# Patient Record
Sex: MALE | Race: WHITE | NOT HISPANIC OR LATINO | Employment: UNEMPLOYED | URBAN - METROPOLITAN AREA
[De-identification: names, ages, dates, MRNs, and addresses within clinical notes are randomized per-mention and may not be internally consistent; named-entity substitution may affect disease eponyms.]

---

## 2017-12-12 ENCOUNTER — HOSPITAL ENCOUNTER (INPATIENT)
Facility: HOSPITAL | Age: 62
LOS: 15 days | DRG: 603 | End: 2017-12-27
Attending: EMERGENCY MEDICINE | Admitting: FAMILY MEDICINE
Payer: MEDICAID

## 2017-12-12 DIAGNOSIS — L03.116 CELLULITIS OF LEFT LOWER EXTREMITY WITHOUT FOOT: Primary | ICD-10-CM

## 2017-12-12 DIAGNOSIS — E11.65 TYPE 2 DIABETES MELLITUS WITH HYPERGLYCEMIA, WITHOUT LONG-TERM CURRENT USE OF INSULIN: ICD-10-CM

## 2017-12-12 DIAGNOSIS — Z79.4 DIABETES MELLITUS, TYPE II, INSULIN DEPENDENT: ICD-10-CM

## 2017-12-12 DIAGNOSIS — I25.10 CORONARY ARTERY DISEASE INVOLVING NATIVE CORONARY ARTERY OF NATIVE HEART WITHOUT ANGINA PECTORIS: ICD-10-CM

## 2017-12-12 DIAGNOSIS — R93.89 ABNORMAL CXR: ICD-10-CM

## 2017-12-12 DIAGNOSIS — E11.9 DIABETES MELLITUS, TYPE II, INSULIN DEPENDENT: ICD-10-CM

## 2017-12-12 DIAGNOSIS — M72.6 NECROTIZING FASCIITIS: ICD-10-CM

## 2017-12-12 DIAGNOSIS — M79.89 LEG SWELLING: ICD-10-CM

## 2017-12-12 DIAGNOSIS — F41.9 ANXIETY: ICD-10-CM

## 2017-12-12 DIAGNOSIS — K42.9 RECURRENT UMBILICAL HERNIA: ICD-10-CM

## 2017-12-12 DIAGNOSIS — L03.116 CELLULITIS OF LEFT LOWER EXTREMITY: ICD-10-CM

## 2017-12-12 DIAGNOSIS — I15.2 HYPERTENSION ASSOCIATED WITH DIABETES: ICD-10-CM

## 2017-12-12 DIAGNOSIS — E11.59 HYPERTENSION ASSOCIATED WITH DIABETES: ICD-10-CM

## 2017-12-12 DIAGNOSIS — R19.7 DIARRHEA, UNSPECIFIED TYPE: ICD-10-CM

## 2017-12-12 DIAGNOSIS — R79.89 ELEVATED TROPONIN: ICD-10-CM

## 2017-12-12 LAB
ALBUMIN SERPL BCP-MCNC: 2.5 G/DL
ALP SERPL-CCNC: 85 U/L
ALT SERPL W/O P-5'-P-CCNC: 30 U/L
ANION GAP SERPL CALC-SCNC: 10 MMOL/L
APTT BLDCRRT: 30 SEC
AST SERPL-CCNC: 33 U/L
BACTERIA #/AREA URNS HPF: NORMAL /HPF
BASOPHILS # BLD AUTO: 0.01 K/UL
BASOPHILS NFR BLD: 0.1 %
BILIRUB SERPL-MCNC: 0.5 MG/DL
BILIRUB UR QL STRIP: NEGATIVE
BUN SERPL-MCNC: 14 MG/DL
CALCIUM SERPL-MCNC: 9.2 MG/DL
CHLORIDE SERPL-SCNC: 98 MMOL/L
CLARITY UR: CLEAR
CO2 SERPL-SCNC: 28 MMOL/L
COLOR UR: YELLOW
CREAT SERPL-MCNC: 0.8 MG/DL
DIFFERENTIAL METHOD: ABNORMAL
EOSINOPHIL # BLD AUTO: 0 K/UL
EOSINOPHIL NFR BLD: 0 %
ERYTHROCYTE [DISTWIDTH] IN BLOOD BY AUTOMATED COUNT: 13.5 %
EST. GFR  (AFRICAN AMERICAN): >60 ML/MIN/1.73 M^2
EST. GFR  (NON AFRICAN AMERICAN): >60 ML/MIN/1.73 M^2
ESTIMATED AVG GLUCOSE: 140 MG/DL
GLUCOSE SERPL-MCNC: 164 MG/DL
GLUCOSE SERPL-MCNC: 170 MG/DL (ref 70–110)
GLUCOSE UR QL STRIP: NEGATIVE
HBA1C MFR BLD HPLC: 6.5 %
HCT VFR BLD AUTO: 42.4 %
HGB BLD-MCNC: 14.4 G/DL
HGB UR QL STRIP: ABNORMAL
HYALINE CASTS #/AREA URNS LPF: 0 /LPF
INR PPP: 1
KETONES UR QL STRIP: NEGATIVE
LACTATE SERPL-SCNC: 1.1 MMOL/L
LACTATE SERPL-SCNC: 1.5 MMOL/L
LACTATE SERPL-SCNC: 1.5 MMOL/L
LEUKOCYTE ESTERASE UR QL STRIP: NEGATIVE
LIPASE SERPL-CCNC: 10 U/L
LYMPHOCYTES # BLD AUTO: 0.5 K/UL
LYMPHOCYTES NFR BLD: 3.6 %
MAGNESIUM SERPL-MCNC: 1.3 MG/DL
MCH RBC QN AUTO: 35 PG
MCHC RBC AUTO-ENTMCNC: 34 G/DL
MCV RBC AUTO: 103 FL
MICROSCOPIC COMMENT: NORMAL
MONOCYTES # BLD AUTO: 1.1 K/UL
MONOCYTES NFR BLD: 8 %
NEUTROPHILS # BLD AUTO: 12.4 K/UL
NEUTROPHILS NFR BLD: 87.9 %
NITRITE UR QL STRIP: NEGATIVE
PH UR STRIP: 7 [PH] (ref 5–8)
PHOSPHATE SERPL-MCNC: 1.7 MG/DL
PLATELET # BLD AUTO: 176 K/UL
PMV BLD AUTO: 12.2 FL
POCT GLUCOSE: 170 MG/DL (ref 70–110)
POCT GLUCOSE: 210 MG/DL (ref 70–110)
POTASSIUM SERPL-SCNC: 3.9 MMOL/L
PROCALCITONIN SERPL IA-MCNC: 0.55 NG/ML
PROT SERPL-MCNC: 7 G/DL
PROT UR QL STRIP: ABNORMAL
PROTHROMBIN TIME: 10.9 SEC
RBC # BLD AUTO: 4.12 M/UL
RBC #/AREA URNS HPF: 1 /HPF (ref 0–4)
SODIUM SERPL-SCNC: 136 MMOL/L
SP GR UR STRIP: <=1.005 (ref 1–1.03)
SQUAMOUS #/AREA URNS HPF: 2 /HPF
TROPONIN I SERPL DL<=0.01 NG/ML-MCNC: 0.02 NG/ML
TROPONIN I SERPL DL<=0.01 NG/ML-MCNC: 0.05 NG/ML
URN SPEC COLLECT METH UR: ABNORMAL
UROBILINOGEN UR STRIP-ACNC: 1 EU/DL
WBC # BLD AUTO: 14.05 K/UL
WBC #/AREA URNS HPF: 0 /HPF (ref 0–5)

## 2017-12-12 PROCEDURE — 84484 ASSAY OF TROPONIN QUANT: CPT | Mod: 91

## 2017-12-12 PROCEDURE — 85610 PROTHROMBIN TIME: CPT

## 2017-12-12 PROCEDURE — 93005 ELECTROCARDIOGRAM TRACING: CPT

## 2017-12-12 PROCEDURE — 93010 ELECTROCARDIOGRAM REPORT: CPT | Mod: 76,,, | Performed by: INTERNAL MEDICINE

## 2017-12-12 PROCEDURE — 25000003 PHARM REV CODE 250: Performed by: STUDENT IN AN ORGANIZED HEALTH CARE EDUCATION/TRAINING PROGRAM

## 2017-12-12 PROCEDURE — 81000 URINALYSIS NONAUTO W/SCOPE: CPT

## 2017-12-12 PROCEDURE — 63600175 PHARM REV CODE 636 W HCPCS: Performed by: STUDENT IN AN ORGANIZED HEALTH CARE EDUCATION/TRAINING PROGRAM

## 2017-12-12 PROCEDURE — 83690 ASSAY OF LIPASE: CPT

## 2017-12-12 PROCEDURE — 96366 THER/PROPH/DIAG IV INF ADDON: CPT

## 2017-12-12 PROCEDURE — 83735 ASSAY OF MAGNESIUM: CPT

## 2017-12-12 PROCEDURE — 85730 THROMBOPLASTIN TIME PARTIAL: CPT

## 2017-12-12 PROCEDURE — 96372 THER/PROPH/DIAG INJ SC/IM: CPT

## 2017-12-12 PROCEDURE — 85025 COMPLETE CBC W/AUTO DIFF WBC: CPT

## 2017-12-12 PROCEDURE — 25000003 PHARM REV CODE 250: Performed by: EMERGENCY MEDICINE

## 2017-12-12 PROCEDURE — 82962 GLUCOSE BLOOD TEST: CPT

## 2017-12-12 PROCEDURE — 21400001 HC TELEMETRY ROOM

## 2017-12-12 PROCEDURE — 87040 BLOOD CULTURE FOR BACTERIA: CPT

## 2017-12-12 PROCEDURE — 84145 PROCALCITONIN (PCT): CPT

## 2017-12-12 PROCEDURE — 93010 ELECTROCARDIOGRAM REPORT: CPT | Mod: ,,, | Performed by: INTERNAL MEDICINE

## 2017-12-12 PROCEDURE — 99285 EMERGENCY DEPT VISIT HI MDM: CPT | Mod: 25

## 2017-12-12 PROCEDURE — 83605 ASSAY OF LACTIC ACID: CPT

## 2017-12-12 PROCEDURE — 83605 ASSAY OF LACTIC ACID: CPT | Mod: 91

## 2017-12-12 PROCEDURE — 80053 COMPREHEN METABOLIC PANEL: CPT

## 2017-12-12 PROCEDURE — 36415 COLL VENOUS BLD VENIPUNCTURE: CPT

## 2017-12-12 PROCEDURE — 87086 URINE CULTURE/COLONY COUNT: CPT

## 2017-12-12 PROCEDURE — 96365 THER/PROPH/DIAG IV INF INIT: CPT

## 2017-12-12 PROCEDURE — 84100 ASSAY OF PHOSPHORUS: CPT

## 2017-12-12 PROCEDURE — 83036 HEMOGLOBIN GLYCOSYLATED A1C: CPT

## 2017-12-12 PROCEDURE — 84484 ASSAY OF TROPONIN QUANT: CPT

## 2017-12-12 PROCEDURE — 96367 TX/PROPH/DG ADDL SEQ IV INF: CPT

## 2017-12-12 PROCEDURE — 63600175 PHARM REV CODE 636 W HCPCS: Performed by: EMERGENCY MEDICINE

## 2017-12-12 RX ORDER — IBUPROFEN 200 MG
24 TABLET ORAL
Status: DISCONTINUED | OUTPATIENT
Start: 2017-12-12 | End: 2017-12-27 | Stop reason: HOSPADM

## 2017-12-12 RX ORDER — SODIUM,POTASSIUM PHOSPHATES 280-250MG
1 POWDER IN PACKET (EA) ORAL ONCE
Status: COMPLETED | OUTPATIENT
Start: 2017-12-12 | End: 2017-12-12

## 2017-12-12 RX ORDER — IBUPROFEN 200 MG
16 TABLET ORAL
Status: DISCONTINUED | OUTPATIENT
Start: 2017-12-12 | End: 2017-12-27 | Stop reason: HOSPADM

## 2017-12-12 RX ORDER — CLOPIDOGREL BISULFATE 75 MG/1
75 TABLET ORAL DAILY
Status: DISCONTINUED | OUTPATIENT
Start: 2017-12-12 | End: 2017-12-27 | Stop reason: HOSPADM

## 2017-12-12 RX ORDER — GLUCAGON 1 MG
1 KIT INJECTION
Status: DISCONTINUED | OUTPATIENT
Start: 2017-12-12 | End: 2017-12-27 | Stop reason: HOSPADM

## 2017-12-12 RX ORDER — HYDROCODONE BITARTRATE AND ACETAMINOPHEN 5; 325 MG/1; MG/1
1 TABLET ORAL
Status: COMPLETED | OUTPATIENT
Start: 2017-12-12 | End: 2017-12-12

## 2017-12-12 RX ORDER — CARVEDILOL 6.25 MG/1
6.25 TABLET ORAL 2 TIMES DAILY WITH MEALS
Status: DISCONTINUED | OUTPATIENT
Start: 2017-12-12 | End: 2017-12-27 | Stop reason: HOSPADM

## 2017-12-12 RX ORDER — ENOXAPARIN SODIUM 100 MG/ML
40 INJECTION SUBCUTANEOUS EVERY 24 HOURS
Status: DISCONTINUED | OUTPATIENT
Start: 2017-12-12 | End: 2017-12-27 | Stop reason: HOSPADM

## 2017-12-12 RX ORDER — IBUPROFEN 200 MG
1 TABLET ORAL DAILY
Status: DISCONTINUED | OUTPATIENT
Start: 2017-12-13 | End: 2017-12-27 | Stop reason: HOSPADM

## 2017-12-12 RX ORDER — LANOLIN ALCOHOL/MO/W.PET/CERES
400 CREAM (GRAM) TOPICAL 2 TIMES DAILY
Status: COMPLETED | OUTPATIENT
Start: 2017-12-12 | End: 2017-12-13

## 2017-12-12 RX ORDER — HYDROCHLOROTHIAZIDE 12.5 MG/1
12.5 TABLET ORAL DAILY
Status: DISCONTINUED | OUTPATIENT
Start: 2017-12-12 | End: 2017-12-27 | Stop reason: HOSPADM

## 2017-12-12 RX ORDER — INSULIN ASPART 100 [IU]/ML
0-5 INJECTION, SOLUTION INTRAVENOUS; SUBCUTANEOUS
Status: DISCONTINUED | OUTPATIENT
Start: 2017-12-12 | End: 2017-12-27 | Stop reason: HOSPADM

## 2017-12-12 RX ORDER — ASPIRIN 81 MG/1
81 TABLET ORAL DAILY
Status: DISCONTINUED | OUTPATIENT
Start: 2017-12-12 | End: 2017-12-27 | Stop reason: HOSPADM

## 2017-12-12 RX ORDER — MORPHINE SULFATE 2 MG/ML
4 INJECTION, SOLUTION INTRAMUSCULAR; INTRAVENOUS EVERY 4 HOURS PRN
Status: DISCONTINUED | OUTPATIENT
Start: 2017-12-12 | End: 2017-12-20

## 2017-12-12 RX ORDER — HYDROCODONE BITARTRATE AND ACETAMINOPHEN 5; 325 MG/1; MG/1
1 TABLET ORAL EVERY 6 HOURS PRN
Status: DISCONTINUED | OUTPATIENT
Start: 2017-12-12 | End: 2017-12-27 | Stop reason: HOSPADM

## 2017-12-12 RX ORDER — CLORAZEPATE DIPOTASSIUM 3.75 MG/1
15 TABLET ORAL 2 TIMES DAILY
Status: DISCONTINUED | OUTPATIENT
Start: 2017-12-12 | End: 2017-12-27 | Stop reason: HOSPADM

## 2017-12-12 RX ORDER — SIMVASTATIN 20 MG/1
40 TABLET, FILM COATED ORAL NIGHTLY
Status: DISCONTINUED | OUTPATIENT
Start: 2017-12-12 | End: 2017-12-27 | Stop reason: HOSPADM

## 2017-12-12 RX ORDER — SODIUM CHLORIDE 0.9 % (FLUSH) 0.9 %
5 SYRINGE (ML) INJECTION
Status: DISCONTINUED | OUTPATIENT
Start: 2017-12-12 | End: 2017-12-27 | Stop reason: HOSPADM

## 2017-12-12 RX ADMIN — CARVEDILOL 6.25 MG: 6.25 TABLET, FILM COATED ORAL at 05:12

## 2017-12-12 RX ADMIN — HYDROCODONE BITARTRATE AND ACETAMINOPHEN 1 TABLET: 5; 325 TABLET ORAL at 08:12

## 2017-12-12 RX ADMIN — HYDROCODONE BITARTRATE AND ACETAMINOPHEN 1 TABLET: 5; 325 TABLET ORAL at 11:12

## 2017-12-12 RX ADMIN — CEFTRIAXONE SODIUM 1 G: 1 INJECTION, POWDER, FOR SOLUTION INTRAMUSCULAR; INTRAVENOUS at 05:12

## 2017-12-12 RX ADMIN — POTASSIUM & SODIUM PHOSPHATES POWDER PACK 280-160-250 MG 1 PACKET: 280-160-250 PACK at 05:12

## 2017-12-12 RX ADMIN — VANCOMYCIN HYDROCHLORIDE 1250 MG: 1 INJECTION, POWDER, LYOPHILIZED, FOR SOLUTION INTRAVENOUS at 01:12

## 2017-12-12 RX ADMIN — INSULIN ASPART 1 UNITS: 100 INJECTION, SOLUTION INTRAVENOUS; SUBCUTANEOUS at 10:12

## 2017-12-12 RX ADMIN — CLORAZEPATE DIPOTASSIUM 15 MG: 3.75 TABLET ORAL at 08:12

## 2017-12-12 RX ADMIN — CLOPIDOGREL 75 MG: 75 TABLET, FILM COATED ORAL at 05:12

## 2017-12-12 RX ADMIN — HYDROCODONE BITARTRATE AND ACETAMINOPHEN 1 TABLET: 5; 325 TABLET ORAL at 12:12

## 2017-12-12 RX ADMIN — PIPERACILLIN SODIUM AND TAZOBACTAM SODIUM 4.5 G: 4; .5 INJECTION, POWDER, FOR SOLUTION INTRAVENOUS at 10:12

## 2017-12-12 RX ADMIN — ENOXAPARIN SODIUM 40 MG: 100 INJECTION SUBCUTANEOUS at 05:12

## 2017-12-12 RX ADMIN — PIPERACILLIN AND TAZOBACTAM 4.5 G: 4; .5 INJECTION, POWDER, LYOPHILIZED, FOR SOLUTION INTRAVENOUS; PARENTERAL at 12:12

## 2017-12-12 RX ADMIN — Medication 400 MG: at 05:12

## 2017-12-12 RX ADMIN — ASPIRIN 81 MG: 81 TABLET, COATED ORAL at 05:12

## 2017-12-12 RX ADMIN — SIMVASTATIN 40 MG: 20 TABLET, FILM COATED ORAL at 05:12

## 2017-12-12 RX ADMIN — HYDROCHLOROTHIAZIDE 12.5 MG: 12.5 TABLET ORAL at 05:12

## 2017-12-12 NOTE — ED NOTES
APPEARANCE: Alert, oriented and in no acute distress.  CARDIAC: Normal rate and rhythm, no murmur heard.   PERIPHERAL VASCULAR: peripheral pulses present. Normal cap refill. Edema to left lower extremity. Warm to touch.    RESPIRATORY:Normal rate and effort, breath sounds clear bilaterally throughout chest. Respirations are equal and unlabored no obvious signs of distress.  GASTRO: soft, bowel sounds normal, no tenderness, no abdominal distention.  MUSC: Limited ROM to left lower extremity due to pain and swelling. Soft tissue tenderness to left lower extremity.   SKIN: Skin is warm and dry, normal skin turgor, mucous membranes moist. Left lower extremity is very red, swollen and the redness is traveling up left leg to groin area, very painful. Area at left knee is purplish red in color.   NEURO: 5/5 strength major flexors/extensors bilaterally. Sensory intact to light touch bilaterally. Guy coma scale: eyes open spontaneously-4, oriented & converses-5, obeys commands-6. No neurological abnormalities.   MENTAL STATUS: awake, alert and aware of environment.  EYE: PERRL, both eyes: pupils brisk and reactive to light. Normal size.  ENT: EARS: no obvious drainage. NOSE: no active bleeding.   Pt complains of left leg pain, redness and swelling x 2 days but has been having trouble with swelling off and on for 1 month.

## 2017-12-12 NOTE — PROGRESS NOTES
"Vancomycin Dosing and Monitoring Pharmacy Protocol    Reji Guerra is a 62 y.o. male    Height: 5' 5" (1.651 m)   Wt Readings from Last 1 Encounters:   12/12/17 106.6 kg (235 lb)     Ideal body weight: 61.5 kg (135 lb 9.3 oz)  Adjusted ideal body weight: 79.5 kg (175 lb 5.6 oz)    Temp Readings from Last 3 Encounters:   12/12/17 98.7 °F (37.1 °C) (Oral)   05/22/15 97.3 °F (36.3 °C) (Oral)   05/20/15 98 °F (36.7 °C)      Lab Results   Component Value Date/Time    WBC 14.05 (H) 12/12/2017 11:19 AM      Lab Results   Component Value Date/Time    CREATININE 0.8 12/12/2017 12:26 PM        Serum creatinine: 0.8 mg/dL 12/12/17 1226  Estimated creatinine clearance: 104.9 mL/min    Antibiotics       Start     Stop Route Frequency Ordered    12/13/17 0200  vancomycin (VANCOCIN) 1,250 mg in dextrose 5 % 250 mL IVPB  (Vancomycin IVPB with levels panel)      -- IV Every 12 hours (non-standard times) 12/12/17 1653    12/12/17 1700  cefTRIAXone (ROCEPHIN) 1 g in dextrose 5 % 50 mL IVPB (ready to mix system)      -- IV Every 12 hours (non-standard times) 12/12/17 1624          Antifungals       None            Microbiology Results (last 7 days)       Procedure Component Value Units Date/Time    Urine culture [[id number]] Collected:  12/12/17 1207    Order Status:  Sent Specimen:  Urine from Urine, Clean Catch Updated:  12/12/17 1605    Blood culture x two cultures. Draw prior to antibiotics. [[id number]] Collected:  12/12/17 1226    Order Status:  Sent Specimen:  Blood from Peripheral, Lower Arm, Right Updated:  12/12/17 1604    Blood culture x two cultures. Draw prior to antibiotics. [[id number]] Collected:  12/12/17 1119    Order Status:  Sent Specimen:  Blood from Peripheral, Hand, Left Updated:  12/12/17 1451            Indication:   Skin and skin structure    Target Level: 10-15 mcg/ml    Hemodialysis:   No on N/A    Dosing Weight:   AdjBW--adjusted body weight  If ABW is greater than or equal to 30% over Ideal Body Weight, " AdjBW will be used to calculate vancomycin dose.    Last Vancomycin dose: 1250 mg 17 at  1400 in ER    Date/Time given: N/A         Vancomycin level:  No results for input(s): VANCOMYCIN-TROUGH in the last 72 hours.  No results for input(s): VANCOMYCIN, RANDOM in the last 72 hours.    Per Protocol Initial/Adjustments Dosin. Initial/Adjustment Dose: DECREASE Vancomycin will be adjusted from 1500 mg q12 hr to 1250mg q12hr  2. Vancomycin Trough Level will be drawn on 17 at 0100 date/time    Pharmacy will continue to follow.    Please contact if you have any further questions. Thank you.    Helena Richards, PharmD  [phone]

## 2017-12-12 NOTE — ED PROVIDER NOTES
Encounter Date: 12/12/2017       History     Chief Complaint   Patient presents with    Leg Swelling     leg swelling, redness, rash, and tenderness off/on one month     This is a 63 y/o M with multiple co morbidities including NIDDM, PVD, CAD, HTN who presents with worsening erythema and swelling of left lower extremity.  Patient denies fever or chills.  Reports throbbing, burning, aching pain of LLE.  Redness has been worsening and at this time entire lower leg is erythematous and swollen and he has streaking area of redness up to the level of his groin on the L side.  No nausea or vomiting.  No chest pain or SOB.  No hx of DVT or PE previously.  Noncompliant with previously prescribed medication for HTN and DM and no visit to MD for over a year.          Leg Pain    The incident occurred at home. There was no injury mechanism. The incident occurred several weeks ago. The pain is present in the left leg. The quality of the pain is described as aching and burning. The pain is at a severity of 10/10. The pain has been constant since onset. Pertinent negatives include no numbness, no inability to bear weight, no loss of motion, no muscle weakness, no loss of sensation and no tingling. He reports no foreign bodies present. The symptoms are aggravated by bearing weight, activity and palpation. He has tried nothing for the symptoms.     Review of patient's allergies indicates:   Allergen Reactions    Iodine and iodide containing products     Shellfish containing products      Past Medical History:   Diagnosis Date    Diabetes mellitus, type 2     Heart disease     High cholesterol     Hypertension      Past Surgical History:   Procedure Laterality Date    ANGIOPLASTY      HERNIA REPAIR       No family history on file.  Social History   Substance Use Topics    Smoking status: Former Smoker    Smokeless tobacco: Not on file    Alcohol use No     Review of Systems   Constitutional: Positive for fatigue and  fever.   HENT: Negative for sore throat.    Respiratory: Negative for shortness of breath.    Cardiovascular: Positive for leg swelling. Negative for chest pain.   Gastrointestinal: Negative for nausea.   Genitourinary: Negative for dysuria.   Musculoskeletal: Negative for back pain.   Skin: Positive for color change, rash and wound.   Neurological: Negative for tingling, weakness and numbness.   Hematological: Does not bruise/bleed easily.   All other systems reviewed and are negative.      Physical Exam     Initial Vitals   BP Pulse Resp Temp SpO2   12/12/17 1025 12/12/17 1025 12/12/17 1025 12/12/17 1249 12/12/17 1025   126/70 88 15 98.9 °F (37.2 °C) 96 %      MAP       12/12/17 1025       88.67         Physical Exam    Nursing note and vitals reviewed.  Constitutional: He appears well-developed and well-nourished.   HENT:   Head: Normocephalic and atraumatic.   Eyes: Conjunctivae and EOM are normal. Pupils are equal, round, and reactive to light.   Neck: Normal range of motion. Neck supple.   Cardiovascular: Normal rate, regular rhythm, normal heart sounds and intact distal pulses. Exam reveals no gallop and no friction rub.    No murmur heard.  Pulmonary/Chest: Breath sounds normal. No stridor. No respiratory distress. He has no wheezes. He has no rhonchi. He has no rales. He exhibits no tenderness.   Abdominal: Soft. Bowel sounds are normal. He exhibits no distension. There is no tenderness. There is no rebound and no guarding.   Musculoskeletal: Normal range of motion. He exhibits edema and tenderness.   Bilateral lower extremity edema worse on the L side, Full ROM of L knee    Neurological: He is alert and oriented to person, place, and time. He has normal strength. No cranial nerve deficit.   Skin: Skin is warm and dry. Capillary refill takes less than 2 seconds. Rash noted. There is erythema.   Psychiatric: He has a normal mood and affect.         ED Course   Procedures  Labs Reviewed   CBC W/ AUTO  DIFFERENTIAL - Abnormal; Notable for the following:        Result Value    WBC 14.05 (*)     RBC 4.12 (*)      (*)     MCH 35.0 (*)     Gran # 12.4 (*)     Lymph # 0.5 (*)     Mono # 1.1 (*)     Gran% 87.9 (*)     Lymph% 3.6 (*)     All other components within normal limits   URINALYSIS - Abnormal; Notable for the following:     Specific Gravity, UA <=1.005 (*)     Protein, UA 2+ (*)     Occult Blood UA 1+ (*)     All other components within normal limits   COMPREHENSIVE METABOLIC PANEL - Abnormal; Notable for the following:     Glucose 164 (*)     Albumin 2.5 (*)     All other components within normal limits   TROPONIN I - Abnormal; Notable for the following:     Troponin I 0.051 (*)     All other components within normal limits   MAGNESIUM - Abnormal; Notable for the following:     Magnesium 1.3 (*)     All other components within normal limits   PHOSPHORUS - Abnormal; Notable for the following:     Phosphorus 1.7 (*)     All other components within normal limits   PROCALCITONIN - Abnormal; Notable for the following:     Procalcitonin 0.55 (*)     All other components within normal limits   POCT GLUCOSE - Abnormal; Notable for the following:     POCT Glucose 170 (*)     All other components within normal limits   PROTIME-INR   APTT   LACTIC ACID, PLASMA   LIPASE   LACTIC ACID, PLASMA   URINALYSIS MICROSCOPIC   LACTIC ACID, PLASMA   TROPONIN I   POCT GLUCOSE   POCT GLUCOSE        Imaging Results          X-Ray Abdomen AP 1 View (Final result)  Result time 12/19/17 08:04:21    Final result by Jorge Allred MD (12/19/17 08:04:21)                 Impression:     Localized proximal small bowel ileus question.      Electronically signed by: ANTONIO ALLRED MD  Date:     12/19/17  Time:    08:04              Narrative:    Single view of the abdomen, cervical mental opacities from anterior abdominal wall repair graft left periumbilical.  Minimal mild distention proximal small bowel.  Nondistended air  stomach and colon.  Lower pelvis not included on exam.                             X-Ray Chest 1 View (Final result)  Result time 12/18/17 08:13:11    Final result by Jorge Allred MD (12/18/17 08:13:11)                 Impression:     Stable chest.  No active process.      Electronically signed by: ANTONIO ALLRED MD  Date:     12/18/17  Time:    08:13              Narrative:    Single view chest, comparison 12/12/2017.  Cardiomegaly stable.  Incomplete inspiration, interstitial markings remain mildly prominent.  There are chronic rib deformity left lateral fifth sixth and seventh ribs.                             MRI Lower Extremity W WO Contrast Left (Final result)  Result time 12/15/17 16:53:12    Final result by Jorge Allred MD (12/15/17 16:53:12)                 Impression:     Nonspecific subcutaneous edema and Cellulitis without abscess, osteomyelitis, septic joint left lower leg.      Electronically signed by: ANTONIO ALLRED MD  Date:     12/15/17  Time:    16:53              Narrative:    Comparison ultrasound vein left leg, radiographs left knee dating back to 12/12/17.  MR on left tibia from knee to ankle without, and with gadovist 10 cc intravenous.    Asymmetrical subcutaneous edema left lower extremity compared to contralateral side.  Bone and joint structures normal.  No fracture dislocation.  Neurovascular structures appear intact.  Muscle tendon, ligament structures normal.  No abscess.  Heterogeneous enhancement of subcutaneous tissues, no unusual enhancement musculoskeletal structures otherwise.                             X-Ray Knee 3 View Left (Final result)  Result time 12/13/17 12:18:14    Final result by Rob Valle MD (12/13/17 12:18:14)                 Impression:     As above.      Electronically signed by: ROB VALLE MD  Date:     12/13/17  Time:    12:18              Narrative:    Knee 3 views left.    Findings: 3 views left. There is no fracture, dislocation,  or bony erosion identified.                             X-Ray Chest AP Portable (Final result)  Result time 12/12/17 12:14:25    Final result by Luan Gifford MD (12/12/17 12:14:25)                 Impression:        No acute radiographic findings on single view of the chest.      Electronically signed by: LUAN GIFFORD MD  Date:     12/12/17  Time:    12:14              Narrative:    Comparison: None    Technique: Single view of the chest.    Findings: Cardiac silhouette is enlarged.  There is aortic atherosclerosis.  Lungs show no evidence of significant focal consolidation, large effusion, or pneumothorax.  The bones demonstrate degenerative changes of the spine and shoulders.                             US Lower Extremity Veins Left (Final result)  Result time 12/12/17 11:54:55    Final result by Luan Gifford MD (12/12/17 11:54:55)                 Impression:         No evidence of deep vein thrombosisin the left lower extremity.    There is lower extremity edema.        Electronically signed by: LUAN GIFFORD MD  Date:     12/12/17  Time:    11:54              Narrative:    Comparison: None.    Findings: There is no evidence of venous thrombosis in the bilateral common femoral, left femoral, greater saphenous, popliteal, posterior tibial, anterior tibial, and peroneal veins.                                 Medical Decision Making:   Differential Diagnosis:   Cellulitis, DVT, peripheral vascular disease, soft tissue infection in diabetic patient   Clinical Tests:   Lab Tests: Ordered and Reviewed  Radiological Study: Ordered and Reviewed  Medical Tests: Ordered and Reviewed  ED Management:  IV abx, admission to [facility] family                    ED Course      Clinical Impression:   The primary encounter diagnosis was Cellulitis of left lower extremity without foot. Diagnoses of Leg swelling, Coronary artery disease involving native coronary artery of native heart without angina pectoris, Type 2 diabetes  mellitus with hyperglycemia, without long-term current use of insulin, Elevated troponin, Cellulitis of left lower extremity, Anxiety, Diabetes mellitus, type II, insulin dependent, and Diarrhea, unspecified type were also pertinent to this visit.    Disposition:   Disposition: Discharged  Condition: Stable                        Keerthi Main MD  12/20/17 6084

## 2017-12-12 NOTE — ED NOTES
Pt sitting up on the side of the bed, eating dinner. No distress noted.    nondistended/nontender.../soft

## 2017-12-12 NOTE — ED NOTES
Pt resting on stretcher. States pain is 2/10 at this time. No distress noted. SR up and CB in reach.

## 2017-12-12 NOTE — HPI
63 yo male w/ CAD (3 stents in 2003 w/MI), DMT2, HTN, HLD presents to ED with 1 week of LLE swelling and erythema, which just starting tracking up his inner thigh within the past two days. No trauma to his leg, no wounds on his foot. Pain with standing and walking. 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  He does have to rest 1/2 up a full flight of stairs but denies chest pain, shortness of breath, recent illness. He sleeps flat on just 1 pillow.    He hasn't had medical care in 2 years and hasn't taken any medications for 1 year.  He thinks he was supposed to keep taking the plavix.  Baseline dry cough, minimally productive with clear sputum. Denies CP, SOB, recent illness, N/V, diarrhea, constipation. Last BM day before admission.

## 2017-12-13 PROBLEM — R79.89 ELEVATED TROPONIN: Status: ACTIVE | Noted: 2017-12-13

## 2017-12-13 PROBLEM — L03.116 CELLULITIS OF LEFT LOWER EXTREMITY WITHOUT FOOT: Status: ACTIVE | Noted: 2017-12-13

## 2017-12-13 LAB
ALBUMIN SERPL BCP-MCNC: 2.2 G/DL
ALP SERPL-CCNC: 91 U/L
ALT SERPL W/O P-5'-P-CCNC: 34 U/L
ANION GAP SERPL CALC-SCNC: 9 MMOL/L
AST SERPL-CCNC: 35 U/L
BACTERIA UR CULT: NORMAL
BASOPHILS # BLD AUTO: 0.01 K/UL
BASOPHILS NFR BLD: 0.1 %
BILIRUB SERPL-MCNC: 0.4 MG/DL
BUN SERPL-MCNC: 15 MG/DL
CALCIUM SERPL-MCNC: 8.9 MG/DL
CHLORIDE SERPL-SCNC: 97 MMOL/L
CO2 SERPL-SCNC: 30 MMOL/L
CREAT SERPL-MCNC: 0.8 MG/DL
DIFFERENTIAL METHOD: ABNORMAL
EOSINOPHIL # BLD AUTO: 0 K/UL
EOSINOPHIL NFR BLD: 0.1 %
ERYTHROCYTE [DISTWIDTH] IN BLOOD BY AUTOMATED COUNT: 13.7 %
EST. GFR  (AFRICAN AMERICAN): >60 ML/MIN/1.73 M^2
EST. GFR  (NON AFRICAN AMERICAN): >60 ML/MIN/1.73 M^2
GLUCOSE SERPL-MCNC: 181 MG/DL
HCT VFR BLD AUTO: 41.9 %
HGB BLD-MCNC: 14 G/DL
LYMPHOCYTES # BLD AUTO: 0.7 K/UL
LYMPHOCYTES NFR BLD: 4.9 %
MAGNESIUM SERPL-MCNC: 1.5 MG/DL
MCH RBC QN AUTO: 34.8 PG
MCHC RBC AUTO-ENTMCNC: 33.4 G/DL
MCV RBC AUTO: 104 FL
MONOCYTES # BLD AUTO: 1.3 K/UL
MONOCYTES NFR BLD: 9.6 %
NEUTROPHILS # BLD AUTO: 11.8 K/UL
NEUTROPHILS NFR BLD: 84.9 %
PHOSPHATE SERPL-MCNC: 2.2 MG/DL
PLATELET # BLD AUTO: 170 K/UL
PMV BLD AUTO: 11.6 FL
POCT GLUCOSE: 132 MG/DL (ref 70–110)
POCT GLUCOSE: 171 MG/DL (ref 70–110)
POCT GLUCOSE: 226 MG/DL (ref 70–110)
POCT GLUCOSE: 229 MG/DL (ref 70–110)
POTASSIUM SERPL-SCNC: 3.5 MMOL/L
PROT SERPL-MCNC: 6.4 G/DL
RBC # BLD AUTO: 4.02 M/UL
SODIUM SERPL-SCNC: 136 MMOL/L
TROPONIN I SERPL DL<=0.01 NG/ML-MCNC: 0.01 NG/ML
WBC # BLD AUTO: 13.9 K/UL

## 2017-12-13 PROCEDURE — 25000003 PHARM REV CODE 250: Performed by: FAMILY MEDICINE

## 2017-12-13 PROCEDURE — 36415 COLL VENOUS BLD VENIPUNCTURE: CPT

## 2017-12-13 PROCEDURE — 63600175 PHARM REV CODE 636 W HCPCS: Performed by: STUDENT IN AN ORGANIZED HEALTH CARE EDUCATION/TRAINING PROGRAM

## 2017-12-13 PROCEDURE — 63600175 PHARM REV CODE 636 W HCPCS: Performed by: FAMILY MEDICINE

## 2017-12-13 PROCEDURE — 80053 COMPREHEN METABOLIC PANEL: CPT

## 2017-12-13 PROCEDURE — S4991 NICOTINE PATCH NONLEGEND: HCPCS | Performed by: STUDENT IN AN ORGANIZED HEALTH CARE EDUCATION/TRAINING PROGRAM

## 2017-12-13 PROCEDURE — A4216 STERILE WATER/SALINE, 10 ML: HCPCS | Performed by: STUDENT IN AN ORGANIZED HEALTH CARE EDUCATION/TRAINING PROGRAM

## 2017-12-13 PROCEDURE — 21400001 HC TELEMETRY ROOM

## 2017-12-13 PROCEDURE — 85025 COMPLETE CBC W/AUTO DIFF WBC: CPT

## 2017-12-13 PROCEDURE — 93010 ELECTROCARDIOGRAM REPORT: CPT | Mod: ,,, | Performed by: INTERNAL MEDICINE

## 2017-12-13 PROCEDURE — 83735 ASSAY OF MAGNESIUM: CPT

## 2017-12-13 PROCEDURE — 93005 ELECTROCARDIOGRAM TRACING: CPT

## 2017-12-13 PROCEDURE — 94761 N-INVAS EAR/PLS OXIMETRY MLT: CPT

## 2017-12-13 PROCEDURE — 84100 ASSAY OF PHOSPHORUS: CPT

## 2017-12-13 PROCEDURE — 25000003 PHARM REV CODE 250: Performed by: STUDENT IN AN ORGANIZED HEALTH CARE EDUCATION/TRAINING PROGRAM

## 2017-12-13 RX ORDER — MAGNESIUM SULFATE 1 G/100ML
1 INJECTION INTRAVENOUS ONCE
Status: DISCONTINUED | OUTPATIENT
Start: 2017-12-13 | End: 2017-12-13

## 2017-12-13 RX ADMIN — SODIUM CHLORIDE, PRESERVATIVE FREE 5 ML: 5 INJECTION INTRAVENOUS at 08:12

## 2017-12-13 RX ADMIN — CARVEDILOL 6.25 MG: 6.25 TABLET, FILM COATED ORAL at 08:12

## 2017-12-13 RX ADMIN — Medication 400 MG: at 08:12

## 2017-12-13 RX ADMIN — INSULIN ASPART 2 UNITS: 100 INJECTION, SOLUTION INTRAVENOUS; SUBCUTANEOUS at 01:12

## 2017-12-13 RX ADMIN — HYDROCODONE BITARTRATE AND ACETAMINOPHEN 1 TABLET: 5; 325 TABLET ORAL at 04:12

## 2017-12-13 RX ADMIN — CLORAZEPATE DIPOTASSIUM 15 MG: 3.75 TABLET ORAL at 08:12

## 2017-12-13 RX ADMIN — CARVEDILOL 6.25 MG: 6.25 TABLET, FILM COATED ORAL at 04:12

## 2017-12-13 RX ADMIN — SODIUM CHLORIDE 500 ML: 0.9 INJECTION, SOLUTION INTRAVENOUS at 08:12

## 2017-12-13 RX ADMIN — PIPERACILLIN SODIUM AND TAZOBACTAM SODIUM 4.5 G: 4; .5 INJECTION, POWDER, FOR SOLUTION INTRAVENOUS at 08:12

## 2017-12-13 RX ADMIN — HYDROCHLOROTHIAZIDE 12.5 MG: 12.5 TABLET ORAL at 08:12

## 2017-12-13 RX ADMIN — ENOXAPARIN SODIUM 40 MG: 100 INJECTION SUBCUTANEOUS at 04:12

## 2017-12-13 RX ADMIN — VANCOMYCIN HYDROCHLORIDE 1250 MG: 10 INJECTION, POWDER, LYOPHILIZED, FOR SOLUTION INTRAVENOUS at 01:12

## 2017-12-13 RX ADMIN — SIMVASTATIN 40 MG: 20 TABLET, FILM COATED ORAL at 08:12

## 2017-12-13 RX ADMIN — NICOTINE 1 PATCH: 21 PATCH, EXTENDED RELEASE TRANSDERMAL at 08:12

## 2017-12-13 RX ADMIN — CLOPIDOGREL 75 MG: 75 TABLET, FILM COATED ORAL at 08:12

## 2017-12-13 RX ADMIN — VANCOMYCIN HYDROCHLORIDE 1250 MG: 10 INJECTION, POWDER, LYOPHILIZED, FOR SOLUTION INTRAVENOUS at 02:12

## 2017-12-13 RX ADMIN — ASPIRIN 81 MG: 81 TABLET, COATED ORAL at 08:12

## 2017-12-13 RX ADMIN — HYDROCODONE BITARTRATE AND ACETAMINOPHEN 1 TABLET: 5; 325 TABLET ORAL at 10:12

## 2017-12-13 NOTE — PROGRESS NOTES
.Pharmacy New Medication Education    Patient accepted medication education.    Pharmacy educated patient on name and purpose of medications and possible side effects, using the teach-back method.     D/C Current Inpatient Medication Orders   D/C aspirin EC tablet 81 mg   D/C carvedilol tablet 6.25 mg   D/C clopidogrel tablet 75 mg   D/C clorazepate tablet 15 mg   D/C dextrose 50% injection 12.5 g   D/C dextrose 50% injection 25 g   D/C enoxaparin injection 40 mg   D/C glucagon (human recombinant) injection 1 mg   D/C glucose chewable tablet 16 g   D/C glucose chewable tablet 24 g   D/C hydroCHLOROthiazide tablet 12.5 mg   D/C hydrocodone-acetaminophen 5-325mg per tablet 1 tablet   D/C influenza (FLUZONE,FLUARIX QUADRIVALENT) vaccine 0.5 mL   D/C insulin aspart pen 0-5 Units   D/C lorazepam (ATIVAN) injection 2 mg   D/C magnesium oxide tablet 400 mg   D/C morphine injection 4 mg     D/C nicotine 21 mg/24 hr 1 patch   D/C piperacillin-tazobactam 4.5 g in dextrose 5 % 100 mL IVPB (ready to mix system)   D/C pneumoc 13-jose g conj-dip cr(PF) 0.5 mL   D/C simvastatin tablet 40 mg   D/C sodium chloride 0.9% flush 5 mL   D/C vancomycin (VANCOCIN) 1,250 mg in dextrose 5 % 250 mL IVPB       Learners of pharmacy medication education included:  Patient    Patient +/- learner response:  Verbalized Understanding, Teachback

## 2017-12-13 NOTE — ASSESSMENT & PLAN NOTE
WBC 14 stable to slightly Improved to 13.9  On Vanc, Zosyn  Vanc trough ordered before 4th dose  IV fluid bolus (500 ml) this AM to supplement PO intake  Cellulitis is overlying joint but seems to just involve the skin/soft tissue, will monitor closely- not currently thought to be septic arthritis, able to range joint

## 2017-12-13 NOTE — ED NOTES
Pt states pain is 2/10 and he will wait until he gets his room upstairs before he asks for more pain medicine. Pt is alert and oriented. SR up and CB in reach.

## 2017-12-13 NOTE — SUBJECTIVE & OBJECTIVE
Past Medical History:   Diagnosis Date    Diabetes mellitus, type 2     Heart disease     High cholesterol     Hypertension      Past Surgical History:   Procedure Laterality Date    ANGIOPLASTY      HERNIA REPAIR       Review of patient's allergies indicates:   Allergen Reactions    Iodine and iodide containing products     Shellfish containing products      No current facility-administered medications on file prior to encounter.      Current Outpatient Prescriptions on File Prior to Encounter   Medication Sig    carvedilol (COREG) 6.25 MG tablet Take 6.25 mg by mouth 2 (two) times daily with meals.    clopidogrel (PLAVIX) 75 mg tablet Take 75 mg by mouth once daily.    clorazepate (TRANXENE) 15 MG tablet Take 15 mg by mouth 2 (two) times daily.    glipiZIDE (GLUCOTROL) 5 MG tablet Take 5 mg by mouth 2 (two) times daily before meals.    hydrochlorothiazide (MICROZIDE) 12.5 mg capsule Take 12.5 mg by mouth once daily.    simvastatin (ZOCOR) 40 MG tablet Take 40 mg by mouth every evening.    [DISCONTINUED] naproxen (NAPROSYN) 500 MG tablet Take 500 mg by mouth 2 (two) times daily.     Family History     None        Social History Main Topics    Smoking status: Former Smoker    Smokeless tobacco: Not on file    Alcohol use No    Drug use: No    Sexual activity: Not on file     Review of Systems   Constitutional: Positive for activity change. Negative for chills and diaphoresis.   Cardiovascular: Positive for leg swelling (Unilat LLE swelling, skin changes).   Gastrointestinal: Positive for abdominal distention. Negative for abdominal pain, blood in stool, constipation, diarrhea, nausea and vomiting.   Genitourinary: Negative for difficulty urinating, dysuria and urgency.   Musculoskeletal: Positive for arthralgias, gait problem and myalgias. Negative for back pain.   Skin: Positive for color change.   Neurological: Negative for dizziness, weakness, numbness and headaches.     Objective:     Vital  Signs (Most Recent):  Temp: 98.7 °F (37.1 °C) (12/12/17 1426)  Pulse: 95 (12/12/17 1426)  Resp: 17 (12/12/17 1426)  BP: 108/61 (12/12/17 1426)  SpO2: 96 % (12/12/17 1426) Vital Signs (24h Range):  Temp:  [98.7 °F (37.1 °C)-98.9 °F (37.2 °C)] 98.7 °F (37.1 °C)  Pulse:  [78-95] 95  Resp:  [15-17] 17  SpO2:  [95 %-96 %] 96 %  BP: (108-126)/(61-76) 108/61     Weight: 106.6 kg (235 lb)  Body mass index is 39.11 kg/m².    Physical Exam   Constitutional: He is oriented to person, place, and time. He appears well-developed and well-nourished. No distress.   HENT:   Head: Normocephalic and atraumatic.   Mouth/Throat: No oropharyngeal exudate.   Eyes: Conjunctivae and EOM are normal. Pupils are equal, round, and reactive to light.   Neck: Normal range of motion. Neck supple. No JVD present.   Cardiovascular: Normal rate, regular rhythm and normal heart sounds.    No murmur heard.  Pulmonary/Chest: Effort normal. No respiratory distress. He has wheezes (bilat lower lung wheezes). He has no rales.   Abdominal: Soft. Bowel sounds are normal. He exhibits distension (rotund abdomen ). He exhibits no mass. There is no tenderness.   Musculoskeletal: He exhibits no edema.   Able to range knee (UE & RLE without erythema, pain); chronic spinal osteoarthritis   Neurological: He is alert and oriented to person, place, and time. No cranial nerve deficit.   Skin: Skin is warm. Capillary refill takes less than 2 seconds. He is not diaphoretic. There is erythema.   LLE erythema and swelling extending from mid calf to knee with erythema spreading up medial aspect of thigh with exquisite tenderness; dry flaking skin on bilat feet; bilat old healed venous stasis ulcer scaling wounds, no drainage   Psychiatric: He has a normal mood and affect. His behavior is normal.   Nursing note and vitals reviewed.        CRANIAL NERVES     CN III, IV, VI   Pupils are equal, round, and reactive to light.  Extraocular motions are normal.               Significant Labs:   CBC:   Recent Labs  Lab 12/12/17  1119   WBC 14.05*   HGB 14.4   HCT 42.4        CMP:   Recent Labs  Lab 12/12/17  1226      K 3.9   CL 98   CO2 28   *   BUN 14   CREATININE 0.8   CALCIUM 9.2   PROT 7.0   ALBUMIN 2.5*   BILITOT 0.5   ALKPHOS 85   AST 33   ALT 30   ANIONGAP 10   EGFRNONAA >60     Lactic Acid:   Recent Labs  Lab 12/12/17  1226 12/12/17  1656   LACTATE 1.5  1.5 1.1     Magnesium:   Recent Labs  Lab 12/12/17  1226   MG 1.3*     Prealbumin: No results for input(s): PREALBUMIN in the last 48 hours.  Troponin:   Recent Labs  Lab 12/12/17  1226   TROPONINI 0.051*       Significant Imaging: I have reviewed all pertinent imaging results/findings within the past 24 hours.     LLE US:   No evidence of deep vein thrombosisin the left lower extremity.    There is lower extremity edema.        Electronically signed by: JH SUTHERLAND MD  Date: 12/12/17  Time: 11:54     Encounter     View Encounter            CXR:  Findings: Cardiac silhouette is enlarged.  There is aortic atherosclerosis.  Lungs show no evidence of significant focal consolidation, large effusion, or pneumothorax.  The bones demonstrate degenerative changes of the spine and shoulders.   Impression         No acute radiographic findings on single view of the chest.      Electronically signed by: JH SUTHERLAND MD  Date: 12/12/17  Time: 12:14     Encounter     View Encounter

## 2017-12-13 NOTE — PROGRESS NOTES
[facility] Hospital Medicine  Progress Note    Patient Name: Reji Guerra  MRN: [id number]  Patient Class: IP- Inpatient   Admission Date: 12/12/2017  Length of Stay: 1 days  Attending Physician: Berkley Johnson MD  Primary Care Provider: [facility]    Subjective:     Principal Problem:Cellulitis    HPI:  61 yo male w/ CAD (3 stents in 2003 w/MI), DMT2, HTN, HLD presents to ED with 1 week of LLE swelling and erythema, which just starting tracking up his inner thigh within the past two days. No trauma to his leg, no wounds on his foot. Pain with standing and walking. His baseline includes being on his feet for long periods of time while working at a [employer].  He does have to rest 1/2 up a full flight of stairs but denies chest pain, shortness of breath, recent illness. He sleeps flat on just 1 pillow.    He hasn't had medical care in 2 years and hasn't taken any medications for 1 year.  He thinks he was supposed to keep taking the plavix.  Baseline dry cough, minimally productive with clear sputum. Denies CP, SOB, recent illness, N/V, diarrhea, constipation. Last BM day before admission.     Interval History: NAEON. AF. VSS. Erythema marked by night resident, no spread. Pt states he feels better and pain improves with meds.    Review of Systems   Constitutional: Positive for activity change. Negative for chills and diaphoresis.   Cardiovascular: Positive for leg swelling (Unilat LLE swelling, skin changes).   Gastrointestinal: Positive for abdominal distention (baseline). Negative for abdominal pain, blood in stool, constipation, diarrhea, nausea and vomiting.   Genitourinary: Negative for difficulty urinating, dysuria and urgency.   Musculoskeletal: Positive for arthralgias, gait problem and myalgias. Negative for back pain.   Skin: Positive for color change.   Neurological: Negative for dizziness, weakness, numbness and headaches.     Objective:     Vital  Signs (Most Recent):  Temp: 98.5 °F (36.9 °C) (12/13/17 0735)  Pulse: 75 (12/13/17 0735)  Resp: 18 (12/13/17 0735)  BP: 125/67 (12/13/17 0735)  SpO2: 96 % (12/13/17 0706) Vital Signs (24h Range):  Temp:  [97.9 °F (36.6 °C)-99.5 °F (37.5 °C)] 98.5 °F (36.9 °C)  Pulse:  [75-95] 75  Resp:  [15-20] 18  SpO2:  [92 %-97 %] 96 %  BP: (108-131)/(59-76) 125/67     Weight: 95 kg (209 lb 6.4 oz)  Body mass index is 34.85 kg/m².    Intake/Output Summary (Last 24 hours) at 12/13/17 0820  Last data filed at 12/13/17 0600   Gross per 24 hour   Intake                0 ml   Output                1 ml   Net               -1 ml      Physical Exam   Constitutional: He is oriented to person, place, and time. He appears well-developed and well-nourished. No distress.   HENT:   Head: Normocephalic and atraumatic.   Mouth/Throat: No oropharyngeal exudate.   Eyes: Conjunctivae and EOM are normal. Pupils are equal, round, and reactive to light.   Neck: Normal range of motion. Neck supple. No JVD present.   Cardiovascular: Normal rate, regular rhythm and normal heart sounds.    No murmur heard.  Pulmonary/Chest: Effort normal. No respiratory distress. He has wheezes (bilat lower lung wheezes). He has no rales.   Abdominal: Soft. Bowel sounds are normal. He exhibits distension (rotund abdomen ). He exhibits no mass. There is no tenderness.   Musculoskeletal: He exhibits no edema.   Able to range knee (UE & RLE without erythema, pain); chronic spinal osteoarthritis   Neurological: He is alert and oriented to person, place, and time. No cranial nerve deficit.   Skin: Skin is warm. Capillary refill takes less than 2 seconds. He is not diaphoretic. There is erythema.   LLE erythema and swelling extending from mid calf to knee with erythema, warmth spreading up medial aspect of thigh with exquisite tenderness; dry flaking skin on bilat feet; bilat old healed venous stasis ulcer scaling wounds, no drainage   Psychiatric: He has a normal mood and  affect. His behavior is normal.   Nursing note and vitals reviewed.    Significant Labs:   CBC:   Recent Labs  Lab 12/12/17  1119 12/13/17  0422   WBC 14.05* 13.90*   HGB 14.4 14.0   HCT 42.4 41.9    170     CMP:   Recent Labs  Lab 12/12/17  1226 12/13/17  0422    136   K 3.9 3.5   CL 98 97   CO2 28 30*   * 181*   BUN 14 15   CREATININE 0.8 0.8   CALCIUM 9.2 8.9   PROT 7.0 6.4   ALBUMIN 2.5* 2.2*   BILITOT 0.5 0.4   ALKPHOS 85 91   AST 33 35   ALT 30 34   ANIONGAP 10 9   EGFRNONAA >60 >60     Lactic Acid:   Recent Labs  Lab 12/12/17  1226 12/12/17  1656   LACTATE 1.5  1.5 1.1     Magnesium:   Recent Labs  Lab 12/12/17  1226 12/13/17  0422   MG 1.3* 1.5*     Troponin:   Recent Labs  Lab 12/12/17  1226 12/12/17  1859 12/12/17  2353   TROPONINI 0.051* 0.022 0.013     Significant Imaging: I have reviewed all pertinent imaging results/findings within the past 24 hours.     US:   Findings: There is no evidence of venous thrombosis in the bilateral common femoral, left femoral, greater saphenous, popliteal, posterior tibial, anterior tibial, and peroneal veins.   Impression          No evidence of deep vein thrombosisin the left lower extremity.    There is lower extremity edema.       CXR/: cardiomegaly, but otherwise neg    Assessment/Plan:      * Cellulitis    WBC 14 stable to slightly Improved to 13.9  On Vanc, Zosyn  Vanc trough ordered before 4th dose  IV fluid bolus (500 ml) this AM to supplement PO intake  Cellulitis is overlying joint but seems to just involve the skin/soft tissue, will monitor closely- not currently thought to be septic arthritis, able to range joint           Leg swelling    No DVT on ultrasound          Anxiety    Pt did not mention this, only per chart review was this known  No meds for now, will monitor          CAD (coronary artery disease)    Hx of 3 stents in angiogram in 2003  Restarted on ASA, plavix. Potentially able to stop plavix since stents were placed over 2  years ago  Sarina trended down  1 EKG with prolonged QT, resolved on its own  No chest pain            HTN (hypertension)    Restarted Carvedilol, HCTZ (prior home meds from 2 years ago)  108-131/59-76        DM (diabetes mellitus)    HgbA1C 6.5  On Low Dose Corrective scale while establishing patient's glucose levels  Gluc 164-181 since admit              VTE Risk Mitigation         Ordered     enoxaparin injection 40 mg  Daily     Route:  Subcutaneous        12/12/17 1624     Medium Risk of VTE  Once      12/12/17 1624              Andrew Lombardo MD  Department of Hospital Medicine   [facility]

## 2017-12-13 NOTE — ASSESSMENT & PLAN NOTE
Hx of 3 stents in angiogram in 2003  Restarted on ASA, plavix. Potentially able to stop plavix since stents were placed over 2 years ago  Trops trended down  1 EKG with prolonged QT, resolved on its own  No chest pain

## 2017-12-13 NOTE — CONSULTS
[facility] Consult    Reason for consult: Left leg cellulitis, eval for septic knee    SUBJECTIVE:     HISTORY OF PRESENT ILLNESS:  Reji Guerra is a 62 y.o. male w/ CAD, DMT2, HTN, HLD who presented to the ED with 3-4 days of LLE swelling and erythema. Denies trauma or wounds. States that he has not had medical care for 1-2 years. He was diagnosed with cellulitis, admitted to Family Medicine, and started on IV antibiotics. Reports pain is primarily in his lower leg over the cellulitis, reports minimal pain in the knee. + tobacco, denies IVDU. Denies history of right knee problems. Ambulates by self.      MEDICATIONS:  Home Medications:  No current facility-administered medications on file prior to encounter.      Current Outpatient Prescriptions on File Prior to Encounter   Medication Sig Dispense Refill    carvedilol (COREG) 6.25 MG tablet Take 6.25 mg by mouth 2 (two) times daily with meals.      clopidogrel (PLAVIX) 75 mg tablet Take 75 mg by mouth once daily.      clorazepate (TRANXENE) 15 MG tablet Take 15 mg by mouth 2 (two) times daily.      glipiZIDE (GLUCOTROL) 5 MG tablet Take 5 mg by mouth 2 (two) times daily before meals.      hydrochlorothiazide (MICROZIDE) 12.5 mg capsule Take 12.5 mg by mouth once daily.      simvastatin (ZOCOR) 40 MG tablet Take 40 mg by mouth every evening.       Inpatient Medications:   aspirin  81 mg Oral Daily    carvedilol  6.25 mg Oral BID WM    clopidogrel  75 mg Oral Daily    clorazepate  15 mg Oral BID    enoxaparin  40 mg Subcutaneous Daily    hydroCHLOROthiazide  12.5 mg Oral Daily    magnesium oxide  400 mg Oral BID    nicotine  1 patch Transdermal Daily    piperacillin-tazobactam 4.5 g in dextrose 5 % 100 mL IVPB (ready to mix system)  4.5 g Intravenous Q12H    simvastatin  40 mg Oral QHS    vancomycin (VANCOCIN) IVPB  1,250 mg Intravenous Q12H     Infusions:   PRN Medications:dextrose 50%, dextrose 50%, glucagon (human recombinant), glucose, glucose,  hydrocodone-acetaminophen 5-325mg, influenza, insulin aspart, lorazepam, morphine, pneumoc 13-jose g conj-dip cr(PF), sodium chloride 0.9%    ALLERGIES:    Review of patient's allergies indicates:   Allergen Reactions    Iodine and iodide containing products     Shellfish containing products        PAST MEDICAL HISTORY:    Past Medical History:   Diagnosis Date    Diabetes mellitus, type 2     Heart disease     High cholesterol     Hypertension        SURGICAL HISTORY:  Past Surgical History:   Procedure Laterality Date    ANGIOPLASTY      HERNIA REPAIR         FAMILY HISTORY:  History reviewed. No pertinent family history.    SOCIAL HISTORY:  Social History   Substance Use Topics    Smoking status: Former Smoker    Smokeless tobacco: Never Used    Alcohol use No        REVIEW OF SYSTEMS:  A 10-point review of systems is negative except for the above mentioned in the HPI.    OBJECTIVE:     Most Recent Vitals:  Temp: 98.4 °F (36.9 °C) (12/13/17 1123)  Pulse: 78 (12/13/17 1123)  Resp: 18 (12/13/17 1123)  BP: 121/65 (12/13/17 1123)  SpO2: (!) 92 % (12/13/17 1117)      PHYSICAL EXAM:  Gen: NAD  Resp: unlabored  Cv: RR by palp  LLE:  Erythema and plaques most significant over lower leg, also has less severe erythema extending down to ankle and up past the knee to the upper thigh  No fluctuance  No significant knee effusion  Knee AROM 5-85, reports pain at endpoints in cellulitic area distal to knee  Sensation grossly intact  BCR, non-palpable pulses      LABORATORY VALUES:    Recent Labs  Lab 12/12/17  1119 12/12/17  1656 12/13/17  0422   WBC 14.05*  --  13.90*   HGB 14.4  --  14.0   HCT 42.4  --  41.9     --  170   HGBA1C  --  6.5*  --      Recent Labs  Lab 12/12/17  1226 12/12/17  1227 12/13/17  0422     --  136   K 3.9  --  3.5   CL 98  --  97   CO2 28  --  30*   BUN 14  --  15   CREATININE 0.8  --  0.8   *  --  181*   CALCIUM 9.2  --  8.9   MG 1.3*  --  1.5*   PHOS 1.7*  --  2.2*   AST 33   --  35   ALT 30  --  34   ALKPHOS 85  --  91   BILITOT 0.5  --  0.4   PROT 7.0  --  6.4   ALBUMIN 2.5*  --  2.2*   LIPASE 10  --   --    PROCAL  --  0.55*  --      Recent Labs  Lab 12/12/17  1226 12/12/17  1656 12/12/17  1859 12/12/17  2353   INR 1.0  --   --   --    LACTATE 1.5  1.5 1.1  --   --    TROPONINI 0.051*  --  0.022 0.013   No results for input(s): PH, PCO2, PO2, HCO3 in the last 72 hours.    Recent Labs  Lab 12/12/17  1207   COLORU Yellow   APPEARANCEUA Clear   PHUR 7.0   SPECGRAV <=1.005*   RBCUA 1   WBCUA 0   BACTERIA Rare   OCCULTUA 1+*   LEUKOCYTESUR Negative   NITRITE Negative   PROTEINUA 2+*   GLUCUA Negative   KETONESU Negative   BILIRUBINUA Negative   UROBILINOGEN 1.0     No results for input(s): LABURIN in the last 168 hours.  Recent Labs  Lab 12/12/17  1119 12/12/17  1226   LABBLOO No Growth to date No Growth to date       Recent Labs  Lab 12/12/17  1119 12/12/17  1226   LABBLOO No Growth to date No Growth to date       ASSESSMENT/PLAN:     Reji Guerra is a 62 y.o. male with LLE cellulitis  - no acute surgical intervention indicated at this time  - patient examined with attending  - will defer aspiration of the knee due to overlying cellulitis and exam not consistent with septic arthritis  - no palpable fluid collections  - will follow. If condition worsens, may consider advanced imaging to eval for any collections not found on physical exam      Mike Kemp  PGY-3  [facility]

## 2017-12-13 NOTE — PLAN OF CARE
Patient lives with a roommate. Has rolling walker and cane. Was working at convenience store. Has sister that may be able to help him.    He said he spoke to medicaid rep but doesn't qualify because he makes too much money with social security. Does NOT have insurance.     He said he went to [facility] family practice but it has been a while. Will give him resources (clinics he can go to). Will speak with MD regarding inexpensive medications for patient.       12/13/17 6536   Discharge Assessment   Assessment Type Discharge Planning Assessment   Confirmed/corrected address and phone number on facesheet? Yes   Assessment information obtained from? Patient   Prior to hospitilization cognitive status: Alert/Oriented   Prior to hospitalization functional status: Independent;Assistive Equipment   Current cognitive status: Alert/Oriented   Current Functional Status: Assistive Equipment   Lives With other (see comments)  (roommate)   Able to Return to Prior Arrangements unable to determine at this time (comments)   Is patient able to care for self after discharge? Unable to determine at this time (comments)   Patient's perception of discharge disposition home or selfcare   Readmission Within The Last 30 Days no previous admission in last 30 days   Patient currently being followed by outpatient case management? No   Equipment Currently Used at Home walker, rolling;cane, quad   Do you have any problems affording any of your prescribed medications? No   Is the patient taking medications as prescribed? no  (patient states he can't afford)   Does the patient have transportation home? Yes   Transportation Available family or friend will provide   Discharge Plan A Home   Patient/Family In Agreement With Plan yes     Maci Mansfield RN, CCM, CMSRN  RN Transition Navigator  [phone]

## 2017-12-13 NOTE — H&P
[facility] Hospital Medicine  History & Physical    Patient Name: Reji Guerra  MRN: [id number]  Admission Date: 12/12/2017  Attending Physician: Berkley Johnson MD   Primary Care Provider: [facility]         Patient information was obtained from patient and ER records.     Subjective:     Principal Problem:Cellulitis    Chief Complaint:   Chief Complaint   Patient presents with    Leg Swelling     leg swelling, redness, rash, and tenderness off/on one month        HPI: 63 yo male w/ CAD (3 stents in 2003 w/MI), DMT2, HTN, HLD presents to ED with 1 week of LLE swelling and erythema, which just starting tracking up his inner thigh within the past two days. No trauma to his leg, no wounds on his foot. Pain with standing and walking. 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  He does have to rest 1/2 up a full flight of stairs but denies chest pain, shortness of breath, recent illness. He sleeps flat on just 1 pillow.    He hasn't had medical care in 2 years and hasn't taken any medications for 1 year.  He thinks he was supposed to keep taking the plavix.  Baseline dry cough, minimally productive. Denies CP, SOB, recent illness, N/V, diarrhea, constipation. Last BM yesterday.    Past Medical History:   Diagnosis Date    Diabetes mellitus, type 2     Heart disease     High cholesterol     Hypertension      Past Surgical History:   Procedure Laterality Date    ANGIOPLASTY      HERNIA REPAIR       Review of patient's allergies indicates:   Allergen Reactions    Iodine and iodide containing products     Shellfish containing products      No current facility-administered medications on file prior to encounter.      Current Outpatient Prescriptions on File Prior to Encounter   Medication Sig    carvedilol (COREG) 6.25 MG tablet Take 6.25 mg by mouth 2 (two) times daily with meals.    clopidogrel (PLAVIX) 75  mg tablet Take 75 mg by mouth once daily.    clorazepate (TRANXENE) 15 MG tablet Take 15 mg by mouth 2 (two) times daily.    glipiZIDE (GLUCOTROL) 5 MG tablet Take 5 mg by mouth 2 (two) times daily before meals.    hydrochlorothiazide (MICROZIDE) 12.5 mg capsule Take 12.5 mg by mouth once daily.    simvastatin (ZOCOR) 40 MG tablet Take 40 mg by mouth every evening.    [DISCONTINUED] naproxen (NAPROSYN) 500 MG tablet Take 500 mg by mouth 2 (two) times daily.     Family History     None        Social History Main Topics    Smoking status: Former Smoker    Smokeless tobacco: Not on file    Alcohol use No    Drug use: No    Sexual activity: Not on file     Review of Systems   Constitutional: Positive for activity change. Negative for chills and diaphoresis.   Cardiovascular: Positive for leg swelling (Unilat LLE swelling, skin changes).   Gastrointestinal: Positive for abdominal distention. Negative for abdominal pain, blood in stool, constipation, diarrhea, nausea and vomiting.   Genitourinary: Negative for difficulty urinating, dysuria and urgency.   Musculoskeletal: Positive for arthralgias, gait problem and myalgias. Negative for back pain.   Skin: Positive for color change.   Neurological: Negative for dizziness, weakness, numbness and headaches.     Objective:     Vital Signs (Most Recent):  Temp: 98.7 °F (37.1 °C) (12/12/17 1426)  Pulse: 95 (12/12/17 1426)  Resp: 17 (12/12/17 1426)  BP: 108/61 (12/12/17 1426)  SpO2: 96 % (12/12/17 1426) Vital Signs (24h Range):  Temp:  [98.7 °F (37.1 °C)-98.9 °F (37.2 °C)] 98.7 °F (37.1 °C)  Pulse:  [78-95] 95  Resp:  [15-17] 17  SpO2:  [95 %-96 %] 96 %  BP: (108-126)/(61-76) 108/61     Weight: 106.6 kg (235 lb)  Body mass index is 39.11 kg/m².    Physical Exam   Constitutional: He is oriented to person, place, and time. He appears well-developed. No distress. Appears older than stated age  HENT:   Head: Normocephalic and atraumatic.   Mouth/Throat: No oropharyngeal  exudate.   Eyes: Conjunctivae and EOM are normal. Pupils are equal, round, and reactive to light.   Neck: Normal range of motion. Neck supple. No JVD present.   Cardiovascular: Normal rate, regular rhythm and distant heart sounds.  Displaced PMI  Pulmonary/Chest: Effort normal. No respiratory distress. He has wheezes (bilat lower lung wheezes). He has no rales.   Abdominal: Soft. Bowel sounds are normal. He exhibits distension (rotund abdomen ). He exhibits no mass. There is no tenderness.   Musculoskeletal: He exhibits no edema.   Able to range knee (UE & RLE without erythema, pain); chronic spinal osteoarthritis   Neurological: He is alert and oriented to person, place, and time. No cranial nerve deficit.   Skin: Skin is warm. Capillary refill takes less than 2 seconds. He is not diaphoretic. There is erythema.   LLE erythema and swelling extending from mid calf to knee with erythema spreading up medial aspect of thigh with exquisite tenderness; dry flaking skin on bilat feet; bilat old healed venous stasis ulcer scaling wounds, no drainage   Psychiatric: He has a normal mood and affect. His behavior is normal.   Nursing note and vitals reviewed.        CRANIAL NERVES     CN III, IV, VI   Pupils are equal, round, and reactive to light.  Extraocular motions are normal.              Significant Labs:   CBC:   Recent Labs  Lab 12/12/17  1119   WBC 14.05*   HGB 14.4   HCT 42.4        CMP:   Recent Labs  Lab 12/12/17  1226      K 3.9   CL 98   CO2 28   *   BUN 14   CREATININE 0.8   CALCIUM 9.2   PROT 7.0   ALBUMIN 2.5*   BILITOT 0.5   ALKPHOS 85   AST 33   ALT 30   ANIONGAP 10   EGFRNONAA >60     Lactic Acid:   Recent Labs  Lab 12/12/17  1226 12/12/17  1656   LACTATE 1.5  1.5 1.1     Magnesium:   Recent Labs  Lab 12/12/17  1226   MG 1.3*     Prealbumin: No results for input(s): PREALBUMIN in the last 48 hours.  Troponin:   Recent Labs  Lab 12/12/17  1226   TROPONINI 0.051*       Significant  Imaging: I have reviewed all pertinent imaging results/findings within the past 24 hours.     LLE US:   No evidence of deep vein thrombosisin the left lower extremity.    There is lower extremity edema.        Electronically signed by: JH SUTHERLAND MD  Date: 12/12/17  Time: 11:54     Encounter     View Encounter            CXR:  Findings: Cardiac silhouette is enlarged.  There is aortic atherosclerosis.  Lungs show no evidence of significant focal consolidation, large effusion, or pneumothorax.  The bones demonstrate degenerative changes of the spine and shoulders.   Impression         No acute radiographic findings on single view of the chest.      Electronically signed by: JH SUTHERLAND MD  Date: 12/12/17  Time: 12:14     Encounter     View Encounter               Assessment/Plan:     * Cellulitis    WBC 14  On Vanc, Zosyn for broad spectum  Leg marked to track erythema spread  Vanc trough ordered before 4th dose  PO diet, tolerating PO fluids.  Cellulitis is overlying joint but seems to just involve the skin/soft tissue, will reassess in the AM- not currently thought to be septic arthritis, able to range joint well      Leg swelling    Gradual onset  No hx of DVT, No DVT on ultrasound  No pockets of fluid  Likely 2/2 cellulitis.  Treat with abx and monitor for lines to track erythema      Anxiety    Pt did not mention this, only per chart review  No meds for now, will monitor      CAD (coronary artery disease)    Hx of 3 stents in angiogram in 2003  On ASA, plavix 2 years ago  Likely does not need plavix now, but restarted for initial management  No chest pain  Trops trended down, ECG yesterday evening did show prolonged QT, but resolved this AM  Consider Cards consult but most importantly patient needs to follow-up as out-patient and take meds        HTN (hypertension)    Restarted Carvedilol, HCTZ (prior home meds from 2 years ago)  108-126/61-76        DM (diabetes mellitus)    Unknown HgbA1C;  pending  On Low Dose Corrective scale while establishing patient's glucose levels          VTE Risk Mitigation         Ordered     enoxaparin injection 40 mg  Daily     Route:  Subcutaneous        12/12/17 1624     Medium Risk of VTE  Once      12/12/17 1624             Andrew Lombardo MD  Department of Hospital Medicine   [facility]

## 2017-12-13 NOTE — PLAN OF CARE
Problem: Patient Care Overview  Goal: Plan of Care Review  Outcome: Ongoing (interventions implemented as appropriate)  Plan of care reviewed with patient. Patient verbalized understanding. CBG monitored ACHS; coverage given as needed. IV antibiotics maintained. Redness to left lower extremity has receded some from original marked borders. Patient NSR on telemetry monitoring, HR 70's-80's, with no true red alarms noted. Bed is low and locked, bed alarm is activated, call light is within reach. Patient has been instructed to call if in need of assistance. Verbalized understanding.

## 2017-12-13 NOTE — ASSESSMENT & PLAN NOTE
HgbA1C 6.5  On Low Dose Corrective scale while establishing patient's glucose levels  Gluc 164-181 since admit

## 2017-12-13 NOTE — ASSESSMENT & PLAN NOTE
Unknown HgbA1C; pending  On Low Dose Corrective scale while establishing patient's glucose levels

## 2017-12-13 NOTE — SUBJECTIVE & OBJECTIVE
Interval History: NAEON. AF. VSS. Erythema marked by night resident, no spread. Pt states he feels better and pain improves with meds.    Review of Systems   Constitutional: Positive for activity change. Negative for chills and diaphoresis.   Cardiovascular: Positive for leg swelling (Unilat LLE swelling, skin changes).   Gastrointestinal: Positive for abdominal distention (baseline). Negative for abdominal pain, blood in stool, constipation, diarrhea, nausea and vomiting.   Genitourinary: Negative for difficulty urinating, dysuria and urgency.   Musculoskeletal: Positive for arthralgias, gait problem and myalgias. Negative for back pain.   Skin: Positive for color change.   Neurological: Negative for dizziness, weakness, numbness and headaches.     Objective:     Vital Signs (Most Recent):  Temp: 98.5 °F (36.9 °C) (12/13/17 0735)  Pulse: 75 (12/13/17 0735)  Resp: 18 (12/13/17 0735)  BP: 125/67 (12/13/17 0735)  SpO2: 96 % (12/13/17 0706) Vital Signs (24h Range):  Temp:  [97.9 °F (36.6 °C)-99.5 °F (37.5 °C)] 98.5 °F (36.9 °C)  Pulse:  [75-95] 75  Resp:  [15-20] 18  SpO2:  [92 %-97 %] 96 %  BP: (108-131)/(59-76) 125/67     Weight: 95 kg (209 lb 6.4 oz)  Body mass index is 34.85 kg/m².    Intake/Output Summary (Last 24 hours) at 12/13/17 0820  Last data filed at 12/13/17 0600   Gross per 24 hour   Intake                0 ml   Output                1 ml   Net               -1 ml      Physical Exam   Constitutional: He is oriented to person, place, and time. He appears well-developed and well-nourished. No distress.   HENT:   Head: Normocephalic and atraumatic.   Mouth/Throat: No oropharyngeal exudate.   Eyes: Conjunctivae and EOM are normal. Pupils are equal, round, and reactive to light.   Neck: Normal range of motion. Neck supple. No JVD present.   Cardiovascular: Normal rate, regular rhythm and normal heart sounds.    No murmur heard.  Pulmonary/Chest: Effort normal. No respiratory distress. He has wheezes (bilat  lower lung wheezes). He has no rales.   Abdominal: Soft. Bowel sounds are normal. He exhibits distension (rotund abdomen ). He exhibits no mass. There is no tenderness.   Musculoskeletal: He exhibits no edema.   Able to range knee (UE & RLE without erythema, pain); chronic spinal osteoarthritis   Neurological: He is alert and oriented to person, place, and time. No cranial nerve deficit.   Skin: Skin is warm. Capillary refill takes less than 2 seconds. He is not diaphoretic. There is erythema.   LLE erythema and swelling extending from mid calf to knee with erythema, warmth spreading up medial aspect of thigh with exquisite tenderness; dry flaking skin on bilat feet; bilat old healed venous stasis ulcer scaling wounds, no drainage   Psychiatric: He has a normal mood and affect. His behavior is normal.   Nursing note and vitals reviewed.    Significant Labs:   CBC:   Recent Labs  Lab 12/12/17  1119 12/13/17  0422   WBC 14.05* 13.90*   HGB 14.4 14.0   HCT 42.4 41.9    170     CMP:   Recent Labs  Lab 12/12/17  1226 12/13/17  0422    136   K 3.9 3.5   CL 98 97   CO2 28 30*   * 181*   BUN 14 15   CREATININE 0.8 0.8   CALCIUM 9.2 8.9   PROT 7.0 6.4   ALBUMIN 2.5* 2.2*   BILITOT 0.5 0.4   ALKPHOS 85 91   AST 33 35   ALT 30 34   ANIONGAP 10 9   EGFRNONAA >60 >60     Lactic Acid:   Recent Labs  Lab 12/12/17  1226 12/12/17  1656   LACTATE 1.5  1.5 1.1     Magnesium:   Recent Labs  Lab 12/12/17  1226 12/13/17  0422   MG 1.3* 1.5*     Troponin:   Recent Labs  Lab 12/12/17  1226 12/12/17  1859 12/12/17  2353   TROPONINI 0.051* 0.022 0.013     Significant Imaging: I have reviewed all pertinent imaging results/findings within the past 24 hours.     US:   Findings: There is no evidence of venous thrombosis in the bilateral common femoral, left femoral, greater saphenous, popliteal, posterior tibial, anterior tibial, and peroneal veins.   Impression          No evidence of deep vein thrombosisin the left lower  extremity.    There is lower extremity edema.       CXR/: cardiomegaly, but otherwise neg

## 2017-12-13 NOTE — ASSESSMENT & PLAN NOTE
WBC 14  On Vanc, Zosyn  Vanc trough ordered before 4th dose  IV fluids  Cellulitis is overlying joint but seems to just involve the skin/soft tissue, will reassess in the AM- not currently thought to be septic arthritis, able to range joint

## 2017-12-13 NOTE — PLAN OF CARE
Plan of care discussed with patient and/or family present. Patient and/or family verbalized complete understanding.   IV abx and pain management  Fall precautions maintained. Bed in lowest position, locked, call light within reach, and bed alarm on. Side rails up x's 2 with slip resistant socks on. Nurse instructed patient to notify staff for any assistance and pt verbalized complete understanding.     Patient on telemetry throughout shift with no ectopy noted. Will continue to monitor.

## 2017-12-14 PROBLEM — L03.116 CELLULITIS OF LEFT LOWER EXTREMITY: Status: ACTIVE | Noted: 2017-12-14

## 2017-12-14 LAB
ALBUMIN SERPL BCP-MCNC: 2.1 G/DL
ALP SERPL-CCNC: 100 U/L
ALT SERPL W/O P-5'-P-CCNC: 41 U/L
ANION GAP SERPL CALC-SCNC: 8 MMOL/L
AST SERPL-CCNC: 39 U/L
BASOPHILS # BLD AUTO: 0.02 K/UL
BASOPHILS NFR BLD: 0.1 %
BILIRUB SERPL-MCNC: 0.4 MG/DL
BUN SERPL-MCNC: 15 MG/DL
CALCIUM SERPL-MCNC: 8.9 MG/DL
CHLORIDE SERPL-SCNC: 99 MMOL/L
CO2 SERPL-SCNC: 31 MMOL/L
CREAT SERPL-MCNC: 0.8 MG/DL
DIFFERENTIAL METHOD: ABNORMAL
EOSINOPHIL # BLD AUTO: 0.1 K/UL
EOSINOPHIL NFR BLD: 0.4 %
ERYTHROCYTE [DISTWIDTH] IN BLOOD BY AUTOMATED COUNT: 13.7 %
EST. GFR  (AFRICAN AMERICAN): >60 ML/MIN/1.73 M^2
EST. GFR  (NON AFRICAN AMERICAN): >60 ML/MIN/1.73 M^2
GLUCOSE SERPL-MCNC: 183 MG/DL
HCT VFR BLD AUTO: 38.7 %
HGB BLD-MCNC: 13.1 G/DL
LYMPHOCYTES # BLD AUTO: 0.7 K/UL
LYMPHOCYTES NFR BLD: 4.9 %
MAGNESIUM SERPL-MCNC: 1.6 MG/DL
MCH RBC QN AUTO: 34.8 PG
MCHC RBC AUTO-ENTMCNC: 33.9 G/DL
MCV RBC AUTO: 103 FL
MONOCYTES # BLD AUTO: 1.3 K/UL
MONOCYTES NFR BLD: 9.1 %
NEUTROPHILS # BLD AUTO: 12.5 K/UL
NEUTROPHILS NFR BLD: 85.5 %
PHOSPHATE SERPL-MCNC: 2.8 MG/DL
PLATELET # BLD AUTO: 180 K/UL
PMV BLD AUTO: 11.6 FL
POCT GLUCOSE: 184 MG/DL (ref 70–110)
POCT GLUCOSE: 240 MG/DL (ref 70–110)
POCT GLUCOSE: 283 MG/DL (ref 70–110)
POCT GLUCOSE: 293 MG/DL (ref 70–110)
POTASSIUM SERPL-SCNC: 3.6 MMOL/L
PROT SERPL-MCNC: 6.3 G/DL
RBC # BLD AUTO: 3.76 M/UL
SODIUM SERPL-SCNC: 138 MMOL/L
VANCOMYCIN TROUGH SERPL-MCNC: 13.4 UG/ML
WBC # BLD AUTO: 14.6 K/UL

## 2017-12-14 PROCEDURE — 80053 COMPREHEN METABOLIC PANEL: CPT

## 2017-12-14 PROCEDURE — 21400001 HC TELEMETRY ROOM

## 2017-12-14 PROCEDURE — 97161 PT EVAL LOW COMPLEX 20 MIN: CPT

## 2017-12-14 PROCEDURE — 36415 COLL VENOUS BLD VENIPUNCTURE: CPT

## 2017-12-14 PROCEDURE — 85025 COMPLETE CBC W/AUTO DIFF WBC: CPT

## 2017-12-14 PROCEDURE — 63600175 PHARM REV CODE 636 W HCPCS: Performed by: STUDENT IN AN ORGANIZED HEALTH CARE EDUCATION/TRAINING PROGRAM

## 2017-12-14 PROCEDURE — G8978 MOBILITY CURRENT STATUS: HCPCS | Mod: CJ

## 2017-12-14 PROCEDURE — G8979 MOBILITY GOAL STATUS: HCPCS | Mod: CH

## 2017-12-14 PROCEDURE — 83735 ASSAY OF MAGNESIUM: CPT

## 2017-12-14 PROCEDURE — 63600175 PHARM REV CODE 636 W HCPCS: Performed by: FAMILY MEDICINE

## 2017-12-14 PROCEDURE — 94761 N-INVAS EAR/PLS OXIMETRY MLT: CPT

## 2017-12-14 PROCEDURE — 25000003 PHARM REV CODE 250: Performed by: STUDENT IN AN ORGANIZED HEALTH CARE EDUCATION/TRAINING PROGRAM

## 2017-12-14 PROCEDURE — 80202 ASSAY OF VANCOMYCIN: CPT

## 2017-12-14 PROCEDURE — 25000003 PHARM REV CODE 250: Performed by: FAMILY MEDICINE

## 2017-12-14 PROCEDURE — 99900038 HC OT GENERIC THERAPY SCREENING (STAT)

## 2017-12-14 PROCEDURE — 84100 ASSAY OF PHOSPHORUS: CPT

## 2017-12-14 PROCEDURE — S4991 NICOTINE PATCH NONLEGEND: HCPCS | Performed by: STUDENT IN AN ORGANIZED HEALTH CARE EDUCATION/TRAINING PROGRAM

## 2017-12-14 RX ORDER — LOPERAMIDE HYDROCHLORIDE 2 MG/1
2 CAPSULE ORAL 4 TIMES DAILY PRN
Status: DISCONTINUED | OUTPATIENT
Start: 2017-12-14 | End: 2017-12-27 | Stop reason: HOSPADM

## 2017-12-14 RX ADMIN — CARVEDILOL 6.25 MG: 6.25 TABLET, FILM COATED ORAL at 09:12

## 2017-12-14 RX ADMIN — PIPERACILLIN SODIUM AND TAZOBACTAM SODIUM 4.5 G: 4; .5 INJECTION, POWDER, FOR SOLUTION INTRAVENOUS at 09:12

## 2017-12-14 RX ADMIN — HYDROCODONE BITARTRATE AND ACETAMINOPHEN 1 TABLET: 5; 325 TABLET ORAL at 09:12

## 2017-12-14 RX ADMIN — INSULIN ASPART 1 UNITS: 100 INJECTION, SOLUTION INTRAVENOUS; SUBCUTANEOUS at 09:12

## 2017-12-14 RX ADMIN — CLORAZEPATE DIPOTASSIUM 15 MG: 3.75 TABLET ORAL at 09:12

## 2017-12-14 RX ADMIN — ASPIRIN 81 MG: 81 TABLET, COATED ORAL at 09:12

## 2017-12-14 RX ADMIN — SIMVASTATIN 40 MG: 20 TABLET, FILM COATED ORAL at 08:12

## 2017-12-14 RX ADMIN — NICOTINE 1 PATCH: 21 PATCH, EXTENDED RELEASE TRANSDERMAL at 09:12

## 2017-12-14 RX ADMIN — HYDROCODONE BITARTRATE AND ACETAMINOPHEN 1 TABLET: 5; 325 TABLET ORAL at 12:12

## 2017-12-14 RX ADMIN — VANCOMYCIN HYDROCHLORIDE 1250 MG: 10 INJECTION, POWDER, LYOPHILIZED, FOR SOLUTION INTRAVENOUS at 02:12

## 2017-12-14 RX ADMIN — ENOXAPARIN SODIUM 40 MG: 100 INJECTION SUBCUTANEOUS at 05:12

## 2017-12-14 RX ADMIN — HYDROCODONE BITARTRATE AND ACETAMINOPHEN 1 TABLET: 5; 325 TABLET ORAL at 08:12

## 2017-12-14 RX ADMIN — CARVEDILOL 6.25 MG: 6.25 TABLET, FILM COATED ORAL at 05:12

## 2017-12-14 RX ADMIN — INSULIN ASPART 2 UNITS: 100 INJECTION, SOLUTION INTRAVENOUS; SUBCUTANEOUS at 09:12

## 2017-12-14 RX ADMIN — LOPERAMIDE HYDROCHLORIDE 2 MG: 2 CAPSULE ORAL at 03:12

## 2017-12-14 RX ADMIN — CLOPIDOGREL 75 MG: 75 TABLET, FILM COATED ORAL at 09:12

## 2017-12-14 RX ADMIN — INSULIN ASPART 3 UNITS: 100 INJECTION, SOLUTION INTRAVENOUS; SUBCUTANEOUS at 05:12

## 2017-12-14 RX ADMIN — VANCOMYCIN HYDROCHLORIDE 1250 MG: 10 INJECTION, POWDER, LYOPHILIZED, FOR SOLUTION INTRAVENOUS at 03:12

## 2017-12-14 RX ADMIN — PIPERACILLIN SODIUM AND TAZOBACTAM SODIUM 4.5 G: 4; .5 INJECTION, POWDER, FOR SOLUTION INTRAVENOUS at 08:12

## 2017-12-14 RX ADMIN — LOPERAMIDE HYDROCHLORIDE 2 MG: 2 CAPSULE ORAL at 08:12

## 2017-12-14 RX ADMIN — HYDROCHLOROTHIAZIDE 12.5 MG: 12.5 TABLET ORAL at 09:12

## 2017-12-14 NOTE — PROGRESS NOTES
[facility] Hospital Medicine  Progress Note    Patient Name: Reji Guerra  MRN: [id number]  Patient Class: IP- Inpatient   Admission Date: 12/12/2017  Length of Stay: 2 days  Attending Physician: Berkley Johnson MD  Primary Care Provider: [facility]        Subjective:     Principal Problem:Cellulitis    HPI:  63 yo male w/ CAD (3 stents in 2003 w/MI), DMT2, HTN, HLD presents to ED with 1 week of LLE swelling and erythema, which just starting tracking up his inner thigh within the past two days. No trauma to his leg, no wounds on his foot. Pain with standing and walking. 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  He does have to rest 1/2 up a full flight of stairs but denies chest pain, shortness of breath, recent illness. He sleeps flat on just 1 pillow.    He hasn't had medical care in 2 years and hasn't taken any medications for 1 year.  He thinks he was supposed to keep taking the plavix.  Baseline dry cough, minimally productive with clear sputum. Denies CP, SOB, recent illness, N/V, diarrhea, constipation. Last BM day before admission.    Hospital Course:  No notes on file    Interval History: NAEON. AF. VSS. Pt states his pain is much improved. No pain on thigh now. Complaint of diarrhea from abx. WBC similarly elevated.    Review of Systems   Constitutional: Positive for activity change. Negative for chills and diaphoresis.   Cardiovascular: Positive for leg swelling (Unilat LLE swelling, skin changes).   Gastrointestinal: Positive for abdominal distention (baseline). Negative for abdominal pain, blood in stool, constipation, diarrhea, nausea and vomiting.   Genitourinary: Negative for difficulty urinating, dysuria and urgency.   Musculoskeletal: Positive for arthralgias, gait problem and myalgias (improved). Negative for back pain.   Skin: Positive for color change (stable, reports improvement).    Neurological: Negative for dizziness, weakness, numbness and headaches.     Objective:     Vital Signs (Most Recent):  Temp: 98.5 °F (36.9 °C) (12/14/17 0720)  Pulse: 84 (12/14/17 0720)  Resp: 18 (12/14/17 0720)  BP: (!) 111/56 (12/14/17 0720)  SpO2: 96 % (12/14/17 0800) Vital Signs (24h Range):  Temp:  [96.4 °F (35.8 °C)-98.5 °F (36.9 °C)] 98.5 °F (36.9 °C)  Pulse:  [71-89] 84  Resp:  [18-20] 18  SpO2:  [90 %-96 %] 96 %  BP: (104-132)/(56-70) 111/56     Weight: 95 kg (209 lb 6.4 oz)  Body mass index is 34.85 kg/m².    Intake/Output Summary (Last 24 hours) at 12/14/17 1011  Last data filed at 12/14/17 0921   Gross per 24 hour   Intake             1920 ml   Output             1250 ml   Net              670 ml      Physical Exam   Constitutional: He is oriented to person, place, and time. He appears well-developed and well-nourished. No distress.   HENT:   Head: Normocephalic and atraumatic.   Mouth/Throat: No oropharyngeal exudate.   Eyes: Conjunctivae and EOM are normal. Pupils are equal, round, and reactive to light.   Neck: Normal range of motion. Neck supple. No JVD present.   Cardiovascular: Normal rate, regular rhythm and normal heart sounds.    No murmur heard.  Pulmonary/Chest: Effort normal. No respiratory distress. He has no wheezes (bilat lower lung wheezes). He has no rales.   Abdominal: Soft. Bowel sounds are normal. He exhibits distension (rotund abdomen ). He exhibits no mass. There is no tenderness.   Musculoskeletal: He exhibits no edema.   Able to range knee (UE & RLE without erythema, pain); chronic spinal osteoarthritis   Neurological: He is alert and oriented to person, place, and time. No cranial nerve deficit.   Skin: Skin is warm. Capillary refill takes less than 2 seconds. He is not diaphoretic. There is erythema (stable possible mild improvement on thigh).   LLE erythema and swelling extending from mid calf to knee with erythema, warmth extends up medial aspect of thigh with less severe  tenderness; dry flaking skin on bilat feet; bilat old healed venous stasis ulcer scaling wounds, no drainage   Psychiatric: He has a normal mood and affect. His behavior is normal.   Nursing note and vitals reviewed.    Significant Labs:   CBC:   Recent Labs  Lab 12/12/17  1119 12/13/17  0422 12/14/17  0257   WBC 14.05* 13.90* 14.60*   HGB 14.4 14.0 13.1*   HCT 42.4 41.9 38.7*    170 180     CMP:   Recent Labs  Lab 12/12/17  1226 12/13/17  0422 12/14/17  0257    136 138   K 3.9 3.5 3.6   CL 98 97 99   CO2 28 30* 31*   * 181* 183*   BUN 14 15 15   CREATININE 0.8 0.8 0.8   CALCIUM 9.2 8.9 8.9   PROT 7.0 6.4 6.3   ALBUMIN 2.5* 2.2* 2.1*   BILITOT 0.5 0.4 0.4   ALKPHOS 85 91 100   AST 33 35 39   ALT 30 34 41   ANIONGAP 10 9 8   EGFRNONAA >60 >60 >60       Significant Imaging: I have reviewed all pertinent imaging results/findings within the past 24 hours.    Assessment/Plan:      * Cellulitis    WBC 14 stable to slightly Improved to 13.9, yesterday, laci again to 14.6  On Vanc, Zosyn  Vanc trough ordered before 4th dose: appropriate at 14  Cellulitis is overlying joint but seems to just involve the skin/soft tissue, will monitor closely- not currently thought to be septic arthritis, able to range joint. Ortho on board          Elevated troponin    Improved to normal  EKG's without St segment elevation  No chest pain        Leg swelling    No DVT on ultrasound  Elevate leg          Anxiety    Pt did not mention this, only per chart review was this known  No meds for now, will monitor          CAD (coronary artery disease)    Hx of 3 stents in angiogram in 2003  Restarted on ASA, plavix. Potentially able to stop plavix since stents were placed over 2 years ago  Trops trended down  F/u cardiology as outpatient  No chest pain            HTN (hypertension)    Restarted Carvedilol, HCTZ (prior home meds from 2 years ago)  104-132/61        Type 2 diabetes mellitus with hyperglycemia, without long-term  current use of insulin    HgbA1C 6.5  On Low Dose Corrective scale  Gluc 164-183 since admit              VTE Risk Mitigation         Ordered     enoxaparin injection 40 mg  Daily     Route:  Subcutaneous        12/12/17 1624     Medium Risk of VTE  Once      12/12/17 1624          Andrew Lombardo MD  Department of Hospital Medicine   [facility]

## 2017-12-14 NOTE — ASSESSMENT & PLAN NOTE
Hx of 3 stents in angiogram in 2003  Restarted on ASA, plavix. Potentially able to stop plavix since stents were placed over 2 years ago  Trops trended down  F/u cardiology as outpatient  No chest pain

## 2017-12-14 NOTE — PT/OT/SLP EVAL
Physical Therapy Evaluation    Patient Name:  Reji Guerra   MRN:  [id number]    Recommendations:     Discharge Recommendations:  home with home health, home health PT   Discharge Equipment Recommendations: bedside commode, shower chair   Barriers to discharge: Decreased caregiver support    Assessment:     Reji Guerra is a 62 y.o. male admitted with a medical diagnosis of Cellulitis.  He presents with the following impairments/functional limitations:  weakness, gait instability, pain, impaired self care skills, impaired functional mobilty, impaired skin, edema, decreased safety awareness, decreased lower extremity function, impaired balance Patient tolerated PT session however limited gait with RW 2/2 pain complaints LLE.    Rehab Prognosis:  Good; patient would benefit from acute skilled PT services to address these deficits and reach maximum level of function.      Recent Surgery: * No surgery found *      Plan:     During this hospitalization, patient to be seen 6 x/week to address the above listed problems via gait training, therapeutic activities, therapeutic exercises  · Plan of Care Expires:  01/14/18   Plan of Care Reviewed with: patient    Subjective     Communicated with primary nurse prior to session.  Patient found seated EOB upon PT entry to room, agreeable to evaluation.      Chief Complaint: pain and fatigue  Patient comments/goals: go home  Pain/Comfort:  · Pain Rating 1:  (did not rate on scale)  · Location - Side 1: Left  · Location 1: leg  · Pain Addressed 1: Reposition, Distraction    Patients cultural, spiritual, [employer] conflicts given the current situation:      Living Environment:  Lives with friend 1st floor apt   Prior to admission, patients level of function was independent.  Patient has the following equipment: walker, rolling, cane, straight.  DME owned (not currently used): none.  Upon discharge, patient will have assistance from friend.    Objective:     Patient found with:  peripheral IV, telemetry     General Precautions: Standard, fall   Orthopedic Precautions:N/A   Braces: N/A     Exams:  · RLE ROM: WFL  · RLE Strength: WFL  · LLE ROM: WFL  · LLE Strength: WFL    Functional Mobility:  · Bed Mobility:     · Sit to Supine: minimum assistance  · Transfers:     · Sit to Stand:  stand by assistance and contact guard assistance with rolling walker  · Gait: 4 ft forward / backward with RW    AM-PAC 6 CLICK MOBILITY  Total Score:20       Therapeutic Activities and Exercises:   reviewed ankle pumps, and LAQ     Patient left HOB elevated with all lines intact, call button in reach and friend present.    GOALS:    Physical Therapy Goals        Problem: Physical Therapy Goal    Goal Priority Disciplines Outcome Goal Variances Interventions   Physical Therapy Goal     PT/OT, PT Ongoing (interventions implemented as appropriate)     Description:  Goals to be met by: 2018     Patient will increase functional independence with mobility by performin. Supine to sit with Modified [place]  2. Sit to stand transfer with Modified [place]  3. Gait  x 150 feet with Modified [place] using Rolling Walker.                       History:     Past Medical History:   Diagnosis Date    Diabetes mellitus, type 2     Heart disease     High cholesterol     Hypertension        Past Surgical History:   Procedure Laterality Date    ANGIOPLASTY      HERNIA REPAIR         Clinical Decision Making:     History  Co-morbidities and personal factors that may impact the plan of care Examination  Body Structures and Functions, activity limitations and participation restrictions that may impact the plan of care Clinical Presentation   Decision Making/ Complexity Score   Co-morbidities:   [] Time since onset of injury / illness / exacerbation  [] Status of current condition  [x]Patient's cognitive status and safety concerns    [] Multiple Medical Problems (see med hx)  Personal Factors:   []  Patient's age  [] Prior Level of function   [x] Patient's home situation (environment and family support)  [] Patient's level of motivation  [] Expected progression of patient      HISTORY:(criteria)    [] 50396 - no personal factors/history    [x] 75437 - has 1-2 personal factor/comorbidity     [] 49826 - has >3 personal factor/comorbidity     Body Regions:  [] Objective examination findings  [] Head     []  Neck  [] Trunk   [] Upper Extremity  [x] Lower Extremity    Body Systems:  [x] For communication ability, affect, cognition, language, and learning style: the assessment of the ability to make needs known, consciousness, orientation (person, place, and time), expected emotional /behavioral responses, and learning preferences (eg, learning barriers, education  needs)  [] For the neuromuscular system: a general assessment of gross coordinated movement (eg, balance, gait, locomotion, transfers, and transitions) and motor function  (motor control and motor learning)  [x] For the musculoskeletal system: the assessment of gross symmetry, gross range of motion, gross strength, height, and weight  [x] For the integumentary system: the assessment of pliability(texture), presence of scar formation, skin color, and skin integrity  [] For cardiovascular/pulmonary system: the assessment of heart rate, respiratory rate, blood pressure, and edema     Activity limitations:    [x] Patient's cognitive status and saf ety concerns          [] Status of current condition      [] Weight bearing restriction  [] Cardiopulmunary Restriction    Participation Restrictions:   [] Goals and goal agreement with the patient     [] Rehab potential (prognosis) and probable outcome      Examination of Body System: (criteria)    [] 52479 - addressing 1-2 elements    [x] 33311 - addressing a total of 3 or more elements     [] 53907 -  Addressing a total of 4 or more elements         Clinical Presentation: (criteria)  Stable - 17987     On  examination of body system using standardized tests and measures patient presents with 3 or more elements from any of the following: body structures and functions, activity limitations, and/or participation restrictions.  Leading to a clinical presentation that is considered stable and/or uncomplicated                              Clinical Decision Making  (Eval Complexity):  Low- 82741     Time Tracking:     PT Received On: 12/14/17  PT Start Time: 0947     PT Stop Time: 1007  PT Total Time (min): 20 min     Billable Minutes: Evaluation 20      Mike Brody, PT  12/14/2017

## 2017-12-14 NOTE — PT/OT/SLP PROGRESS
"Occupational Therapy  Screen     Patient Name:  Reji Guerra   MRN:  [id number]    3 attempts made to see pt this date; 2nd attempt pt in restroom and speaking with therapist, however, declining to allow therapist to assist and requesting return at a later time; 3rd attempt pt found in bed and answering questions, however, states he is too fatigued to participate in OOB axs.   Pt extremely anxious throughout speaking with therapist; becoming agitated and stating he would leave if he "was too much to handle."     Pt states he lives with a friend in [facility], no steps, tub/shower combo. Pt states he has/uses his RW for ambulation. Reports he is currently not getting into the shower 2/2 swelling/reddness of BLEs. Unable to obtain further information at this time 2/2 increased anxiety noted when attempting to take medicine from nurse and spilling water on his gown.     Will attempt again tomorrow's date    Randi Cool OT  12/14/2017  "

## 2017-12-14 NOTE — PROGRESS NOTES
Interval:   NAEO. Reports improved pain today, sitting up on the side of the bed.    Vitals:  Temp:  [96.4 °F (35.8 °C)-98.5 °F (36.9 °C)] 98.5 °F (36.9 °C)  Pulse:  [71-89] 84  Resp:  [18-20] 18  SpO2:  [90 %-96 %] 96 %  BP: (104-132)/(56-70) 111/56    Scheduled Meds:    aspirin  81 mg Oral Daily    carvedilol  6.25 mg Oral BID WM    clopidogrel  75 mg Oral Daily    clorazepate  15 mg Oral BID    enoxaparin  40 mg Subcutaneous Daily    hydroCHLOROthiazide  12.5 mg Oral Daily    nicotine  1 patch Transdermal Daily    piperacillin-tazobactam 4.5 g in dextrose 5 % 100 mL IVPB (ready to mix system)  4.5 g Intravenous Q12H    simvastatin  40 mg Oral QHS    vancomycin (VANCOCIN) IVPB  1,250 mg Intravenous Q12H     Continuous Infusions:   PRN Meds: dextrose 50%, dextrose 50%, glucagon (human recombinant), glucose, glucose, hydrocodone-acetaminophen 5-325mg, influenza, insulin aspart, lorazepam, morphine, pneumoc 13-jose g conj-dip cr(PF), sodium chloride 0.9%    Diet: Diet Cardiac    Trended Lab Data:    Recent Labs  Lab 12/12/17  1119 12/12/17  1226 12/13/17  0422 12/14/17  0257   WBC 14.05*  --  13.90* 14.60*   HGB 14.4  --  14.0 13.1*   HCT 42.4  --  41.9 38.7*     --  170 180   *  --  104* 103*   RDW 13.5  --  13.7 13.7   NA  --  136 136 138   K  --  3.9 3.5 3.6   CL  --  98 97 99   CO2  --  28 30* 31*   BUN  --  14 15 15   CREATININE  --  0.8 0.8 0.8   GLU  --  164* 181* 183*   PROT  --  7.0 6.4 6.3   ALBUMIN  --  2.5* 2.2* 2.1*   BILITOT  --  0.5 0.4 0.4   AST  --  33 35 39   ALKPHOS  --  85 91 100   ALT  --  30 34 41       I/O last 3 completed shifts:  In: 2035 [P.O.:985; IV Piggyback:1050]  Out: 1251 [Urine:1250; Stool:1]    Exam:  Gen: NAD  Resp: unlabored  Cv: RR by palp  LLE:  Erythema and plaques most significant over lower leg, also has less severe erythema extending down to ankle and up past the knee to the upper thigh. Overall improvement compared to yesterday.  No fluctuance  No  significant knee effusion or joint line tenderness  Knee AROM 5-95, reports pain at endpoints in cellulitic area distal to knee  Sensation grossly intact  BCR, non-palpable pulses  EHL/FHL/TA/GS intact    Impression/Plan:  62M with LLE cellulitis  - no acute surgical intervention indicated at this time  - some clinical improvement since yesterday  - exam not consistent with septic arthritis  - no palpable fluid collections  - If condition worsens, may consider advanced imaging to eval for any collections not found on physical exam  - continue medical management per primary    Mike Kemp  PGY-3  [employer] [facility]

## 2017-12-14 NOTE — ASSESSMENT & PLAN NOTE
WBC 14 stable to slightly Improved to 13.9, yesterday, laci again to 14.6  On Vanc, Zosyn  Vanc trough ordered before 4th dose: appropriate at 14  Cellulitis is overlying joint but seems to just involve the skin/soft tissue, will monitor closely- not currently thought to be septic arthritis, able to range joint. Ortho on board

## 2017-12-14 NOTE — PT/OT/SLP PROGRESS
Occupational Therapy  Visit Attempt    Patient Name:  Reji Guerra   MRN:  [id number]    Patient not seen today secondary to with MDs at this time 1211  . Will follow-up as avaiable.    Randi Cool OT  12/14/2017

## 2017-12-14 NOTE — PLAN OF CARE
Problem: Patient Care Overview  Goal: Plan of Care Review  Outcome: Ongoing (interventions implemented as appropriate)  Pain controlled with medications.  IV antibiotics for cellulitis continuing.  Fall precautions explained and maintained.  Bed Alarm on.  Blood sugar 183 this AM.  Telemetry with NSR.  Will continue to monitor VS, telemetry, labs, and medications as ordered.

## 2017-12-14 NOTE — PLAN OF CARE
Problem: Physical Therapy Goal  Goal: Physical Therapy Goal  Goals to be met by: 2018     Patient will increase functional independence with mobility by performin. Supine to sit with Modified [place]  2. Sit to stand transfer with Modified [place]  3. Gait  x 150 feet with Modified [place] using Rolling Walker.     Outcome: Ongoing (interventions implemented as appropriate)  Recommend Home with Home Health  May need bedside commode,shower chair

## 2017-12-14 NOTE — SUBJECTIVE & OBJECTIVE
Interval History: NAEON. AF. VSS. Pt states his pain is much improved. No pain on thigh now. Complaint of diarrhea from abx. WBC similarly elevated.    Review of Systems   Constitutional: Positive for activity change. Negative for chills and diaphoresis.   Cardiovascular: Positive for leg swelling (Unilat LLE swelling, skin changes).   Gastrointestinal: Positive for abdominal distention (baseline). Negative for abdominal pain, blood in stool, constipation, diarrhea, nausea and vomiting.   Genitourinary: Negative for difficulty urinating, dysuria and urgency.   Musculoskeletal: Positive for arthralgias, gait problem and myalgias (improved). Negative for back pain.   Skin: Positive for color change (stable, reports improvement).   Neurological: Negative for dizziness, weakness, numbness and headaches.     Objective:     Vital Signs (Most Recent):  Temp: 98.5 °F (36.9 °C) (12/14/17 0720)  Pulse: 84 (12/14/17 0720)  Resp: 18 (12/14/17 0720)  BP: (!) 111/56 (12/14/17 0720)  SpO2: 96 % (12/14/17 0800) Vital Signs (24h Range):  Temp:  [96.4 °F (35.8 °C)-98.5 °F (36.9 °C)] 98.5 °F (36.9 °C)  Pulse:  [71-89] 84  Resp:  [18-20] 18  SpO2:  [90 %-96 %] 96 %  BP: (104-132)/(56-70) 111/56     Weight: 95 kg (209 lb 6.4 oz)  Body mass index is 34.85 kg/m².    Intake/Output Summary (Last 24 hours) at 12/14/17 1011  Last data filed at 12/14/17 0921   Gross per 24 hour   Intake             1920 ml   Output             1250 ml   Net              670 ml      Physical Exam   Constitutional: He is oriented to person, place, and time. He appears well-developed and well-nourished. No distress.   HENT:   Head: Normocephalic and atraumatic.   Mouth/Throat: No oropharyngeal exudate.   Eyes: Conjunctivae and EOM are normal. Pupils are equal, round, and reactive to light.   Neck: Normal range of motion. Neck supple. No JVD present.   Cardiovascular: Normal rate, regular rhythm and normal heart sounds.    No murmur heard.  Pulmonary/Chest: Effort  normal. No respiratory distress. He has no wheezes (bilat lower lung wheezes). He has no rales.   Abdominal: Soft. Bowel sounds are normal. He exhibits distension (rotund abdomen ). He exhibits no mass. There is no tenderness.   Musculoskeletal: He exhibits no edema.   Able to range knee (UE & RLE without erythema, pain); chronic spinal osteoarthritis   Neurological: He is alert and oriented to person, place, and time. No cranial nerve deficit.   Skin: Skin is warm. Capillary refill takes less than 2 seconds. He is not diaphoretic. There is erythema (stable possible mild improvement on thigh).   LLE erythema and swelling extending from mid calf to knee with erythema, warmth extends up medial aspect of thigh with less severe tenderness; dry flaking skin on bilat feet; bilat old healed venous stasis ulcer scaling wounds, no drainage   Psychiatric: He has a normal mood and affect. His behavior is normal.   Nursing note and vitals reviewed.    Significant Labs:   CBC:   Recent Labs  Lab 12/12/17  1119 12/13/17  0422 12/14/17  0257   WBC 14.05* 13.90* 14.60*   HGB 14.4 14.0 13.1*   HCT 42.4 41.9 38.7*    170 180     CMP:   Recent Labs  Lab 12/12/17  1226 12/13/17  0422 12/14/17  0257    136 138   K 3.9 3.5 3.6   CL 98 97 99   CO2 28 30* 31*   * 181* 183*   BUN 14 15 15   CREATININE 0.8 0.8 0.8   CALCIUM 9.2 8.9 8.9   PROT 7.0 6.4 6.3   ALBUMIN 2.5* 2.2* 2.1*   BILITOT 0.5 0.4 0.4   ALKPHOS 85 91 100   AST 33 35 39   ALT 30 34 41   ANIONGAP 10 9 8   EGFRNONAA >60 >60 >60       Significant Imaging: I have reviewed all pertinent imaging results/findings within the past 24 hours.

## 2017-12-15 LAB
ALBUMIN SERPL BCP-MCNC: 2 G/DL
ALP SERPL-CCNC: 119 U/L
ALT SERPL W/O P-5'-P-CCNC: 41 U/L
ANION GAP SERPL CALC-SCNC: 5 MMOL/L
AST SERPL-CCNC: 33 U/L
BASOPHILS # BLD AUTO: 0.02 K/UL
BASOPHILS NFR BLD: 0.2 %
BILIRUB SERPL-MCNC: 0.3 MG/DL
BUN SERPL-MCNC: 15 MG/DL
CALCIUM SERPL-MCNC: 9.5 MG/DL
CHLORIDE SERPL-SCNC: 96 MMOL/L
CO2 SERPL-SCNC: 38 MMOL/L
CREAT SERPL-MCNC: 0.9 MG/DL
DIFFERENTIAL METHOD: ABNORMAL
EOSINOPHIL # BLD AUTO: 0.1 K/UL
EOSINOPHIL NFR BLD: 1.1 %
ERYTHROCYTE [DISTWIDTH] IN BLOOD BY AUTOMATED COUNT: 13.8 %
EST. GFR  (AFRICAN AMERICAN): >60 ML/MIN/1.73 M^2
EST. GFR  (NON AFRICAN AMERICAN): >60 ML/MIN/1.73 M^2
FOLATE SERPL-MCNC: 9.9 NG/ML
GLUCOSE SERPL-MCNC: 232 MG/DL
HCT VFR BLD AUTO: 39.9 %
HGB BLD-MCNC: 13 G/DL
LYMPHOCYTES # BLD AUTO: 0.9 K/UL
LYMPHOCYTES NFR BLD: 7.1 %
MAGNESIUM SERPL-MCNC: 1.5 MG/DL
MCH RBC QN AUTO: 34.3 PG
MCHC RBC AUTO-ENTMCNC: 32.6 G/DL
MCV RBC AUTO: 105 FL
MONOCYTES # BLD AUTO: 1.4 K/UL
MONOCYTES NFR BLD: 11 %
NEUTROPHILS # BLD AUTO: 10.1 K/UL
NEUTROPHILS NFR BLD: 79.7 %
PHOSPHATE SERPL-MCNC: 3.5 MG/DL
PLATELET # BLD AUTO: 219 K/UL
PMV BLD AUTO: 11.1 FL
POCT GLUCOSE: 201 MG/DL (ref 70–110)
POCT GLUCOSE: 217 MG/DL (ref 70–110)
POCT GLUCOSE: 292 MG/DL (ref 70–110)
POCT GLUCOSE: 318 MG/DL (ref 70–110)
POTASSIUM SERPL-SCNC: 4.3 MMOL/L
PROT SERPL-MCNC: 6.4 G/DL
RBC # BLD AUTO: 3.79 M/UL
SODIUM SERPL-SCNC: 139 MMOL/L
VIT B12 SERPL-MCNC: 410 PG/ML
WBC # BLD AUTO: 12.67 K/UL

## 2017-12-15 PROCEDURE — 83735 ASSAY OF MAGNESIUM: CPT

## 2017-12-15 PROCEDURE — 63600175 PHARM REV CODE 636 W HCPCS: Performed by: STUDENT IN AN ORGANIZED HEALTH CARE EDUCATION/TRAINING PROGRAM

## 2017-12-15 PROCEDURE — 80053 COMPREHEN METABOLIC PANEL: CPT

## 2017-12-15 PROCEDURE — G8987 SELF CARE CURRENT STATUS: HCPCS | Mod: CJ

## 2017-12-15 PROCEDURE — 85025 COMPLETE CBC W/AUTO DIFF WBC: CPT

## 2017-12-15 PROCEDURE — 21400001 HC TELEMETRY ROOM

## 2017-12-15 PROCEDURE — A9585 GADOBUTROL INJECTION: HCPCS | Performed by: FAMILY MEDICINE

## 2017-12-15 PROCEDURE — 25500020 PHARM REV CODE 255: Performed by: FAMILY MEDICINE

## 2017-12-15 PROCEDURE — 82607 VITAMIN B-12: CPT

## 2017-12-15 PROCEDURE — 97110 THERAPEUTIC EXERCISES: CPT

## 2017-12-15 PROCEDURE — 97530 THERAPEUTIC ACTIVITIES: CPT

## 2017-12-15 PROCEDURE — 25000003 PHARM REV CODE 250: Performed by: FAMILY MEDICINE

## 2017-12-15 PROCEDURE — S4991 NICOTINE PATCH NONLEGEND: HCPCS | Performed by: STUDENT IN AN ORGANIZED HEALTH CARE EDUCATION/TRAINING PROGRAM

## 2017-12-15 PROCEDURE — S0077 INJECTION, CLINDAMYCIN PHOSP: HCPCS | Performed by: FAMILY MEDICINE

## 2017-12-15 PROCEDURE — G8988 SELF CARE GOAL STATUS: HCPCS | Mod: CI

## 2017-12-15 PROCEDURE — 97535 SELF CARE MNGMENT TRAINING: CPT

## 2017-12-15 PROCEDURE — 82746 ASSAY OF FOLIC ACID SERUM: CPT

## 2017-12-15 PROCEDURE — 63600175 PHARM REV CODE 636 W HCPCS: Performed by: FAMILY MEDICINE

## 2017-12-15 PROCEDURE — 84100 ASSAY OF PHOSPHORUS: CPT

## 2017-12-15 PROCEDURE — 94761 N-INVAS EAR/PLS OXIMETRY MLT: CPT

## 2017-12-15 PROCEDURE — 36415 COLL VENOUS BLD VENIPUNCTURE: CPT

## 2017-12-15 PROCEDURE — 97165 OT EVAL LOW COMPLEX 30 MIN: CPT

## 2017-12-15 PROCEDURE — 25000003 PHARM REV CODE 250: Performed by: STUDENT IN AN ORGANIZED HEALTH CARE EDUCATION/TRAINING PROGRAM

## 2017-12-15 PROCEDURE — 97116 GAIT TRAINING THERAPY: CPT

## 2017-12-15 RX ORDER — GADOBUTROL 604.72 MG/ML
10 INJECTION INTRAVENOUS
Status: COMPLETED | OUTPATIENT
Start: 2017-12-15 | End: 2017-12-15

## 2017-12-15 RX ORDER — CLINDAMYCIN PHOSPHATE 600 MG/50ML
600 INJECTION, SOLUTION INTRAVENOUS
Status: DISCONTINUED | OUTPATIENT
Start: 2017-12-15 | End: 2017-12-18

## 2017-12-15 RX ORDER — LANOLIN ALCOHOL/MO/W.PET/CERES
400 CREAM (GRAM) TOPICAL 2 TIMES DAILY
Status: DISCONTINUED | OUTPATIENT
Start: 2017-12-15 | End: 2017-12-27 | Stop reason: HOSPADM

## 2017-12-15 RX ADMIN — HYDROCODONE BITARTRATE AND ACETAMINOPHEN 1 TABLET: 5; 325 TABLET ORAL at 06:12

## 2017-12-15 RX ADMIN — ENOXAPARIN SODIUM 40 MG: 100 INJECTION SUBCUTANEOUS at 05:12

## 2017-12-15 RX ADMIN — CLOPIDOGREL 75 MG: 75 TABLET, FILM COATED ORAL at 09:12

## 2017-12-15 RX ADMIN — LOPERAMIDE HYDROCHLORIDE 2 MG: 2 CAPSULE ORAL at 08:12

## 2017-12-15 RX ADMIN — INSULIN ASPART 1 UNITS: 100 INJECTION, SOLUTION INTRAVENOUS; SUBCUTANEOUS at 08:12

## 2017-12-15 RX ADMIN — SIMVASTATIN 40 MG: 20 TABLET, FILM COATED ORAL at 08:12

## 2017-12-15 RX ADMIN — CARVEDILOL 6.25 MG: 6.25 TABLET, FILM COATED ORAL at 09:12

## 2017-12-15 RX ADMIN — MAGNESIUM OXIDE TAB 400 MG (241.3 MG ELEMENTAL MG) 400 MG: 400 (241.3 MG) TAB at 08:12

## 2017-12-15 RX ADMIN — CLINDAMYCIN IN 5 PERCENT DEXTROSE 600 MG: 12 INJECTION, SOLUTION INTRAVENOUS at 05:12

## 2017-12-15 RX ADMIN — NICOTINE 1 PATCH: 21 PATCH, EXTENDED RELEASE TRANSDERMAL at 09:12

## 2017-12-15 RX ADMIN — LOPERAMIDE HYDROCHLORIDE 2 MG: 2 CAPSULE ORAL at 09:12

## 2017-12-15 RX ADMIN — CLORAZEPATE DIPOTASSIUM 15 MG: 3.75 TABLET ORAL at 09:12

## 2017-12-15 RX ADMIN — GADOBUTROL 10 ML: 604.72 INJECTION INTRAVENOUS at 04:12

## 2017-12-15 RX ADMIN — CARVEDILOL 6.25 MG: 6.25 TABLET, FILM COATED ORAL at 05:12

## 2017-12-15 RX ADMIN — CLORAZEPATE DIPOTASSIUM 15 MG: 3.75 TABLET ORAL at 08:12

## 2017-12-15 RX ADMIN — PIPERACILLIN SODIUM AND TAZOBACTAM SODIUM 4.5 G: 4; .5 INJECTION, POWDER, FOR SOLUTION INTRAVENOUS at 09:12

## 2017-12-15 RX ADMIN — ASPIRIN 81 MG: 81 TABLET, COATED ORAL at 09:12

## 2017-12-15 RX ADMIN — HYDROCHLOROTHIAZIDE 12.5 MG: 12.5 TABLET ORAL at 09:12

## 2017-12-15 RX ADMIN — MAGNESIUM OXIDE TAB 400 MG (241.3 MG ELEMENTAL MG) 400 MG: 400 (241.3 MG) TAB at 09:12

## 2017-12-15 RX ADMIN — INSULIN ASPART 4 UNITS: 100 INJECTION, SOLUTION INTRAVENOUS; SUBCUTANEOUS at 05:12

## 2017-12-15 RX ADMIN — VANCOMYCIN HYDROCHLORIDE 1250 MG: 10 INJECTION, POWDER, LYOPHILIZED, FOR SOLUTION INTRAVENOUS at 03:12

## 2017-12-15 NOTE — PLAN OF CARE
Problem: Patient Care Overview  Goal: Plan of Care Review  Outcome: Ongoing (interventions implemented as appropriate)  Pt is anxious. Plan of care reviewed. Pt verbalized understanding. On Tele NSR HR 70's No red alarms noted. Safety measures maintained. Bed is in the lowest position and locked. Call bell is within reach. Instructed pt to call for assistance. Pt verbalized understanding. Will continue to monitor.

## 2017-12-15 NOTE — ASSESSMENT & PLAN NOTE
Hx of 3 stents in angiogram in 2003  Restarted on ASA, plavix. Potentially able to stop plavix since stents were placed over 2 years ago  Trops trended down  F/u cardiology as outpatient  H/H 13/39 but with MCV of 105, checking folate and B12  No chest pain

## 2017-12-15 NOTE — ASSESSMENT & PLAN NOTE
Restarted Carvedilol, HCTZ (prior home meds from 2 years ago)   Stable  Cardiac diet  127-141/59-82

## 2017-12-15 NOTE — PLAN OF CARE
Problem: Patient Care Overview  Goal: Plan of Care Review  Outcome: Ongoing (interventions implemented as appropriate)  Plan of care reviewed with patient. IV antibiotics administered for cellulitis of left leg. Left leg elevated on pillow while patient is lying down. Bed alarm on, bed locked and low, and call bell in reach. Diabetes controlled through diet, blood glucose monitoring, and insulin administration. PRN pain medication given for pain. Patient complains of diarrhea, PRN imodium given. Tele monitor attached. No red alarms or ectopy noted during shift. Patient verbalizes clear understanding of plan of care.

## 2017-12-15 NOTE — CONSULTS
Patient seen regarding Cellulitis left lower leg. Left lower leg swollen and red pink ankle to just past lateral knee area. Left foot dorsal pedal and posterior tibial pulses palpable. It appears that blood has leaked just under the skin of the lateral leg. No drainage seen at all. The prevoius line around redness indicates redness is decreasing. Patient is able to raise and lower leg himself. Mid anterior left lower leg 2 small dry scab areas seen. Right lower leg with few dry scab areas seen as well. Venous stasis with hemosiderin staining to both lower legs. Rt lower leg not edematous. Recommend continuing to keep legs without wrappings. Keep scabs dry. Elevate legs as tolerated.

## 2017-12-15 NOTE — PLAN OF CARE
Ortho Plan of Care    I reviewed the MRI of the left lower extremity and agree with the radiologist's interpretation - there is subcutaneous edema and cellulitis throughout the lower leg with no evidence of a fluid collection that would need surgical intervention. Recommend continued antibiotics per primary.      Mike Kemp  PGY-3  [employer] [facility]

## 2017-12-15 NOTE — PLAN OF CARE
"9 beat run of VTACH on telemetry. Pt asymptomatic. States "I coughed." B/P 110/62, HR 73. Dr. Broderick notified. No new orders received.   "

## 2017-12-15 NOTE — ASSESSMENT & PLAN NOTE
HgbA1C 6.5  On Low Dose Corrective scale  Gluc 181-232  Will likely add Determir for long acting insulin

## 2017-12-15 NOTE — PROGRESS NOTES
.Pharmacy New Medication Education    Patient accepted medication education.    Pharmacy educated patient on name and purpose of medications and possible side effects, using the teach-back method.     Loperamide     Learners of pharmacy medication education included:  Patient    Patient +/- learner response:  Verbalized Understanding, Teachback

## 2017-12-15 NOTE — PROGRESS NOTES
Interval:   NAEO. Reports improved pain.    Vitals:  Temp:  [96.7 °F (35.9 °C)-97.6 °F (36.4 °C)] 97.3 °F (36.3 °C)  Pulse:  [71-88] 77  Resp:  [16-20] 17  SpO2:  [94 %-100 %] 100 %  BP: (127-142)/(59-82) 142/66    Scheduled Meds:    aspirin  81 mg Oral Daily    carvedilol  6.25 mg Oral BID WM    clopidogrel  75 mg Oral Daily    clorazepate  15 mg Oral BID    enoxaparin  40 mg Subcutaneous Daily    hydroCHLOROthiazide  12.5 mg Oral Daily    magnesium oxide  400 mg Oral BID    nicotine  1 patch Transdermal Daily    piperacillin-tazobactam 4.5 g in dextrose 5 % 100 mL IVPB (ready to mix system)  4.5 g Intravenous Q12H    simvastatin  40 mg Oral QHS    vancomycin (VANCOCIN) IVPB  1,250 mg Intravenous Q12H     Continuous Infusions:   PRN Meds: dextrose 50%, dextrose 50%, glucagon (human recombinant), glucose, glucose, hydrocodone-acetaminophen 5-325mg, influenza, insulin aspart, loperamide, lorazepam, morphine, pneumoc 13-jose g conj-dip cr(PF), sodium chloride 0.9%    Diet: Diet Cardiac    Trended Lab Data:    Recent Labs  Lab 12/13/17  0422 12/14/17  0257 12/15/17  0354   WBC 13.90* 14.60* 12.67   HGB 14.0 13.1* 13.0*   HCT 41.9 38.7* 39.9*    180 219   * 103* 105*   RDW 13.7 13.7 13.8    138 139   K 3.5 3.6 4.3   CL 97 99 96   CO2 30* 31* 38*   BUN 15 15 15   CREATININE 0.8 0.8 0.9   * 183* 232*   PROT 6.4 6.3 6.4   ALBUMIN 2.2* 2.1* 2.0*   BILITOT 0.4 0.4 0.3   AST 35 39 33   ALKPHOS 91 100 119   ALT 34 41 41       I/O last 3 completed shifts:  In: 2221 [P.O.:1521; IV Piggyback:700]  Out: 3350 [Urine:3350]    Exam:  Gen: NAD  Resp: unlabored  Cv: RR by palp  LLE:  No significant change in erythema compared to yesterday. Patient most tender over posterior lower leg.   No palpable fluctuance  No significant knee effusion or joint line tenderness  Knee AROM 5-95, reports pain at endpoints in cellulitic areas distal to knee  Sensation grossly intact  BCR, non-palpable  pulses  EHL/FHL/TA/GS intact without significant pain    Impression/Plan:  62M with LLE cellulitis  - no acute surgical intervention indicated at this time  - exam not consistent with septic arthritis  - no palpable fluid collections, however due to continued pain and minimal change in lower leg appearance, may want to consider advanced imaging to evaluate for any fluid collection not identified on exam - discussed with Family Medicine this morning  - continue medical management per primary    Mike Kemp  PGY-3  [employer] [facility]

## 2017-12-15 NOTE — PT/OT/SLP PROGRESS
Physical Therapy Treatment    Patient Name:  Reji Guerra   MRN:  [id number]    Recommendations:     Discharge Recommendations:  home (pt has no insurance per [place])   Discharge Equipment Recommendations:     Barriers to discharge: Decreased caregiver support    Assessment:     Reji Guerra is a 62 y.o. male admitted with a medical diagnosis of Cellulitis of left lower extremity without foot.  He presents with the following impairments/functional limitations:  weakness, impaired endurance, impaired functional mobilty, gait instability, impaired balance, decreased lower extremity function, pain, decreased ROM, impaired coordination pt requires verbal cues to stay on task,requires S/SBA with gait,limited by no insurance for continued rx.    Rehab Prognosis:  Good; patient would benefit from acute skilled PT services to address these deficits and reach maximum level of function.      Recent Surgery: * No surgery found *      Plan:     During this hospitalization, patient to be seen 6 x/week to address the above listed problems via gait training, therapeutic activities, therapeutic exercises  · Plan of Care Expires:  01/14/18   Plan of Care Reviewed with: patient    Subjective     Communicated with nsg prior to session.  Patient found EOB upon PT entry to room, agreeable to treatment.      Chief Complaint: L le pain  Patient comments/goals: pt states RW adjustment helped  Pain/Comfort:  · Pain Rating 1: 5/10  · Location - Side 1: Left  · Location - Orientation 1: generalized  · Location 1: leg  · Pain Addressed 1: Reposition, Distraction, Nurse notified    Patients cultural, spiritual, Christianity conflicts given the current situation:      Objective:     Patient found with: telemetry     General Precautions: Standard, fall   Orthopedic Precautions:N/A   Braces: N/A     Functional Mobility:  · Transfers:     · Sit to Stand:  stand by assistance and contact guard assistance with rolling walker  · Gait: ~20' X 2 with RW  WBAT with S/SBA  · Balance: fair sitting balance      AM-PAC 6 CLICK MOBILITY  Turning over in bed (including adjusting bedclothes, sheets and blankets)?: 4  Sitting down on and standing up from a chair with arms (e.g., wheelchair, bedside commode, etc.): 3  Moving from lying on back to sitting on the side of the bed?: 3  Moving to and from a bed to a chair (including a wheelchair)?: 3  Need to walk in hospital room?: 3  Climbing 3-5 steps with a railing?: 2  Total Score: 18       Therapeutic Activities and Exercises: le seated ex's X 15 reps inc: ap,laq,hip flex,abd/add,pt used bathroom during rx with with some time required,adjusted pt's RW height for ease of use       Patient left EOB with call button in reach and nsg present..    GOALS: see general POC   Physical Therapy Goals        Problem: Physical Therapy Goal    Goal Priority Disciplines Outcome Goal Variances Interventions   Physical Therapy Goal     PT/OT, PT Ongoing (interventions implemented as appropriate)     Description:  Goals to be met by: 2018     Patient will increase functional independence with mobility by performin. Supine to sit with Modified [place]  2. Sit to stand transfer with Modified [place]  3. Gait  x 150 feet with Modified [place] using Rolling Walker.                       Time Tracking:     PT Received On: 12/15/17  PT Start Time: 915     PT Stop Time: 957  PT Total Time (min): 42 min     Billable Minutes: Gait Training 16, Therapeutic Activity 13 and Therapeutic Exercise 13    Treatment Type: Treatment  PT/PTA: PTA     PTA Visit Number: 1     Markus Kraft, PTA  12/15/2017

## 2017-12-15 NOTE — PROGRESS NOTES
Patient awake, aware, talkative. Left lower leg swollen ankle to thigh/hip area. No open draining wounds seen. Venous stasis hemosiderin both lower legs. Pink red discoloration of left leg appears to be decreasing. Capillary fill left foot >3 sec.  Recommend continuing present treatment. Elevate feet and legs as tolerated. Follow-up with [facility].

## 2017-12-15 NOTE — PLAN OF CARE
Problem: Occupational Therapy Goal  Goal: Occupational Therapy Goal  Goals to be met by: 12/23/2017    Patient will increase functional independence with ADLs by performing:    LE Dressing with Supervision.  Grooming while standing at sink with Supervision.  Toileting from bedside commode with Supervision for hygiene and clothing management.   Supine to sit with Modified [place].  Toilet transfer to bedside commode with Supervision.    Outcome: Ongoing (interventions implemented as appropriate)  Pt. Would benefit from skilled OT. Recommendations for home DME:  [facility], [facility].  HHOT/HHPT/HH aide recommended.  Continue with OT POC.

## 2017-12-15 NOTE — PLAN OF CARE
Problem: Physical Therapy Goal  Goal: Physical Therapy Goal  Goals to be met by: 2018     Patient will increase functional independence with mobility by performin. Supine to sit with Modified [place]  2. Sit to stand transfer with Modified [place]  3. Gait  x 150 feet with Modified [place] using Rolling Walker.      Outcome: Ongoing (interventions implemented as appropriate)  Goals ongoing

## 2017-12-15 NOTE — ASSESSMENT & PLAN NOTE
WBC 14 on admit, now down to 12.6  On Vanc, Zosyn  Vanc trough ordered before 4th dose: appropriate at 13.4  Cellulitis is overlying joint but seems to just involve the skin/soft tissue, will monitor closely- not currently thought to be septic arthritis, able to range joint. Ortho on board  Will likely switch to IV Cipro today or tomorrow to monitor for efficacy

## 2017-12-15 NOTE — SUBJECTIVE & OBJECTIVE
Interval History: NAEON. AF. VSS. Diarrhea improved but still with one occasion. Pain continues to be improved. Tolerating PO diet, wound care consulted. Pt elevating leg.    Review of Systems   Constitutional: Positive for activity change. Negative for chills and diaphoresis.   Cardiovascular: Positive for leg swelling (Unilat LLE swelling, skin changes).   Gastrointestinal: Positive for abdominal distention (baseline, soft) and diarrhea. Negative for abdominal pain, blood in stool, constipation, nausea and vomiting.   Genitourinary: Negative for difficulty urinating, dysuria and urgency.   Musculoskeletal: Positive for gait problem and myalgias (improved even further). Negative for back pain.   Skin: Positive for color change (stable, reports improvement).   Neurological: Negative for dizziness, weakness, numbness and headaches.     Objective:     Vital Signs (Most Recent):  Temp: 97.4 °F (36.3 °C) (12/15/17 0600)  Pulse: 71 (12/15/17 0600)  Resp: 20 (12/15/17 0600)  BP: 131/61 (12/15/17 0600)  SpO2: 100 % (12/15/17 0709) Vital Signs (24h Range):  Temp:  [96.7 °F (35.9 °C)-97.6 °F (36.4 °C)] 97.4 °F (36.3 °C)  Pulse:  [71-80] 71  Resp:  [16-20] 20  SpO2:  [94 %-100 %] 100 %  BP: (127-141)/(59-82) 131/61     Weight: 95 kg (209 lb 6.4 oz)  Body mass index is 34.85 kg/m².    Intake/Output Summary (Last 24 hours) at 12/15/17 0802  Last data filed at 12/15/17 0600   Gross per 24 hour   Intake             1451 ml   Output             2100 ml   Net             -649 ml      Physical Exam   Constitutional: He is oriented to person, place, and time. He appears well-developed and well-nourished. No distress.   Appears older than stated age, comfortable appearing   HENT:   Head: Normocephalic and atraumatic.   Mouth/Throat: No oropharyngeal exudate.   Eyes: Conjunctivae and EOM are normal. Pupils are equal, round, and reactive to light.   Neck: Normal range of motion. Neck supple. No JVD present.   Cardiovascular: Normal  rate, regular rhythm and normal heart sounds.    No murmur heard.  Pulmonary/Chest: Effort normal. No respiratory distress. He has no wheezes (bilat lower lung wheezes). He has no rales.   Abdominal: Soft. Bowel sounds are normal. He exhibits distension (rotund abdomen ). He exhibits no mass. There is no tenderness.   Musculoskeletal: He exhibits no edema.   Able to range knee (UE & RLE without erythema, pain); chronic spinal osteoarthritis   Neurological: He is alert and oriented to person, place, and time. No cranial nerve deficit.   Skin: Skin is warm. Capillary refill takes less than 2 seconds. He is not diaphoretic. There is erythema (stable possible mild improvement on thigh).   LLE erythema and swelling extending from mid calf to knee with erythema, warmth extends up medial aspect of thigh with decreasing pain/erythema lateral anterior thigh; dry flaking skin on bilat feet; bilat old healed venous stasis ulcer scaling wounds, no drainage, no scrotal involvement   Psychiatric: He has a normal mood and affect. His behavior is normal.   Nursing note and vitals reviewed.    Significant Labs:   CBC:   Recent Labs  Lab 12/14/17 0257 12/15/17  0354   WBC 14.60* 12.67   HGB 13.1* 13.0*   HCT 38.7* 39.9*    219     CMP:   Recent Labs  Lab 12/14/17 0257 12/15/17  0354    139   K 3.6 4.3   CL 99 96   CO2 31* 38*   * 232*   BUN 15 15   CREATININE 0.8 0.9   CALCIUM 8.9 9.5   PROT 6.3 6.4   ALBUMIN 2.1* 2.0*   BILITOT 0.4 0.3   ALKPHOS 100 119   AST 39 33   ALT 41 41   ANIONGAP 8 5*   EGFRNONAA >60 >60       Significant Imaging: I have reviewed all pertinent imaging results/findings within the past 24 hours.

## 2017-12-15 NOTE — PT/OT/SLP EVAL
Occupational Therapy   Evaluation/Treatment    Name: Reji Guerra  MRN: [id number]  Admitting Diagnosis:  Cellulitis of left lower extremity without foot      Recommendations:     Discharge Recommendations: home health OT, home health PT, home with home health  Discharge Equipment Recommendations:  bath bench, bedside commode  Barriers to discharge:  Decreased caregiver support    History:     Occupational Profile:  Living Environment: lives with roommate H with no steps ; has tub/shower combo; standard toilet  Previous level of function: Tadeo ADLS; ambulates with RW recently otherwise uses SPC; works full time convenient store; sisters helps with groceries;does his own laundry; pt. drives  Roles and Routines: active  Equipment Owned:  RW, SPC  Assistance upon Discharge: limited sister and roommate    Past Medical History:   Diagnosis Date    Diabetes mellitus, type 2     Heart disease     High cholesterol     Hypertension        Past Surgical History:   Procedure Laterality Date    ANGIOPLASTY      HERNIA REPAIR         Subjective     Chief Complaint: LLE pain  Patient/Family stated goals: none  Communicated with: none prior to session.  Pain/Comfort:  · Pain Rating 1: 7/10  · Location - Side 1: Left  · Location - Orientation 1: lower  · Location 1: leg  · Pain Addressed 1: Pre-medicate for activity, Reposition, Distraction  · Pain Rating Post-Intervention 1: 4/10    Objective:     Patient found with: telemetry, peripheral IV    General Precautions: Standard, fall   Orthopedic Precautions:  none  Braces: N/A     Occupational Performance:    Bed Mobility:    · Patient completed Rolling/Turning to Left with  minimum assistance  · Patient completed Scooting/Bridging with stand by assistance  · Patient completed Supine to Sit with minimum assistance and with side rail  · Patient completed Sit to Supine with minimum assistance    Functional Mobility/Transfers:  · Patient completed Sit <> Stand Transfer with  contact guard assistance  with  rolling walker   · Patient completed Toilet Transfer Stand Pivot technique with contact guard assistance with  bedside commode and rolling walker    Activities of Daily Living:  · Feeding:  independence bed level  · Grooming: independence bed level  · LB Dressing: total assistance socks and L shoe cover  · Toileting: contact guard assistance for clothes management from BS with RW    Cognitive/Visual Perceptual:  Cognitive/Psychosocial Skills:     -       Oriented to: Person, Place, Time and Situation   -       Follows Commands/attention:Follows one-step commands  -       Communication: clear/fluent and very verbose  -       Memory: No Deficits noted  -       Safety awareness/insight to disability: intact   -       Mood/Affect/Coping skills/emotional control: Appropriate to situation  Visual/Perceptual:      -Intact    Physical Exam:  Balance:    -       static sitting:  Good  Dynamic Sitting: fair plus  Static Standing: Fair  Dynamic standing: poor plus    Postural examination/scapula alignment:    -       Rounded shoulders  Skin integrity: Dry scar R lower legs sore, Dry and red LLE and L inner thigh  Edema:  Severe LLE (below knee) including foot  Dominant hand:    -       right  Upper Extremity Range of Motion:     -       Right Upper Extremity: WFL  -       Left Upper Extremity: WFL  Upper Extremity Strength:    -       Right Upper Extremity: WFL  -       Left Upper Extremity: WFL   Strength:    -       Right Upper Extremity: WFL  -       Left Upper Extremity: WFL    Patient left HOB elevated with all lines intact and call button in reach    [facility] 6 Click:  [facility] Total Score: 21    Treatment & Education:  Role of OT and POC education, safety with RW use for SPT; BSc transfers with RW; toileting from BS with RW use, bed rolling and transfer supine<>sit  Education:    Assessment:     Reji Guerra is a 62 y.o. male with a medical diagnosis of Cellulitis of left lower  "extremity without foot.  He presents with moderate assist for LB ADLS and CGA with BSC stand pivot t.f with DME. Pt. Would benefit from skilled OT. Recommendations for home DME:  BSC, TTB.  HHOT/HHPT/HH adie recommended.  Continue with OT POC.  Performance deficits affecting function are weakness, impaired self care skills, impaired functional mobilty, pain, gait instability, decreased lower extremity function.      Rehab Prognosis:  good; patient would benefit from acute skilled OT services to address these deficits and reach maximum level of function.         Clinical Decision Makin.  OT Low:  "Pt evaluation falls under low complexity for evaluation coding due to performance deficits noted in 1-3 areas as stated above and 0 co-morbities affecting current functional status. Data obtained from problem focused assessments. No modifications or assistance was required for completion of evaluation. Only brief occupational profile and history review completed."     Plan:     Patient to be seen 5 x/week to address the above listed problems via self-care/home management, therapeutic activities, therapeutic exercises  · Plan of Care Expires: 01/15/18  · Plan of Care Reviewed with: patient    This Plan of care has been discussed with the patient who was involved in its development and understands and is in agreement with the identified goals and treatment plan    GOALS:    Occupational Therapy Goals        Problem: Occupational Therapy Goal    Goal Priority Disciplines Outcome Interventions   Occupational Therapy Goal     OT, PT/OT Ongoing (interventions implemented as appropriate)    Description:  Goals to be met by: 2017    Patient will increase functional independence with ADLs by performing:    LE Dressing with Supervision.  Grooming while standing at sink with Supervision.  Toileting from bedside commode with Supervision for hygiene and clothing management.   Supine to sit with Modified " [place].  Toilet transfer to bedside commode with Supervision.                      Time Tracking:     OT Date of Treatment: 12/15/17  OT Start Time: 1054  OT Stop Time: 1144  OT Total Time (min): 50 min    Billable Minutes:Evaluation 15  Self Care/Home Management 25  Therapeutic Activity 10  Total Time 50    Chiquita Anton OT  12/15/2017

## 2017-12-15 NOTE — PLAN OF CARE
Problem: Patient Care Overview  Goal: Plan of Care Review  Outcome: Revised  Pt c/o of pain to LLE x1 during the night and is c/o of pain to R hand d/t infiltrated PIV. Will d/c PIV and a new one will be started. Pt extremely anxious. Relaxation techniques used to calm patient. Pt receiving IV Zosyn and Vanc every 12 hours for LLE cellulitis. LLE still red, warm to touch, swollen, and painful. Pt remains on fall precautions. Bed alarm on. Call bell in reach. Accuchecks done AC/HS with SS insulin given last night. Pt on tele, running SR, HR 70s-90s with one 8 beat run of VTACH with no symptoms. Will cont to monitor and will report off to oncoming nurse.

## 2017-12-15 NOTE — PROGRESS NOTES
[facility] Hospital Medicine  Progress Note    Patient Name: Reji Guerra  MRN: [id number]  Patient Class: IP- Inpatient   Admission Date: 12/12/2017  Length of Stay: 3 days  Attending Physician: Berkley Johnson MD  Primary Care Provider: [facility]        Subjective:     Principal Problem:Cellulitis of left lower extremity without foot    HPI:  61 yo male w/ CAD (3 stents in 2003 w/MI), DMT2, HTN, HLD presents to ED with 1 week of LLE swelling and erythema, which just starting tracking up his inner thigh within the past two days. No trauma to his leg, no wounds on his foot. Pain with standing and walking. 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  He does have to rest 1/2 up a full flight of stairs but denies chest pain, shortness of breath, recent illness. He sleeps flat on just 1 pillow.    He hasn't had medical care in 2 years and hasn't taken any medications for 1 year.  He thinks he was supposed to keep taking the plavix.  Baseline dry cough, minimally productive with clear sputum. Denies CP, SOB, recent illness, N/V, diarrhea, constipation. Last BM day before admission.    Hospital Course:  No notes on file    Interval History: NAEON. AF. VSS. Diarrhea improved but still with one occasion. Pain continues to be improved. Tolerating PO diet, wound care consulted. Pt elevating leg.    Review of Systems   Constitutional: Positive for activity change. Negative for chills and diaphoresis.   Cardiovascular: Positive for leg swelling (Unilat LLE swelling, skin changes).   Gastrointestinal: Positive for abdominal distention (baseline, soft) and diarrhea. Negative for abdominal pain, blood in stool, constipation, nausea and vomiting.   Genitourinary: Negative for difficulty urinating, dysuria and urgency.   Musculoskeletal: Positive for gait problem and myalgias (improved even further). Negative for back pain.    Skin: Positive for color change (stable, reports improvement).   Neurological: Negative for dizziness, weakness, numbness and headaches.     Objective:     Vital Signs (Most Recent):  Temp: 97.4 °F (36.3 °C) (12/15/17 0600)  Pulse: 71 (12/15/17 0600)  Resp: 20 (12/15/17 0600)  BP: 131/61 (12/15/17 0600)  SpO2: 100 % (12/15/17 0709) Vital Signs (24h Range):  Temp:  [96.7 °F (35.9 °C)-97.6 °F (36.4 °C)] 97.4 °F (36.3 °C)  Pulse:  [71-80] 71  Resp:  [16-20] 20  SpO2:  [94 %-100 %] 100 %  BP: (127-141)/(59-82) 131/61     Weight: 95 kg (209 lb 6.4 oz)  Body mass index is 34.85 kg/m².    Intake/Output Summary (Last 24 hours) at 12/15/17 0802  Last data filed at 12/15/17 0600   Gross per 24 hour   Intake             1451 ml   Output             2100 ml   Net             -649 ml      Physical Exam   Constitutional: He is oriented to person, place, and time. He appears well-developed and well-nourished. No distress.   Appears older than stated age, comfortable appearing   HENT:   Head: Normocephalic and atraumatic.   Mouth/Throat: No oropharyngeal exudate.   Eyes: Conjunctivae and EOM are normal. Pupils are equal, round, and reactive to light.   Neck: Normal range of motion. Neck supple. No JVD present.   Cardiovascular: Normal rate, regular rhythm and normal heart sounds.    No murmur heard.  Pulmonary/Chest: Effort normal. No respiratory distress. He has no wheezes (bilat lower lung wheezes). He has no rales.   Abdominal: Soft. Bowel sounds are normal. He exhibits distension (rotund abdomen ). He exhibits no mass. There is no tenderness.   Musculoskeletal: He exhibits no edema.   Able to range knee (UE & RLE without erythema, pain); chronic spinal osteoarthritis   Neurological: He is alert and oriented to person, place, and time. No cranial nerve deficit.   Skin: Skin is warm. Capillary refill takes less than 2 seconds. He is not diaphoretic. There is erythema (stable possible mild improvement on thigh).   LLE erythema  and swelling extending from mid calf to knee with erythema, warmth extends up medial aspect of thigh with decreasing pain/erythema lateral anterior thigh; dry flaking skin on bilat feet; bilat old healed venous stasis ulcer scaling wounds, no drainage, no scrotal involvement   Psychiatric: He has a normal mood and affect. His behavior is normal.   Nursing note and vitals reviewed.                Significant Labs:   CBC:   Recent Labs  Lab 12/14/17  0257 12/15/17  0354   WBC 14.60* 12.67   HGB 13.1* 13.0*   HCT 38.7* 39.9*    219     CMP:   Recent Labs  Lab 12/14/17 0257 12/15/17  0354    139   K 3.6 4.3   CL 99 96   CO2 31* 38*   * 232*   BUN 15 15   CREATININE 0.8 0.9   CALCIUM 8.9 9.5   PROT 6.3 6.4   ALBUMIN 2.1* 2.0*   BILITOT 0.4 0.3   ALKPHOS 100 119   AST 39 33   ALT 41 41   ANIONGAP 8 5*   EGFRNONAA >60 >60       Significant Imaging: I have reviewed all pertinent imaging results/findings within the past 24 hours.    Assessment/Plan:      * Cellulitis of left lower extremity without foot    WBC 14 on admit, now down to 12.6  On Vanc, Zosyn  Vanc trough ordered before 4th dose: appropriate at 13.4  Cellulitis is overlying joint but seems to just involve the skin/soft tissue, will monitor closely- not currently thought to be septic arthritis, able to range joint. Ortho on board  Will likely switch to IV Cipro today or tomorrow to monitor for efficacy      Elevated troponin    Improved to normal  EKG's without St segment elevation  No chest pain      Leg swelling    No DVT on ultrasound  Elevating leg      Anxiety    Pt did not mention this, only per chart review was this known  No meds for now, will monitor      Coronary artery disease involving native coronary artery of native heart without angina pectoris    Hx of 3 stents in angiogram in 2003  Restarted on ASA, plavix. Potentially able to stop plavix since stents were placed over 2 years ago  Trops trended down  F/u cardiology as  outpatient  H/H 13/39 but with MCV of 105, checking folate and B12  No chest pain      HTN (hypertension)    Restarted Carvedilol, HCTZ (prior home meds from 2 years ago)   Cardiac diet  Stable  127-141/59-82      Type 2 diabetes mellitus with hyperglycemia, without long-term current use of insulin    HgbA1C 6.5  On Low Dose Corrective scale  Gluc 181-232  Will likely add Determir for long acting insulin        VTE Risk Mitigation         Ordered     enoxaparin injection 40 mg  Daily     Route:  Subcutaneous        12/12/17 1624     Medium Risk of VTE  Once      12/12/17 1624          Andrew Lombardo MD  Department of Hospital Medicine   [facility]

## 2017-12-16 LAB
ALBUMIN SERPL BCP-MCNC: 2 G/DL
ALP SERPL-CCNC: 130 U/L
ALT SERPL W/O P-5'-P-CCNC: 39 U/L
ANION GAP SERPL CALC-SCNC: 8 MMOL/L
AST SERPL-CCNC: 33 U/L
BASOPHILS # BLD AUTO: 0.03 K/UL
BASOPHILS NFR BLD: 0.3 %
BILIRUB SERPL-MCNC: 0.4 MG/DL
BUN SERPL-MCNC: 18 MG/DL
CALCIUM SERPL-MCNC: 9.5 MG/DL
CHLORIDE SERPL-SCNC: 94 MMOL/L
CO2 SERPL-SCNC: 35 MMOL/L
CREAT SERPL-MCNC: 1.2 MG/DL
DIFFERENTIAL METHOD: ABNORMAL
EOSINOPHIL # BLD AUTO: 0.1 K/UL
EOSINOPHIL NFR BLD: 1.2 %
ERYTHROCYTE [DISTWIDTH] IN BLOOD BY AUTOMATED COUNT: 13.6 %
EST. GFR  (AFRICAN AMERICAN): >60 ML/MIN/1.73 M^2
EST. GFR  (NON AFRICAN AMERICAN): >60 ML/MIN/1.73 M^2
GLUCOSE SERPL-MCNC: 345 MG/DL
HCT VFR BLD AUTO: 42.7 %
HGB BLD-MCNC: 13.7 G/DL
LYMPHOCYTES # BLD AUTO: 1.1 K/UL
LYMPHOCYTES NFR BLD: 9.5 %
MAGNESIUM SERPL-MCNC: 1.7 MG/DL
MCH RBC QN AUTO: 33.9 PG
MCHC RBC AUTO-ENTMCNC: 32.1 G/DL
MCV RBC AUTO: 106 FL
MONOCYTES # BLD AUTO: 1.1 K/UL
MONOCYTES NFR BLD: 9.5 %
NEUTROPHILS # BLD AUTO: 9.2 K/UL
NEUTROPHILS NFR BLD: 78.1 %
PHOSPHATE SERPL-MCNC: 3.9 MG/DL
PLATELET # BLD AUTO: 263 K/UL
PMV BLD AUTO: 11.2 FL
POCT GLUCOSE: 208 MG/DL (ref 70–110)
POCT GLUCOSE: 218 MG/DL (ref 70–110)
POCT GLUCOSE: 277 MG/DL (ref 70–110)
POCT GLUCOSE: 290 MG/DL (ref 70–110)
POTASSIUM SERPL-SCNC: 4 MMOL/L
PROT SERPL-MCNC: 6.7 G/DL
RBC # BLD AUTO: 4.04 M/UL
SODIUM SERPL-SCNC: 137 MMOL/L
VANCOMYCIN TROUGH SERPL-MCNC: 10 UG/ML
WBC # BLD AUTO: 11.73 K/UL

## 2017-12-16 PROCEDURE — 80053 COMPREHEN METABOLIC PANEL: CPT

## 2017-12-16 PROCEDURE — 83735 ASSAY OF MAGNESIUM: CPT

## 2017-12-16 PROCEDURE — 85025 COMPLETE CBC W/AUTO DIFF WBC: CPT

## 2017-12-16 PROCEDURE — 84100 ASSAY OF PHOSPHORUS: CPT

## 2017-12-16 PROCEDURE — 36415 COLL VENOUS BLD VENIPUNCTURE: CPT

## 2017-12-16 PROCEDURE — 80202 ASSAY OF VANCOMYCIN: CPT

## 2017-12-16 PROCEDURE — S4991 NICOTINE PATCH NONLEGEND: HCPCS | Performed by: STUDENT IN AN ORGANIZED HEALTH CARE EDUCATION/TRAINING PROGRAM

## 2017-12-16 PROCEDURE — 25000003 PHARM REV CODE 250: Performed by: FAMILY MEDICINE

## 2017-12-16 PROCEDURE — 94761 N-INVAS EAR/PLS OXIMETRY MLT: CPT

## 2017-12-16 PROCEDURE — 25000003 PHARM REV CODE 250: Performed by: STUDENT IN AN ORGANIZED HEALTH CARE EDUCATION/TRAINING PROGRAM

## 2017-12-16 PROCEDURE — 99233 SBSQ HOSP IP/OBS HIGH 50: CPT | Mod: ,,, | Performed by: STUDENT IN AN ORGANIZED HEALTH CARE EDUCATION/TRAINING PROGRAM

## 2017-12-16 PROCEDURE — 63600175 PHARM REV CODE 636 W HCPCS: Performed by: STUDENT IN AN ORGANIZED HEALTH CARE EDUCATION/TRAINING PROGRAM

## 2017-12-16 PROCEDURE — S0077 INJECTION, CLINDAMYCIN PHOSP: HCPCS | Performed by: FAMILY MEDICINE

## 2017-12-16 PROCEDURE — 21400001 HC TELEMETRY ROOM

## 2017-12-16 RX ORDER — DOXYLAMINE SUCCINATE 25 MG
TABLET ORAL 2 TIMES DAILY
Status: DISCONTINUED | OUTPATIENT
Start: 2017-12-16 | End: 2017-12-27 | Stop reason: HOSPADM

## 2017-12-16 RX ADMIN — MAGNESIUM OXIDE TAB 400 MG (241.3 MG ELEMENTAL MG) 400 MG: 400 (241.3 MG) TAB at 08:12

## 2017-12-16 RX ADMIN — CLORAZEPATE DIPOTASSIUM 15 MG: 3.75 TABLET ORAL at 08:12

## 2017-12-16 RX ADMIN — INSULIN DETEMIR 3 UNITS: 100 INJECTION, SOLUTION SUBCUTANEOUS at 08:12

## 2017-12-16 RX ADMIN — HYDROCHLOROTHIAZIDE 12.5 MG: 12.5 TABLET ORAL at 08:12

## 2017-12-16 RX ADMIN — HYDROCODONE BITARTRATE AND ACETAMINOPHEN 1 TABLET: 5; 325 TABLET ORAL at 06:12

## 2017-12-16 RX ADMIN — CARVEDILOL 6.25 MG: 6.25 TABLET, FILM COATED ORAL at 05:12

## 2017-12-16 RX ADMIN — CLINDAMYCIN IN 5 PERCENT DEXTROSE 600 MG: 12 INJECTION, SOLUTION INTRAVENOUS at 06:12

## 2017-12-16 RX ADMIN — MICONAZOLE NITRATE: 20 CREAM TOPICAL at 08:12

## 2017-12-16 RX ADMIN — INSULIN ASPART 2 UNITS: 100 INJECTION, SOLUTION INTRAVENOUS; SUBCUTANEOUS at 11:12

## 2017-12-16 RX ADMIN — INSULIN ASPART 1 UNITS: 100 INJECTION, SOLUTION INTRAVENOUS; SUBCUTANEOUS at 09:12

## 2017-12-16 RX ADMIN — SODIUM CHLORIDE 500 ML: 0.9 INJECTION, SOLUTION INTRAVENOUS at 07:12

## 2017-12-16 RX ADMIN — CLINDAMYCIN IN 5 PERCENT DEXTROSE 600 MG: 12 INJECTION, SOLUTION INTRAVENOUS at 11:12

## 2017-12-16 RX ADMIN — CLOPIDOGREL 75 MG: 75 TABLET, FILM COATED ORAL at 08:12

## 2017-12-16 RX ADMIN — CARVEDILOL 6.25 MG: 6.25 TABLET, FILM COATED ORAL at 07:12

## 2017-12-16 RX ADMIN — SIMVASTATIN 40 MG: 20 TABLET, FILM COATED ORAL at 08:12

## 2017-12-16 RX ADMIN — ASPIRIN 81 MG: 81 TABLET, COATED ORAL at 08:12

## 2017-12-16 RX ADMIN — HYDROCODONE BITARTRATE AND ACETAMINOPHEN 1 TABLET: 5; 325 TABLET ORAL at 05:12

## 2017-12-16 RX ADMIN — CLINDAMYCIN IN 5 PERCENT DEXTROSE 600 MG: 12 INJECTION, SOLUTION INTRAVENOUS at 12:12

## 2017-12-16 RX ADMIN — NICOTINE 1 PATCH: 21 PATCH, EXTENDED RELEASE TRANSDERMAL at 08:12

## 2017-12-16 RX ADMIN — ENOXAPARIN SODIUM 40 MG: 100 INJECTION SUBCUTANEOUS at 05:12

## 2017-12-16 RX ADMIN — MICONAZOLE NITRATE: 20 CREAM TOPICAL at 11:12

## 2017-12-16 RX ADMIN — MORPHINE SULFATE 4 MG: 2 INJECTION, SOLUTION INTRAMUSCULAR; INTRAVENOUS at 07:12

## 2017-12-16 RX ADMIN — INSULIN ASPART 2 UNITS: 100 INJECTION, SOLUTION INTRAVENOUS; SUBCUTANEOUS at 05:12

## 2017-12-16 RX ADMIN — CLINDAMYCIN IN 5 PERCENT DEXTROSE 600 MG: 12 INJECTION, SOLUTION INTRAVENOUS at 05:12

## 2017-12-16 RX ADMIN — INSULIN ASPART 3 UNITS: 100 INJECTION, SOLUTION INTRAVENOUS; SUBCUTANEOUS at 07:12

## 2017-12-16 NOTE — PLAN OF CARE
Problem: Patient Care Overview  Goal: Plan of Care Review  Outcome: Ongoing (interventions implemented as appropriate)  Pt on RA with sats of  96%, Will continue to monitor.

## 2017-12-16 NOTE — PLAN OF CARE
Problem: Patient Care Overview  Goal: Plan of Care Review  Outcome: Ongoing (interventions implemented as appropriate)  Pt has slept well between care.  He is on telemetry running normal sinus rhythm.  VSS.  Pt complained of pain, but would not take medication for it until this morning. Area of redness to his left leg has decreased. Pt continues to be on antibiotics.  Blood glucose levels have been monitored and did require coverage.

## 2017-12-16 NOTE — PLAN OF CARE
Problem: Patient Care Overview  Goal: Plan of Care Review  Outcome: Ongoing (interventions implemented as appropriate)  Plan of care reviewed with patient. Patient verbalized understanding. CBG monitored ACHS; coverage given as needed. Glucose readings remains elevated; scheduled levemir added this AM. Patient encouraged to stay off of left leg, but continues to ambulate to and from restroom despite having urinal and bedside commode nearby. Patient NSR on telemetry monitoring, HR 70's-80's, with no true red alarms noted. Bed is low and locked, bed alarm is activated, call light is within reach. Patient has been instructed to call if in need of assistance. Verbalized understanding.

## 2017-12-16 NOTE — CONSULTS
Patient ID: Reji Guerra is a 62 y.o. male.    Chief Complaint: Leg Swelling (leg swelling, redness, rash, and tenderness off/on one month)      HPI:  62M with DM, CAD, htn reports having two weeks hx of left lower leg swelling, pain. He had never had this before or in his other leg. Denies any fevers or drainage. He has some scabs on the right lower leg that come and go chronically. Prior to this he was ambulating well but become unable to walk and presented to the hospital 3-4 days ago. He was admitted by the family medicine service and had been on vanc/zosyn and was switched to clinda yesterday.  Patient reports improvement in symptoms and mobility. He has been afebrile since admit and his WBC is slowly trending down.         Review of Systems   Constitutional: Negative for fever.   HENT: Negative for trouble swallowing.    Respiratory: Negative for cough.    Cardiovascular: Negative for chest pain.   Gastrointestinal: Negative for abdominal pain, nausea and vomiting.   Genitourinary: Negative for dysuria.   Musculoskeletal: Positive for gait problem.   Allergic/Immunologic: Positive for immunocompromised state.   Neurological: Negative for weakness.   Hematological: Does not bruise/bleed easily.   Psychiatric/Behavioral: Negative for agitation.       Current Facility-Administered Medications   Medication Dose Route Frequency Provider Last Rate Last Dose    aspirin EC tablet 81 mg  81 mg Oral Daily Andrew Lombardo MD   81 mg at 12/16/17 0837    carvedilol tablet 6.25 mg  6.25 mg Oral BID WM Andrew Lombardo MD   6.25 mg at 12/16/17 0725    clindamycin 600 MG/50 ML D5W 600 mg/50 mL IVPB 600 mg  600 mg Intravenous Q6H Denny Dan  mL/hr at 12/16/17 1110 600 mg at 12/16/17 1110    clopidogrel tablet 75 mg  75 mg Oral Daily Andrew Lombardo MD   75 mg at 12/16/17 0837    clorazepate tablet 15 mg  15 mg Oral BID Andrew Lombardo MD   15 mg at 12/16/17 0837    dextrose 50%  injection 12.5 g  12.5 g Intravenous PRN Andrew Lombardo MD        dextrose 50% injection 25 g  25 g Intravenous PRN Andrew Lombardo MD        enoxaparin injection 40 mg  40 mg Subcutaneous Daily Andrew Lombardo MD   40 mg at 12/15/17 1713    glucagon (human recombinant) injection 1 mg  1 mg Intramuscular PRN Andrew Lombardo MD        glucose chewable tablet 16 g  16 g Oral PRN Andrew Lombardo MD        glucose chewable tablet 24 g  24 g Oral PRN Andrew Lombardo MD        hydroCHLOROthiazide tablet 12.5 mg  12.5 mg Oral Daily Andrew Lombardo MD   12.5 mg at 12/16/17 0837    hydrocodone-acetaminophen 5-325mg per tablet 1 tablet  1 tablet Oral Q6H PRN Andrew Lombardo MD   1 tablet at 12/16/17 0613    influenza (FLUZONE,FLUARIX QUADRIVALENT) vaccine 0.5 mL  0.5 mL Intramuscular vaccine x 1 dose Berkley Johnson MD        insulin aspart pen 0-5 Units  0-5 Units Subcutaneous QID (AC + HS) PRN Andrew Lombardo MD   2 Units at 12/16/17 1113    insulin detemir pen 3 Units  3 Units Subcutaneous BID Andrew Lombardo MD   3 Units at 12/16/17 0837    loperamide capsule 2 mg  2 mg Oral QID PRN Andrew Lombardo MD   2 mg at 12/15/17 2059    lorazepam (ATIVAN) injection 2 mg  2 mg Intravenous Q1H PRN Andrew Lombardo MD        magnesium oxide tablet 400 mg  400 mg Oral BID Andrew Lombardo MD   400 mg at 12/16/17 0837    miconazole 2 % cream   Topical (Top) BID Robert Marino MD        morphine injection 4 mg  4 mg Intravenous Q4H PRN Andrew Lombardo MD   4 mg at 12/16/17 0724    nicotine 21 mg/24 hr 1 patch  1 patch Transdermal Daily Andrew Lombardo MD   1 patch at 12/16/17 0837    pneumoc 13-jose g conj-dip cr(PF) 0.5 mL  0.5 mL Intramuscular vaccine x 1 dose Berkley Johnson MD        simvastatin tablet 40 mg  40 mg Oral QHS Andrew Lombardo MD   40 mg at 12/15/17 3177    sodium chloride 0.9% flush 5 mL  5 mL Intravenous PRN Andrew Toledo  MD [employer]   5 mL at 12/13/17 2041       Review of patient's allergies indicates:   Allergen Reactions    Iodine and iodide containing products     Shellfish containing products        Past Medical History:   Diagnosis Date    Diabetes mellitus, type 2     Heart disease     High cholesterol     Hypertension        Past Surgical History:   Procedure Laterality Date    ANGIOPLASTY      HERNIA REPAIR         History reviewed. No pertinent family history.    Social History     Social History    Marital status: Single     Spouse name: N/A    Number of children: N/A    Years of education: N/A     Occupational History    Not on file.     Social History Main Topics    Smoking status: Former Smoker    Smokeless tobacco: Never Used    Alcohol use No    Drug use: No    Sexual activity: Not on file     Other Topics Concern    Not on file     Social History Narrative    No narrative on file       Vitals:    12/16/17 1509   BP: (!) 145/76   Pulse: 84   Resp: 18   Temp: 96.5 °F (35.8 °C)       Physical Exam   Constitutional: He is oriented to person, place, and time. He appears well-nourished. No distress.   HENT:   Head: Normocephalic and atraumatic.   Eyes: No scleral icterus.   Cardiovascular: Normal rate and regular rhythm.    Pulmonary/Chest: Effort normal. No stridor. No respiratory distress.   Abdominal: Soft. He exhibits no distension. There is no tenderness. There is no rebound and no guarding.   Musculoskeletal: He exhibits edema and tenderness.   Significant edema left lower leg. Minimal edema RLE.   Lymphadenopathy:     He has no cervical adenopathy.   Neurological: He is alert and oriented to person, place, and time.   Skin: Skin is warm. He is not diaphoretic. There is erythema.   Left lower leg with pitting edema, ttp. No fluctuance, no drainage that I can appreciate.    Psychiatric: He has a normal mood and affect.   Faintly palpable DP bilaterally. Good femoral pulses bilaterally    MRI shows no  evidence of abscess  WBC 11 down from 12  Blood cultures ngtd  US shows no dvt    Assessment & Plan:   62M with left lower extremity cellulitis, erysipelas.   Continue abx.  Will need to keep elevated as much as possible....which he currently seems resistant to. Once signs of infection resolve will benefit from compression hose as his other leg shows signs of chronic venous stasis. No abscess clinically or radiographically that I can see. Continue slow progress. Will follow

## 2017-12-16 NOTE — PLAN OF CARE
Problem: Patient Care Overview  Goal: Plan of Care Review  Pt on RA. Pt with no apparent distress noted. Will continue to monitor.

## 2017-12-16 NOTE — HOSPITAL COURSE
Pt admitted with Vanc and Zosyn. Ortho consulted for possible joint involvement, recommendations. Slow clinical improvement.  WBC improved slowly as well. Pt remained afebrile through stay.  Abx switched to IV clinda to monitor for response but WBC and clinically pt regressed.  ID and surgery brought on board for additional recs.  Leg wrapped loosely and more dedicated to elevating leg.  Good improvement on HOD# 7  Pt still with complaints about diarrhea even though C Diff neg and on imodium.     Stool studies sent as patient had chronic intermittent diarrhea prior to admission. All cultures continued to be NGTD through HOD#10. CRP improved and then increased. Pt had temp of 100.4 and new cultures were sent.  Leg showed slow improvement and pt had difficulty with mobility.    All cultures continued to show no growth.  Pt began to describe that morning lab blood sticks were worse than his leg pain.  He was still having difficulty mobilizing and PT/OT recommend rehab but pt declined being able to handle a total of 3 hours of therapy a day.    His medicaid came through but pt still declined placement.  He was initially refusing nursing home shelter care as well, but he had no assistance available for 24/7 care at home.  His sister came and convinced him that [facility] would be the best option. He agreed.    He had 7 more days of keflex to complete.  He continued to have some difficulty with unexpectedly loosing stool or urine when coughing or when moving positions. He was tolerating PO diet and pain was managed with pain meds.  He did have significant difficulties with anxiety and decision making.

## 2017-12-16 NOTE — SUBJECTIVE & OBJECTIVE
Interval History: NAEON. AF. VSS.  Pain improving slowly. Diarrhea has stopped.  Abx switched last night. Minimal improvement in erythema and swelling.  Keeping leg elevated. Wound with no further recommendations.  Blood sugars in high 200's. MRI yesterday    Review of Systems   Constitutional: Positive for activity change. Negative for chills and diaphoresis.   Cardiovascular: Positive for leg swelling (Unilat LLE swelling, skin changes).   Gastrointestinal: Positive for abdominal distention (baseline, soft) and diarrhea. Negative for abdominal pain, blood in stool, constipation, nausea and vomiting.   Genitourinary: Negative for difficulty urinating, dysuria and urgency.   Musculoskeletal: Positive for gait problem and myalgias (improved even further). Negative for back pain.   Skin: Positive for color change (stable, reports improvement).   Neurological: Negative for dizziness, weakness, numbness and headaches.     Objective:     Vital Signs (Most Recent):  Temp: 96.5 °F (35.8 °C) (12/16/17 0721)  Pulse: 72 (12/16/17 0721)  Resp: 18 (12/16/17 0721)  BP: 133/63 (12/16/17 0721)  SpO2: 95 % (12/16/17 0032) Vital Signs (24h Range):  Temp:  [96.5 °F (35.8 °C)-98.5 °F (36.9 °C)] 96.5 °F (35.8 °C)  Pulse:  [72-82] 72  Resp:  [18-20] 18  SpO2:  [94 %-99 %] 95 %  BP: (129-138)/(63-95) 133/63     Weight: 95 kg (209 lb 6.4 oz)  Body mass index is 34.85 kg/m².    Intake/Output Summary (Last 24 hours) at 12/16/17 0857  Last data filed at 12/16/17 0613   Gross per 24 hour   Intake              345 ml   Output              700 ml   Net             -355 ml      Physical Exam   Constitutional: He is oriented to person, place, and time. He appears well-developed and well-nourished. No distress.   Appears older than stated age, comfortable appearing   HENT:   Head: Normocephalic and atraumatic.   Mouth/Throat: No oropharyngeal exudate.   Eyes: Conjunctivae and EOM are normal. Pupils are equal, round, and reactive to light.   Neck:  Normal range of motion. Neck supple. No JVD present.   Cardiovascular: Normal rate, regular rhythm and normal heart sounds.    No murmur heard.  Pulmonary/Chest: Effort normal. No respiratory distress. He has no wheezes (bilat lower lung wheezes). He has no rales.   Abdominal: Soft. Bowel sounds are normal. He exhibits distension (rotund abdomen ). He exhibits no mass. There is no tenderness.   Musculoskeletal: He exhibits no edema.   Able to range knee (UE & RLE without erythema, pain); chronic spinal osteoarthritis   Neurological: He is alert and oriented to person, place, and time. No cranial nerve deficit.   Skin: Skin is warm. Capillary refill takes less than 2 seconds. He is not diaphoretic. There is erythema (stable possible mild improvement on thigh).   LLE erythema and swelling extending from mid calf to knee with erythema, warmth extends up medial aspect of thigh with improvement in pain/erythema lateral anterior thigh; dry flaking skin on bilat feet; bilat old healed venous stasis ulcer scaling wounds, no drainage, no scrotal involvement, no LAD appreciated   Psychiatric: He has a normal mood and affect. His behavior is normal.   Nursing note and vitals reviewed.    Significant Labs:   CBC:   Recent Labs  Lab 12/15/17  0354 12/16/17  0434   WBC 12.67 11.73   HGB 13.0* 13.7*   HCT 39.9* 42.7    263     CMP:   Recent Labs  Lab 12/15/17  0354 12/16/17  0434    137   K 4.3 4.0   CL 96 94*   CO2 38* 35*   * 345*   BUN 15 18   CREATININE 0.9 1.2   CALCIUM 9.5 9.5   PROT 6.4 6.7   ALBUMIN 2.0* 2.0*   BILITOT 0.3 0.4   ALKPHOS 119 130   AST 33 33   ALT 41 39   ANIONGAP 5* 8   EGFRNONAA >60 >60     POCT Glucose:   Recent Labs  Lab 12/15/17  1645 12/15/17  2041 12/16/17  0511   POCTGLUCOSE 318* 292* 277*       Significant Imaging: I have reviewed all pertinent imaging results/findings within the past 24 hours.     MRI: no abscess

## 2017-12-17 LAB
ALBUMIN SERPL BCP-MCNC: 2 G/DL
ALP SERPL-CCNC: 106 U/L
ALT SERPL W/O P-5'-P-CCNC: 32 U/L
ANION GAP SERPL CALC-SCNC: 7 MMOL/L
AST SERPL-CCNC: 26 U/L
BACTERIA BLD CULT: NORMAL
BACTERIA BLD CULT: NORMAL
BASOPHILS # BLD AUTO: 0.03 K/UL
BASOPHILS NFR BLD: 0.2 %
BILIRUB SERPL-MCNC: 0.4 MG/DL
BUN SERPL-MCNC: 23 MG/DL
CALCIUM SERPL-MCNC: 9.2 MG/DL
CHLORIDE SERPL-SCNC: 97 MMOL/L
CO2 SERPL-SCNC: 35 MMOL/L
CREAT SERPL-MCNC: 1.2 MG/DL
DIFFERENTIAL METHOD: ABNORMAL
EOSINOPHIL # BLD AUTO: 0.2 K/UL
EOSINOPHIL NFR BLD: 2 %
ERYTHROCYTE [DISTWIDTH] IN BLOOD BY AUTOMATED COUNT: 13.7 %
EST. GFR  (AFRICAN AMERICAN): >60 ML/MIN/1.73 M^2
EST. GFR  (NON AFRICAN AMERICAN): >60 ML/MIN/1.73 M^2
GLUCOSE SERPL-MCNC: 188 MG/DL
HCT VFR BLD AUTO: 42.1 %
HGB BLD-MCNC: 13.7 G/DL
LYMPHOCYTES # BLD AUTO: 1 K/UL
LYMPHOCYTES NFR BLD: 7.8 %
MAGNESIUM SERPL-MCNC: 1.8 MG/DL
MCH RBC QN AUTO: 34 PG
MCHC RBC AUTO-ENTMCNC: 32.5 G/DL
MCV RBC AUTO: 105 FL
MONOCYTES # BLD AUTO: 0.9 K/UL
MONOCYTES NFR BLD: 7.6 %
NEUTROPHILS # BLD AUTO: 9.9 K/UL
NEUTROPHILS NFR BLD: 80.6 %
PHOSPHATE SERPL-MCNC: 4 MG/DL
PLATELET # BLD AUTO: 278 K/UL
PMV BLD AUTO: 10.9 FL
POCT GLUCOSE: 162 MG/DL (ref 70–110)
POCT GLUCOSE: 167 MG/DL (ref 70–110)
POCT GLUCOSE: 259 MG/DL (ref 70–110)
POCT GLUCOSE: 312 MG/DL (ref 70–110)
POTASSIUM SERPL-SCNC: 4.5 MMOL/L
PROT SERPL-MCNC: 6.7 G/DL
RBC # BLD AUTO: 4.03 M/UL
SODIUM SERPL-SCNC: 139 MMOL/L
WBC # BLD AUTO: 12.26 K/UL

## 2017-12-17 PROCEDURE — 21400001 HC TELEMETRY ROOM

## 2017-12-17 PROCEDURE — 99232 SBSQ HOSP IP/OBS MODERATE 35: CPT | Mod: ,,, | Performed by: STUDENT IN AN ORGANIZED HEALTH CARE EDUCATION/TRAINING PROGRAM

## 2017-12-17 PROCEDURE — 94761 N-INVAS EAR/PLS OXIMETRY MLT: CPT

## 2017-12-17 PROCEDURE — 83735 ASSAY OF MAGNESIUM: CPT

## 2017-12-17 PROCEDURE — 25000003 PHARM REV CODE 250: Performed by: STUDENT IN AN ORGANIZED HEALTH CARE EDUCATION/TRAINING PROGRAM

## 2017-12-17 PROCEDURE — 84100 ASSAY OF PHOSPHORUS: CPT

## 2017-12-17 PROCEDURE — 36415 COLL VENOUS BLD VENIPUNCTURE: CPT

## 2017-12-17 PROCEDURE — S4991 NICOTINE PATCH NONLEGEND: HCPCS | Performed by: STUDENT IN AN ORGANIZED HEALTH CARE EDUCATION/TRAINING PROGRAM

## 2017-12-17 PROCEDURE — 97530 THERAPEUTIC ACTIVITIES: CPT

## 2017-12-17 PROCEDURE — S0077 INJECTION, CLINDAMYCIN PHOSP: HCPCS | Performed by: FAMILY MEDICINE

## 2017-12-17 PROCEDURE — 97110 THERAPEUTIC EXERCISES: CPT

## 2017-12-17 PROCEDURE — 85025 COMPLETE CBC W/AUTO DIFF WBC: CPT

## 2017-12-17 PROCEDURE — 80053 COMPREHEN METABOLIC PANEL: CPT

## 2017-12-17 PROCEDURE — 63600175 PHARM REV CODE 636 W HCPCS: Performed by: STUDENT IN AN ORGANIZED HEALTH CARE EDUCATION/TRAINING PROGRAM

## 2017-12-17 PROCEDURE — 25000003 PHARM REV CODE 250: Performed by: FAMILY MEDICINE

## 2017-12-17 RX ADMIN — MORPHINE SULFATE 4 MG: 2 INJECTION, SOLUTION INTRAMUSCULAR; INTRAVENOUS at 06:12

## 2017-12-17 RX ADMIN — MORPHINE SULFATE 4 MG: 2 INJECTION, SOLUTION INTRAMUSCULAR; INTRAVENOUS at 10:12

## 2017-12-17 RX ADMIN — ASPIRIN 81 MG: 81 TABLET, COATED ORAL at 08:12

## 2017-12-17 RX ADMIN — CLORAZEPATE DIPOTASSIUM 15 MG: 3.75 TABLET ORAL at 09:12

## 2017-12-17 RX ADMIN — MAGNESIUM OXIDE TAB 400 MG (241.3 MG ELEMENTAL MG) 400 MG: 400 (241.3 MG) TAB at 08:12

## 2017-12-17 RX ADMIN — INSULIN ASPART 4 UNITS: 100 INJECTION, SOLUTION INTRAVENOUS; SUBCUTANEOUS at 11:12

## 2017-12-17 RX ADMIN — MORPHINE SULFATE 4 MG: 2 INJECTION, SOLUTION INTRAMUSCULAR; INTRAVENOUS at 05:12

## 2017-12-17 RX ADMIN — LOPERAMIDE HYDROCHLORIDE 2 MG: 2 CAPSULE ORAL at 08:12

## 2017-12-17 RX ADMIN — CARVEDILOL 6.25 MG: 6.25 TABLET, FILM COATED ORAL at 08:12

## 2017-12-17 RX ADMIN — CLINDAMYCIN IN 5 PERCENT DEXTROSE 600 MG: 12 INJECTION, SOLUTION INTRAVENOUS at 05:12

## 2017-12-17 RX ADMIN — MICONAZOLE NITRATE 1 EACH: 20 CREAM TOPICAL at 09:12

## 2017-12-17 RX ADMIN — HYDROCODONE BITARTRATE AND ACETAMINOPHEN 1 TABLET: 5; 325 TABLET ORAL at 11:12

## 2017-12-17 RX ADMIN — CLINDAMYCIN IN 5 PERCENT DEXTROSE 600 MG: 12 INJECTION, SOLUTION INTRAVENOUS at 11:12

## 2017-12-17 RX ADMIN — NICOTINE 1 PATCH: 21 PATCH, EXTENDED RELEASE TRANSDERMAL at 08:12

## 2017-12-17 RX ADMIN — CLORAZEPATE DIPOTASSIUM 15 MG: 3.75 TABLET ORAL at 08:12

## 2017-12-17 RX ADMIN — HYDROCHLOROTHIAZIDE 12.5 MG: 12.5 TABLET ORAL at 08:12

## 2017-12-17 RX ADMIN — MICONAZOLE NITRATE: 20 CREAM TOPICAL at 08:12

## 2017-12-17 RX ADMIN — INSULIN DETEMIR 3 UNITS: 100 INJECTION, SOLUTION SUBCUTANEOUS at 08:12

## 2017-12-17 RX ADMIN — ENOXAPARIN SODIUM 40 MG: 100 INJECTION SUBCUTANEOUS at 05:12

## 2017-12-17 RX ADMIN — MAGNESIUM OXIDE TAB 400 MG (241.3 MG ELEMENTAL MG) 400 MG: 400 (241.3 MG) TAB at 09:12

## 2017-12-17 RX ADMIN — SIMVASTATIN 40 MG: 20 TABLET, FILM COATED ORAL at 09:12

## 2017-12-17 RX ADMIN — INSULIN ASPART 3 UNITS: 100 INJECTION, SOLUTION INTRAVENOUS; SUBCUTANEOUS at 05:12

## 2017-12-17 RX ADMIN — CLOPIDOGREL 75 MG: 75 TABLET, FILM COATED ORAL at 08:12

## 2017-12-17 RX ADMIN — CARVEDILOL 6.25 MG: 6.25 TABLET, FILM COATED ORAL at 05:12

## 2017-12-17 NOTE — PT/OT/SLP PROGRESS
Physical Therapy Treatment    Patient Name:  Reji Guerra   MRN:  [id number]    Recommendations:     Discharge Recommendations:  home with home health, home health PT, home health OT   Discharge Equipment Recommendations:     Barriers to discharge: Decreased caregiver support    Assessment:     Reji Guerra is a 62 y.o. male admitted with a medical diagnosis of Cellulitis of left lower extremity without foot.  He presents with the following impairments/functional limitations:  weakness, impaired endurance, gait instability, impaired functional mobilty, impaired self care skills, impaired balance, decreased lower extremity function, decreased safety awareness, pain, decreased ROM, impaired skin, edema. Limited due to pain. Increased time to perform activities due to this. Pt easily distracted & gets unfocused during therapy requiring vc/tc's for redirection.     Rehab Prognosis:  Good; patient would benefit from acute skilled PT services to address these deficits and reach maximum level of function.      Recent Surgery: * No surgery found *      Plan:     During this hospitalization, patient to be seen 6 x/week to address the above listed problems via gait training, therapeutic activities, therapeutic exercises  · Plan of Care Expires:  01/14/18   Plan of Care Reviewed with: patient    Subjective     Communicated with nurse Florence prior to session.  Patient found in bed upon PT entry to room, agreeable to treatment.      Chief Complaint: fatigue, pain & not being able to move as much  Patient comments/goals: I just want to move a little bit. Pt wants to sit up.  Pain/Comfort:  · Pain Rating 1: 7/10  · Location - Side 1: Left  · Location - Orientation 1: lower  · Location 1: leg  · Pain Addressed 1: Distraction, Reposition, Cessation of Activity  · Pain Rating Post-Intervention 1: 7/10    Patients cultural, spiritual, Hinduism conflicts given the current situation:      Objective:     Patient found with:        General Precautions: Standard, fall   Orthopedic Precautions:N/A   Braces: N/A     Functional Mobility:  · Bed Mobility:     · Rolling Right: contact guard assistance and /c side rail /c HOB slightly raised  · Scooting: contact guard assistance  · Supine to Sit: minimum assistance  · Transfers:     · Sit to Stand:  minimum assistance with rolling walker and vc/tc's for correct hands placement for proper technique for safety & balance  · Bed to Chair: moderate assistance and /c vc/tc's for proper technqiue for BLE & BUE placement with  no AD  using  Step Transfer  · Gait: Unable to attempt. Pt /c increased pain in LLE /c standing this day.  · Balance: Fair      AM-PAC 6 CLICK MOBILITY  Turning over in bed (including adjusting bedclothes, sheets and blankets)?: 3  Sitting down on and standing up from a chair with arms (e.g., wheelchair, bedside commode, etc.): 2  Moving from lying on back to sitting on the side of the bed?: 3  Moving to and from a bed to a chair (including a wheelchair)?: 2  Need to walk in hospital room?: 2  Climbing 3-5 steps with a railing?: 1  Total Score: 13       Therapeutic Activities and Exercises:   Supported static standing balance at EOB /c RW for one minute /c Min-CGA. Pt /c increased WB through RLE due to pain in LLE. Pt sat at EOB for a while before t/fing to chair due to pain /c S. Stand-step t/f from EOB>bedside chair /c Mod A. BLE elevated up in chair /c pillow placed underneath each LE for comfort. Performed BLE's long sitting therex in bedside chair x5 rep for ROM & strengthening: knee flex, hip abd/add, SLR. AROM RLE; AA/AAROM LLE. Educated pt importance and benefits on performing therex during non therapy time while sitting in bedside chair to prevent BLE's stiffening up. Pt verb'd understanding & stated he has been doing it in the bed. Pt gets distracted & unfocused during therapy. Vc/tc's for redirection to attend to tasks.    Patient left up in chair /c fly up /c pillow  underneath each LE /c all needs met with call button in reach, nurse Naman notified and PCT present..    GOALS:    Physical Therapy Goals        Problem: Physical Therapy Goal    Goal Priority Disciplines Outcome Goal Variances Interventions   Physical Therapy Goal     PT/OT, PT Ongoing (interventions implemented as appropriate)     Description:  Goals to be met by: 2018     Patient will increase functional independence with mobility by performin. Supine to sit with Modified [place]  2. Sit to stand transfer with Modified [place]  3. Gait  x 150 feet with Modified [place] using Rolling Walker.                       Time Tracking:     PT Received On: 17  PT Start Time: 1037     PT Stop Time: 1115  PT Total Time (min): 38 min     Billable Minutes: Therapeutic Activity 30 and Therapeutic Exercise 8    Treatment Type: Treatment  PT/PTA: PTA     PTA Visit Number: 3     Ruth Haynes, PTA  2017

## 2017-12-17 NOTE — PLAN OF CARE
Problem: Patient Care Overview  Goal: Plan of Care Review  Outcome: Ongoing (interventions implemented as appropriate)  Pt received on RA.  SPO2  92%.  Pt in no apparent respiratory distress.  Will continue to monitor.

## 2017-12-17 NOTE — PLAN OF CARE
Problem: Physical Therapy Goal  Goal: Physical Therapy Goal  Goals to be met by: 2018     Patient will increase functional independence with mobility by performin. Supine to sit with Modified [place]  2. Sit to stand transfer with Modified [place]  3. Gait  x 150 feet with Modified [place] using Rolling Walker.      Outcome: Ongoing (interventions implemented as appropriate)  Progressing fairly /c therapy. Limited by pain. Cont POC.

## 2017-12-17 NOTE — PROGRESS NOTES
[facility]  General Surgery  Progress Note    Subjective:     Interval History:   Patient reports pain improving still  No events overnight. OOB some. Still not keeping elevated    Post-Op Info:  * No surgery found *          Medications:  Continuous Infusions:  Scheduled Meds:   aspirin  81 mg Oral Daily    carvedilol  6.25 mg Oral BID WM    clindamycin (CLEOCIN) IVPB  600 mg Intravenous Q6H    clopidogrel  75 mg Oral Daily    clorazepate  15 mg Oral BID    enoxaparin  40 mg Subcutaneous Daily    hydroCHLOROthiazide  12.5 mg Oral Daily    insulin detemir  3 Units Subcutaneous BID    magnesium oxide  400 mg Oral BID    miconazole   Topical (Top) BID    nicotine  1 patch Transdermal Daily    simvastatin  40 mg Oral QHS     PRN Meds:dextrose 50%, dextrose 50%, glucagon (human recombinant), glucose, glucose, hydrocodone-acetaminophen 5-325mg, influenza, insulin aspart, loperamide, lorazepam, morphine, pneumoc 13-jose g conj-dip cr(PF), sodium chloride 0.9%     Objective:     Vital Signs (Most Recent):  Temp: 97.8 °F (36.6 °C) (12/17/17 0744)  Pulse: 77 (12/17/17 0744)  Resp: 18 (12/17/17 0744)  BP: 115/68 (12/17/17 0744)  SpO2: (!) 92 % (12/17/17 0734) Vital Signs (24h Range):  Temp:  [96.5 °F (35.8 °C)-98.9 °F (37.2 °C)] 97.8 °F (36.6 °C)  Pulse:  [72-84] 77  Resp:  [18] 18  SpO2:  [92 %-96 %] 92 %  BP: (108-145)/(61-76) 115/68       Intake/Output Summary (Last 24 hours) at 12/17/17 1103  Last data filed at 12/17/17 0852   Gross per 24 hour   Intake             1905 ml   Output             1882 ml   Net               23 ml       Physical Exam  Left lower extremity mostly unchanged. Moderately TTP. No purulent drainage. No drainable abscess      Significant Labs:  CBC:   Recent Labs  Lab 12/17/17  0530   WBC 12.26   RBC 4.03*   HGB 13.7*   HCT 42.1      *   MCH 34.0*   MCHC 32.5     CMP:   Recent Labs  Lab 12/17/17  0530   *   CALCIUM 9.2   ALBUMIN 2.0*   PROT 6.7   NA  139   K 4.5   CO2 35*   CL 97   BUN 23   CREATININE 1.2   ALKPHOS 106   ALT 32   AST 26   BILITOT 0.4       Significant Diagnostics:  I have reviewed all pertinent imaging results/findings within the past 24 hours.    Assessment/Plan:     Active Diagnoses:    Diagnosis Date Noted POA    PRINCIPAL PROBLEM:  Cellulitis of left lower extremity without foot [L03.116] 12/13/2017 No    Cellulitis of left lower extremity [L03.116] 12/14/2017 Yes    Elevated troponin [R74.8] 12/13/2017 Yes    Diabetes mellitus, type II, insulin dependent [E11.9, Z79.4]  Not Applicable    Leg swelling [M79.89] 12/12/2017 Yes    Anxiety [F41.9] 05/20/2015 Yes    Coronary artery disease involving native coronary artery of native heart without angina pectoris [I25.10] 05/20/2015 Yes    Type 2 diabetes mellitus with hyperglycemia, without long-term current use of insulin [E11.65] 05/20/2015 Yes    HTN (hypertension) [I10] 05/20/2015 Yes      Problems Resolved During this Admission:    Diagnosis Date Noted Date Resolved POA     62M with LLE cellultitis  Elevation  On clinda. Wbc stable. 12 today, afebrile  Patient subjectively improving but objectively looks about the same. I would expect the most superficial erysipelas part of the LLE to remain red for quite some time, even after infectious process is resolving. Still diffusely edematous but nothing drainable yet. I suspect he is not cooperating with elevation of the extremity.      Messi Zambrano MD  General Surgery  [facility]

## 2017-12-17 NOTE — PROGRESS NOTES
PGY-1 Progress Note [facility]     Hospital Stay Day 5    Subjective: Patient seen and examined. He reports feeling better, reports pain is controlled, and reports improvement in overall appearance of left leg.  Denies n/v, f/c, sob, chest pain, abdominal pain, dysuria, constipation or diarrhea.      Scheduled Meds:   aspirin  81 mg Oral Daily    carvedilol  6.25 mg Oral BID WM    clindamycin (CLEOCIN) IVPB  600 mg Intravenous Q6H    clopidogrel  75 mg Oral Daily    clorazepate  15 mg Oral BID    enoxaparin  40 mg Subcutaneous Daily    hydroCHLOROthiazide  12.5 mg Oral Daily    insulin detemir  5 Units Subcutaneous BID    magnesium oxide  400 mg Oral BID    miconazole   Topical (Top) BID    nicotine  1 patch Transdermal Daily    simvastatin  40 mg Oral QHS     Continuous Infusions:  PRN Meds:dextrose 50%, dextrose 50%, glucagon (human recombinant), glucose, glucose, hydrocodone-acetaminophen 5-325mg, influenza, insulin aspart, loperamide, lorazepam, morphine, pneumoc 13-jose g conj-dip cr(PF), sodium chloride 0.9%    Review of patient's allergies indicates:   Allergen Reactions    Iodine and iodide containing products     Shellfish containing products        Objectives:     Vitals(Most Recent)      BP  Min: 108/61  Max: 145/76  Temp  Av.5 °F (36.4 °C)  Min: 96.1 °F (35.6 °C)  Max: 98.9 °F (37.2 °C)  Pulse  Av.6  Min: 72  Max: 84  Resp  Av  Min: 18  Max: 18  SpO2  Av.8 %  Min: 92 %  Max: 96 %             Vitals([id number])  Temp:  [96.1 °F (35.6 °C)-98.9 °F (37.2 °C)]   Pulse:  [72-84]   Resp:  [18]   BP: (108-145)/(61-76)   SpO2:  [92 %-96 %]     I & O([id number])    Intake/Output Summary (Last 24 hours) at 17 1155  Last data filed at 17 0852   Gross per 24 hour   Intake             1905 ml   Output             1882 ml   Net               23 ml     Urinalysis  No results for input(s): COLORU, CLARITYU, SPECGRAV, PHUR, PROTEINUA, GLUCOSEU, BILIRUBINCON, BLOODU, WBCU, RBCU, BACTERIA,  MUCUS, NITRITE, LEUKOCYTESUR, UROBILINOGEN, HYALINECASTS in the last 24 hours.    General: AAOx3. NAD.  HEENT: NCAT. PERRLA. EOMI.   Neck: Supple. No JVD. No LAD.    CV: RRR. NL S1/S2. No M/R/G.   Chest: NL effort. CTAB. No R/R/W.   Abd: BS present, soft, distended. NT. No rebound or guarding.   Ext/skin: LLE erythema and swelling from mid calf to knee.  Warm.  Improvement of pain/erythema of lateral anterior thigh.  Dry skin on bilateral feet.    Neuro: CN II-XII intact. No focal deficit. Strength 5/5 throughout. Sensation intact.   Psych: Good judgement and reason. No A/V hallucinations. NL affect. No abnormal behaviors noted    LABS  CBC    Recent Labs  Lab 12/15/17  0354 12/16/17  0434 12/17/17  0530   WBC 12.67 11.73 12.26   RBC 3.79* 4.04* 4.03*   HGB 13.0* 13.7* 13.7*   HCT 39.9* 42.7 42.1    263 278   * 106* 105*   MCH 34.3* 33.9* 34.0*   MCHC 32.6 32.1 32.5     BMP    Recent Labs  Lab 12/15/17  0354 12/16/17  0434 12/17/17  0530    137 139   K 4.3 4.0 4.5   CO2 38* 35* 35*   CL 96 94* 97   BUN 15 18 23   CREATININE 0.9 1.2 1.2   * 345* 188*       POCT-Glucose  POCT Glucose   Date Value Ref Range Status   12/17/2017 162 (H) 70 - 110 mg/dL Final   12/16/2017 290 (H) 70 - 110 mg/dL Final   12/16/2017 208 (H) 70 - 110 mg/dL Final   12/16/2017 218 (H) 70 - 110 mg/dL Final   12/16/2017 277 (H) 70 - 110 mg/dL Final   12/15/2017 292 (H) 70 - 110 mg/dL Final   12/15/2017 318 (H) 70 - 110 mg/dL Final   12/15/2017 217 (H) 70 - 110 mg/dL Final   12/15/2017 201 (H) 70 - 110 mg/dL Final   12/14/2017 283 (H) 70 - 110 mg/dL Final   12/14/2017 293 (H) 70 - 110 mg/dL Final         Recent Labs  Lab 12/15/17  0354 12/16/17  0434 12/17/17  0530   CALCIUM 9.5 9.5 9.2   MG 1.5* 1.7 1.8   PHOS 3.5 3.9 4.0     LFT    Recent Labs  Lab 12/15/17  0354 12/16/17  0434 12/17/17  0530   PROT 6.4 6.7 6.7   ALBUMIN 2.0* 2.0* 2.0*   BILITOT 0.3 0.4 0.4   AST 33 33 26   ALKPHOS 119 130 106   ALT 41 39 32          COAGS    Recent Labs  Lab 12/12/17  1226   INR 1.0   APTT 30.0     CE    Recent Labs  Lab 12/12/17  1226 12/12/17  1859 12/12/17  2353   TROPONINI 0.051* 0.022 0.013     ABGs  No results for input(s): PH, PCO2, PO2, HCO3, POCSATURATED, BE in the last 24 hours.  BNP  No results for input(s): BNP in the last 168 hours.  UA  No results for input(s): COLORU, CLARITYU, SPECGRAV, PHUR, PROTEINUA, GLUCOSEU, BLOODU, WBCU, RBCU, BACTERIA, MUCUS in the last 24 hours.    Invalid input(s):  BILIRUBINCON  LAST HbA1c  Lab Results   Component Value Date    HGBA1C 6.5 (H) 12/12/2017           Imaging  Imaging Results          MRI Lower Extremity W WO Contrast Left (Final result)  Result time 12/15/17 16:53:12    Final result by Jorge Allred MD (12/15/17 16:53:12)                 Impression:     Nonspecific subcutaneous edema and Cellulitis without abscess, osteomyelitis, septic joint left lower leg.      Electronically signed by: ANTONIO ALLRED MD  Date:     12/15/17  Time:    16:53              Narrative:    Comparison ultrasound vein left leg, radiographs left knee dating back to 12/12/17.  MR on left tibia from knee to ankle without, and with gadovist 10 cc intravenous.    Asymmetrical subcutaneous edema left lower extremity compared to contralateral side.  Bone and joint structures normal.  No fracture dislocation.  Neurovascular structures appear intact.  Muscle tendon, ligament structures normal.  No abscess.  Heterogeneous enhancement of subcutaneous tissues, no unusual enhancement musculoskeletal structures otherwise.                             X-Ray Knee 3 View Left (Final result)  Result time 12/13/17 12:18:14    Final result by Rob Valle MD (12/13/17 12:18:14)                 Impression:     As above.      Electronically signed by: ROB VALLE MD  Date:     12/13/17  Time:    12:18              Narrative:    Knee 3 views left.    Findings: 3 views left. There is no fracture, dislocation, or bony  erosion identified.                             X-Ray Chest AP Portable (Final result)  Result time 12/12/17 12:14:25    Final result by Luan Gifford MD (12/12/17 12:14:25)                 Impression:        No acute radiographic findings on single view of the chest.      Electronically signed by: LUAN GIFFORD MD  Date:     12/12/17  Time:    12:14              Narrative:    Comparison: None    Technique: Single view of the chest.    Findings: Cardiac silhouette is enlarged.  There is aortic atherosclerosis.  Lungs show no evidence of significant focal consolidation, large effusion, or pneumothorax.  The bones demonstrate degenerative changes of the spine and shoulders.                             US Lower Extremity Veins Left (Final result)  Result time 12/12/17 11:54:55    Final result by Luan Gifford MD (12/12/17 11:54:55)                 Impression:         No evidence of deep vein thrombosisin the left lower extremity.    There is lower extremity edema.        Electronically signed by: LUAN GIFFORD MD  Date:     12/12/17  Time:    11:54              Narrative:    Comparison: None.    Findings: There is no evidence of venous thrombosis in the bilateral common femoral, left femoral, greater saphenous, popliteal, posterior tibial, anterior tibial, and peroneal veins.                              Micro:  Microbiology Results (last 7 days)     Procedure Component Value Units Date/Time    Blood culture x two cultures. Draw prior to antibiotics. [[id number]] Collected:  12/12/17 1226    Order Status:  Completed Specimen:  Blood from Peripheral, Lower Arm, Right Updated:  12/16/17 1812     Blood Culture, Routine No Growth to date     Blood Culture, Routine No Growth to date     Blood Culture, Routine No Growth to date     Blood Culture, Routine No Growth to date     Blood Culture, Routine No Growth to date    Narrative:       Aerobic and anaerobic    Blood culture x two cultures. Draw prior to  antibiotics. [[id number]] Collected:  12/12/17 1119    Order Status:  Completed Specimen:  Blood from Peripheral, Hand, Left Updated:  12/16/17 1812     Blood Culture, Routine No Growth to date     Blood Culture, Routine No Growth to date     Blood Culture, Routine No Growth to date     Blood Culture, Routine No Growth to date     Blood Culture, Routine No Growth to date    Narrative:       Aerobic and anaerobic    Urine culture [[id number]] Collected:  12/12/17 1207    Order Status:  Completed Specimen:  Urine from Urine, Clean Catch Updated:  12/13/17 2147     Urine Culture, Routine No significant growth               Assessment/Plan:   * Cellulitis of left lower extremity without foot     WBC 14 on admit, now down to 12.26 today  On Vanc, Zosyn  Vanc trough ordered before 4th dose: appropriate at 13.4  Cellulitis is overlying joint but seems to just involve the skin/soft tissue, will monitor closely- not currently thought to be septic arthritis, able to range joint. Ortho on board  Will likely switch to IV Clinda today or tomorrow to monitor for efficacy  D/C vanc and zosyn.  Started on Clinda       Elevated troponin     Improved to normal  EKG's without St segment elevation  No chest pain       Leg swelling     No DVT on ultrasound  Elevating leg       Anxiety     Pt did not mention this, only per chart review was this known  No meds for now, will monitor       Coronary artery disease involving native coronary artery of native heart without angina pectoris     Hx of 3 stents in angiogram in 2003  Restarted on ASA, plavix. Potentially able to stop plavix since stents were placed over 2 years ago  Trops trended down  F/u cardiology as outpatient  H/H 13/39 but with MCV of 105, checking folate and B12  No chest pain       HTN (hypertension)     Restarted Carvedilol, HCTZ (prior home meds from 2 years ago)   Cardiac diet  Stable  127-141/59-82       Type 2 diabetes mellitus with hyperglycemia, without long-term  current use of insulin     HgbA1C 6.5  On Low Dose Corrective scale  Gluc 181-232  Will likely add Determir for long acting insulin               Smitha Hernandez MD  [facility] PGY1   12/17/2017

## 2017-12-17 NOTE — PROGRESS NOTES
[facility] Hospital Medicine  Progress Note    Patient Name: Reji Guerra  MRN: [id number]  Patient Class: IP- Inpatient   Admission Date: 12/12/2017  Length of Stay: 5 days  Attending Physician: Berkley Johnson MD  Primary Care Provider: [facility]        Subjective:     Principal Problem:Cellulitis of left lower extremity without foot    HPI:  63 yo male w/ CAD (3 stents in 2003 w/MI), DMT2, HTN, HLD presents to ED with 1 week of LLE swelling and erythema, which just starting tracking up his inner thigh within the past two days. No trauma to his leg, no wounds on his foot. Pain with standing and walking. 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  He does have to rest 1/2 up a full flight of stairs but denies chest pain, shortness of breath, recent illness. He sleeps flat on just 1 pillow.    He hasn't had medical care in 2 years and hasn't taken any medications for 1 year.  He thinks he was supposed to keep taking the plavix.  Baseline dry cough, minimally productive with clear sputum. Denies CP, SOB, recent illness, N/V, diarrhea, constipation. Last BM day before admission.    Hospital Course:  Pt admitted with Kade and Jaxon. Ortho consulted for possible joint involvement, recommendations. Slow clinical improvement.  WBC improved slowly as well. Pt remained afebrile through stay.  Abx switched to IV clinda to monitor for response.  Pt states pain is slowly improving.     Interval History: NAEON. AF. VSS.  Pain improving slowly. Diarrhea has stopped.  Abx switched last night. Minimal improvement in erythema and swelling.  Keeping leg elevated. Wound with no further recommendations.  Blood sugars in high 200's. MRI yesterday    Review of Systems   Constitutional: Positive for activity change. Negative for chills and diaphoresis.   Cardiovascular: Positive for leg swelling (Unilat LLE swelling, skin  changes).   Gastrointestinal: Positive for abdominal distention (baseline, soft) and diarrhea. Negative for abdominal pain, blood in stool, constipation, nausea and vomiting.   Genitourinary: Negative for difficulty urinating, dysuria and urgency.   Musculoskeletal: Positive for gait problem and myalgias (improved even further). Negative for back pain.   Skin: Positive for color change (stable, reports improvement).   Neurological: Negative for dizziness, weakness, numbness and headaches.     Objective:     Vital Signs (Most Recent):  Temp: 96.5 °F (35.8 °C) (12/16/17 0721)  Pulse: 72 (12/16/17 0721)  Resp: 18 (12/16/17 0721)  BP: 133/63 (12/16/17 0721)  SpO2: 95 % (12/16/17 0032) Vital Signs (24h Range):  Temp:  [96.5 °F (35.8 °C)-98.5 °F (36.9 °C)] 96.5 °F (35.8 °C)  Pulse:  [72-82] 72  Resp:  [18-20] 18  SpO2:  [94 %-99 %] 95 %  BP: (129-138)/(63-95) 133/63     Weight: 95 kg (209 lb 6.4 oz)  Body mass index is 34.85 kg/m².    Intake/Output Summary (Last 24 hours) at 12/16/17 0857  Last data filed at 12/16/17 0613   Gross per 24 hour   Intake              345 ml   Output              700 ml   Net             -355 ml      Physical Exam   Constitutional: He is oriented to person, place, and time. He appears well-developed and well-nourished. No distress.   Appears older than stated age, comfortable appearing   HENT:   Head: Normocephalic and atraumatic.   Mouth/Throat: No oropharyngeal exudate.   Eyes: Conjunctivae and EOM are normal. Pupils are equal, round, and reactive to light.   Neck: Normal range of motion. Neck supple. No JVD present.   Cardiovascular: Normal rate, regular rhythm and normal heart sounds.    No murmur heard.  Pulmonary/Chest: Effort normal. No respiratory distress. He has no wheezes (bilat lower lung wheezes). He has no rales.   Abdominal: Soft. Bowel sounds are normal. He exhibits distension (rotund abdomen ). He exhibits no mass. There is no tenderness.   Musculoskeletal: He exhibits no  edema.   Able to range knee (UE & RLE without erythema, pain); chronic spinal osteoarthritis   Neurological: He is alert and oriented to person, place, and time. No cranial nerve deficit.   Skin: Skin is warm. Capillary refill takes less than 2 seconds. He is not diaphoretic. There is erythema (stable possible mild improvement on thigh).   LLE erythema and swelling extending from mid calf to knee with erythema, warmth extends up medial aspect of thigh with improvement in pain/erythema lateral anterior thigh; dry flaking skin on bilat feet; bilat old healed venous stasis ulcer scaling wounds, no drainage, no scrotal involvement, no LAD appreciated   Psychiatric: He has a normal mood and affect. His behavior is normal.   Nursing note and vitals reviewed.    Significant Labs:   CBC:   Recent Labs  Lab 12/15/17  0354 12/16/17  0434   WBC 12.67 11.73   HGB 13.0* 13.7*   HCT 39.9* 42.7    263     CMP:   Recent Labs  Lab 12/15/17  0354 12/16/17  0434    137   K 4.3 4.0   CL 96 94*   CO2 38* 35*   * 345*   BUN 15 18   CREATININE 0.9 1.2   CALCIUM 9.5 9.5   PROT 6.4 6.7   ALBUMIN 2.0* 2.0*   BILITOT 0.3 0.4   ALKPHOS 119 130   AST 33 33   ALT 41 39   ANIONGAP 5* 8   EGFRNONAA >60 >60     POCT Glucose:   Recent Labs  Lab 12/15/17  1645 12/15/17  2041 12/16/17  0511   POCTGLUCOSE 318* 292* 277*       Significant Imaging: I have reviewed all pertinent imaging results/findings within the past 24 hours.     MRI: no abscess    Assessment/Plan:      * Cellulitis of left lower extremity without foot    WBC 14 on admit, now down to 11  Was on Vanc, Zosyn- switched to IV Cipro last night  Cellulitis is overlying joint but seems to just involve the skin/soft tissue, will monitor closely- not currently thought to be septic arthritis, able to range joint. Ortho on board  Will consult Surgery for recs            Elevated troponin    Improved to normal  EKG's without St segment elevation  No chest pain        Leg  swelling    No DVT on ultrasound  Elevating leg          Anxiety    Will look into past treatment as anxiety symptoms are strong        Coronary artery disease involving native coronary artery of native heart without angina pectoris    Hx of 3 stents in angiogram in 2003  Restarted on ASA, plavix. Potentially able to stop plavix since stents were placed over 2 years ago  Sarina trended down  F/u cardiology as outpatient  H/H 13/39 but with MCV of 105  Folate and B12 normal  No chest pain            HTN (hypertension)    Restarted Carvedilol, HCTZ (prior home meds from 2 years ago)   Stable  Cardiac diet  127-141/59-82          Type 2 diabetes mellitus with hyperglycemia, without long-term current use of insulin    HgbA1C 6.5  On Low Dose Corrective scale  Gluc 181-232  Will likely add Determir for long acting insulin              VTE Risk Mitigation         Ordered     enoxaparin injection 40 mg  Daily     Route:  Subcutaneous        12/12/17 1624     Medium Risk of VTE  Once      12/12/17 1624          Andrew Lombardo MD  Department of Hospital Medicine   [facility]

## 2017-12-17 NOTE — PLAN OF CARE
Problem: Patient Care Overview  Goal: Plan of Care Review  Outcome: Ongoing (interventions implemented as appropriate)  Plan of care reviewed with patient. Patient verbalized understanding. Telemetry DC'd this AM. Patient able to work with PT, OT today. Still refuses to use bedside commode, urinal and ambulates on left leg despite frequent encouragement from staff to minimize weight bearing. Patient did elevate leg for most of the afternoon, though. Redness to leg is slowly improving. Pain controlled with PRN Norco, Morphine. CBG monitored ACHS, patient covered as needed. Bed is low and locked, bed alarm is activated, call light is within reach. Patient has been instructed to call if in need of assistance. Verbalized understanding.

## 2017-12-17 NOTE — PLAN OF CARE
Problem: Patient Care Overview  Goal: Plan of Care Review  Outcome: Ongoing (interventions implemented as appropriate)  Plan of care reviewed with patient. Patient verbalized complete understanding. Fall/safety precautions maintained. Slip resistant socks on. Bed in lowest position, locked, call light within reach. Bed alarm on, Side rails up x's 2. Nurse instructed patient to notify staff for any assistance and pt verbalized complete understanding. Strict I&O's done. Pt received 4 mg morphine for complaints of left leg pain. Pt Left leg elevated to decrease swelling. PVD noted in bilateral lower extremities with blanchable redness. Capillary refill <3 sec. Pt educated on keeping pressure off of extremities. Pt received IV antibiotics during shift. Blood glucose monitored throughout shift, appropriate insulin coverage selected from MD ordered sliding scale. No acute distress noted, will continue to monitor.   Pt on telemetry, no ectopy or true red alarms noted. SR, HR 70-80's

## 2017-12-18 PROBLEM — R19.7 DIARRHEA: Status: ACTIVE | Noted: 2017-12-18

## 2017-12-18 LAB
ALBUMIN SERPL BCP-MCNC: 2.2 G/DL
ALP SERPL-CCNC: 104 U/L
ALT SERPL W/O P-5'-P-CCNC: 27 U/L
ANION GAP SERPL CALC-SCNC: 11 MMOL/L
ANISOCYTOSIS BLD QL SMEAR: SLIGHT
AST SERPL-CCNC: 19 U/L
BASOPHILS # BLD AUTO: 0.06 K/UL
BASOPHILS # BLD AUTO: 0.06 K/UL
BASOPHILS NFR BLD: 0.4 %
BASOPHILS NFR BLD: 0.5 %
BILIRUB SERPL-MCNC: 0.4 MG/DL
BUN SERPL-MCNC: 25 MG/DL
C DIFF GDH STL QL: NEGATIVE
C DIFF TOX A+B STL QL IA: NEGATIVE
CALCIUM SERPL-MCNC: 9.6 MG/DL
CHLORIDE SERPL-SCNC: 95 MMOL/L
CO2 SERPL-SCNC: 34 MMOL/L
CREAT SERPL-MCNC: 1.3 MG/DL
CRP SERPL-MCNC: 172.6 MG/L
DIFFERENTIAL METHOD: ABNORMAL
DIFFERENTIAL METHOD: ABNORMAL
EOSINOPHIL # BLD AUTO: 0.2 K/UL
EOSINOPHIL # BLD AUTO: 0.3 K/UL
EOSINOPHIL NFR BLD: 1.5 %
EOSINOPHIL NFR BLD: 2 %
ERYTHROCYTE [DISTWIDTH] IN BLOOD BY AUTOMATED COUNT: 13.8 %
ERYTHROCYTE [DISTWIDTH] IN BLOOD BY AUTOMATED COUNT: 14 %
EST. GFR  (AFRICAN AMERICAN): >60 ML/MIN/1.73 M^2
EST. GFR  (NON AFRICAN AMERICAN): 58 ML/MIN/1.73 M^2
GLUCOSE SERPL-MCNC: 188 MG/DL
HCT VFR BLD AUTO: 41 %
HCT VFR BLD AUTO: 44.1 %
HGB BLD-MCNC: 13.7 G/DL
HGB BLD-MCNC: 14.5 G/DL
LYMPHOCYTES # BLD AUTO: 0.9 K/UL
LYMPHOCYTES # BLD AUTO: 1 K/UL
LYMPHOCYTES NFR BLD: 6.3 %
LYMPHOCYTES NFR BLD: 7.9 %
MAGNESIUM SERPL-MCNC: 1.8 MG/DL
MCH RBC QN AUTO: 34.3 PG
MCH RBC QN AUTO: 34.6 PG
MCHC RBC AUTO-ENTMCNC: 32.9 G/DL
MCHC RBC AUTO-ENTMCNC: 33.4 G/DL
MCV RBC AUTO: 104 FL
MCV RBC AUTO: 104 FL
MONOCYTES # BLD AUTO: 1.1 K/UL
MONOCYTES # BLD AUTO: 1.1 K/UL
MONOCYTES NFR BLD: 7.8 %
MONOCYTES NFR BLD: 8.6 %
NEUTROPHILS # BLD AUTO: 10.4 K/UL
NEUTROPHILS # BLD AUTO: 11.4 K/UL
NEUTROPHILS NFR BLD: 81 %
NEUTROPHILS NFR BLD: 81.8 %
PHOSPHATE SERPL-MCNC: 4.5 MG/DL
PLATELET # BLD AUTO: 305 K/UL
PLATELET # BLD AUTO: 309 K/UL
PLATELET BLD QL SMEAR: ABNORMAL
PMV BLD AUTO: 10.7 FL
PMV BLD AUTO: 11 FL
POCT GLUCOSE: 161 MG/DL (ref 70–110)
POCT GLUCOSE: 175 MG/DL (ref 70–110)
POCT GLUCOSE: 178 MG/DL (ref 70–110)
POCT GLUCOSE: 221 MG/DL (ref 70–110)
POIKILOCYTOSIS BLD QL SMEAR: SLIGHT
POTASSIUM SERPL-SCNC: 4.4 MMOL/L
PROT SERPL-MCNC: 7 G/DL
RBC # BLD AUTO: 3.96 M/UL
RBC # BLD AUTO: 4.23 M/UL
SODIUM SERPL-SCNC: 140 MMOL/L
TSH SERPL DL<=0.005 MIU/L-ACNC: 2 UIU/ML
WBC # BLD AUTO: 13.21 K/UL
WBC # BLD AUTO: 13.88 K/UL

## 2017-12-18 PROCEDURE — 85025 COMPLETE CBC W/AUTO DIFF WBC: CPT

## 2017-12-18 PROCEDURE — 97530 THERAPEUTIC ACTIVITIES: CPT

## 2017-12-18 PROCEDURE — 25000003 PHARM REV CODE 250: Performed by: INTERNAL MEDICINE

## 2017-12-18 PROCEDURE — 84100 ASSAY OF PHOSPHORUS: CPT

## 2017-12-18 PROCEDURE — 36415 COLL VENOUS BLD VENIPUNCTURE: CPT

## 2017-12-18 PROCEDURE — 0J9M3ZX DRAINAGE OF LEFT UPPER LEG SUBCUTANEOUS TISSUE AND FASCIA, PERCUTANEOUS APPROACH, DIAGNOSTIC: ICD-10-PCS | Performed by: SURGERY

## 2017-12-18 PROCEDURE — 97110 THERAPEUTIC EXERCISES: CPT

## 2017-12-18 PROCEDURE — 21400001 HC TELEMETRY ROOM

## 2017-12-18 PROCEDURE — 25000003 PHARM REV CODE 250: Performed by: FAMILY MEDICINE

## 2017-12-18 PROCEDURE — S0077 INJECTION, CLINDAMYCIN PHOSP: HCPCS | Performed by: FAMILY MEDICINE

## 2017-12-18 PROCEDURE — 84443 ASSAY THYROID STIM HORMONE: CPT

## 2017-12-18 PROCEDURE — 63600175 PHARM REV CODE 636 W HCPCS: Performed by: STUDENT IN AN ORGANIZED HEALTH CARE EDUCATION/TRAINING PROGRAM

## 2017-12-18 PROCEDURE — 10060 I&D ABSCESS SIMPLE/SINGLE: CPT | Mod: ,,, | Performed by: SURGERY

## 2017-12-18 PROCEDURE — 80053 COMPREHEN METABOLIC PANEL: CPT

## 2017-12-18 PROCEDURE — 87075 CULTR BACTERIA EXCEPT BLOOD: CPT

## 2017-12-18 PROCEDURE — 87040 BLOOD CULTURE FOR BACTERIA: CPT

## 2017-12-18 PROCEDURE — 86141 C-REACTIVE PROTEIN HS: CPT

## 2017-12-18 PROCEDURE — 25000003 PHARM REV CODE 250: Performed by: STUDENT IN AN ORGANIZED HEALTH CARE EDUCATION/TRAINING PROGRAM

## 2017-12-18 PROCEDURE — 99232 SBSQ HOSP IP/OBS MODERATE 35: CPT | Mod: 25,,, | Performed by: SURGERY

## 2017-12-18 PROCEDURE — 87070 CULTURE OTHR SPECIMN AEROBIC: CPT

## 2017-12-18 PROCEDURE — 83735 ASSAY OF MAGNESIUM: CPT

## 2017-12-18 PROCEDURE — S4991 NICOTINE PATCH NONLEGEND: HCPCS | Performed by: STUDENT IN AN ORGANIZED HEALTH CARE EDUCATION/TRAINING PROGRAM

## 2017-12-18 PROCEDURE — 94761 N-INVAS EAR/PLS OXIMETRY MLT: CPT

## 2017-12-18 PROCEDURE — 87449 NOS EACH ORGANISM AG IA: CPT

## 2017-12-18 RX ORDER — CALCIUM CARBONATE 200(500)MG
500 TABLET,CHEWABLE ORAL DAILY PRN
Status: DISCONTINUED | OUTPATIENT
Start: 2017-12-18 | End: 2017-12-27 | Stop reason: HOSPADM

## 2017-12-18 RX ORDER — CEFAZOLIN SODIUM 1 G/50ML
1 SOLUTION INTRAVENOUS
Status: DISCONTINUED | OUTPATIENT
Start: 2017-12-18 | End: 2017-12-25

## 2017-12-18 RX ADMIN — CLORAZEPATE DIPOTASSIUM 15 MG: 3.75 TABLET ORAL at 11:12

## 2017-12-18 RX ADMIN — INSULIN ASPART 2 UNITS: 100 INJECTION, SOLUTION INTRAVENOUS; SUBCUTANEOUS at 11:12

## 2017-12-18 RX ADMIN — CLINDAMYCIN IN 5 PERCENT DEXTROSE 600 MG: 12 INJECTION, SOLUTION INTRAVENOUS at 11:12

## 2017-12-18 RX ADMIN — CLINDAMYCIN IN 5 PERCENT DEXTROSE 600 MG: 12 INJECTION, SOLUTION INTRAVENOUS at 05:12

## 2017-12-18 RX ADMIN — MICONAZOLE NITRATE: 20 CREAM TOPICAL at 08:12

## 2017-12-18 RX ADMIN — ASPIRIN 81 MG: 81 TABLET, COATED ORAL at 08:12

## 2017-12-18 RX ADMIN — MAGNESIUM OXIDE TAB 400 MG (241.3 MG ELEMENTAL MG) 400 MG: 400 (241.3 MG) TAB at 10:12

## 2017-12-18 RX ADMIN — MAGNESIUM OXIDE TAB 400 MG (241.3 MG ELEMENTAL MG) 400 MG: 400 (241.3 MG) TAB at 08:12

## 2017-12-18 RX ADMIN — CALCIUM CARBONATE (ANTACID) CHEW TAB 500 MG 500 MG: 500 CHEW TAB at 08:12

## 2017-12-18 RX ADMIN — MICONAZOLE NITRATE: 20 CREAM TOPICAL at 10:12

## 2017-12-18 RX ADMIN — CEFAZOLIN SODIUM 1 G: 1 SOLUTION INTRAVENOUS at 11:12

## 2017-12-18 RX ADMIN — CEFAZOLIN SODIUM 1 G: 1 SOLUTION INTRAVENOUS at 03:12

## 2017-12-18 RX ADMIN — ENOXAPARIN SODIUM 40 MG: 100 INJECTION SUBCUTANEOUS at 05:12

## 2017-12-18 RX ADMIN — HYDROCODONE BITARTRATE AND ACETAMINOPHEN 1 TABLET: 5; 325 TABLET ORAL at 05:12

## 2017-12-18 RX ADMIN — NICOTINE 1 PATCH: 21 PATCH, EXTENDED RELEASE TRANSDERMAL at 08:12

## 2017-12-18 RX ADMIN — SIMVASTATIN 40 MG: 20 TABLET, FILM COATED ORAL at 10:12

## 2017-12-18 RX ADMIN — MORPHINE SULFATE 4 MG: 2 INJECTION, SOLUTION INTRAMUSCULAR; INTRAVENOUS at 10:12

## 2017-12-18 RX ADMIN — CARVEDILOL 6.25 MG: 6.25 TABLET, FILM COATED ORAL at 05:12

## 2017-12-18 RX ADMIN — CLOPIDOGREL 75 MG: 75 TABLET, FILM COATED ORAL at 08:12

## 2017-12-18 RX ADMIN — CLORAZEPATE DIPOTASSIUM 15 MG: 3.75 TABLET ORAL at 10:12

## 2017-12-18 RX ADMIN — HYDROCHLOROTHIAZIDE 12.5 MG: 12.5 TABLET ORAL at 08:12

## 2017-12-18 RX ADMIN — CARVEDILOL 6.25 MG: 6.25 TABLET, FILM COATED ORAL at 08:12

## 2017-12-18 NOTE — PLAN OF CARE
Problem: Occupational Therapy Goal  Goal: Occupational Therapy Goal  Goals to be met by: 12/23/2017    Patient will increase functional independence with ADLs by performing:    LE Dressing with Supervision.  Grooming while standing at sink with Supervision.  Toileting from bedside commode with Supervision for hygiene and clothing management.   Supine to sit with Modified Stearns.  Toilet transfer to bedside commode with Supervision.     Outcome: Ongoing (interventions implemented as appropriate)  Patient with increased swelling and pain after deep subcutaneous tissue sample performed earlier. Will benefit from continued OT to address functional deficits.

## 2017-12-18 NOTE — ASSESSMENT & PLAN NOTE
WBC 14 on admit, decreased to 11.73 but now back up to 13.88  Vanc and Zosyn switched to Cipro 3 days ago with decrease in improvement  Cellulitis is overlying joint but seems to just involve the skin/soft tissue, will monitor closely- not currently thought to be septic arthritis, able to range joint. Ortho and surgery consulted, no surgical intervention  Potentially switch back to broad spectrum abx as progression has stymied

## 2017-12-18 NOTE — SUBJECTIVE & OBJECTIVE
Interval History: NAEON. AF. VSS. Not elevating foot consistently. More erythema on foot, thigh/calf looks similar. On IV clinida.  WBC laci slightly.  Pt with soft BM. Surgery following    Review of Systems   Constitutional: Positive for activity change. Negative for chills and diaphoresis.   Cardiovascular: Positive for leg swelling (Unilat LLE swelling, skin changes).   Gastrointestinal: Positive for abdominal distention (baseline, soft, questionable increaded) and diarrhea. Negative for abdominal pain, blood in stool, constipation, nausea and vomiting.   Genitourinary: Negative for difficulty urinating, dysuria and urgency.   Musculoskeletal: Positive for gait problem and myalgias (improved even further). Negative for back pain.   Skin: Positive for color change (stable, reports improvement).   Neurological: Negative for dizziness, weakness, numbness and headaches.     Objective:     Vital Signs (Most Recent):  Temp: 98.1 °F (36.7 °C) (12/18/17 0513)  Pulse: 80 (12/18/17 0513)  Resp: 18 (12/18/17 0513)  BP: (!) 115/56 (12/18/17 0513)  SpO2: (!) 94 % (12/18/17 0712) Vital Signs (24h Range):  Temp:  [96.1 °F (35.6 °C)-99 °F (37.2 °C)] 98.1 °F (36.7 °C)  Pulse:  [73-84] 80  Resp:  [18-20] 18  SpO2:  [92 %-96 %] 94 %  BP: (115-142)/(56-70) 115/56     Weight: 95 kg (209 lb 6.4 oz)  Body mass index is 34.85 kg/m².    Intake/Output Summary (Last 24 hours) at 12/18/17 0800  Last data filed at 12/18/17 0600   Gross per 24 hour   Intake              955 ml   Output             1960 ml   Net            -1005 ml      Physical Exam   Constitutional: He is oriented to person, place, and time. He appears well-developed and well-nourished. No distress.   Ambulating from bathroom slowly with walker and nurses assisting. Purple foot with venous congestion   HENT:   Head: Normocephalic and atraumatic.   Mouth/Throat: No oropharyngeal exudate.   Eyes: Conjunctivae and EOM are normal. Pupils are equal, round, and reactive to light.    Neck: Normal range of motion. Neck supple. No JVD present.   Cardiovascular: Normal rate, regular rhythm and normal heart sounds.    No murmur heard.  Pulmonary/Chest: Effort normal. No respiratory distress. He has no wheezes (bilat lower lung wheezes). He has no rales.   Abdominal: Soft. Bowel sounds are normal. He exhibits distension (rotund abdomen ). He exhibits no mass. There is no tenderness.   Musculoskeletal: He exhibits edema.   Able to range knee with some pain (UE & RLE without erythema, pain); chronic spinal osteoarthritis   Neurological: He is alert and oriented to person, place, and time. No cranial nerve deficit.   Skin: Skin is warm. Capillary refill takes less than 2 seconds. He is not diaphoretic. There is erythema (stable possible mild improvement on thigh).   LLE erythema and swelling extending from mid calf to knee with erythema, warmth extends up medial aspect of thigh stable, increase in swelling and erythema at foot; dry flaking skin on bilat feet; bilat old healed venous stasis ulcer scaling wounds, no drainage, no scrotal involvement, no LAD appreciated   Psychiatric: He has a normal mood and affect. His behavior is normal.   Nursing note and vitals reviewed.    Significant Labs:   CBC:   Recent Labs  Lab 12/17/17  0530 12/18/17  0437 12/18/17  0626   WBC 12.26 13.21* 13.88*   HGB 13.7* 14.5 13.7*   HCT 42.1 44.1 41.0    305 309     CMP:   Recent Labs  Lab 12/17/17  0530 12/18/17  0625    140   K 4.5 4.4   CL 97 95   CO2 35* 34*   * 188*   BUN 23 25*   CREATININE 1.2 1.3   CALCIUM 9.2 9.6   PROT 6.7 7.0   ALBUMIN 2.0* 2.2*   BILITOT 0.4 0.4   ALKPHOS 106 104   AST 26 19   ALT 32 27   ANIONGAP 7* 11   EGFRNONAA >60 58*       Significant Imaging: I have reviewed all pertinent imaging results/findings within the past 24 hours.

## 2017-12-18 NOTE — SUBJECTIVE & OBJECTIVE
Past Medical History:   Diagnosis Date    Diabetes mellitus, type 2     Heart disease     High cholesterol     Hypertension        Past Surgical History:   Procedure Laterality Date    ANGIOPLASTY      HERNIA REPAIR         Review of patient's allergies indicates:   Allergen Reactions    Iodine and iodide containing products     Shellfish containing products        Medications:  Prescriptions Prior to Admission   Medication Sig    carvedilol (COREG) 6.25 MG tablet Take 6.25 mg by mouth 2 (two) times daily with meals.    clopidogrel (PLAVIX) 75 mg tablet Take 75 mg by mouth once daily.    clorazepate (TRANXENE) 15 MG tablet Take 15 mg by mouth 2 (two) times daily.    glipiZIDE (GLUCOTROL) 5 MG tablet Take 5 mg by mouth 2 (two) times daily before meals.    hydrochlorothiazide (MICROZIDE) 12.5 mg capsule Take 12.5 mg by mouth once daily.    simvastatin (ZOCOR) 40 MG tablet Take 40 mg by mouth every evening.     Antibiotics     Start     Stop Route Frequency Ordered    12/15/17 1800  clindamycin 600 MG/50 ML D5W 600 mg/50 mL IVPB 600 mg      -- IV Every 6 hours (non-standard times) 12/15/17 1659        Antifungals     Start     Stop Route Frequency Ordered    12/16/17 1015  miconazole 2 % cream      -- Top 2 times daily 12/16/17 0902        Antivirals     None           There is no immunization history for the selected administration types on file for this patient.    Family History     None        Social History     Social History    Marital status: Single     Spouse name: N/A    Number of children: N/A    Years of education: N/A     Social History Main Topics    Smoking status: Former Smoker    Smokeless tobacco: Never Used    Alcohol use No    Drug use: No    Sexual activity: Not Asked     Other Topics Concern    None     Social History Narrative    None     Review of Systems   Constitutional: Negative for chills, fatigue and fever.   HENT: Negative for sore throat.    Eyes: Negative for  photophobia.   Respiratory: Negative for cough and shortness of breath.    Cardiovascular: Negative for chest pain and palpitations.   Gastrointestinal: Positive for abdominal pain and diarrhea. Negative for constipation, nausea and vomiting.   Genitourinary: Negative for dysuria.   Skin: Positive for color change. Negative for wound.   Neurological: Negative for facial asymmetry, speech difficulty and weakness.     Objective:     Vital Signs (Most Recent):  Temp: 98.4 °F (36.9 °C) (12/18/17 1156)  Pulse: 73 (12/18/17 1156)  Resp: 20 (12/18/17 1156)  BP: 132/69 (12/18/17 1156)  SpO2: 97 % (12/18/17 1214) Vital Signs (24h Range):  Temp:  [96.7 °F (35.9 °C)-99 °F (37.2 °C)] 98.4 °F (36.9 °C)  Pulse:  [73-84] 73  Resp:  [18-20] 20  SpO2:  [92 %-97 %] 97 %  BP: (115-142)/(56-70) 132/69     Weight: 95 kg (209 lb 6.4 oz)  Body mass index is 34.85 kg/m².    Estimated Creatinine Clearance: 60.8 mL/min (based on SCr of 1.3 mg/dL).    Physical Exam   Constitutional: He appears well-developed and well-nourished.   HENT:   Head: Normocephalic and atraumatic.   Mouth/Throat: Oropharynx is clear and moist.   Eyes: Conjunctivae and EOM are normal. No scleral icterus.   Neck: Normal range of motion. No thyromegaly present.   Cardiovascular: Normal rate and normal heart sounds.    No murmur heard.  Pulmonary/Chest: Effort normal and breath sounds normal. No stridor. No respiratory distress. He has no wheezes. He has no rales.   Abdominal: Soft. He exhibits distension. There is tenderness. There is no rebound and no guarding.   Diffuse tenderness, hyperactive bowel sounds   Musculoskeletal:   LLE with erythema, warmth and edema from toes to knee, with erythema tracking medial aspect of thigh to groin as well as lateral aspect of thigh to buttocks  RLE with venous stasis dermatitis    Neurological: He is alert. He exhibits normal muscle tone.   Skin: No rash noted. He is not diaphoretic.       Significant Labs:   Blood Culture:    Recent Labs  Lab 12/12/17  1119 12/12/17  1226   LABBLOO No growth after 5 days. No growth after 5 days.     CBC:   Recent Labs  Lab 12/17/17  0530 12/18/17  0437 12/18/17  0626   WBC 12.26 13.21* 13.88*   HGB 13.7* 14.5 13.7*   HCT 42.1 44.1 41.0    305 309     CMP:   Recent Labs  Lab 12/17/17  0530 12/18/17  0625    140   K 4.5 4.4   CL 97 95   CO2 35* 34*   * 188*   BUN 23 25*   CREATININE 1.2 1.3   CALCIUM 9.2 9.6   PROT 6.7 7.0   ALBUMIN 2.0* 2.2*   BILITOT 0.4 0.4   ALKPHOS 106 104   AST 26 19   ALT 32 27   ANIONGAP 7* 11   EGFRNONAA >60 58*     Lactic Acid: No results for input(s): LACTATE in the last 48 hours.  Procalcitonin: No results for input(s): PROCAL in the last 48 hours.  Urine Culture:   Recent Labs  Lab 12/12/17  1207   LABURIN No significant growth     Urine Studies:   Recent Labs  Lab 12/12/17  1207   COLORU Yellow   APPEARANCEUA Clear   PHUR 7.0   SPECGRAV <=1.005*   PROTEINUA 2+*   GLUCUA Negative   KETONESU Negative   BILIRUBINUA Negative   OCCULTUA 1+*   NITRITE Negative   UROBILINOGEN 1.0   LEUKOCYTESUR Negative   RBCUA 1   WBCUA 0   BACTERIA Rare   SQUAMEPITHEL 2   HYALINECASTS 0     Wound Culture: No results for input(s): LABAERO in the last 4320 hours.    Significant Imaging:   Imaging Results          X-Ray Chest 1 View (Final result)  Result time 12/18/17 08:13:11    Final result by Jorge Allred MD (12/18/17 08:13:11)                 Impression:     Stable chest.  No active process.      Electronically signed by: ANTONIO ALLRED MD  Date:     12/18/17  Time:    08:13              Narrative:    Single view chest, comparison 12/12/2017.  Cardiomegaly stable.  Incomplete inspiration, interstitial markings remain mildly prominent.  There are chronic rib deformity left lateral fifth sixth and seventh ribs.                             MRI Lower Extremity W WO Contrast Left (Final result)  Result time 12/15/17 16:53:12    Final result by Jorge Mojica  MD Blake (12/15/17 16:53:12)                 Impression:     Nonspecific subcutaneous edema and Cellulitis without abscess, osteomyelitis, septic joint left lower leg.      Electronically signed by: ANTONIO ALLRED MD  Date:     12/15/17  Time:    16:53              Narrative:    Comparison ultrasound vein left leg, radiographs left knee dating back to 12/12/17.  MR on left tibia from knee to ankle without, and with gadovist 10 cc intravenous.    Asymmetrical subcutaneous edema left lower extremity compared to contralateral side.  Bone and joint structures normal.  No fracture dislocation.  Neurovascular structures appear intact.  Muscle tendon, ligament structures normal.  No abscess.  Heterogeneous enhancement of subcutaneous tissues, no unusual enhancement musculoskeletal structures otherwise.                             X-Ray Knee 3 View Left (Final result)  Result time 12/13/17 12:18:14    Final result by Rbo Valle MD (12/13/17 12:18:14)                 Impression:     As above.      Electronically signed by: ROB VALLE MD  Date:     12/13/17  Time:    12:18              Narrative:    Knee 3 views left.    Findings: 3 views left. There is no fracture, dislocation, or bony erosion identified.                             X-Ray Chest AP Portable (Final result)  Result time 12/12/17 12:14:25    Final result by Luan Sutherland MD (12/12/17 12:14:25)                 Impression:        No acute radiographic findings on single view of the chest.      Electronically signed by: LUAN SUTHERLAND MD  Date:     12/12/17  Time:    12:14              Narrative:    Comparison: None    Technique: Single view of the chest.    Findings: Cardiac silhouette is enlarged.  There is aortic atherosclerosis.  Lungs show no evidence of significant focal consolidation, large effusion, or pneumothorax.  The bones demonstrate degenerative changes of the spine and shoulders.                             US Lower Extremity Veins Left  (Final result)  Result time 12/12/17 11:54:55    Final result by Luan Gifford MD (12/12/17 11:54:55)                 Impression:         No evidence of deep vein thrombosisin the left lower extremity.    There is lower extremity edema.        Electronically signed by: LUAN GIFFORD MD  Date:     12/12/17  Time:    11:54              Narrative:    Comparison: None.    Findings: There is no evidence of venous thrombosis in the bilateral common femoral, left femoral, greater saphenous, popliteal, posterior tibial, anterior tibial, and peroneal veins.                            Antibiotics     Start     Stop Route Frequency Ordered    12/15/17 1800  clindamycin 600 MG/50 ML D5W 600 mg/50 mL IVPB 600 mg      -- IV Every 6 hours (non-standard times) 12/15/17 8706

## 2017-12-18 NOTE — PROCEDURES
"Reji Guerra is a 62 y.o. male patient.    Temp: 98.4 °F (36.9 °C) (12/18/17 1156)  Pulse: 73 (12/18/17 1156)  Resp: 20 (12/18/17 1156)  BP: 132/69 (12/18/17 1156)  SpO2: 97 % (12/18/17 1214)  Weight: 95 kg (209 lb 6.4 oz) (12/13/17 0004)  Height: 5' 5" (165.1 cm) (12/13/17 0004)       Incision and Drainage  Date/Time: 12/18/2017 1:00 PM  Performed by: URI HENRIQUEZ JR.  Authorized by: URI HENRIQUEZ JR.   Pre-operative diagnosis: cellulitis left lower extremity without foot  Post-operative diagnosis: cellulitis left lower extremity without foot  Consent Done: Yes  Consent: Written consent obtained.  Risks and benefits: risks, benefits and alternatives were discussed  Consent given by: patient  Patient understanding: patient does not state understanding of the procedure being performed  Patient consent: the patient's understanding of the procedure matches consent given  Procedure consent: procedure consent matches procedure scheduled  Patient identity confirmed: verbally with patient  Time out: Immediately prior to procedure a "time out" was called to verify the correct patient, procedure, equipment, support staff and site/side marked as required.  Indications for incision and drainage: cellulitis.  Body area: lower extremity (left thigh)  Anesthesia: local infiltration    Anesthesia:  Local Anesthetic: lidocaine 1% with epinephrine  Anesthetic total: 6 mL  Patient sedated: no  Description of findings: +bleeding; no dishwater fluid; no purulence   Scalpel size: 11  Incision type: single straight  Complexity: simple  Drainage: bloody  Wound treatment: wound left open and  wound packed  Packing material: 1/2 in gauze  Technical procedures used: Deep subcutaneous tissue swab culture with tissue culture  Complications: No  Estimated blood loss (mL): 5  Specimens: Yes (aerobic and anaerobic culture)  Patient tolerance: Patient tolerated the procedure well with no immediate complications          Uri ARGUETA " Sang Frank  12/18/2017

## 2017-12-18 NOTE — ASSESSMENT & PLAN NOTE
Multiple unformed stools for in the previous two days. Associated with distention, hyperactive bowel sounds and diffuse abdominal tenderness. On antibiotics since 12/12, on clindamycin since 12/15    Recs  - rule out C diff  - stop clindamycin  - would get KUB to rule out megacolon/perforation

## 2017-12-18 NOTE — ASSESSMENT & PLAN NOTE
Hx of 3 stents in angiogram in 2003  Restarted on ASA, plavix. Potentially able to stop plavix since stents were placed over 2 years ago  Trops trended down  F/u cardiology as outpatient  H/H 13/39 but with MCV of 105, Folate and B12 were normal  No chest pain

## 2017-12-18 NOTE — ASSESSMENT & PLAN NOTE
Restarted Carvedilol, HCTZ (prior home meds from 2 years ago)   Stable  Cardiac diet  115-142/56-68
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
## 2017-12-18 NOTE — PLAN OF CARE
Problem: Patient Care Overview  Goal: Plan of Care Review  Outcome: Ongoing (interventions implemented as appropriate)  Patient on Tele SR. Patient denies any chest pain or SOB. Left lower extremity present with erythema and +4 edema. Area marked. Patient c/o pain during shift. Administered Morphine 4mg per md IV order. Bed alarm in place. Call light within reach.

## 2017-12-18 NOTE — PROGRESS NOTES
[facility]  General Surgery  Progress Note    Subjective:     History of Present Illness:  No notes on file    Post-Op Info:  * No surgery found *         Interval History: Pain unchanged from yesterday.  Afebrile and HD stable.  No tachycardia.  Making more of an attempt to keep extremity elevated.    Medications:  Continuous Infusions:  Scheduled Meds:   aspirin  81 mg Oral Daily    carvedilol  6.25 mg Oral BID WM    clindamycin (CLEOCIN) IVPB  600 mg Intravenous Q6H    clopidogrel  75 mg Oral Daily    clorazepate  15 mg Oral BID    enoxaparin  40 mg Subcutaneous Daily    hydroCHLOROthiazide  12.5 mg Oral Daily    insulin detemir  8 Units Subcutaneous BID    magnesium oxide  400 mg Oral BID    miconazole   Topical (Top) BID    nicotine  1 patch Transdermal Daily    simvastatin  40 mg Oral QHS     PRN Meds:calcium carbonate, dextrose 50%, dextrose 50%, glucagon (human recombinant), glucose, glucose, hydrocodone-acetaminophen 5-325mg, influenza, insulin aspart, loperamide, lorazepam, morphine, pneumoc 13-jose g conj-dip cr(PF), sodium chloride 0.9%     Review of patient's allergies indicates:   Allergen Reactions    Iodine and iodide containing products     Shellfish containing products      Objective:     Vital Signs (Most Recent):  Temp: 98.4 °F (36.9 °C) (12/18/17 1156)  Pulse: 73 (12/18/17 1156)  Resp: 20 (12/18/17 1156)  BP: 132/69 (12/18/17 1156)  SpO2: 97 % (12/18/17 1214) Vital Signs (24h Range):  Temp:  [96.7 °F (35.9 °C)-99 °F (37.2 °C)] 98.4 °F (36.9 °C)  Pulse:  [73-84] 73  Resp:  [18-20] 20  SpO2:  [92 %-97 %] 97 %  BP: (115-142)/(56-70) 132/69     Weight: 95 kg (209 lb 6.4 oz)  Body mass index is 34.85 kg/m².    Intake/Output - Last 3 Shifts       12/16 0700 - 12/17 0659 12/17 0700 - 12/18 0659 12/18 0700 - 12/19 0659    P.O. 1770 805 305    IV Piggyback 200 150     Total Intake(mL/kg) 1970 (20.7) 955 (10.1) 305 (3.2)    Urine (mL/kg/hr) 1900 (0.8) 2440 (1.1)     Stool 3  (0) 0 (0)     Total Output 1903 2440      Net +67 -1485 +305           Urine Occurrence  1 x 1 x    Stool Occurrence 0 x 1 x 2 x          Physical Exam   Constitutional: He appears well-nourished. No distress (mild when LLE examined).   Cardiovascular: Normal rate and regular rhythm.    Pulmonary/Chest: Effort normal. No respiratory distress.   Musculoskeletal:   RLE with chronic venous stasis-related changes; no cellulitis.  LLE with significant cellulitic changes diffusely with sparing only of the distal food and superior-medial thigh.  No fluctuance. +induration.  Blanching erythema   Nursing note and vitals reviewed.      Significant Labs:  CBC:   Recent Labs  Lab 12/18/17  0626   WBC 13.88*   RBC 3.96*   HGB 13.7*   HCT 41.0      *   MCH 34.6*   MCHC 33.4     CMP:   Recent Labs  Lab 12/18/17  0625   *   CALCIUM 9.6   ALBUMIN 2.2*   PROT 7.0      K 4.4   CO2 34*   CL 95   BUN 25*   CREATININE 1.3   ALKPHOS 104   ALT 27   AST 19   BILITOT 0.4     CRP: 172.6    Significant Diagnostics:  MRI reviewed    Assessment/Plan:     * Cellulitis of left lower extremity without foot    -Will obtain deep tissue cultures at bedside; written consent obtained  -Recommend trending CRP  -Recommend adding Vancomycin to regimen with trough goal 15-20 for tissue penetration  -Will continue to follow            Denny Domínguez Jr., MD  General Surgery  [facility]

## 2017-12-18 NOTE — ASSESSMENT & PLAN NOTE
-Will obtain deep tissue cultures at bedside; written consent obtained  -Recommend trending CRP  -Recommend adding Vancomycin to regimen with trough goal 15-20 for tissue penetration  -Will continue to follow

## 2017-12-18 NOTE — CONSULTS
[facility]  Infectious Disease  Consult Note    Patient Name: Reji Guerra  MRN: [id number]  Admission Date: 12/12/2017  Hospital Length of Stay: 6 days  Attending Physician: Berkley Johnson MD  Primary Care Provider: [facility]     Isolation Status: Special Contact    Patient information was obtained from patient, past medical records and ER records.      Inpatient consult to Infectious Diseases  Consult performed by: AURELIO MAGAÑA  Consult ordered by: RAFI KOCH        Assessment/Plan:     Diarrhea    Multiple unformed stools for in the previous two days. Associated with distention, hyperactive bowel sounds and diffuse abdominal tenderness. On antibiotics since 12/12, on clindamycin since 12/15    Recs  - rule out C diff  - stop clindamycin  - would get KUB to rule out megacolon/perforation         Cellulitis of left lower extremity    62 M with LLE cellulitis, erythema, warmth and swelling primarily to knee with redness and warmth tracking to groin on medial and lateral aspects of L thigh. Initially on vanc and zosyn with minimal improvement. Currently on clindamycin.    Recs  - stop clindamycin  - switch to cefazolin 1g q8hrs; will add vancomycin tomorrow if no improvement  - repeat blood cultures             Thank you for your consult. I will follow-up with patient. Please contact us if you have any additional questions.    HALEY Mcmillan  Infectious Disease  [facility]    Subjective:     Principal Problem: Cellulitis of left lower extremity without foot    HPI: 62M with history of CAD (s/p PCI with three stents), DMII, HTN, HLD who presented to ED on 12/12 with left lower extremity swelling and erythema. He reports that the swelling and redness began a few days before admission, worsening until he was unable to bear weight on his left leg. He denies fevers and chills while at home, recent trauma to his L leg. He  has had no aquatic exposures.     Hospital course significant for initial improvement (per patient, though imaging in chart does not suggest there was improvement) with vancomycin and zosyn with transition to clindamycin on 12/15. He has not had much clinical improvement since transitioning to clindamycin. Ortho consulted initially because of concern for septic arthritis (knee) but ruled out clinically (tap deferred due to surrounding cellulitis). Venous US of lower extremity was without evidence of DVT and MRI showed nonspecific subcutaneous edema and cellulitis without abscess, osteomyelitis, septic joint of left lower leg. Patient has remained afebrile but with continuing mild leukocytosis (up to 15, 13.8 today). Blood cultures NGTD. Urine culture NGTD. CXR not suggestive of consolidation.     Patient has had many loose bowel movements and is complaining of abdominal pain and distention.     Past Medical History:   Diagnosis Date    Diabetes mellitus, type 2     Heart disease     High cholesterol     Hypertension        Past Surgical History:   Procedure Laterality Date    ANGIOPLASTY      HERNIA REPAIR         Review of patient's allergies indicates:   Allergen Reactions    Iodine and iodide containing products     Shellfish containing products        Medications:  Prescriptions Prior to Admission   Medication Sig    carvedilol (COREG) 6.25 MG tablet Take 6.25 mg by mouth 2 (two) times daily with meals.    clopidogrel (PLAVIX) 75 mg tablet Take 75 mg by mouth once daily.    clorazepate (TRANXENE) 15 MG tablet Take 15 mg by mouth 2 (two) times daily.    glipiZIDE (GLUCOTROL) 5 MG tablet Take 5 mg by mouth 2 (two) times daily before meals.    hydrochlorothiazide (MICROZIDE) 12.5 mg capsule Take 12.5 mg by mouth once daily.    simvastatin (ZOCOR) 40 MG tablet Take 40 mg by mouth every evening.     Antibiotics     Start     Stop Route Frequency Ordered    12/15/17 1800  clindamycin 600 MG/50 ML D5W 600  mg/50 mL IVPB 600 mg      -- IV Every 6 hours (non-standard times) 12/15/17 1659        Antifungals     Start     Stop Route Frequency Ordered    12/16/17 1015  miconazole 2 % cream      -- Top 2 times daily 12/16/17 0902        Antivirals     None           There is no immunization history for the selected administration types on file for this patient.    Family History     None        Social History     Social History    Marital status: Single     Spouse name: N/A    Number of children: N/A    Years of education: N/A     Social History Main Topics    Smoking status: Former Smoker    Smokeless tobacco: Never Used    Alcohol use No    Drug use: No    Sexual activity: Not Asked     Other Topics Concern    None     Social History Narrative    None     Review of Systems   Constitutional: Negative for chills, fatigue and fever.   HENT: Negative for sore throat.    Eyes: Negative for photophobia.   Respiratory: Negative for cough and shortness of breath.    Cardiovascular: Negative for chest pain and palpitations.   Gastrointestinal: Positive for abdominal pain and diarrhea. Negative for constipation, nausea and vomiting.   Genitourinary: Negative for dysuria.   Skin: Positive for color change. Negative for wound.   Neurological: Negative for facial asymmetry, speech difficulty and weakness.     Objective:     Vital Signs (Most Recent):  Temp: 98.4 °F (36.9 °C) (12/18/17 1156)  Pulse: 73 (12/18/17 1156)  Resp: 20 (12/18/17 1156)  BP: 132/69 (12/18/17 1156)  SpO2: 97 % (12/18/17 1214) Vital Signs (24h Range):  Temp:  [96.7 °F (35.9 °C)-99 °F (37.2 °C)] 98.4 °F (36.9 °C)  Pulse:  [73-84] 73  Resp:  [18-20] 20  SpO2:  [92 %-97 %] 97 %  BP: (115-142)/(56-70) 132/69     Weight: 95 kg (209 lb 6.4 oz)  Body mass index is 34.85 kg/m².    Estimated Creatinine Clearance: 60.8 mL/min (based on SCr of 1.3 mg/dL).    Physical Exam   Constitutional: He appears well-developed and well-nourished.   HENT:   Head: Normocephalic  and atraumatic.   Mouth/Throat: Oropharynx is clear and moist.   Eyes: Conjunctivae and EOM are normal. No scleral icterus.   Neck: Normal range of motion. No thyromegaly present.   Cardiovascular: Normal rate and normal heart sounds.    No murmur heard.  Pulmonary/Chest: Effort normal and breath sounds normal. No stridor. No respiratory distress. He has no wheezes. He has no rales.   Abdominal: Soft. He exhibits distension. There is tenderness. There is no rebound and no guarding.   Diffuse tenderness, hyperactive bowel sounds   Musculoskeletal:   LLE with erythema, warmth and edema from toes to knee, with erythema tracking medial aspect of thigh to groin as well as lateral aspect of thigh to buttocks  RLE with venous stasis dermatitis    Neurological: He is alert. He exhibits normal muscle tone.   Skin: No rash noted. He is not diaphoretic.       Significant Labs:   Blood Culture:   Recent Labs  Lab 12/12/17  1119 12/12/17  1226   LABBLOO No growth after 5 days. No growth after 5 days.     CBC:   Recent Labs  Lab 12/17/17  0530 12/18/17  0437 12/18/17  0626   WBC 12.26 13.21* 13.88*   HGB 13.7* 14.5 13.7*   HCT 42.1 44.1 41.0    305 309     CMP:   Recent Labs  Lab 12/17/17  0530 12/18/17  0625    140   K 4.5 4.4   CL 97 95   CO2 35* 34*   * 188*   BUN 23 25*   CREATININE 1.2 1.3   CALCIUM 9.2 9.6   PROT 6.7 7.0   ALBUMIN 2.0* 2.2*   BILITOT 0.4 0.4   ALKPHOS 106 104   AST 26 19   ALT 32 27   ANIONGAP 7* 11   EGFRNONAA >60 58*     Lactic Acid: No results for input(s): LACTATE in the last 48 hours.  Procalcitonin: No results for input(s): PROCAL in the last 48 hours.  Urine Culture:   Recent Labs  Lab 12/12/17  1207   LABURIN No significant growth     Urine Studies:   Recent Labs  Lab 12/12/17  1207   COLORU Yellow   APPEARANCEUA Clear   PHUR 7.0   SPECGRAV <=1.005*   PROTEINUA 2+*   GLUCUA Negative   KETONESU Negative   BILIRUBINUA Negative   OCCULTUA 1+*   NITRITE Negative   UROBILINOGEN 1.0    LEUKOCYTESUR Negative   RBCUA 1   WBCUA 0   BACTERIA Rare   SQUAMEPITHEL 2   HYALINECASTS 0     Wound Culture: No results for input(s): LABAERO in the last 4320 hours.    Significant Imaging:   Imaging Results          X-Ray Chest 1 View (Final result)  Result time 12/18/17 08:13:11    Final result by Jorge Allred MD (12/18/17 08:13:11)                 Impression:     Stable chest.  No active process.      Electronically signed by: ANTONIO ALLRED MD  Date:     12/18/17  Time:    08:13              Narrative:    Single view chest, comparison 12/12/2017.  Cardiomegaly stable.  Incomplete inspiration, interstitial markings remain mildly prominent.  There are chronic rib deformity left lateral fifth sixth and seventh ribs.                             MRI Lower Extremity W WO Contrast Left (Final result)  Result time 12/15/17 16:53:12    Final result by Jorge Allred MD (12/15/17 16:53:12)                 Impression:     Nonspecific subcutaneous edema and Cellulitis without abscess, osteomyelitis, septic joint left lower leg.      Electronically signed by: ANTONIO ALLRED MD  Date:     12/15/17  Time:    16:53              Narrative:    Comparison ultrasound vein left leg, radiographs left knee dating back to 12/12/17.  MR on left tibia from knee to ankle without, and with gadovist 10 cc intravenous.    Asymmetrical subcutaneous edema left lower extremity compared to contralateral side.  Bone and joint structures normal.  No fracture dislocation.  Neurovascular structures appear intact.  Muscle tendon, ligament structures normal.  No abscess.  Heterogeneous enhancement of subcutaneous tissues, no unusual enhancement musculoskeletal structures otherwise.                             X-Ray Knee 3 View Left (Final result)  Result time 12/13/17 12:18:14    Final result by Rob Valle MD (12/13/17 12:18:14)                 Impression:     As above.      Electronically signed by: ROB VALLE  MD  Date:     12/13/17  Time:    12:18              Narrative:    Knee 3 views left.    Findings: 3 views left. There is no fracture, dislocation, or bony erosion identified.                             X-Ray Chest AP Portable (Final result)  Result time 12/12/17 12:14:25    Final result by Luan Gifford MD (12/12/17 12:14:25)                 Impression:        No acute radiographic findings on single view of the chest.      Electronically signed by: LUAN GIFFORD MD  Date:     12/12/17  Time:    12:14              Narrative:    Comparison: None    Technique: Single view of the chest.    Findings: Cardiac silhouette is enlarged.  There is aortic atherosclerosis.  Lungs show no evidence of significant focal consolidation, large effusion, or pneumothorax.  The bones demonstrate degenerative changes of the spine and shoulders.                             US Lower Extremity Veins Left (Final result)  Result time 12/12/17 11:54:55    Final result by Luan Gifford MD (12/12/17 11:54:55)                 Impression:         No evidence of deep vein thrombosisin the left lower extremity.    There is lower extremity edema.        Electronically signed by: LUAN GIFFORD MD  Date:     12/12/17  Time:    11:54              Narrative:    Comparison: None.    Findings: There is no evidence of venous thrombosis in the bilateral common femoral, left femoral, greater saphenous, popliteal, posterior tibial, anterior tibial, and peroneal veins.                            Antibiotics     Start     Stop Route Frequency Ordered    12/15/17 1800  clindamycin 600 MG/50 ML D5W 600 mg/50 mL IVPB 600 mg      -- IV Every 6 hours (non-standard times) 12/15/17 9733

## 2017-12-18 NOTE — PROGRESS NOTES
[facility] Hospital Medicine  Progress Note    Patient Name: Reji Guerra  MRN: [id number]  Patient Class: IP- Inpatient   Admission Date: 12/12/2017  Length of Stay: 6 days  Attending Physician: Berkley Johnson MD  Primary Care Provider: [facility]    Subjective:     Principal Problem:Cellulitis of left lower extremity without foot    HPI:  63 yo male w/ CAD (3 stents in 2003 w/MI), DMT2, HTN, HLD presents to ED with 1 week of LLE swelling and erythema, which just starting tracking up his inner thigh within the past two days. No trauma to his leg, no wounds on his foot. Pain with standing and walking. 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  He does have to rest 1/2 up a full flight of stairs but denies chest pain, shortness of breath, recent illness. He sleeps flat on just 1 pillow.    He hasn't had medical care in 2 years and hasn't taken any medications for 1 year.  He thinks he was supposed to keep taking the plavix.  Baseline dry cough, minimally productive with clear sputum. Denies CP, SOB, recent illness, N/V, diarrhea, constipation. Last BM day before admission.    Hospital Course:  Pt admitted with Vanc and Zosyn. Ortho consulted for possible joint involvement, recommendations. Slow clinical improvement.  WBC improved slowly as well. Pt remained afebrile through stay.  Abx switched to IV clinda to monitor for response.  Pt states pain is slowly improving.     Interval History: NAEON. AF. VSS. Not elevating foot consistently. More erythema on foot, thigh/calf looks similar. On IV clinida.  WBC laci slightly.  Pt with soft BM. Surgery following    Review of Systems   Constitutional: Positive for activity change. Negative for chills and diaphoresis.   Cardiovascular: Positive for leg swelling (Unilat LLE swelling, skin changes).   Gastrointestinal: Positive for abdominal distention (baseline, soft,  questionable increaded) and diarrhea. Negative for abdominal pain, blood in stool, constipation, nausea and vomiting.   Genitourinary: Negative for difficulty urinating, dysuria and urgency.   Musculoskeletal: Positive for gait problem and myalgias (improved even further). Negative for back pain.   Skin: Positive for color change (stable, reports improvement).   Neurological: Negative for dizziness, weakness, numbness and headaches.     Objective:     Vital Signs (Most Recent):  Temp: 98.1 °F (36.7 °C) (12/18/17 0513)  Pulse: 80 (12/18/17 0513)  Resp: 18 (12/18/17 0513)  BP: (!) 115/56 (12/18/17 0513)  SpO2: (!) 94 % (12/18/17 0712) Vital Signs (24h Range):  Temp:  [96.1 °F (35.6 °C)-99 °F (37.2 °C)] 98.1 °F (36.7 °C)  Pulse:  [73-84] 80  Resp:  [18-20] 18  SpO2:  [92 %-96 %] 94 %  BP: (115-142)/(56-70) 115/56     Weight: 95 kg (209 lb 6.4 oz)  Body mass index is 34.85 kg/m².    Intake/Output Summary (Last 24 hours) at 12/18/17 0800  Last data filed at 12/18/17 0600   Gross per 24 hour   Intake              955 ml   Output             1960 ml   Net            -1005 ml      Physical Exam   Constitutional: He is oriented to person, place, and time. He appears well-developed and well-nourished. No distress.   Ambulating from bathroom slowly with walker and nurses assisting. Purple foot with venous congestion   HENT:   Head: Normocephalic and atraumatic.   Mouth/Throat: No oropharyngeal exudate.   Eyes: Conjunctivae and EOM are normal. Pupils are equal, round, and reactive to light.   Neck: Normal range of motion. Neck supple. No JVD present.   Cardiovascular: Normal rate, regular rhythm and normal heart sounds.    No murmur heard.  Pulmonary/Chest: Effort normal. No respiratory distress. He has no wheezes (bilat lower lung wheezes). He has no rales.   Abdominal: Soft. Bowel sounds are normal. He exhibits distension (rotund abdomen ). He exhibits no mass. There is no tenderness.   Musculoskeletal: He exhibits edema.    Able to range knee with some pain (UE & RLE without erythema, pain); chronic spinal osteoarthritis   Neurological: He is alert and oriented to person, place, and time. No cranial nerve deficit.   Skin: Skin is warm. Capillary refill takes less than 2 seconds. He is not diaphoretic. There is erythema (stable possible mild improvement on thigh).   LLE erythema and swelling extending from mid calf to knee with erythema, warmth extends up medial aspect of thigh stable, increase in swelling and erythema at foot; dry flaking skin on bilat feet; bilat old healed venous stasis ulcer scaling wounds, no drainage, no scrotal involvement, no LAD appreciated   Psychiatric: He has a normal mood and affect. His behavior is normal.   Nursing note and vitals reviewed.    Significant Labs:   CBC:   Recent Labs  Lab 12/17/17  0530 12/18/17  0437 12/18/17  0626   WBC 12.26 13.21* 13.88*   HGB 13.7* 14.5 13.7*   HCT 42.1 44.1 41.0    305 309     CMP:   Recent Labs  Lab 12/17/17  0530 12/18/17  0625    140   K 4.5 4.4   CL 97 95   CO2 35* 34*   * 188*   BUN 23 25*   CREATININE 1.2 1.3   CALCIUM 9.2 9.6   PROT 6.7 7.0   ALBUMIN 2.0* 2.2*   BILITOT 0.4 0.4   ALKPHOS 106 104   AST 26 19   ALT 32 27   ANIONGAP 7* 11   EGFRNONAA >60 58*       Significant Imaging: I have reviewed all pertinent imaging results/findings within the past 24 hours.       Assessment/Plan:      * Cellulitis of left lower extremity without foot    WBC 14 on admit, decreased to 11.73 but now back up to 13.88  Vanc and Zosyn switched to Cipro 3 days ago with decrease in improvement  Cellulitis is overlying joint but seems to just involve the skin/soft tissue, will monitor closely- not currently thought to be septic arthritis, able to range joint. Ortho and surgery consulted, no surgical intervention  Potentially switch back to broad spectrum abx as progression has stymied           Elevated troponin    Improved to normal  EKG's without St segment  elevation  No chest pain        Leg swelling    No DVT on ultrasound  Advised to elevate leg, but moderate to minimal compliance          Anxiety    Original home med Traxene restarted          Coronary artery disease involving native coronary artery of native heart without angina pectoris    Hx of 3 stents in angiogram in 2003  Restarted on ASA, plavix. Potentially able to stop plavix since stents were placed over 2 years ago  Trops trended down  F/u cardiology as outpatient  H/H 13/39 but with MCV of 105, Folate and B12 were normal  No chest pain            HTN (hypertension)    Restarted Carvedilol, HCTZ (prior home meds from 2 years ago)   Stable  Cardiac diet  115-142/56-68          Type 2 diabetes mellitus with hyperglycemia, without long-term current use of insulin    HgbA1C 6.5  On Low Dose Corrective scale  Gluc 188-345  On Detemir 7 units BID, will increase              VTE Risk Mitigation         Ordered     enoxaparin injection 40 mg  Daily     Route:  Subcutaneous        12/12/17 1624     Medium Risk of VTE  Once      12/12/17 1624          Andrew Lombardo MD  Department of Hospital Medicine   [facility]

## 2017-12-18 NOTE — SUBJECTIVE & OBJECTIVE
Interval History: Pain unchanged from yesterday.  Afebrile and HD stable.  No tachycardia.  Making more of an attempt to keep extremity elevated.    Medications:  Continuous Infusions:  Scheduled Meds:   aspirin  81 mg Oral Daily    carvedilol  6.25 mg Oral BID WM    clindamycin (CLEOCIN) IVPB  600 mg Intravenous Q6H    clopidogrel  75 mg Oral Daily    clorazepate  15 mg Oral BID    enoxaparin  40 mg Subcutaneous Daily    hydroCHLOROthiazide  12.5 mg Oral Daily    insulin detemir  8 Units Subcutaneous BID    magnesium oxide  400 mg Oral BID    miconazole   Topical (Top) BID    nicotine  1 patch Transdermal Daily    simvastatin  40 mg Oral QHS     PRN Meds:calcium carbonate, dextrose 50%, dextrose 50%, glucagon (human recombinant), glucose, glucose, hydrocodone-acetaminophen 5-325mg, influenza, insulin aspart, loperamide, lorazepam, morphine, pneumoc 13-jose g conj-dip cr(PF), sodium chloride 0.9%     Review of patient's allergies indicates:   Allergen Reactions    Iodine and iodide containing products     Shellfish containing products      Objective:     Vital Signs (Most Recent):  Temp: 98.4 °F (36.9 °C) (12/18/17 1156)  Pulse: 73 (12/18/17 1156)  Resp: 20 (12/18/17 1156)  BP: 132/69 (12/18/17 1156)  SpO2: 97 % (12/18/17 1214) Vital Signs (24h Range):  Temp:  [96.7 °F (35.9 °C)-99 °F (37.2 °C)] 98.4 °F (36.9 °C)  Pulse:  [73-84] 73  Resp:  [18-20] 20  SpO2:  [92 %-97 %] 97 %  BP: (115-142)/(56-70) 132/69     Weight: 95 kg (209 lb 6.4 oz)  Body mass index is 34.85 kg/m².    Intake/Output - Last 3 Shifts       12/16 0700 - 12/17 0659 12/17 0700 - 12/18 0659 12/18 0700 - 12/19 0659    P.O. 1770 805 305    IV Piggyback 200 150     Total Intake(mL/kg) 1970 (20.7) 955 (10.1) 305 (3.2)    Urine (mL/kg/hr) 1900 (0.8) 2440 (1.1)     Stool 3 (0) 0 (0)     Total Output 1903 2440      Net +67 -1485 +305           Urine Occurrence  1 x 1 x    Stool Occurrence 0 x 1 x 2 x          Physical Exam   Constitutional: He  appears well-nourished. No distress (mild when LLE examined).   Cardiovascular: Normal rate and regular rhythm.    Pulmonary/Chest: Effort normal. No respiratory distress.   Musculoskeletal:   RLE with chronic venous stasis-related changes; no cellulitis.  LLE with significant cellulitic changes diffusely with sparing only of the distal food and superior-medial thigh.  No fluctuance. +induration.  Blanching erythema   Nursing note and vitals reviewed.      Significant Labs:  CBC:   Recent Labs  Lab 12/18/17  0626   WBC 13.88*   RBC 3.96*   HGB 13.7*   HCT 41.0      *   MCH 34.6*   MCHC 33.4     CMP:   Recent Labs  Lab 12/18/17  0625   *   CALCIUM 9.6   ALBUMIN 2.2*   PROT 7.0      K 4.4   CO2 34*   CL 95   BUN 25*   CREATININE 1.3   ALKPHOS 104   ALT 27   AST 19   BILITOT 0.4     CRP: 172.6    Significant Diagnostics:  MRI reviewed

## 2017-12-18 NOTE — PT/OT/SLP PROGRESS
Occupational Therapy   Treatment    Name: Reji Guerra  MRN: [id number]  Admitting Diagnosis:  Cellulitis of left lower extremity without foot       Recommendations:     Discharge Recommendations: home health PT, home health OT  Discharge Equipment Recommendations:  bedside commode, bath bench  Barriers to discharge:  Decreased caregiver support    Subjective     Communicated with: Naman quinn prior to session.  Pain/Comfort:  · Pain Rating 1: 10/10  · Location - Side 1: Left  · Location - Orientation 1: lower  · Location 1: leg  · Pain Addressed 1: Nurse notified    Objective:     Patient found with: peripheral IV, telemetry    General Precautions: Standard, fall   Orthopedic Precautions:N/A   Braces: N/A     Bed Mobility:    · N/A    Functional Mobility/Transfers:  · N/A    Activities of Daily Living:  · N/A    Patient left HOB flat and BLEs elevatee with all lines intact, call button in reach, bed alarm on, RN notified and sister present    [facility] 6 Click:  [facility] Total Score: 20    Treatment & Education:  BUE AROM therex, 1 x 10 reps: bicep curls, sh flexion, punches  Educated on post-acute therapy -- unable to receive HH OT/PT 2* no insurance coverage.  Education:    Assessment:   Patient with increased swelling and pain after deep subcutaneous tissue sample performed earlier. Will benefit from continued OT to address functional deficits.     Reji Guerra is a 62 y.o. male with a medical diagnosis of Cellulitis of left lower extremity without foot.Performance deficits affecting function are weakness, impaired endurance, impaired self care skills, impaired functional mobilty, gait instability, impaired balance, decreased lower extremity function, pain, impaired skin, edema.      Rehab Prognosis:  Fair+; patient would benefit from acute skilled OT services to address these deficits and reach maximum level of function.       Plan:     Patient to be seen 5 x/week to address the above listed problems via  self-care/home management, therapeutic activities, therapeutic exercises  · Plan of Care Expires: 01/15/18  · Plan of Care Reviewed with: patient    This Plan of care has been discussed with the patient who was involved in its development and understands and is in agreement with the identified goals and treatment plan    GOALS:    Occupational Therapy Goals        Problem: Occupational Therapy Goal    Goal Priority Disciplines Outcome Interventions   Occupational Therapy Goal     OT, PT/OT Ongoing (interventions implemented as appropriate)    Description:  Goals to be met by: 12/23/2017    Patient will increase functional independence with ADLs by performing:    LE Dressing with Supervision.  Grooming while standing at sink with Supervision.  Toileting from bedside commode with Supervision for hygiene and clothing management.   Supine to sit with Modified [place].  Toilet transfer to bedside commode with Supervision.                      Time Tracking:     OT Date of Treatment: 12/18/17  OT Start Time: 1540  OT Stop Time: 1553  OT Total Time (min): 13 min    Billable Minutes:Therapeutic Activity 13    CARLO Jeff  12/18/2017

## 2017-12-18 NOTE — ASSESSMENT & PLAN NOTE
62 M with LLE cellulitis, erythema, warmth and swelling primarily to knee with redness and warmth tracking to groin on medial and lateral aspects of L thigh. Initially on vanc and zosyn with minimal improvement. Currently on clindamycin.    Recs  - stop clindamycin  - switch to cefazolin 1g q8hrs; will add vancomycin tomorrow if no improvement  - repeat blood cultures

## 2017-12-18 NOTE — PLAN OF CARE
Problem: Patient Care Overview  Goal: Plan of Care Review  Outcome: Ongoing (interventions implemented as appropriate)  Plan of care reviewed with patient. Patient verbalized understanding. Patient had I&D of left thigh for cultures this afternoon, tolerated well. Left thigh dressing clean, dry and intact. Pain managed with PRN morphine, norco. Patient able to work with PT, OT today. CBG monitored ACHS, patient covered as needed. Special contact precautions initiated until rule out c diff. Stool specimen sent to lab. Bed is low and locked, bed alarm is activated, call light is within reach. Patient has been instructed to call if in need of assistance. Verbalized understanding.

## 2017-12-18 NOTE — PLAN OF CARE
Problem: Physical Therapy Goal  Goal: Physical Therapy Goal  Goals to be met by: 2018     Patient will increase functional independence with mobility by performin. Supine to sit with Modified [place]  2. Sit to stand transfer with Modified [place]  3. Gait  x 150 feet with Modified [place] using Rolling Walker.      Outcome: Ongoing (interventions implemented as appropriate)  Goals ongoing

## 2017-12-18 NOTE — HPI
62M with history of CAD (s/p PCI with three stents), DMII, HTN, HLD who presented to ED on 12/12 with left lower extremity swelling and erythema. He reports that the swelling and redness began a few days before admission, worsening until he was unable to bear weight on his left leg. He denies fevers and chills while at home, recent trauma to his L leg. He has had no aquatic exposures.     Hospital course significant for initial improvement (per patient, though imaging in chart does not suggest there was improvement) with vancomycin and zosyn with transition to clindamycin on 12/15. He has not had much clinical improvement since transitioning to clindamycin. Ortho consulted initially because of concern for septic arthritis (knee) but ruled out clinically (tap deferred due to surrounding cellulitis). Venous US of lower extremity was without evidence of DVT and MRI showed nonspecific subcutaneous edema and cellulitis without abscess, osteomyelitis, septic joint of left lower leg. Patient has remained afebrile but with continuing mild leukocytosis (up to 15, 13.8 today). Blood cultures NGTD. Urine culture NGTD. CXR not suggestive of consolidation.     Patient has had many loose bowel movements and is complaining of abdominal pain and distention.     12/18 switched to cefazolin. Slight improvement  12/19 C diff negative    12/21 US negative for fluid collection, continue improvement     12/23/17 added vancomycin fro elevation of CRP

## 2017-12-18 NOTE — PT/OT/SLP PROGRESS
Physical Therapy Treatment    Patient Name:  Reji Guerra   MRN:  [id number]    Recommendations:     Discharge Recommendations:   (TBD,decreased assistance at home)   Discharge Equipment Recommendations:     Barriers to discharge: Inaccessible home and Decreased caregiver support    Assessment:     Reji Guerra is a 62 y.o. male admitted with a medical diagnosis of Cellulitis of left lower extremity without foot.  He presents with the following impairments/functional limitations:  weakness, impaired endurance, impaired functional mobilty, decreased lower extremity function, pain, decreased ROM, impaired skin pt limited by increased L le pain,unsure of pt's next level of care due to pt's insurance coverage.    Rehab Prognosis:  Good; patient would benefit from acute skilled PT services to address these deficits and reach maximum level of function.      Recent Surgery: * No surgery found *      Plan:     During this hospitalization, patient to be seen 6 x/week to address the above listed problems via gait training, therapeutic activities, therapeutic exercises  · Plan of Care Expires:  01/14/18   Plan of Care Reviewed with: patient    Subjective     Communicated with nsg prior to session.  Patient found supine upon PT entry to room, agreeable to treatment.      Chief Complaint: L le pain  Patient comments/goals: to have less pain and go home  Pain/Comfort:  · Pain Rating 1: 10/10  · Location - Side 1: Left  · Location - Orientation 1: lower  · Location 1: leg  · Pain Addressed 1: Pre-medicate for activity, Reposition, Distraction, Cessation of Activity    Patients cultural, spiritual, Congregation conflicts given the current situation:      Objective:     Patient found with: peripheral IV, telemetry     General Precautions: Standard, fall   Orthopedic Precautions:N/A   Braces: N/A     Functional Mobility:  · Bed Mobility:     · Supine to Sit: moderate assistance  · Sit to Supine: moderate assistance  · Balance:  fair sitting balance      AM-PAC 6 CLICK MOBILITY  Turning over in bed (including adjusting bedclothes, sheets and blankets)?: 2  Sitting down on and standing up from a chair with arms (e.g., wheelchair, bedside commode, etc.): 2  Moving from lying on back to sitting on the side of the bed?: 2  Moving to and from a bed to a chair (including a wheelchair)?: 2  Need to walk in hospital room?: 2  Climbing 3-5 steps with a railing?: 1  Total Score: 11       Therapeutic Activities and Exercises: la supine ex's on R inc: ap,qs,hs,abd/add,slr and minimal ap only on L,pt sat EOB ~14 mins with supervision,assisted nsg with dressing pt's L le X two trials due to bandage changes by MD       Patient left supine with all lines intact, call button in reach, bed alarm on and nsg notified..    GOALS: see general POC   Physical Therapy Goals        Problem: Physical Therapy Goal    Goal Priority Disciplines Outcome Goal Variances Interventions   Physical Therapy Goal     PT/OT, PT Ongoing (interventions implemented as appropriate)     Description:  Goals to be met by: 2018     Patient will increase functional independence with mobility by performin. Supine to sit with Modified [place]  2. Sit to stand transfer with Modified [place]  3. Gait  x 150 feet with Modified [place] using Rolling Walker.                       Time Tracking:     PT Received On: 17  PT Start Time: 954     PT Stop Time: 4  PT Total Time (min): 40 min     Billable Minutes: Therapeutic Activity 26 and Therapeutic Exercise 14    Treatment Type: Treatment  PT/PTA: PTA     PTA Visit Number: 4     Markus Kraft PTA  2017

## 2017-12-19 PROBLEM — R19.7 DIARRHEA: Status: ACTIVE | Noted: 2017-12-19

## 2017-12-19 LAB
ALBUMIN SERPL BCP-MCNC: 2.2 G/DL
ALP SERPL-CCNC: 93 U/L
ALT SERPL W/O P-5'-P-CCNC: 24 U/L
ANION GAP SERPL CALC-SCNC: 8 MMOL/L
AST SERPL-CCNC: 20 U/L
BASOPHILS # BLD AUTO: 0.02 K/UL
BASOPHILS NFR BLD: 0.2 %
BILIRUB SERPL-MCNC: 0.3 MG/DL
BUN SERPL-MCNC: 27 MG/DL
CALCIUM SERPL-MCNC: 9.4 MG/DL
CHLORIDE SERPL-SCNC: 102 MMOL/L
CO2 SERPL-SCNC: 30 MMOL/L
CREAT SERPL-MCNC: 1.3 MG/DL
DIFFERENTIAL METHOD: ABNORMAL
EOSINOPHIL # BLD AUTO: 0.2 K/UL
EOSINOPHIL NFR BLD: 1.3 %
ERYTHROCYTE [DISTWIDTH] IN BLOOD BY AUTOMATED COUNT: 14.1 %
EST. GFR  (AFRICAN AMERICAN): >60 ML/MIN/1.73 M^2
EST. GFR  (NON AFRICAN AMERICAN): 58 ML/MIN/1.73 M^2
GLUCOSE SERPL-MCNC: 191 MG/DL
HCT VFR BLD AUTO: 39.9 %
HGB BLD-MCNC: 13 G/DL
LYMPHOCYTES # BLD AUTO: 1.1 K/UL
LYMPHOCYTES NFR BLD: 8.5 %
MAGNESIUM SERPL-MCNC: 1.9 MG/DL
MCH RBC QN AUTO: 34 PG
MCHC RBC AUTO-ENTMCNC: 32.6 G/DL
MCV RBC AUTO: 105 FL
MONOCYTES # BLD AUTO: 1 K/UL
MONOCYTES NFR BLD: 7.8 %
NEUTROPHILS # BLD AUTO: 10.5 K/UL
NEUTROPHILS NFR BLD: 79.9 %
PHOSPHATE SERPL-MCNC: 4.5 MG/DL
PLATELET # BLD AUTO: 307 K/UL
PMV BLD AUTO: 11.1 FL
POCT GLUCOSE: 162 MG/DL (ref 70–110)
POCT GLUCOSE: 169 MG/DL (ref 70–110)
POCT GLUCOSE: 190 MG/DL (ref 70–110)
POCT GLUCOSE: 192 MG/DL (ref 70–110)
POTASSIUM SERPL-SCNC: 4.6 MMOL/L
PROT SERPL-MCNC: 6.8 G/DL
RBC # BLD AUTO: 3.82 M/UL
SODIUM SERPL-SCNC: 140 MMOL/L
WBC # BLD AUTO: 13.1 K/UL

## 2017-12-19 PROCEDURE — 80053 COMPREHEN METABOLIC PANEL: CPT

## 2017-12-19 PROCEDURE — 85025 COMPLETE CBC W/AUTO DIFF WBC: CPT

## 2017-12-19 PROCEDURE — G8978 MOBILITY CURRENT STATUS: HCPCS | Mod: CL | Performed by: PHYSICAL THERAPIST

## 2017-12-19 PROCEDURE — 83735 ASSAY OF MAGNESIUM: CPT

## 2017-12-19 PROCEDURE — 63600175 PHARM REV CODE 636 W HCPCS: Performed by: INTERNAL MEDICINE

## 2017-12-19 PROCEDURE — 99024 POSTOP FOLLOW-UP VISIT: CPT | Mod: ,,, | Performed by: SURGERY

## 2017-12-19 PROCEDURE — 36415 COLL VENOUS BLD VENIPUNCTURE: CPT

## 2017-12-19 PROCEDURE — 25000003 PHARM REV CODE 250: Performed by: STUDENT IN AN ORGANIZED HEALTH CARE EDUCATION/TRAINING PROGRAM

## 2017-12-19 PROCEDURE — 97530 THERAPEUTIC ACTIVITIES: CPT | Performed by: PHYSICAL THERAPIST

## 2017-12-19 PROCEDURE — 94761 N-INVAS EAR/PLS OXIMETRY MLT: CPT

## 2017-12-19 PROCEDURE — S4991 NICOTINE PATCH NONLEGEND: HCPCS | Performed by: STUDENT IN AN ORGANIZED HEALTH CARE EDUCATION/TRAINING PROGRAM

## 2017-12-19 PROCEDURE — 84100 ASSAY OF PHOSPHORUS: CPT

## 2017-12-19 PROCEDURE — 21400001 HC TELEMETRY ROOM

## 2017-12-19 PROCEDURE — G8979 MOBILITY GOAL STATUS: HCPCS | Mod: CJ | Performed by: PHYSICAL THERAPIST

## 2017-12-19 PROCEDURE — 97530 THERAPEUTIC ACTIVITIES: CPT

## 2017-12-19 PROCEDURE — 63600175 PHARM REV CODE 636 W HCPCS: Performed by: STUDENT IN AN ORGANIZED HEALTH CARE EDUCATION/TRAINING PROGRAM

## 2017-12-19 RX ADMIN — CLORAZEPATE DIPOTASSIUM 15 MG: 3.75 TABLET ORAL at 09:12

## 2017-12-19 RX ADMIN — CARVEDILOL 6.25 MG: 6.25 TABLET, FILM COATED ORAL at 05:12

## 2017-12-19 RX ADMIN — MAGNESIUM OXIDE TAB 400 MG (241.3 MG ELEMENTAL MG) 400 MG: 400 (241.3 MG) TAB at 09:12

## 2017-12-19 RX ADMIN — MAGNESIUM OXIDE TAB 400 MG (241.3 MG ELEMENTAL MG) 400 MG: 400 (241.3 MG) TAB at 08:12

## 2017-12-19 RX ADMIN — MORPHINE SULFATE 4 MG: 2 INJECTION, SOLUTION INTRAMUSCULAR; INTRAVENOUS at 12:12

## 2017-12-19 RX ADMIN — CLORAZEPATE DIPOTASSIUM 15 MG: 3.75 TABLET ORAL at 08:12

## 2017-12-19 RX ADMIN — CARVEDILOL 6.25 MG: 6.25 TABLET, FILM COATED ORAL at 08:12

## 2017-12-19 RX ADMIN — HYDROCODONE BITARTRATE AND ACETAMINOPHEN 1 TABLET: 5; 325 TABLET ORAL at 09:12

## 2017-12-19 RX ADMIN — SIMVASTATIN 40 MG: 20 TABLET, FILM COATED ORAL at 09:12

## 2017-12-19 RX ADMIN — CEFAZOLIN SODIUM 1 G: 1 SOLUTION INTRAVENOUS at 05:12

## 2017-12-19 RX ADMIN — MICONAZOLE NITRATE: 20 CREAM TOPICAL at 09:12

## 2017-12-19 RX ADMIN — ENOXAPARIN SODIUM 40 MG: 100 INJECTION SUBCUTANEOUS at 05:12

## 2017-12-19 RX ADMIN — MORPHINE SULFATE 4 MG: 2 INJECTION, SOLUTION INTRAMUSCULAR; INTRAVENOUS at 05:12

## 2017-12-19 RX ADMIN — MORPHINE SULFATE 4 MG: 2 INJECTION, SOLUTION INTRAMUSCULAR; INTRAVENOUS at 08:12

## 2017-12-19 RX ADMIN — CEFAZOLIN SODIUM 1 G: 1 SOLUTION INTRAVENOUS at 06:12

## 2017-12-19 RX ADMIN — NICOTINE 1 PATCH: 21 PATCH, EXTENDED RELEASE TRANSDERMAL at 08:12

## 2017-12-19 RX ADMIN — MICONAZOLE NITRATE: 20 CREAM TOPICAL at 08:12

## 2017-12-19 RX ADMIN — CLOPIDOGREL 75 MG: 75 TABLET, FILM COATED ORAL at 08:12

## 2017-12-19 RX ADMIN — HYDROCHLOROTHIAZIDE 12.5 MG: 12.5 TABLET ORAL at 08:12

## 2017-12-19 RX ADMIN — ASPIRIN 81 MG: 81 TABLET, COATED ORAL at 08:12

## 2017-12-19 NOTE — PLAN OF CARE
Problem: Patient Care Overview  Goal: Plan of Care Review  Plan of care reviewed with the patient. Verbalized clear understanding. Bed alarm set. Bed in lowest position. Pt remain afebrile and free of fall. Call light within reach. Instructed pt to call when getting out of bed. Bedside commode/urinal at the bedside. PRN medication given as needed and reported relief from it. No report of SOB or lightheadedness. Instructed to keep the L leg elevated. Will continue to monitor.

## 2017-12-19 NOTE — SUBJECTIVE & OBJECTIVE
Interval History: Diarrhea improved--C diff negative. KUB with small bowel distention and possible ileus. Slight interval improvement in leg. Continued pain. No fevers or chills.     Review of Systems   Constitutional: Negative for chills, diaphoresis and fever.   HENT: Negative for sore throat.    Eyes: Negative for photophobia.   Respiratory: Negative for cough and shortness of breath.    Cardiovascular: Negative for chest pain and palpitations.   Gastrointestinal: Positive for abdominal distention. Negative for abdominal pain, constipation, diarrhea, nausea and vomiting.   Genitourinary: Negative for dysuria and flank pain.   Musculoskeletal: Negative for back pain.   Skin: Positive for color change.   Neurological: Negative for dizziness, speech difficulty and weakness.   Psychiatric/Behavioral: Negative for confusion.     Objective:     Vital Signs (Most Recent):  Temp: 97.4 °F (36.3 °C) (12/19/17 0804)  Pulse: 71 (12/19/17 0804)  Resp: 19 (12/19/17 0804)  BP: 134/74 (12/19/17 0804)  SpO2: 95 % (12/19/17 0749) Vital Signs (24h Range):  Temp:  [97.4 °F (36.3 °C)-98.4 °F (36.9 °C)] 97.4 °F (36.3 °C)  Pulse:  [71-73] 71  Resp:  [12-20] 19  SpO2:  [90 %-97 %] 95 %  BP: (100-134)/(56-88) 134/74     Weight: 95 kg (209 lb 6.4 oz)  Body mass index is 34.85 kg/m².    Estimated Creatinine Clearance: 60.8 mL/min (based on SCr of 1.3 mg/dL).    Physical Exam   Constitutional: He appears well-developed and well-nourished. No distress.   HENT:   Head: Normocephalic and atraumatic.   Mouth/Throat: Oropharynx is clear and moist.   Eyes: Conjunctivae and EOM are normal. No scleral icterus.   Neck: Normal range of motion.   Cardiovascular: Normal rate and normal heart sounds.    No murmur heard.  Pulmonary/Chest: Effort normal and breath sounds normal. No stridor. No respiratory distress. He has no wheezes. He has no rales.   Abdominal: Soft. Bowel sounds are normal. He exhibits distension. There is no tenderness. There is no  rebound and no guarding.   TTP previously repaired umbilical hernia    Musculoskeletal:   LLE with erythema, warmth and edema from toes to knee, with erythema tracking medial aspect of thigh to groin as well as lateral aspect of thigh to buttocks--minimally improved from previous; less TTP  RLE with venous stasis dermatitis        Lymphadenopathy:     He has no cervical adenopathy.   Neurological: He is alert. He exhibits normal muscle tone.   Psychiatric: He has a normal mood and affect.   Nursing note and vitals reviewed.      Significant Labs:   Blood Culture:   Recent Labs  Lab 12/12/17  1119 12/12/17  1226 12/18/17  1512   LABBLOO No growth after 5 days. No growth after 5 days. No Growth to date  No Growth to date     CBC:   Recent Labs  Lab 12/18/17  0437 12/18/17  0626 12/19/17  0410   WBC 13.21* 13.88* 13.10*   HGB 14.5 13.7* 13.0*   HCT 44.1 41.0 39.9*    309 307     CMP:   Recent Labs  Lab 12/18/17  0625 12/19/17  0410    140   K 4.4 4.6   CL 95 102   CO2 34* 30*   * 191*   BUN 25* 27*   CREATININE 1.3 1.3   CALCIUM 9.6 9.4   PROT 7.0 6.8   ALBUMIN 2.2* 2.2*   BILITOT 0.4 0.3   ALKPHOS 104 93   AST 19 20   ALT 27 24   ANIONGAP 11 8   EGFRNONAA 58* 58*     Lactic Acid: No results for input(s): LACTATE in the last 48 hours.  Urine Culture:   Recent Labs  Lab 12/12/17  1207   LABURIN No significant growth     Urine Studies:   Recent Labs  Lab 12/12/17  1207   COLORU Yellow   APPEARANCEUA Clear   PHUR 7.0   SPECGRAV <=1.005*   PROTEINUA 2+*   GLUCUA Negative   KETONESU Negative   BILIRUBINUA Negative   OCCULTUA 1+*   NITRITE Negative   UROBILINOGEN 1.0   LEUKOCYTESUR Negative   RBCUA 1   WBCUA 0   BACTERIA Rare   SQUAMEPITHEL 2   HYALINECASTS 0     Wound Culture:   Recent Labs  Lab 12/18/17  1343   LABAERO No growth       Significant Imaging:   Imaging Results          X-Ray Abdomen AP 1 View (Final result)  Result time 12/19/17 08:04:21    Final result by Jorge Lozano MD  (12/19/17 08:04:21)                 Impression:     Localized proximal small bowel ileus question.      Electronically signed by: ANTONIO ALLRED MD  Date:     12/19/17  Time:    08:04              Narrative:    Single view of the abdomen, cervical mental opacities from anterior abdominal wall repair graft left periumbilical.  Minimal mild distention proximal small bowel.  Nondistended air stomach and colon.  Lower pelvis not included on exam.                             X-Ray Chest 1 View (Final result)  Result time 12/18/17 08:13:11    Final result by Jorge Allred MD (12/18/17 08:13:11)                 Impression:     Stable chest.  No active process.      Electronically signed by: ANTONIO ALLRED MD  Date:     12/18/17  Time:    08:13              Narrative:    Single view chest, comparison 12/12/2017.  Cardiomegaly stable.  Incomplete inspiration, interstitial markings remain mildly prominent.  There are chronic rib deformity left lateral fifth sixth and seventh ribs.                             MRI Lower Extremity W WO Contrast Left (Final result)  Result time 12/15/17 16:53:12    Final result by Jorge Allred MD (12/15/17 16:53:12)                 Impression:     Nonspecific subcutaneous edema and Cellulitis without abscess, osteomyelitis, septic joint left lower leg.      Electronically signed by: ANTONIO ALLRED MD  Date:     12/15/17  Time:    16:53              Narrative:    Comparison ultrasound vein left leg, radiographs left knee dating back to 12/12/17.  MR on left tibia from knee to ankle without, and with gadovist 10 cc intravenous.    Asymmetrical subcutaneous edema left lower extremity compared to contralateral side.  Bone and joint structures normal.  No fracture dislocation.  Neurovascular structures appear intact.  Muscle tendon, ligament structures normal.  No abscess.  Heterogeneous enhancement of subcutaneous tissues, no unusual enhancement musculoskeletal structures  otherwise.                             X-Ray Knee 3 View Left (Final result)  Result time 12/13/17 12:18:14    Final result by Rob Valle MD (12/13/17 12:18:14)                 Impression:     As above.      Electronically signed by: ROB VALLE MD  Date:     12/13/17  Time:    12:18              Narrative:    Knee 3 views left.    Findings: 3 views left. There is no fracture, dislocation, or bony erosion identified.                             X-Ray Chest AP Portable (Final result)  Result time 12/12/17 12:14:25    Final result by Luan Gifford MD (12/12/17 12:14:25)                 Impression:        No acute radiographic findings on single view of the chest.      Electronically signed by: LUAN GIFFORD MD  Date:     12/12/17  Time:    12:14              Narrative:    Comparison: None    Technique: Single view of the chest.    Findings: Cardiac silhouette is enlarged.  There is aortic atherosclerosis.  Lungs show no evidence of significant focal consolidation, large effusion, or pneumothorax.  The bones demonstrate degenerative changes of the spine and shoulders.                             US Lower Extremity Veins Left (Final result)  Result time 12/12/17 11:54:55    Final result by Luan Gifford MD (12/12/17 11:54:55)                 Impression:         No evidence of deep vein thrombosisin the left lower extremity.    There is lower extremity edema.        Electronically signed by: LUAN GIFFORD MD  Date:     12/12/17  Time:    11:54              Narrative:    Comparison: None.    Findings: There is no evidence of venous thrombosis in the bilateral common femoral, left femoral, greater saphenous, popliteal, posterior tibial, anterior tibial, and peroneal veins.                            Antibiotics     Start     Stop Route Frequency Ordered    12/18/17 1530  ceFAZolin (ANCEF) 1 gram in dextrose 5 % 50 mL IVPB (premix)      -- IV Every 8 hours (non-standard times) 12/18/17 1416

## 2017-12-19 NOTE — PLAN OF CARE
Spoke with patient yesterday as sister was at bedside about discharge plan.  Patient doesn't have insurance and doesn't qualify for medicaid.  Patient lives at home with a roommate and drives a car. Discussed possibility of NHP if patient cannot take of himself. Patient stated he DOES NOT want to go to NH. Sister at bedside said she can help patient at home.  Patient has rolling walker. Sister said she will look into buying a BSC out of pocket.    ID following patient. Will continue to follow for discharge planning.       12/19/17 0081   Discharge Reassessment   Assessment Type Discharge Planning Reassessment   Provided patient/caregiver education on the expected discharge date and the discharge plan No   Do you have any problems affording any of your prescribed medications? TBD   Discharge Plan A Home;Home with family   Discharge Plan B Home;Home with family   Describe the patient's ability to walk at the present time. Walks with the help of equipment   How often would a person be available to care for the patient? Occasionally     Maci Mansfield RN, CCM, CMSRN  RN Transition Navigator  [phone]

## 2017-12-19 NOTE — ASSESSMENT & PLAN NOTE
62 M with LLE cellulitis, erythema, warmth and swelling primarily to knee with redness and warmth tracking to groin on medial and lateral aspects of L thigh. Initially on vanc and zosyn with minimal improvement. Currently on cefazolin. Minimal improvement in exam last 24 hours.     Recs  - continue cefazolin 1g q8hrs; will add vancomycin if worsens clinically   - repeat blood cultures NGTD

## 2017-12-19 NOTE — PROGRESS NOTES
[facility]  General Surgery  Progress Note    Subjective:     History of Present Illness:  No notes on file    Post-Op Info:  * No surgery found *         Interval History: Pain significantly improved since yesterday per patient.  Afebrile and HD stable.  No tachycardia. Keeping extremity elevated.  Incised and obtained deep tissue cultures of left upper thigh; no bleeding complications.    Medications:  Continuous Infusions:  Scheduled Meds:   aspirin  81 mg Oral Daily    carvedilol  6.25 mg Oral BID WM    ceFAZolin (ANCEF) IVPB  1 g Intravenous Q8H    clopidogrel  75 mg Oral Daily    clorazepate  15 mg Oral BID    enoxaparin  40 mg Subcutaneous Daily    hydroCHLOROthiazide  12.5 mg Oral Daily    insulin detemir  8 Units Subcutaneous BID    magnesium oxide  400 mg Oral BID    miconazole   Topical (Top) BID    nicotine  1 patch Transdermal Daily    simvastatin  40 mg Oral QHS     PRN Meds:calcium carbonate, dextrose 50%, dextrose 50%, glucagon (human recombinant), glucose, glucose, hydrocodone-acetaminophen 5-325mg, influenza, insulin aspart, loperamide, lorazepam, morphine, pneumoc 13-jose g conj-dip cr(PF), sodium chloride 0.9%     Review of patient's allergies indicates:   Allergen Reactions    Iodine and iodide containing products     Shellfish containing products      Objective:     Vital Signs (Most Recent):  Temp: 96.4 °F (35.8 °C) (12/19/17 1141)  Pulse: 72 (12/19/17 1141)  Resp: 19 (12/19/17 1141)  BP: 109/66 (12/19/17 1141)  SpO2: 97 % (12/19/17 1128) Vital Signs (24h Range):  Temp:  [96.4 °F (35.8 °C)-98 °F (36.7 °C)] 96.4 °F (35.8 °C)  Pulse:  [71-73] 72  Resp:  [12-19] 19  SpO2:  [90 %-97 %] 97 %  BP: (100-134)/(56-88) 109/66     Weight: 95 kg (209 lb 6.4 oz)  Body mass index is 34.85 kg/m².    Intake/Output - Last 3 Shifts       12/17 0700 - 12/18 0659 12/18 0700 - 12/19 0659 12/19 0700 - 12/20 0659    P.O. 805 530     IV Piggyback 150 200     Total Intake(mL/kg) 767  (10.1) 730 (7.7)     Urine (mL/kg/hr) 2440 (1.1) 500 (0.2)     Stool 0 (0)      Total Output 2440 500      Net -1485 +230             Urine Occurrence 1 x 3 x     Stool Occurrence 1 x 5 x           Physical Exam   Constitutional: He appears well-nourished. No distress (mild when LLE examined).   Cardiovascular: Normal rate and regular rhythm.    Pulmonary/Chest: Effort normal. No respiratory distress.   Musculoskeletal:   RLE with chronic venous stasis-related changes; no cellulitis.  LLE with significant cellulitic changes diffusely with sparing only of the distal food and superior-medial thigh.  This has improved over the past one day.  Incision is open, packed, and not bleeding.  No fluctuance. +induration.  Blanching erythema.  Tenderness significantly improved   Nursing note and vitals reviewed.      Significant Labs:  CBC:     Recent Labs  Lab 12/19/17  0410   WBC 13.10*   RBC 3.82*   HGB 13.0*   HCT 39.9*      *   MCH 34.0*   MCHC 32.6     CMP:     Recent Labs  Lab 12/19/17  0410   *   CALCIUM 9.4   ALBUMIN 2.2*   PROT 6.8      K 4.6   CO2 30*      BUN 27*   CREATININE 1.3   ALKPHOS 93   ALT 24   AST 20   BILITOT 0.3     Significant Diagnostics:  None new to review    Assessment/Plan:     * Cellulitis of left lower extremity without foot    -Significant improvement in pain; some improvement in surface area of erythema  -Incision site looks good; daily wound dressing changes  -Cultures pending  -Recommend trending CRP  -ID consulted; appreciate assistance; changed to Ancef  -Will continue to follow            Denny Domínguez Jr., MD  General Surgery  [facility]

## 2017-12-19 NOTE — PT/OT/SLP PROGRESS
Occupational Therapy   Treatment    Name: Reji Guerra  MRN: [id number]  Admitting Diagnosis:  Cellulitis of left lower extremity without foot       Recommendations:     Discharge Recommendations: home with home health  Discharge Equipment Recommendations:  bedside commode, bath bench  Barriers to discharge:  Inaccessible home environment    Subjective     Communicated with: nurseVerona prior to session.  Pain/Comfort:  · Pain Rating 1: 5/10  · Location - Side 1: Left  · Location - Orientation 1: lower  · Location 1: leg  · Pain Addressed 1: Distraction, Cessation of Activity    Objective:     Patient found with: peripheral IV, telemetry    General Precautions: Standard, fall   Orthopedic Precautions:N/A   Braces: N/A     Bed Mobility:    · N/A - patient declined EOB and OOB    Patient left HOB elevated with all lines intact, call button in reach, bed alarm on and RN notified    [facility] 6 Click:  [facility] Total Score: 17    Treatment & Education:  Education:  Patient declining any EOB and OOB activity. Washed face and performed oral care from bed level with SBA. Engaged in BUE AROM therex, 1 x 20 reps: bicep curls, sh flexion, punches, hand pumps, horizontal sh abd/add. Patient instructed to complete exercises throughout the day to maintain mobility and strength.    Assessment:   Patient declining EOB and OOB activity this PM. Remains limited by pain and decreased ROM in LLE. Will benefit from continued OT to address functional deficits.     Reji Guerra is a 62 y.o. male with a medical diagnosis of Cellulitis of left lower extremity without foot.  Performance deficits affecting function are weakness, impaired endurance, impaired self care skills, impaired functional mobilty, gait instability, impaired balance, decreased lower extremity function, decreased ROM, pain, impaired skin, edema.      Rehab Prognosis:  Fair+; patient would benefit from acute skilled OT services to address these deficits and reach maximum  level of function.       Plan:     Patient to be seen 5 x/week to address the above listed problems via self-care/home management, therapeutic activities, therapeutic exercises  · Plan of Care Expires: 01/15/18  · Plan of Care Reviewed with: patient    This Plan of care has been discussed with the patient who was involved in its development and understands and is in agreement with the identified goals and treatment plan    GOALS:    Occupational Therapy Goals        Problem: Occupational Therapy Goal    Goal Priority Disciplines Outcome Interventions   Occupational Therapy Goal     OT, PT/OT Ongoing (interventions implemented as appropriate)    Description:  Goals to be met by: 12/23/2017    Patient will increase functional independence with ADLs by performing:    LE Dressing with Supervision.  Grooming while standing at sink with Supervision.  Toileting from bedside commode with Supervision for hygiene and clothing management.   Supine to sit with Modified [place].  Toilet transfer to bedside commode with Supervision.                      Time Tracking:     OT Date of Treatment: 12/19/17  OT Start Time: 1414  OT Stop Time: 1432  OT Total Time (min): 18 min    Billable Minutes:Therapeutic Activity 18    CARLO Jeff  12/19/2017

## 2017-12-19 NOTE — PLAN OF CARE
Problem: Pressure Ulcer Risk (Maximiliano Scale) (Adult,Obstetrics,Pediatric)  Goal: Skin Integrity  Patient will demonstrate the desired outcomes by discharge/transition of care.   Outcome: Ongoing (interventions implemented as appropriate)  Patient turned every two hours throughout shift. Positioning supports utilized to turn and elevate extremities

## 2017-12-19 NOTE — PLAN OF CARE
Problem: Patient Care Overview  Goal: Plan of Care Review  Outcome: Ongoing (interventions implemented as appropriate)  Pt's SpO2 95% on RA. No respiratory distress noted. Will continue to monitor SpO2.

## 2017-12-19 NOTE — PROGRESS NOTES
[facility] Hospital Medicine  Progress Note    Patient Name: Reji Guerra  MRN: [id number]  Patient Class: IP- Inpatient   Admission Date: 12/12/2017  Length of Stay: 7 days  Attending Physician: Berkley Johnson MD  Primary Care Provider: [facility]        Subjective:     Principal Problem:Cellulitis of left lower extremity without foot    HPI:  61 yo male w/ CAD (3 stents in 2003 w/MI), DMT2, HTN, HLD presents to ED with 1 week of LLE swelling and erythema, which just starting tracking up his inner thigh within the past two days. No trauma to his leg, no wounds on his foot. Pain with standing and walking. 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  He does have to rest 1/2 up a full flight of stairs but denies chest pain, shortness of breath, recent illness. He sleeps flat on just 1 pillow.    He hasn't had medical care in 2 years and hasn't taken any medications for 1 year.  He thinks he was supposed to keep taking the plavix.  Baseline dry cough, minimally productive with clear sputum. Denies CP, SOB, recent illness, N/V, diarrhea, constipation. Last BM day before admission.    Hospital Course:  Pt admitted with Vanc and Zosyn. Ortho consulted for possible joint involvement, recommendations. Slow clinical improvement.  WBC improved slowly as well. Pt remained afebrile through stay.  Abx switched to IV clinda to monitor for response but WBC and clinically pt regressed.  ID and surgery brought on board for additional recs.  Leg wrapped loosely and more dedicated to elevating leg.  Good improvement on HOD# 7  Pt still with complaints about diarrhea even though C Diff neg and on imodium.     Interval History: NAEON. AF. VSS. Marked improvement. Pt still with some diarrhea, difficulty with ambulation. Keeping leg elevated. ID switched to Ancef yesterday. Also Surgery took biopsy for tissue cultures.    Review of  Systems   Constitutional: Negative for chills, fatigue and fever.   HENT: Negative for sore throat.    Eyes: Negative for photophobia.   Respiratory: Negative for cough and shortness of breath.    Cardiovascular: Negative for chest pain and palpitations.   Gastrointestinal: Positive for abdominal pain and diarrhea. Negative for constipation, nausea and vomiting.   Genitourinary: Negative for dysuria.   Skin: Positive for color change. Negative for wound.   Neurological: Negative for facial asymmetry, speech difficulty and weakness.     Objective:     Vital Signs (Most Recent):  Temp: 98.3 °F (36.8 °C) (12/19/17 1553)  Pulse: 74 (12/19/17 1553)  Resp: 18 (12/19/17 1553)  BP: 124/70 (12/19/17 1553)  SpO2: 97 % (12/19/17 1128) Vital Signs (24h Range):  Temp:  [96.4 °F (35.8 °C)-98.3 °F (36.8 °C)] 98.3 °F (36.8 °C)  Pulse:  [71-74] 74  Resp:  [12-19] 18  SpO2:  [90 %-97 %] 97 %  BP: (100-134)/(56-74) 124/70     Weight: 95 kg (209 lb 6.4 oz)  Body mass index is 34.85 kg/m².    Intake/Output Summary (Last 24 hours) at 12/19/17 1656  Last data filed at 12/19/17 1630   Gross per 24 hour   Intake              675 ml   Output             1825 ml   Net            -1150 ml      Physical Exam   Constitutional: He is oriented to person, place, and time. He appears well-developed and well-nourished. No distress.   Resting in bed, appears older than stated age   HENT:   Head: Normocephalic and atraumatic.   Mouth/Throat: No oropharyngeal exudate.   Eyes: Conjunctivae and EOM are normal. Pupils are equal, round, and reactive to light.   Neck: Normal range of motion. Neck supple. No JVD present.   Cardiovascular: Normal rate, regular rhythm and normal heart sounds.    No murmur heard.  Pulmonary/Chest: Effort normal. No respiratory distress. He has no wheezes (bilat lower lung wheezes). He has no rales.   Abdominal: Soft. Bowel sounds are normal. He exhibits distension (rotund abdomen ). He exhibits no mass. There is tenderness  (mildly ttp at old surgical scar from umbilical hernia repair).   Musculoskeletal: He exhibits edema.   Able to range knee with some pain (UE & RLE without erythema, pain); chronic spinal osteoarthritis   Neurological: He is alert and oriented to person, place, and time. No cranial nerve deficit.   Skin: Skin is warm. Capillary refill takes less than 2 seconds. He is not diaphoretic. There is erythema.   LLE erythema and swelling extending from mid calf to knee with erythema, warmth extends up medial aspect of thigh stable, increase in swelling and erythema at foot; dry flaking skin on bilat feet; MUCH IMPROVED    bilat old healed venous stasis ulcer scaling wounds, no drainage, no scrotal involvement, no LAD appreciated   Psychiatric: Very friendly, apologetic  Nursing note and vitals reviewed.                  Significant Labs:   CBC:   Recent Labs  Lab 12/18/17  0437 12/18/17  0626 12/19/17  0410   WBC 13.21* 13.88* 13.10*   HGB 14.5 13.7* 13.0*   HCT 44.1 41.0 39.9*    309 307     CMP:   Recent Labs  Lab 12/18/17  0625 12/19/17  0410    140   K 4.4 4.6   CL 95 102   CO2 34* 30*   * 191*   BUN 25* 27*   CREATININE 1.3 1.3   CALCIUM 9.6 9.4   PROT 7.0 6.8   ALBUMIN 2.2* 2.2*   BILITOT 0.4 0.3   ALKPHOS 104 93   AST 19 20   ALT 27 24   ANIONGAP 11 8   EGFRNONAA 58* 58*       Significant Imaging: I have reviewed all pertinent imaging results/findings within the past 24 hours.     Awaiting cultures from tissue biopsy    Assessment/Plan:      * Cellulitis of left lower extremity without foot    WBC 14 on admit, waxing and waning improvement  Vanc and Zosyn switched to Clinda 3 days ago with decrease in improvement  Now switched to Ancef  Cellulitis is overlying joint but seems to just involve the skin/soft tissue, will monitor closely- not currently thought to be septic arthritis, able to range joint. Ortho and surgery consulted, no surgical intervention (biopsy yesterday for tissue cultures)        Elevated troponin    Improved to normal  EKG's without St segment elevation  No chest pain        Leg swelling    No DVT on ultrasound  Advised to elevate leg, but hard for patient to tolerate          Anxiety    Original home med Traxene restarted          Coronary artery disease involving native coronary artery of native heart without angina pectoris    Hx of 3 stents in angiogram in 2003  Restarted on ASA, plavix. Potentially able to stop plavix since stents were placed over 2 years ago  Trops trended down  F/u cardiology as outpatient  H/H 13/39 but with MCV of 105, Folate and B12 were normal  No chest pain            HTN (hypertension)    Restarted Carvedilol, HCTZ (prior home meds from 2 years ago)   Stable  Cardiac diet            Type 2 diabetes mellitus with hyperglycemia, without long-term current use of insulin    HgbA1C 6.5  On Low Dose Corrective scale  Gluc 188-191  On Detemir 8 units BID              VTE Risk Mitigation         Ordered     enoxaparin injection 40 mg  Daily     Route:  Subcutaneous        12/12/17 1624     Medium Risk of VTE  Once      12/12/17 1624              Andrew Lombardo MD  Department of Hospital Medicine   [facility]

## 2017-12-19 NOTE — PLAN OF CARE
Problem: Physical Therapy Goal  Goal: Physical Therapy Goal  Goals to be met by: 2018     Patient will increase functional independence with mobility by performin. Supine to sit with Modified [place]  2. Sit to stand transfer with Modified [place]  3. Gait  x 150 feet with Modified [place] using Rolling Walker.      Outcome: Ongoing (interventions implemented as appropriate)  Pt would benefit from PT to improve functional mobility.

## 2017-12-19 NOTE — PLAN OF CARE
Problem: Occupational Therapy Goal  Goal: Occupational Therapy Goal  Goals to be met by: 12/23/2017    Patient will increase functional independence with ADLs by performing:    LE Dressing with Supervision.  Grooming while standing at sink with Supervision.  Toileting from bedside commode with Supervision for hygiene and clothing management.   Supine to sit with Modified [place].  Toilet transfer to bedside commode with Supervision.     Outcome: Ongoing (interventions implemented as appropriate)  Patient declining EOB and OOB activity this PM. Remains limited by pain and decreased ROM in LLE. Will benefit from continued OT to address functional deficits.

## 2017-12-19 NOTE — PROGRESS NOTES
[facility]  Infectious Disease  Progress Note    Patient Name: Reji Guerra  MRN: [id number]  Admission Date: 12/12/2017  Length of Stay: 7 days  Attending Physician: Berkley Johnson MD  Primary Care Provider: [facility]    Isolation Status: No active isolations  Assessment/Plan:      Diarrhea    Multiple unformed stools for in the previous two days. Associated with distention, hyperactive bowel sounds and diffuse abdominal tenderness. On antibiotics since 12/12, on clindamycin since 12/15. Improved 12/18    Recs  - C diff negative  - KUB with questionable small bowel ileus  - old, previously repaired umbilical hernia that appears to be causing some discomfort; could benefit from surgical evaluation of abdomen         Cellulitis of left lower extremity    62 M with LLE cellulitis, erythema, warmth and swelling primarily to knee with redness and warmth tracking to groin on medial and lateral aspects of L thigh. Initially on vanc and zosyn with minimal improvement. Currently on cefazolin. Minimal improvement in exam last 24 hours.     Recs  - continue cefazolin 1g q8hrs; will add vancomycin if worsens clinically   - repeat blood cultures NGTD            Anticipated Disposition: unclear    Thank you for your consult. I will follow-up with patient. Please contact us if you have any additional questions.    HALEY Mcmillan  Infectious Disease  [facility]    Subjective:     Principal Problem:Cellulitis of left lower extremity without foot    HPI: 62M with history of CAD (s/p PCI with three stents), DMII, HTN, HLD who presented to ED on 12/12 with left lower extremity swelling and erythema. He reports that the swelling and redness began a few days before admission, worsening until he was unable to bear weight on his left leg. He denies fevers and chills while at home, recent trauma to his L leg. He has had no aquatic exposures.     Hospital course  significant for initial improvement (per patient, though imaging in chart does not suggest there was improvement) with vancomycin and zosyn with transition to clindamycin on 12/15. He has not had much clinical improvement since transitioning to clindamycin. Ortho consulted initially because of concern for septic arthritis (knee) but ruled out clinically (tap deferred due to surrounding cellulitis). Venous US of lower extremity was without evidence of DVT and MRI showed nonspecific subcutaneous edema and cellulitis without abscess, osteomyelitis, septic joint of left lower leg. Patient has remained afebrile but with continuing mild leukocytosis (up to 15, 13.8 today). Blood cultures NGTD. Urine culture NGTD. CXR not suggestive of consolidation.     Patient has had many loose bowel movements and is complaining of abdominal pain and distention.   Interval History: Diarrhea improved--C diff negative. KUB with small bowel distention and possible ileus. Slight interval improvement in leg. Continued pain. No fevers or chills.     Review of Systems   Constitutional: Negative for chills, diaphoresis and fever.   HENT: Negative for sore throat.    Eyes: Negative for photophobia.   Respiratory: Negative for cough and shortness of breath.    Cardiovascular: Negative for chest pain and palpitations.   Gastrointestinal: Positive for abdominal distention. Negative for abdominal pain, constipation, diarrhea, nausea and vomiting.   Genitourinary: Negative for dysuria and flank pain.   Musculoskeletal: Negative for back pain.   Skin: Positive for color change.   Neurological: Negative for dizziness, speech difficulty and weakness.   Psychiatric/Behavioral: Negative for confusion.     Objective:     Vital Signs (Most Recent):  Temp: 97.4 °F (36.3 °C) (12/19/17 0804)  Pulse: 71 (12/19/17 0804)  Resp: 19 (12/19/17 0804)  BP: 134/74 (12/19/17 0804)  SpO2: 95 % (12/19/17 0749) Vital Signs (24h Range):  Temp:  [97.4 °F (36.3 °C)-98.4 °F  (36.9 °C)] 97.4 °F (36.3 °C)  Pulse:  [71-73] 71  Resp:  [12-20] 19  SpO2:  [90 %-97 %] 95 %  BP: (100-134)/(56-88) 134/74     Weight: 95 kg (209 lb 6.4 oz)  Body mass index is 34.85 kg/m².    Estimated Creatinine Clearance: 60.8 mL/min (based on SCr of 1.3 mg/dL).    Physical Exam   Constitutional: He appears well-developed and well-nourished. No distress.   HENT:   Head: Normocephalic and atraumatic.   Mouth/Throat: Oropharynx is clear and moist.   Eyes: Conjunctivae and EOM are normal. No scleral icterus.   Neck: Normal range of motion.   Cardiovascular: Normal rate and normal heart sounds.    No murmur heard.  Pulmonary/Chest: Effort normal and breath sounds normal. No stridor. No respiratory distress. He has no wheezes. He has no rales.   Abdominal: Soft. Bowel sounds are normal. He exhibits distension. There is no tenderness. There is no rebound and no guarding.   TTP previously repaired umbilical hernia    Musculoskeletal:   LLE with erythema, warmth and edema from toes to knee, with erythema tracking medial aspect of thigh to groin as well as lateral aspect of thigh to buttocks--minimally improved from previous; less TTP  RLE with venous stasis dermatitis        Lymphadenopathy:     He has no cervical adenopathy.   Neurological: He is alert. He exhibits normal muscle tone.   Psychiatric: He has a normal mood and affect.   Nursing note and vitals reviewed.      Significant Labs:   Blood Culture:   Recent Labs  Lab 12/12/17  1119 12/12/17  1226 12/18/17  1512   LABBLOO No growth after 5 days. No growth after 5 days. No Growth to date  No Growth to date     CBC:   Recent Labs  Lab 12/18/17  0437 12/18/17  0626 12/19/17  0410   WBC 13.21* 13.88* 13.10*   HGB 14.5 13.7* 13.0*   HCT 44.1 41.0 39.9*    309 307     CMP:   Recent Labs  Lab 12/18/17  0625 12/19/17  0410    140   K 4.4 4.6   CL 95 102   CO2 34* 30*   * 191*   BUN 25* 27*   CREATININE 1.3 1.3   CALCIUM 9.6 9.4   PROT 7.0 6.8    ALBUMIN 2.2* 2.2*   BILITOT 0.4 0.3   ALKPHOS 104 93   AST 19 20   ALT 27 24   ANIONGAP 11 8   EGFRNONAA 58* 58*     Lactic Acid: No results for input(s): LACTATE in the last 48 hours.  Urine Culture:   Recent Labs  Lab 12/12/17  1207   LABURIN No significant growth     Urine Studies:   Recent Labs  Lab 12/12/17  1207   COLORU Yellow   APPEARANCEUA Clear   PHUR 7.0   SPECGRAV <=1.005*   PROTEINUA 2+*   GLUCUA Negative   KETONESU Negative   BILIRUBINUA Negative   OCCULTUA 1+*   NITRITE Negative   UROBILINOGEN 1.0   LEUKOCYTESUR Negative   RBCUA 1   WBCUA 0   BACTERIA Rare   SQUAMEPITHEL 2   HYALINECASTS 0     Wound Culture:   Recent Labs  Lab 12/18/17  1343   LABAERO No growth       Significant Imaging:   Imaging Results          X-Ray Abdomen AP 1 View (Final result)  Result time 12/19/17 08:04:21    Final result by Jorge Allred MD (12/19/17 08:04:21)                 Impression:     Localized proximal small bowel ileus question.      Electronically signed by: ANTONIO ALLRED MD  Date:     12/19/17  Time:    08:04              Narrative:    Single view of the abdomen, cervical mental opacities from anterior abdominal wall repair graft left periumbilical.  Minimal mild distention proximal small bowel.  Nondistended air stomach and colon.  Lower pelvis not included on exam.                             X-Ray Chest 1 View (Final result)  Result time 12/18/17 08:13:11    Final result by Jorge Allred MD (12/18/17 08:13:11)                 Impression:     Stable chest.  No active process.      Electronically signed by: ANTONIO ALLRED MD  Date:     12/18/17  Time:    08:13              Narrative:    Single view chest, comparison 12/12/2017.  Cardiomegaly stable.  Incomplete inspiration, interstitial markings remain mildly prominent.  There are chronic rib deformity left lateral fifth sixth and seventh ribs.                             MRI Lower Extremity W WO Contrast Left (Final result)  Result  time 12/15/17 16:53:12    Final result by Jorge Lozano MD (12/15/17 16:53:12)                 Impression:     Nonspecific subcutaneous edema and Cellulitis without abscess, osteomyelitis, septic joint left lower leg.      Electronically signed by: ANTONIO LOZANO MD  Date:     12/15/17  Time:    16:53              Narrative:    Comparison ultrasound vein left leg, radiographs left knee dating back to 12/12/17.  MR on left tibia from knee to ankle without, and with gadovist 10 cc intravenous.    Asymmetrical subcutaneous edema left lower extremity compared to contralateral side.  Bone and joint structures normal.  No fracture dislocation.  Neurovascular structures appear intact.  Muscle tendon, ligament structures normal.  No abscess.  Heterogeneous enhancement of subcutaneous tissues, no unusual enhancement musculoskeletal structures otherwise.                             X-Ray Knee 3 View Left (Final result)  Result time 12/13/17 12:18:14    Final result by Rob Valle MD (12/13/17 12:18:14)                 Impression:     As above.      Electronically signed by: ROB VALLE MD  Date:     12/13/17  Time:    12:18              Narrative:    Knee 3 views left.    Findings: 3 views left. There is no fracture, dislocation, or bony erosion identified.                             X-Ray Chest AP Portable (Final result)  Result time 12/12/17 12:14:25    Final result by Luan Sutherland MD (12/12/17 12:14:25)                 Impression:        No acute radiographic findings on single view of the chest.      Electronically signed by: LUAN SUTHERLAND MD  Date:     12/12/17  Time:    12:14              Narrative:    Comparison: None    Technique: Single view of the chest.    Findings: Cardiac silhouette is enlarged.  There is aortic atherosclerosis.  Lungs show no evidence of significant focal consolidation, large effusion, or pneumothorax.  The bones demonstrate degenerative changes of the spine and shoulders.                              US Lower Extremity Veins Left (Final result)  Result time 12/12/17 11:54:55    Final result by Luan Gifford MD (12/12/17 11:54:55)                 Impression:         No evidence of deep vein thrombosisin the left lower extremity.    There is lower extremity edema.        Electronically signed by: LUAN GIFFORD MD  Date:     12/12/17  Time:    11:54              Narrative:    Comparison: None.    Findings: There is no evidence of venous thrombosis in the bilateral common femoral, left femoral, greater saphenous, popliteal, posterior tibial, anterior tibial, and peroneal veins.                            Antibiotics     Start     Stop Route Frequency Ordered    12/18/17 1530  ceFAZolin (ANCEF) 1 gram in dextrose 5 % 50 mL IVPB (premix)      -- IV Every 8 hours (non-standard times) 12/18/17 0701

## 2017-12-19 NOTE — ASSESSMENT & PLAN NOTE
WBC 14 on admit, waxing and waning improvement  Vanc and Zosyn switched to [place] 3 days ago with decrease in improvement  Now switched to Ancef  Cellulitis is overlying joint but seems to just involve the skin/soft tissue, will monitor closely- not currently thought to be septic arthritis, able to range joint. Ortho and surgery consulted, no surgical intervention (biopsy yesterday for tissue cultures)

## 2017-12-19 NOTE — SUBJECTIVE & OBJECTIVE
Interval History: Pain significantly improved since yesterday per patient.  Afebrile and HD stable.  No tachycardia. Keeping extremity elevated.  Incised and obtained deep tissue cultures of left upper thigh; no bleeding complications.    Medications:  Continuous Infusions:  Scheduled Meds:   aspirin  81 mg Oral Daily    carvedilol  6.25 mg Oral BID WM    ceFAZolin (ANCEF) IVPB  1 g Intravenous Q8H    clopidogrel  75 mg Oral Daily    clorazepate  15 mg Oral BID    enoxaparin  40 mg Subcutaneous Daily    hydroCHLOROthiazide  12.5 mg Oral Daily    insulin detemir  8 Units Subcutaneous BID    magnesium oxide  400 mg Oral BID    miconazole   Topical (Top) BID    nicotine  1 patch Transdermal Daily    simvastatin  40 mg Oral QHS     PRN Meds:calcium carbonate, dextrose 50%, dextrose 50%, glucagon (human recombinant), glucose, glucose, hydrocodone-acetaminophen 5-325mg, influenza, insulin aspart, loperamide, lorazepam, morphine, pneumoc 13-jose g conj-dip cr(PF), sodium chloride 0.9%     Review of patient's allergies indicates:   Allergen Reactions    Iodine and iodide containing products     Shellfish containing products      Objective:     Vital Signs (Most Recent):  Temp: 96.4 °F (35.8 °C) (12/19/17 1141)  Pulse: 72 (12/19/17 1141)  Resp: 19 (12/19/17 1141)  BP: 109/66 (12/19/17 1141)  SpO2: 97 % (12/19/17 1128) Vital Signs (24h Range):  Temp:  [96.4 °F (35.8 °C)-98 °F (36.7 °C)] 96.4 °F (35.8 °C)  Pulse:  [71-73] 72  Resp:  [12-19] 19  SpO2:  [90 %-97 %] 97 %  BP: (100-134)/(56-88) 109/66     Weight: 95 kg (209 lb 6.4 oz)  Body mass index is 34.85 kg/m².    Intake/Output - Last 3 Shifts       12/17 0700 - 12/18 0659 12/18 0700 - 12/19 0659 12/19 0700 - 12/20 0659    P.O. 805 530     IV Piggyback 150 200     Total Intake(mL/kg) 955 (10.1) 730 (7.7)     Urine (mL/kg/hr) 2440 (1.1) 500 (0.2)     Stool 0 (0)      Total Output 2440 500      Net -1485 +230             Urine Occurrence 1 x 3 x     Stool Occurrence  1 x 5 x           Physical Exam   Constitutional: He appears well-nourished. No distress (mild when LLE examined).   Cardiovascular: Normal rate and regular rhythm.    Pulmonary/Chest: Effort normal. No respiratory distress.   Musculoskeletal:   RLE with chronic venous stasis-related changes; no cellulitis.  LLE with significant cellulitic changes diffusely with sparing only of the distal food and superior-medial thigh.  This has improved over the past one day.  Incision is open, packed, and not bleeding.  No fluctuance. +induration.  Blanching erythema.  Tenderness significantly improved   Nursing note and vitals reviewed.      Significant Labs:  CBC:     Recent Labs  Lab 12/19/17  0410   WBC 13.10*   RBC 3.82*   HGB 13.0*   HCT 39.9*      *   MCH 34.0*   MCHC 32.6     CMP:     Recent Labs  Lab 12/19/17  0410   *   CALCIUM 9.4   ALBUMIN 2.2*   PROT 6.8      K 4.6   CO2 30*      BUN 27*   CREATININE 1.3   ALKPHOS 93   ALT 24   AST 20   BILITOT 0.3     Significant Diagnostics:  None new to review

## 2017-12-19 NOTE — PROGRESS NOTES
.Pharmacy New Medication Education    Patient accepted medication education.    Pharmacy educated patient on name and purpose of medications and possible side effects, using the teach-back method.     Ancef  Detemir  miconazole    Learners of pharmacy medication education included:  Patient    Patient +/- learner response:  Verbalized Understanding, Teachback

## 2017-12-19 NOTE — ASSESSMENT & PLAN NOTE
Multiple unformed stools for in the previous two days. Associated with distention, hyperactive bowel sounds and diffuse abdominal tenderness. On antibiotics since 12/12, on clindamycin since 12/15. Improved 12/18    Recs  - C diff negative  - KUB with questionable small bowel ileus  - old, previously repaired umbilical hernia that appears to be causing some discomfort; could benefit from surgical evaluation of abdomen

## 2017-12-19 NOTE — SUBJECTIVE & OBJECTIVE
Interval History: NAEON. AF. VSS. Marked improvement. Pt still with some diarrhea, difficulty with ambulation. Keeping leg elevated. ID switched to Ancef yesterday. Also Surgery took biopsy for tissue cultures.    Review of Systems   Constitutional: Negative for chills, fatigue and fever.   HENT: Negative for sore throat.    Eyes: Negative for photophobia.   Respiratory: Negative for cough and shortness of breath.    Cardiovascular: Negative for chest pain and palpitations.   Gastrointestinal: Positive for abdominal pain and diarrhea. Negative for constipation, nausea and vomiting.   Genitourinary: Negative for dysuria.   Skin: Positive for color change. Negative for wound.   Neurological: Negative for facial asymmetry, speech difficulty and weakness.     Objective:     Vital Signs (Most Recent):  Temp: 98.3 °F (36.8 °C) (12/19/17 1553)  Pulse: 74 (12/19/17 1553)  Resp: 18 (12/19/17 1553)  BP: 124/70 (12/19/17 1553)  SpO2: 97 % (12/19/17 1128) Vital Signs (24h Range):  Temp:  [96.4 °F (35.8 °C)-98.3 °F (36.8 °C)] 98.3 °F (36.8 °C)  Pulse:  [71-74] 74  Resp:  [12-19] 18  SpO2:  [90 %-97 %] 97 %  BP: (100-134)/(56-74) 124/70     Weight: 95 kg (209 lb 6.4 oz)  Body mass index is 34.85 kg/m².    Intake/Output Summary (Last 24 hours) at 12/19/17 1656  Last data filed at 12/19/17 1630   Gross per 24 hour   Intake              675 ml   Output             1825 ml   Net            -1150 ml      Physical Exam   Constitutional: He is oriented to person, place, and time. He appears well-developed and well-nourished. No distress.   Resting in bed, appears older than stated age   HENT:   Head: Normocephalic and atraumatic.   Mouth/Throat: No oropharyngeal exudate.   Eyes: Conjunctivae and EOM are normal. Pupils are equal, round, and reactive to light.   Neck: Normal range of motion. Neck supple. No JVD present.   Cardiovascular: Normal rate, regular rhythm and normal heart sounds.    No murmur heard.  Pulmonary/Chest: Effort  normal. No respiratory distress. He has no wheezes (bilat lower lung wheezes). He has no rales.   Abdominal: Soft. Bowel sounds are normal. He exhibits distension (rotund abdomen ). He exhibits no mass. There is tenderness (mildly ttp at old surgical scar from umbilical hernia repair).   Musculoskeletal: He exhibits edema.   Able to range knee with some pain (UE & RLE without erythema, pain); chronic spinal osteoarthritis   Neurological: He is alert and oriented to person, place, and time. No cranial nerve deficit.   Skin: Skin is warm. Capillary refill takes less than 2 seconds. He is not diaphoretic. There is erythema.   LLE erythema and swelling extending from mid calf to knee with erythema, warmth extends up medial aspect of thigh stable, increase in swelling and erythema at foot; dry flaking skin on bilat feet; MUCH IMPROVED    bilat old healed venous stasis ulcer scaling wounds, no drainage, no scrotal involvement, no LAD appreciated   Psychiatric: He has a normal mood and affect. His behavior is normal.   Nursing note and vitals reviewed.    Significant Labs:   CBC:   Recent Labs  Lab 12/18/17  0437 12/18/17  0626 12/19/17  0410   WBC 13.21* 13.88* 13.10*   HGB 14.5 13.7* 13.0*   HCT 44.1 41.0 39.9*    309 307     CMP:   Recent Labs  Lab 12/18/17  0625 12/19/17  0410    140   K 4.4 4.6   CL 95 102   CO2 34* 30*   * 191*   BUN 25* 27*   CREATININE 1.3 1.3   CALCIUM 9.6 9.4   PROT 7.0 6.8   ALBUMIN 2.2* 2.2*   BILITOT 0.4 0.3   ALKPHOS 104 93   AST 19 20   ALT 27 24   ANIONGAP 11 8   EGFRNONAA 58* 58*       Significant Imaging: I have reviewed all pertinent imaging results/findings within the past 24 hours.     Awaiting cultures from tissue biopsy

## 2017-12-19 NOTE — ASSESSMENT & PLAN NOTE
-Significant improvement in pain; some improvement in surface area of erythema  -Incision site looks good; daily wound dressing changes  -Cultures pending  -Recommend trending CRP  -ID consulted; appreciate assistance; changed to Ancef  -Will continue to follow

## 2017-12-19 NOTE — NURSING
Report received from [facility]. Pt AAOx4. Respirations even and unlabored. Patient resting in bed eyes closed. Pt VSS. Will continue to monitor.

## 2017-12-19 NOTE — PT/OT/SLP PROGRESS
Physical Therapy Treatment    Patient Name:  Reji Guerra   MRN:  [id number]    Recommendations:     Discharge Recommendations:  home with home health   Discharge Equipment Recommendations:     Barriers to discharge: Decreased caregiver support    Assessment:     Reji Guerra is a 62 y.o. male admitted with a medical diagnosis of Cellulitis of left lower extremity without foot.  He presents with the following impairments/functional limitations:  weakness, gait instability, impaired endurance, decreased lower extremity function, impaired self care skills, impaired functional mobilty, edema.    Rehab Prognosis:  FAIR; patient would benefit from acute skilled PT services to address these deficits and reach maximum level of function.      Recent Surgery: * No surgery found *      Plan:     During this hospitalization, patient to be seen daily to address the above listed problems via gait training, therapeutic activities, therapeutic exercises, neuromuscular re-education  · Plan of Care Expires:  01/19/18   Plan of Care Reviewed with: patient    Subjective     Communicated with nurse prior to session.  Patient found supine with LLE elevated on 2 pillows upon PT entry to room, agreeable to treatment.      Chief Complaint: feels weak because he has not been doing much  Patient comments/goals: go to the bathroom  Pain/Comfort:  · Pain Rating 1: 1/10  · Location - Side 1: Left  · Location 1: foot  · Pain Addressed 1: Reposition, Distraction, Pre-medicate for activity  · Pain Rating Post-Intervention 1: 0/10    Patients cultural, spiritual, Jew conflicts given the current situation:      Objective:     Patient found with:       General Precautions: Standard, fall   Orthopedic Precautions:N/A   Braces:       Functional Mobility:  · Bed Mobility:     · Rolling Right: minimum assistance  · Supine to Sit: moderate assistance  · Transfers:     · Sit to Stand:  moderate assistance with rolling walker  · Toilet Transfer:  moderate assistance with  rolling walker  using  Stand Pivot      AM-PAC 6 CLICK MOBILITY  Turning over in bed (including adjusting bedclothes, sheets and blankets)?: 3  Sitting down on and standing up from a chair with arms (e.g., wheelchair, bedside commode, etc.): 3  Moving from lying on back to sitting on the side of the bed?: 3  Moving to and from a bed to a chair (including a wheelchair)?: 3  Need to walk in hospital room?: 1  Climbing 3-5 steps with a railing?: 1  Total Score: 14       Therapeutic Activities and Exercises:   Pt sat on EOB 15 minutes, b/s commode 10 minutes    Patient left sitting on b/s commode with call button in reach, nurse notified and nurse present..    GOALS:    Physical Therapy Goals        Problem: Physical Therapy Goal    Goal Priority Disciplines Outcome Goal Variances Interventions   Physical Therapy Goal     PT/OT, PT Ongoing (interventions implemented as appropriate)     Description:  Goals to be met by: 2018     Patient will increase functional independence with mobility by performin. Supine to sit with Modified [place]  2. Sit to stand transfer with Modified [place]  3. Gait  x 150 feet with Modified [place] using Rolling Walker.              Problem: Physical Therapy Goal    Goal Priority Disciplines Outcome Goal Variances Interventions   Physical Therapy Goal     PT/OT, PT                      Time Tracking:     PT Received On: 17  PT Start Time: 1040     PT Stop Time: 1110  PT Total Time (min): 30 min     Billable Minutes: Therapeutic Activity 30    Treatment Type: Treatment  PT/PTA: PT     PTA Visit Number: 0     Fely Anguiano, PT  2017

## 2017-12-20 LAB
ALBUMIN SERPL BCP-MCNC: 2.2 G/DL
ALP SERPL-CCNC: 91 U/L
ALT SERPL W/O P-5'-P-CCNC: 20 U/L
ANION GAP SERPL CALC-SCNC: 8 MMOL/L
AST SERPL-CCNC: 22 U/L
BASOPHILS # BLD AUTO: 0.04 K/UL
BASOPHILS NFR BLD: 0.4 %
BILIRUB SERPL-MCNC: 0.4 MG/DL
BUN SERPL-MCNC: 29 MG/DL
CALCIUM SERPL-MCNC: 9.4 MG/DL
CHLORIDE SERPL-SCNC: 100 MMOL/L
CO2 SERPL-SCNC: 32 MMOL/L
CREAT SERPL-MCNC: 1.2 MG/DL
CRP SERPL-MCNC: 112.29 MG/L
DIFFERENTIAL METHOD: ABNORMAL
EOSINOPHIL # BLD AUTO: 0.2 K/UL
EOSINOPHIL NFR BLD: 1.4 %
ERYTHROCYTE [DISTWIDTH] IN BLOOD BY AUTOMATED COUNT: 14.1 %
EST. GFR  (AFRICAN AMERICAN): >60 ML/MIN/1.73 M^2
EST. GFR  (NON AFRICAN AMERICAN): >60 ML/MIN/1.73 M^2
GLUCOSE SERPL-MCNC: 118 MG/DL
HCT VFR BLD AUTO: 40 %
HGB BLD-MCNC: 13.1 G/DL
LYMPHOCYTES # BLD AUTO: 1.4 K/UL
LYMPHOCYTES NFR BLD: 12.9 %
MAGNESIUM SERPL-MCNC: 2.1 MG/DL
MCH RBC QN AUTO: 33.9 PG
MCHC RBC AUTO-ENTMCNC: 32.8 G/DL
MCV RBC AUTO: 104 FL
MONOCYTES # BLD AUTO: 1 K/UL
MONOCYTES NFR BLD: 9.3 %
NEUTROPHILS # BLD AUTO: 8.1 K/UL
NEUTROPHILS NFR BLD: 73 %
PHOSPHATE SERPL-MCNC: 4.6 MG/DL
PLATELET # BLD AUTO: 315 K/UL
PMV BLD AUTO: 11.2 FL
POCT GLUCOSE: 145 MG/DL (ref 70–110)
POCT GLUCOSE: 149 MG/DL (ref 70–110)
POCT GLUCOSE: 174 MG/DL (ref 70–110)
POCT GLUCOSE: 213 MG/DL (ref 70–110)
POTASSIUM SERPL-SCNC: 4.4 MMOL/L
PROT SERPL-MCNC: 7.1 G/DL
RBC # BLD AUTO: 3.86 M/UL
SODIUM SERPL-SCNC: 140 MMOL/L
WBC # BLD AUTO: 11.08 K/UL
WBC #/AREA STL HPF: NORMAL /[HPF]

## 2017-12-20 PROCEDURE — 63600175 PHARM REV CODE 636 W HCPCS: Performed by: INTERNAL MEDICINE

## 2017-12-20 PROCEDURE — 86141 C-REACTIVE PROTEIN HS: CPT

## 2017-12-20 PROCEDURE — 97530 THERAPEUTIC ACTIVITIES: CPT

## 2017-12-20 PROCEDURE — 97110 THERAPEUTIC EXERCISES: CPT

## 2017-12-20 PROCEDURE — 27000221 HC OXYGEN, UP TO 24 HOURS

## 2017-12-20 PROCEDURE — 63600175 PHARM REV CODE 636 W HCPCS: Performed by: FAMILY MEDICINE

## 2017-12-20 PROCEDURE — 87427 SHIGA-LIKE TOXIN AG IA: CPT | Mod: 59

## 2017-12-20 PROCEDURE — 99024 POSTOP FOLLOW-UP VISIT: CPT | Mod: ,,, | Performed by: SURGERY

## 2017-12-20 PROCEDURE — S4991 NICOTINE PATCH NONLEGEND: HCPCS | Performed by: STUDENT IN AN ORGANIZED HEALTH CARE EDUCATION/TRAINING PROGRAM

## 2017-12-20 PROCEDURE — 36415 COLL VENOUS BLD VENIPUNCTURE: CPT

## 2017-12-20 PROCEDURE — 87046 STOOL CULTR AEROBIC BACT EA: CPT

## 2017-12-20 PROCEDURE — 87209 SMEAR COMPLEX STAIN: CPT

## 2017-12-20 PROCEDURE — 25000003 PHARM REV CODE 250: Performed by: STUDENT IN AN ORGANIZED HEALTH CARE EDUCATION/TRAINING PROGRAM

## 2017-12-20 PROCEDURE — 63600175 PHARM REV CODE 636 W HCPCS: Performed by: STUDENT IN AN ORGANIZED HEALTH CARE EDUCATION/TRAINING PROGRAM

## 2017-12-20 PROCEDURE — 83735 ASSAY OF MAGNESIUM: CPT

## 2017-12-20 PROCEDURE — 85025 COMPLETE CBC W/AUTO DIFF WBC: CPT

## 2017-12-20 PROCEDURE — 97535 SELF CARE MNGMENT TRAINING: CPT

## 2017-12-20 PROCEDURE — 80053 COMPREHEN METABOLIC PANEL: CPT

## 2017-12-20 PROCEDURE — 89055 LEUKOCYTE ASSESSMENT FECAL: CPT

## 2017-12-20 PROCEDURE — 21400001 HC TELEMETRY ROOM

## 2017-12-20 PROCEDURE — 84100 ASSAY OF PHOSPHORUS: CPT

## 2017-12-20 PROCEDURE — 94761 N-INVAS EAR/PLS OXIMETRY MLT: CPT

## 2017-12-20 PROCEDURE — 87045 FECES CULTURE AEROBIC BACT: CPT

## 2017-12-20 RX ORDER — MORPHINE SULFATE 10 MG/ML
4 INJECTION INTRAMUSCULAR; INTRAVENOUS; SUBCUTANEOUS EVERY 4 HOURS PRN
Status: DISCONTINUED | OUTPATIENT
Start: 2017-12-20 | End: 2017-12-27 | Stop reason: HOSPADM

## 2017-12-20 RX ADMIN — MORPHINE SULFATE 4 MG: 2 INJECTION, SOLUTION INTRAMUSCULAR; INTRAVENOUS at 02:12

## 2017-12-20 RX ADMIN — SIMVASTATIN 40 MG: 20 TABLET, FILM COATED ORAL at 08:12

## 2017-12-20 RX ADMIN — MAGNESIUM OXIDE TAB 400 MG (241.3 MG ELEMENTAL MG) 400 MG: 400 (241.3 MG) TAB at 09:12

## 2017-12-20 RX ADMIN — MAGNESIUM OXIDE TAB 400 MG (241.3 MG ELEMENTAL MG) 400 MG: 400 (241.3 MG) TAB at 08:12

## 2017-12-20 RX ADMIN — CEFAZOLIN SODIUM 1 G: 1 SOLUTION INTRAVENOUS at 04:12

## 2017-12-20 RX ADMIN — INSULIN ASPART 1 UNITS: 100 INJECTION, SOLUTION INTRAVENOUS; SUBCUTANEOUS at 09:12

## 2017-12-20 RX ADMIN — MICONAZOLE NITRATE: 20 CREAM TOPICAL at 08:12

## 2017-12-20 RX ADMIN — MICONAZOLE NITRATE: 20 CREAM TOPICAL at 09:12

## 2017-12-20 RX ADMIN — MORPHINE SULFATE 4 MG: 10 INJECTION INTRAVENOUS at 04:12

## 2017-12-20 RX ADMIN — CEFAZOLIN SODIUM 1 G: 1 SOLUTION INTRAVENOUS at 01:12

## 2017-12-20 RX ADMIN — HYDROCODONE BITARTRATE AND ACETAMINOPHEN 1 TABLET: 5; 325 TABLET ORAL at 02:12

## 2017-12-20 RX ADMIN — ASPIRIN 81 MG: 81 TABLET, COATED ORAL at 09:12

## 2017-12-20 RX ADMIN — MORPHINE SULFATE 4 MG: 2 INJECTION, SOLUTION INTRAMUSCULAR; INTRAVENOUS at 09:12

## 2017-12-20 RX ADMIN — CLOPIDOGREL 75 MG: 75 TABLET, FILM COATED ORAL at 09:12

## 2017-12-20 RX ADMIN — ENOXAPARIN SODIUM 40 MG: 100 INJECTION SUBCUTANEOUS at 04:12

## 2017-12-20 RX ADMIN — CLORAZEPATE DIPOTASSIUM 15 MG: 3.75 TABLET ORAL at 09:12

## 2017-12-20 RX ADMIN — CARVEDILOL 6.25 MG: 6.25 TABLET, FILM COATED ORAL at 04:12

## 2017-12-20 RX ADMIN — NICOTINE 1 PATCH: 21 PATCH, EXTENDED RELEASE TRANSDERMAL at 09:12

## 2017-12-20 RX ADMIN — CEFAZOLIN SODIUM 1 G: 1 SOLUTION INTRAVENOUS at 09:12

## 2017-12-20 RX ADMIN — HYDROCHLOROTHIAZIDE 12.5 MG: 12.5 TABLET ORAL at 09:12

## 2017-12-20 RX ADMIN — CLORAZEPATE DIPOTASSIUM 15 MG: 3.75 TABLET ORAL at 08:12

## 2017-12-20 RX ADMIN — CARVEDILOL 6.25 MG: 6.25 TABLET, FILM COATED ORAL at 09:12

## 2017-12-20 NOTE — PLAN OF CARE
Problem: Occupational Therapy Goal  Goal: Occupational Therapy Goal  Goals to be met by: 12/23/2017    Patient will increase functional independence with ADLs by performing:    LE Dressing with Supervision.  Grooming while standing at sink with Supervision.  Toileting from bedside commode with Supervision for hygiene and clothing management.   Supine to sit with Modified [place].  Toilet transfer to bedside commode with Supervision.     Outcome: Ongoing (interventions implemented as appropriate)  Pt. making slow progress toward goals.  Limited by LLE pain @ reported 10/10 with transitional movement OOB and to BSC and fa ambulation in room with RW. Pt. will need 24/7 supervision and assist at discharge.  Continue with OT POC.

## 2017-12-20 NOTE — PLAN OF CARE
Problem: Physical Therapy Goal  Goal: Physical Therapy Goal  Goals to be met by: 2018     Patient will increase functional independence with mobility by performin. Supine to sit with Modified [place]  2. Sit to stand transfer with Modified [place]  3. Gait  x 150 feet with Modified [place] using Rolling Walker.      Outcome: Ongoing (interventions implemented as appropriate)  Patient willing to participate with active exercise LE's only, refused to get up 2/2 L leg pain  Recommend Home   Will need bedside commode and bath bench/shower chair

## 2017-12-20 NOTE — PROGRESS NOTES
[facility]  General Surgery  Progress Note    Subjective:     History of Present Illness:  No notes on file    Post-Op Info:  * No surgery found *         Interval History: Pain unchanged from yesterday.  Remains with leg elevated.  Some improvement in edema.  Afebrile and HD stable.  No tachycardia.    Medications:  Continuous Infusions:  Scheduled Meds:   aspirin  81 mg Oral Daily    carvedilol  6.25 mg Oral BID WM    ceFAZolin (ANCEF) IVPB  1 g Intravenous Q8H    clopidogrel  75 mg Oral Daily    clorazepate  15 mg Oral BID    enoxaparin  40 mg Subcutaneous Daily    hydroCHLOROthiazide  12.5 mg Oral Daily    insulin detemir  8 Units Subcutaneous BID    magnesium oxide  400 mg Oral BID    miconazole   Topical (Top) BID    nicotine  1 patch Transdermal Daily    simvastatin  40 mg Oral QHS     PRN Meds:calcium carbonate, dextrose 50%, dextrose 50%, glucagon (human recombinant), glucose, glucose, hydrocodone-acetaminophen 5-325mg, influenza, insulin aspart, loperamide, lorazepam, morphine, pneumoc 13-jose g conj-dip cr(PF), sodium chloride 0.9%     Review of patient's allergies indicates:   Allergen Reactions    Iodine and iodide containing products     Shellfish containing products      Objective:     Vital Signs (Most Recent):  Temp: 99.3 °F (37.4 °C) (12/20/17 0756)  Pulse: 69 (12/20/17 0756)  Resp: 18 (12/20/17 0756)  BP: 120/71 (12/20/17 0756)  SpO2: 96 % (12/20/17 0425) Vital Signs (24h Range):  Temp:  [96.4 °F (35.8 °C)-99.3 °F (37.4 °C)] 99.3 °F (37.4 °C)  Pulse:  [63-76] 69  Resp:  [12-19] 18  SpO2:  [87 %-97 %] 96 %  BP: (109-124)/(62-76) 120/71     Weight: 95 kg (209 lb 6.4 oz)  Body mass index is 34.85 kg/m².    Intake/Output - Last 3 Shifts       12/18 0700 - 12/19 0659 12/19 0700 - 12/20 0659 12/20 0700 - 12/21 0659    P.O. 530 500     IV Piggyback 200 50     Total Intake(mL/kg) 730 (7.7) 550 (5.8)     Urine (mL/kg/hr) 500 (0.2) 1325 (0.6)     Stool  0 (0)     Total Output  500 1325      Net +230 -775             Urine Occurrence 3 x      Stool Occurrence 5 x 1 x           Physical Exam   Constitutional: He appears well-nourished. No distress (mild when LLE examined).   Cardiovascular: Normal rate and regular rhythm.    Pulmonary/Chest: Effort normal. No respiratory distress.   Musculoskeletal:   RLE with chronic venous stasis-related changes; no cellulitis.  LLE with significant cellulitic changes diffusely with sparing only of the distal food and superior-medial thigh.  This has improved over the past couple of days but appears mostly similar to yesterday.  Incision clean and dry.  No fluctuance. +induration.  Blanching erythema.  Tenderness significantly improved   Nursing note and vitals reviewed.      Significant Labs:  CBC:     Recent Labs  Lab 12/20/17  0459   WBC 11.08   RBC 3.86*   HGB 13.1*   HCT 40.0      *   MCH 33.9*   MCHC 32.8     CMP:     Recent Labs  Lab 12/20/17  0459   *   CALCIUM 9.4   ALBUMIN 2.2*   PROT 7.1      K 4.4   CO2 32*      BUN 29*   CREATININE 1.2   ALKPHOS 91   ALT 20   AST 22   BILITOT 0.4     Significant Diagnostics:  None new to review    Assessment/Plan:     * Cellulitis of left lower extremity without foot    -Significant improvement in pain; some improvement in surface area of erythema over the past couple days after change in Abx  -Incision site looks good; daily wound dressing changes  -Cultures still pending/no growth  -Recommend trending CRP - downtrending  -ID consulted; appreciate assistance  -Will continue to follow            Denny Domínguez Jr., MD  General Surgery  [facility]

## 2017-12-20 NOTE — ASSESSMENT & PLAN NOTE
62 M with LLE cellulitis, erythema, warmth and swelling primarily to knee with redness and warmth tracking to groin on medial and lateral aspects of L thigh. Initially on vanc and zosyn with minimal improvement. Currently on cefazolin. Minimal improvement in exam last 24 hours.     Recs  - continue cefazolin 1g q8hrs; would consider re-imaging if improvement continues to be slow  - repeat blood cultures NGTD

## 2017-12-20 NOTE — SUBJECTIVE & OBJECTIVE
Interval History: Pain unchanged from yesterday.  Remains with leg elevated.  Some improvement in edema.  Afebrile and HD stable.  No tachycardia.    Medications:  Continuous Infusions:  Scheduled Meds:   aspirin  81 mg Oral Daily    carvedilol  6.25 mg Oral BID WM    ceFAZolin (ANCEF) IVPB  1 g Intravenous Q8H    clopidogrel  75 mg Oral Daily    clorazepate  15 mg Oral BID    enoxaparin  40 mg Subcutaneous Daily    hydroCHLOROthiazide  12.5 mg Oral Daily    insulin detemir  8 Units Subcutaneous BID    magnesium oxide  400 mg Oral BID    miconazole   Topical (Top) BID    nicotine  1 patch Transdermal Daily    simvastatin  40 mg Oral QHS     PRN Meds:calcium carbonate, dextrose 50%, dextrose 50%, glucagon (human recombinant), glucose, glucose, hydrocodone-acetaminophen 5-325mg, influenza, insulin aspart, loperamide, lorazepam, morphine, pneumoc 13-jose g conj-dip cr(PF), sodium chloride 0.9%     Review of patient's allergies indicates:   Allergen Reactions    Iodine and iodide containing products     Shellfish containing products      Objective:     Vital Signs (Most Recent):  Temp: 99.3 °F (37.4 °C) (12/20/17 0756)  Pulse: 69 (12/20/17 0756)  Resp: 18 (12/20/17 0756)  BP: 120/71 (12/20/17 0756)  SpO2: 96 % (12/20/17 0425) Vital Signs (24h Range):  Temp:  [96.4 °F (35.8 °C)-99.3 °F (37.4 °C)] 99.3 °F (37.4 °C)  Pulse:  [63-76] 69  Resp:  [12-19] 18  SpO2:  [87 %-97 %] 96 %  BP: (109-124)/(62-76) 120/71     Weight: 95 kg (209 lb 6.4 oz)  Body mass index is 34.85 kg/m².    Intake/Output - Last 3 Shifts       12/18 0700 - 12/19 0659 12/19 0700 - 12/20 0659 12/20 0700 - 12/21 0659    P.O. 530 500     IV Piggyback 200 50     Total Intake(mL/kg) 730 (7.7) 550 (5.8)     Urine (mL/kg/hr) 500 (0.2) 1325 (0.6)     Stool  0 (0)     Total Output 500 1325      Net +230 -775             Urine Occurrence 3 x      Stool Occurrence 5 x 1 x           Physical Exam   Constitutional: He appears well-nourished. No distress  (mild when LLE examined).   Cardiovascular: Normal rate and regular rhythm.    Pulmonary/Chest: Effort normal. No respiratory distress.   Musculoskeletal:   RLE with chronic venous stasis-related changes; no cellulitis.  LLE with significant cellulitic changes diffusely with sparing only of the distal food and superior-medial thigh.  This has improved over the past couple of days but appears mostly similar to yesterday.  Incision clean and dry.  No fluctuance. +induration.  Blanching erythema.  Tenderness significantly improved   Nursing note and vitals reviewed.      Significant Labs:  CBC:     Recent Labs  Lab 12/20/17 0459   WBC 11.08   RBC 3.86*   HGB 13.1*   HCT 40.0      *   MCH 33.9*   MCHC 32.8     CMP:     Recent Labs  Lab 12/20/17 0459   *   CALCIUM 9.4   ALBUMIN 2.2*   PROT 7.1      K 4.4   CO2 32*      BUN 29*   CREATININE 1.2   ALKPHOS 91   ALT 20   AST 22   BILITOT 0.4     Significant Diagnostics:  None new to review

## 2017-12-20 NOTE — ASSESSMENT & PLAN NOTE
Multiple unformed stools for in the previous two days. Associated with distention, hyperactive bowel sounds and diffuse abdominal tenderness. On antibiotics since 12/12, on clindamycin since 12/15. Improved 12/18    Recs  - C diff negative; assumed to be antibiotic associated diarrhea   - KUB with questionable small bowel ileus  - old, previously repaired umbilical hernia that appears to be causing some discomfort; could benefit from surgical evaluation of abdomen

## 2017-12-20 NOTE — ASSESSMENT & PLAN NOTE
-Significant improvement in pain; some improvement in surface area of erythema over the past couple days after change in Abx  -Incision site looks good; daily wound dressing changes  -Cultures still pending/no growth  -Recommend trending CRP - downtrending  -ID consulted; appreciate assistance  -Will continue to follow

## 2017-12-20 NOTE — PROGRESS NOTES
[facility]  Infectious Disease  Progress Note    Patient Name: Reji Guerra  MRN: [id number]  Admission Date: 12/12/2017  Length of Stay: 8 days  Attending Physician: Berkley Johnson MD  Primary Care Provider: [facility]    Isolation Status: No active isolations  Assessment/Plan:      Diarrhea    Multiple unformed stools for in the previous two days. Associated with distention, hyperactive bowel sounds and diffuse abdominal tenderness. On antibiotics since 12/12, on clindamycin since 12/15. Improved 12/18    Recs  - C diff negative; assumed to be antibiotic associated diarrhea   - KUB with questionable small bowel ileus  - old, previously repaired umbilical hernia that appears to be causing some discomfort; could benefit from surgical evaluation of abdomen         Cellulitis of left lower extremity    62 M with LLE cellulitis, erythema, warmth and swelling primarily to knee with redness and warmth tracking to groin on medial and lateral aspects of L thigh. Initially on vanc and zosyn with minimal improvement. Currently on cefazolin. Minimal improvement in exam last 24 hours.     Recs  - continue cefazolin 1g q8hrs; would consider re-imaging if improvement continues to be slow  - repeat blood cultures NGTD            Anticipated Disposition: unclear    Thank you for your consult. I will follow-up with patient. Please contact us if you have any additional questions.    HALEY Mcmillan  Infectious Disease  [facility]    Subjective:     Principal Problem:Cellulitis of left lower extremity without foot    HPI: 62M with history of CAD (s/p PCI with three stents), DMII, HTN, HLD who presented to ED on 12/12 with left lower extremity swelling and erythema. He reports that the swelling and redness began a few days before admission, worsening until he was unable to bear weight on his left leg. He denies fevers and chills while at home, recent trauma  to his L leg. He has had no aquatic exposures.     Hospital course significant for initial improvement (per patient, though imaging in chart does not suggest there was improvement) with vancomycin and zosyn with transition to clindamycin on 12/15. He has not had much clinical improvement since transitioning to clindamycin. Ortho consulted initially because of concern for septic arthritis (knee) but ruled out clinically (tap deferred due to surrounding cellulitis). Venous US of lower extremity was without evidence of DVT and MRI showed nonspecific subcutaneous edema and cellulitis without abscess, osteomyelitis, septic joint of left lower leg. Patient has remained afebrile but with continuing mild leukocytosis (up to 15, 13.8 today). Blood cultures NGTD. Urine culture NGTD. CXR not suggestive of consolidation.     Patient has had many loose bowel movements and is complaining of abdominal pain and distention.   Interval History: Slight improvement in pain in leg. Minimal improvement on exam. Continues to report occasional loose stools.     Review of Systems   Constitutional: Negative for chills and fever.   HENT: Negative for sore throat.    Eyes: Negative for photophobia.   Respiratory: Negative for cough, shortness of breath and wheezing.    Cardiovascular: Negative for chest pain and palpitations.   Gastrointestinal: Positive for abdominal distention and diarrhea. Negative for abdominal pain, nausea and vomiting.   Genitourinary: Negative for dysuria and flank pain.   Musculoskeletal: Negative for neck stiffness.   Skin: Positive for rash.   Neurological: Negative for facial asymmetry, weakness and headaches.   Psychiatric/Behavioral: Negative for confusion.     Objective:     Vital Signs (Most Recent):  Temp: 97.6 °F (36.4 °C) (12/20/17 1128)  Pulse: 65 (12/20/17 1128)  Resp: 18 (12/20/17 1128)  BP: 134/61 (12/20/17 1128)  SpO2: 98 % (12/20/17 1114) Vital Signs (24h Range):  Temp:  [97.5 °F (36.4 °C)-99.3 °F (37.4  °C)] 97.6 °F (36.4 °C)  Pulse:  [63-76] 65  Resp:  [12-18] 18  SpO2:  [87 %-99 %] 98 %  BP: (110-134)/(61-76) 134/61     Weight: 95 kg (209 lb 6.4 oz)  Body mass index is 34.85 kg/m².    Estimated Creatinine Clearance: 65.9 mL/min (based on SCr of 1.2 mg/dL).    Physical Exam   Constitutional: He appears well-developed and well-nourished. No distress.   HENT:   Head: Normocephalic and atraumatic.   Mouth/Throat: Oropharynx is clear and moist.   Eyes: Conjunctivae and EOM are normal. No scleral icterus.   Neck: Normal range of motion.   Cardiovascular: Normal rate and normal heart sounds.    No murmur heard.  Pulmonary/Chest: Effort normal and breath sounds normal. No stridor. No respiratory distress. He has no wheezes. He has no rales.   Abdominal: Soft. Bowel sounds are normal. He exhibits distension. There is tenderness. There is no rebound and no guarding.   Periumbilical tenderness     Musculoskeletal:   LLE with erythema, warmth and edema from toes to knee, with erythema tracking medial aspect of thigh to groin as well as lateral aspect of thigh to buttocks--minimally improved from previous; less TTP  RLE with venous stasis dermatitis         Lymphadenopathy:     He has no cervical adenopathy.   Neurological: He is alert. He exhibits normal muscle tone.   Skin: No rash noted. He is not diaphoretic.       Significant Labs:   Blood Culture:   Recent Labs  Lab 12/12/17  1119 12/12/17  1226 12/18/17  1512   LABBLOO No growth after 5 days. No growth after 5 days. No Growth to date  No Growth to date  No Growth to date  No Growth to date     CBC:   Recent Labs  Lab 12/19/17  0410 12/20/17  0459   WBC 13.10* 11.08   HGB 13.0* 13.1*   HCT 39.9* 40.0    315     CMP:   Recent Labs  Lab 12/19/17  0410 12/20/17  0459    140   K 4.6 4.4    100   CO2 30* 32*   * 118*   BUN 27* 29*   CREATININE 1.3 1.2   CALCIUM 9.4 9.4   PROT 6.8 7.1   ALBUMIN 2.2* 2.2*   BILITOT 0.3 0.4   ALKPHOS 93 91   AST 20  22   ALT 24 20   ANIONGAP 8 8   EGFRNONAA 58* >60     Urine Culture:   Recent Labs  Lab 12/12/17  1207   LABURIN No significant growth     Urine Studies:   Recent Labs  Lab 12/12/17  1207   COLORU Yellow   APPEARANCEUA Clear   PHUR 7.0   SPECGRAV <=1.005*   PROTEINUA 2+*   GLUCUA Negative   KETONESU Negative   BILIRUBINUA Negative   OCCULTUA 1+*   NITRITE Negative   UROBILINOGEN 1.0   LEUKOCYTESUR Negative   RBCUA 1   WBCUA 0   BACTERIA Rare   SQUAMEPITHEL 2   HYALINECASTS 0     Wound Culture:   Recent Labs  Lab 12/18/17  1343   LABAERO No growth       Significant Imaging:   Imaging Results          X-Ray Abdomen AP 1 View (Final result)  Result time 12/19/17 08:04:21    Final result by Jorge Allred MD (12/19/17 08:04:21)                 Impression:     Localized proximal small bowel ileus question.      Electronically signed by: ANTONIO ALLRED MD  Date:     12/19/17  Time:    08:04              Narrative:    Single view of the abdomen, cervical mental opacities from anterior abdominal wall repair graft left periumbilical.  Minimal mild distention proximal small bowel.  Nondistended air stomach and colon.  Lower pelvis not included on exam.                             X-Ray Chest 1 View (Final result)  Result time 12/18/17 08:13:11    Final result by Jorge Allred MD (12/18/17 08:13:11)                 Impression:     Stable chest.  No active process.      Electronically signed by: ANTONIO ALLRED MD  Date:     12/18/17  Time:    08:13              Narrative:    Single view chest, comparison 12/12/2017.  Cardiomegaly stable.  Incomplete inspiration, interstitial markings remain mildly prominent.  There are chronic rib deformity left lateral fifth sixth and seventh ribs.                             MRI Lower Extremity W WO Contrast Left (Final result)  Result time 12/15/17 16:53:12    Final result by Jorge Allred MD (12/15/17 16:53:12)                 Impression:     Nonspecific  subcutaneous edema and Cellulitis without abscess, osteomyelitis, septic joint left lower leg.      Electronically signed by: ANTONIO ALLRED MD  Date:     12/15/17  Time:    16:53              Narrative:    Comparison ultrasound vein left leg, radiographs left knee dating back to 12/12/17.  MR on left tibia from knee to ankle without, and with gadovist 10 cc intravenous.    Asymmetrical subcutaneous edema left lower extremity compared to contralateral side.  Bone and joint structures normal.  No fracture dislocation.  Neurovascular structures appear intact.  Muscle tendon, ligament structures normal.  No abscess.  Heterogeneous enhancement of subcutaneous tissues, no unusual enhancement musculoskeletal structures otherwise.                             X-Ray Knee 3 View Left (Final result)  Result time 12/13/17 12:18:14    Final result by Rob Valle MD (12/13/17 12:18:14)                 Impression:     As above.      Electronically signed by: ROB VALLE MD  Date:     12/13/17  Time:    12:18              Narrative:    Knee 3 views left.    Findings: 3 views left. There is no fracture, dislocation, or bony erosion identified.                             X-Ray Chest AP Portable (Final result)  Result time 12/12/17 12:14:25    Final result by Luan Sutherland MD (12/12/17 12:14:25)                 Impression:        No acute radiographic findings on single view of the chest.      Electronically signed by: LUAN SUTHERLAND MD  Date:     12/12/17  Time:    12:14              Narrative:    Comparison: None    Technique: Single view of the chest.    Findings: Cardiac silhouette is enlarged.  There is aortic atherosclerosis.  Lungs show no evidence of significant focal consolidation, large effusion, or pneumothorax.  The bones demonstrate degenerative changes of the spine and shoulders.                             US Lower Extremity Veins Left (Final result)  Result time 12/12/17 11:54:55    Final result by Luan CARRILLO  MD Balta (12/12/17 11:54:55)                 Impression:         No evidence of deep vein thrombosisin the left lower extremity.    There is lower extremity edema.        Electronically signed by: JH SUTHERLAND MD  Date:     12/12/17  Time:    11:54              Narrative:    Comparison: None.    Findings: There is no evidence of venous thrombosis in the bilateral common femoral, left femoral, greater saphenous, popliteal, posterior tibial, anterior tibial, and peroneal veins.                            Antibiotics     Start     Stop Route Frequency Ordered    12/18/17 1530  ceFAZolin (ANCEF) 1 gram in dextrose 5 % 50 mL IVPB (premix)      -- IV Every 8 hours (non-standard times) 12/18/17 1416

## 2017-12-20 NOTE — PT/OT/SLP PROGRESS
Occupational Therapy   Treatment    Name: Reji Guerra  MRN: [id number]  Admitting Diagnosis:  Cellulitis of left lower extremity without foot       Recommendations:     Discharge Recommendations: home with home health (but not eligible for HH per .  Pt. will need 24/7 supervision and assist at home)  Discharge Equipment Recommendations:  bedside commode, bath bench  Barriers to discharge:  Inaccessible home environment    Subjective     Communicated with: nurse prior to session.  Pain/Comfort:  · Pain Rating 1: 10/10  · Location - Side 1: Left  · Location - Orientation 1: lower  · Location 1: leg  · Pain Addressed 1: Reposition, Distraction, Nurse notified, Cessation of Activity  · Pain Rating Post-Intervention 1: 10/10    Objective:     Patient found with: telemetry    General Precautions: Standard, fall   Orthopedic Precautions:Full weight bearing   Braces: N/A     Occupational Performance:    Bed Mobility:    · Patient completed Rolling/Turning to Right with stand by assistance, with side rail and HOB ~30 elevated; increased time with mod vc for tech  · Patient completed Scooting/Bridging with stand by assistance, with side rail and increased time  · Patient completed Supine to Sit with contact guard assistance, with side rail and increased time  · Patient completed Sit to Supine with minimum assistance     Functional Mobility/Transfers:  · Patient completed Sit <> Stand Transfer with contact guard assistance  with  rolling walker with increased time; slow transition 2/2 pain LLE  · Patient completed Toilet Transfer Stand Pivot technique with contact guard assistance with  bedside commode and rolling walker with increase time    Activities of Daily Living:  · Grooming: contact guard assistance standing at sink with RW only for hand hygiene after toileting; declined other tasks 2/2 stated already done prior to OT visit.  · LB Dressing: dependence L foot shoe cover 2/2 bandage to L foot; limioted  bending and crossing legs due to LLE pain  · Toileting: minimum assistance for clothes management; increased time with task; pt anxious and verbose; extremely apologetic throughout session therese toileting;    Patient left HOB elevated with all lines intact, call button in reach, nurse notified and sister present    [facility] 6 Click:  [facility] Total Score: 18    Treatment & Education:  Role of OT And POC; purpose of visit; see above ADLS and mobility; fx ambulation with RW and CGA to sink and back to bed; slow moving 2/2 pain LLE  Education:    Assessment:     Reji Guerra is a 62 y.o. male with a medical diagnosis of Cellulitis of left lower extremity without foot.  He presents with slow progress toward goals.  Limited by LLE pain @ reported 10/10 with transitional movement OOB and to BSC and fx ambulation in room with RW. Pt. will need 24/7 supervision and assist at discharge.  Continue with OT POC.  Performance deficits affecting function are weakness, impaired balance, impaired self care skills, impaired functional mobilty, impaired endurance, pain, gait instability, edema, impaired skin, decreased safety awareness, other (comment), decreased lower extremity function (anxiety).      Rehab Prognosis:  good; patient would benefit from acute skilled OT services to address these deficits and reach maximum level of function.       Plan:     Patient to be seen 5 x/week to address the above listed problems via self-care/home management, therapeutic activities, therapeutic exercises  · Plan of Care Expires: 01/15/18  · Plan of Care Reviewed with: patient    This Plan of care has been discussed with the patient who was involved in its development and understands and is in agreement with the identified goals and treatment plan    GOALS:    Occupational Therapy Goals        Problem: Occupational Therapy Goal    Goal Priority Disciplines Outcome Interventions   Occupational Therapy Goal     OT, PT/OT Ongoing (interventions  implemented as appropriate)    Description:  Goals to be met by: 12/23/2017    Patient will increase functional independence with ADLs by performing:    LE Dressing with Supervision.  Grooming while standing at sink with Supervision.  Toileting from bedside commode with Supervision for hygiene and clothing management.   Supine to sit with Modified [place].  Toilet transfer to bedside commode with Supervision.                      Time Tracking:     OT Date of Treatment: 12/20/17  OT Start Time: 1331  OT Stop Time: 1411  OT Total Time (min): 40 min    Billable Minutes:Self Care/Home Management 30  Therapeutic Activity 10  Total Time 40    Chiquita Anton OT  12/20/2017

## 2017-12-20 NOTE — SUBJECTIVE & OBJECTIVE
Interval History: Slight improvement in pain in leg. Minimal improvement on exam. Continues to report occasional loose stools.     Review of Systems   Constitutional: Negative for chills and fever.   HENT: Negative for sore throat.    Eyes: Negative for photophobia.   Respiratory: Negative for cough, shortness of breath and wheezing.    Cardiovascular: Negative for chest pain and palpitations.   Gastrointestinal: Positive for abdominal distention and diarrhea. Negative for abdominal pain, nausea and vomiting.   Genitourinary: Negative for dysuria and flank pain.   Musculoskeletal: Negative for neck stiffness.   Skin: Positive for rash.   Neurological: Negative for facial asymmetry, weakness and headaches.   Psychiatric/Behavioral: Negative for confusion.     Objective:     Vital Signs (Most Recent):  Temp: 97.6 °F (36.4 °C) (12/20/17 1128)  Pulse: 65 (12/20/17 1128)  Resp: 18 (12/20/17 1128)  BP: 134/61 (12/20/17 1128)  SpO2: 98 % (12/20/17 1114) Vital Signs (24h Range):  Temp:  [97.5 °F (36.4 °C)-99.3 °F (37.4 °C)] 97.6 °F (36.4 °C)  Pulse:  [63-76] 65  Resp:  [12-18] 18  SpO2:  [87 %-99 %] 98 %  BP: (110-134)/(61-76) 134/61     Weight: 95 kg (209 lb 6.4 oz)  Body mass index is 34.85 kg/m².    Estimated Creatinine Clearance: 65.9 mL/min (based on SCr of 1.2 mg/dL).    Physical Exam   Constitutional: He appears well-developed and well-nourished. No distress.   HENT:   Head: Normocephalic and atraumatic.   Mouth/Throat: Oropharynx is clear and moist.   Eyes: Conjunctivae and EOM are normal. No scleral icterus.   Neck: Normal range of motion.   Cardiovascular: Normal rate and normal heart sounds.    No murmur heard.  Pulmonary/Chest: Effort normal and breath sounds normal. No stridor. No respiratory distress. He has no wheezes. He has no rales.   Abdominal: Soft. Bowel sounds are normal. He exhibits distension. There is tenderness. There is no rebound and no guarding.   Periumbilical tenderness     Musculoskeletal:    LLE with erythema, warmth and edema from toes to knee, with erythema tracking medial aspect of thigh to groin as well as lateral aspect of thigh to buttocks--minimally improved from previous; less TTP  RLE with venous stasis dermatitis         Lymphadenopathy:     He has no cervical adenopathy.   Neurological: He is alert. He exhibits normal muscle tone.   Skin: No rash noted. He is not diaphoretic.       Significant Labs:   Blood Culture:   Recent Labs  Lab 12/12/17  1119 12/12/17  1226 12/18/17  1512   LABBLOO No growth after 5 days. No growth after 5 days. No Growth to date  No Growth to date  No Growth to date  No Growth to date     CBC:   Recent Labs  Lab 12/19/17  0410 12/20/17  0459   WBC 13.10* 11.08   HGB 13.0* 13.1*   HCT 39.9* 40.0    315     CMP:   Recent Labs  Lab 12/19/17  0410 12/20/17  0459    140   K 4.6 4.4    100   CO2 30* 32*   * 118*   BUN 27* 29*   CREATININE 1.3 1.2   CALCIUM 9.4 9.4   PROT 6.8 7.1   ALBUMIN 2.2* 2.2*   BILITOT 0.3 0.4   ALKPHOS 93 91   AST 20 22   ALT 24 20   ANIONGAP 8 8   EGFRNONAA 58* >60     Urine Culture:   Recent Labs  Lab 12/12/17  1207   LABURIN No significant growth     Urine Studies:   Recent Labs  Lab 12/12/17  1207   COLORU Yellow   APPEARANCEUA Clear   PHUR 7.0   SPECGRAV <=1.005*   PROTEINUA 2+*   GLUCUA Negative   KETONESU Negative   BILIRUBINUA Negative   OCCULTUA 1+*   NITRITE Negative   UROBILINOGEN 1.0   LEUKOCYTESUR Negative   RBCUA 1   WBCUA 0   BACTERIA Rare   SQUAMEPITHEL 2   HYALINECASTS 0     Wound Culture:   Recent Labs  Lab 12/18/17  1343   LABAERO No growth       Significant Imaging:   Imaging Results          X-Ray Abdomen AP 1 View (Final result)  Result time 12/19/17 08:04:21    Final result by Jorge Allred MD (12/19/17 08:04:21)                 Impression:     Localized proximal small bowel ileus question.      Electronically signed by: ANTONIO ALLRED MD  Date:     12/19/17  Time:    08:04               Narrative:    Single view of the abdomen, cervical mental opacities from anterior abdominal wall repair graft left periumbilical.  Minimal mild distention proximal small bowel.  Nondistended air stomach and colon.  Lower pelvis not included on exam.                             X-Ray Chest 1 View (Final result)  Result time 12/18/17 08:13:11    Final result by Jorge Allred MD (12/18/17 08:13:11)                 Impression:     Stable chest.  No active process.      Electronically signed by: ANTONIO ALLRED MD  Date:     12/18/17  Time:    08:13              Narrative:    Single view chest, comparison 12/12/2017.  Cardiomegaly stable.  Incomplete inspiration, interstitial markings remain mildly prominent.  There are chronic rib deformity left lateral fifth sixth and seventh ribs.                             MRI Lower Extremity W WO Contrast Left (Final result)  Result time 12/15/17 16:53:12    Final result by Jorge Allred MD (12/15/17 16:53:12)                 Impression:     Nonspecific subcutaneous edema and Cellulitis without abscess, osteomyelitis, septic joint left lower leg.      Electronically signed by: ANTONIO ALLRED MD  Date:     12/15/17  Time:    16:53              Narrative:    Comparison ultrasound vein left leg, radiographs left knee dating back to 12/12/17.  MR on left tibia from knee to ankle without, and with gadovist 10 cc intravenous.    Asymmetrical subcutaneous edema left lower extremity compared to contralateral side.  Bone and joint structures normal.  No fracture dislocation.  Neurovascular structures appear intact.  Muscle tendon, ligament structures normal.  No abscess.  Heterogeneous enhancement of subcutaneous tissues, no unusual enhancement musculoskeletal structures otherwise.                             X-Ray Knee 3 View Left (Final result)  Result time 12/13/17 12:18:14    Final result by Rob Willard MD (12/13/17 12:18:14)                 Impression:      As above.      Electronically signed by: ULISES VALLE MD  Date:     12/13/17  Time:    12:18              Narrative:    Knee 3 views left.    Findings: 3 views left. There is no fracture, dislocation, or bony erosion identified.                             X-Ray Chest AP Portable (Final result)  Result time 12/12/17 12:14:25    Final result by Luan Gifford MD (12/12/17 12:14:25)                 Impression:        No acute radiographic findings on single view of the chest.      Electronically signed by: LUAN GIFFORD MD  Date:     12/12/17  Time:    12:14              Narrative:    Comparison: None    Technique: Single view of the chest.    Findings: Cardiac silhouette is enlarged.  There is aortic atherosclerosis.  Lungs show no evidence of significant focal consolidation, large effusion, or pneumothorax.  The bones demonstrate degenerative changes of the spine and shoulders.                             US Lower Extremity Veins Left (Final result)  Result time 12/12/17 11:54:55    Final result by Luan Gifford MD (12/12/17 11:54:55)                 Impression:         No evidence of deep vein thrombosisin the left lower extremity.    There is lower extremity edema.        Electronically signed by: LUAN GIFFORD MD  Date:     12/12/17  Time:    11:54              Narrative:    Comparison: None.    Findings: There is no evidence of venous thrombosis in the bilateral common femoral, left femoral, greater saphenous, popliteal, posterior tibial, anterior tibial, and peroneal veins.                            Antibiotics     Start     Stop Route Frequency Ordered    12/18/17 1530  ceFAZolin (ANCEF) 1 gram in dextrose 5 % 50 mL IVPB (premix)      -- IV Every 8 hours (non-standard times) 12/18/17 1416

## 2017-12-21 PROBLEM — K42.9 RECURRENT UMBILICAL HERNIA: Status: ACTIVE | Noted: 2017-12-21

## 2017-12-21 LAB
ALBUMIN SERPL BCP-MCNC: 2.3 G/DL
ALP SERPL-CCNC: 90 U/L
ALT SERPL W/O P-5'-P-CCNC: 18 U/L
ANION GAP SERPL CALC-SCNC: 7 MMOL/L
ANISOCYTOSIS BLD QL SMEAR: SLIGHT
AST SERPL-CCNC: 23 U/L
BACTERIA SPEC AEROBE CULT: NO GROWTH
BASOPHILS # BLD AUTO: ABNORMAL K/UL
BASOPHILS NFR BLD: 0 %
BILIRUB SERPL-MCNC: 0.4 MG/DL
BILIRUB UR QL STRIP: NEGATIVE
BUN SERPL-MCNC: 31 MG/DL
CALCIUM SERPL-MCNC: 9.6 MG/DL
CHLORIDE SERPL-SCNC: 100 MMOL/L
CLARITY UR: CLEAR
CO2 SERPL-SCNC: 32 MMOL/L
COLOR UR: YELLOW
CREAT SERPL-MCNC: 1.4 MG/DL
CRP SERPL-MCNC: 114.04 MG/L
DIFFERENTIAL METHOD: ABNORMAL
EOSINOPHIL # BLD AUTO: ABNORMAL K/UL
EOSINOPHIL NFR BLD: 1 %
ERYTHROCYTE [DISTWIDTH] IN BLOOD BY AUTOMATED COUNT: 13.8 %
EST. GFR  (AFRICAN AMERICAN): >60 ML/MIN/1.73 M^2
EST. GFR  (NON AFRICAN AMERICAN): 53 ML/MIN/1.73 M^2
GLUCOSE SERPL-MCNC: 140 MG/DL
GLUCOSE UR QL STRIP: NEGATIVE
HCT VFR BLD AUTO: 40.8 %
HGB BLD-MCNC: 13.3 G/DL
HGB UR QL STRIP: NEGATIVE
KETONES UR QL STRIP: NEGATIVE
LEUKOCYTE ESTERASE UR QL STRIP: NEGATIVE
LYMPHOCYTES # BLD AUTO: ABNORMAL K/UL
LYMPHOCYTES NFR BLD: 19 %
MAGNESIUM SERPL-MCNC: 2.1 MG/DL
MCH RBC QN AUTO: 33.9 PG
MCHC RBC AUTO-ENTMCNC: 32.6 G/DL
MCV RBC AUTO: 104 FL
MONOCYTES # BLD AUTO: ABNORMAL K/UL
MONOCYTES NFR BLD: 5 %
MYELOCYTES NFR BLD MANUAL: 1 %
NEUTROPHILS NFR BLD: 68 %
NEUTS BAND NFR BLD MANUAL: 6 %
NITRITE UR QL STRIP: NEGATIVE
O+P STL TRI STN: NORMAL
PH UR STRIP: 6 [PH] (ref 5–8)
PHOSPHATE SERPL-MCNC: 4.8 MG/DL
PLATELET # BLD AUTO: 324 K/UL
PLATELET BLD QL SMEAR: ABNORMAL
PMV BLD AUTO: 11 FL
POCT GLUCOSE: 145 MG/DL (ref 70–110)
POCT GLUCOSE: 162 MG/DL (ref 70–110)
POCT GLUCOSE: 175 MG/DL (ref 70–110)
POCT GLUCOSE: 206 MG/DL (ref 70–110)
POTASSIUM SERPL-SCNC: 4.7 MMOL/L
PROT SERPL-MCNC: 7.3 G/DL
PROT UR QL STRIP: NEGATIVE
RBC # BLD AUTO: 3.92 M/UL
SODIUM SERPL-SCNC: 139 MMOL/L
SP GR UR STRIP: 1.01 (ref 1–1.03)
URN SPEC COLLECT METH UR: NORMAL
UROBILINOGEN UR STRIP-ACNC: NEGATIVE EU/DL
WBC # BLD AUTO: 11.64 K/UL

## 2017-12-21 PROCEDURE — 97530 THERAPEUTIC ACTIVITIES: CPT

## 2017-12-21 PROCEDURE — 94799 UNLISTED PULMONARY SVC/PX: CPT

## 2017-12-21 PROCEDURE — 27000221 HC OXYGEN, UP TO 24 HOURS

## 2017-12-21 PROCEDURE — 86141 C-REACTIVE PROTEIN HS: CPT

## 2017-12-21 PROCEDURE — 25000003 PHARM REV CODE 250: Performed by: STUDENT IN AN ORGANIZED HEALTH CARE EDUCATION/TRAINING PROGRAM

## 2017-12-21 PROCEDURE — 85007 BL SMEAR W/DIFF WBC COUNT: CPT

## 2017-12-21 PROCEDURE — S4991 NICOTINE PATCH NONLEGEND: HCPCS | Performed by: STUDENT IN AN ORGANIZED HEALTH CARE EDUCATION/TRAINING PROGRAM

## 2017-12-21 PROCEDURE — 97110 THERAPEUTIC EXERCISES: CPT

## 2017-12-21 PROCEDURE — 36415 COLL VENOUS BLD VENIPUNCTURE: CPT

## 2017-12-21 PROCEDURE — 84100 ASSAY OF PHOSPHORUS: CPT

## 2017-12-21 PROCEDURE — 63600175 PHARM REV CODE 636 W HCPCS: Performed by: FAMILY MEDICINE

## 2017-12-21 PROCEDURE — 99024 POSTOP FOLLOW-UP VISIT: CPT | Mod: ,,, | Performed by: SURGERY

## 2017-12-21 PROCEDURE — 85027 COMPLETE CBC AUTOMATED: CPT

## 2017-12-21 PROCEDURE — 83735 ASSAY OF MAGNESIUM: CPT

## 2017-12-21 PROCEDURE — 86580 TB INTRADERMAL TEST: CPT | Performed by: STUDENT IN AN ORGANIZED HEALTH CARE EDUCATION/TRAINING PROGRAM

## 2017-12-21 PROCEDURE — 87040 BLOOD CULTURE FOR BACTERIA: CPT

## 2017-12-21 PROCEDURE — 63600175 PHARM REV CODE 636 W HCPCS: Performed by: INTERNAL MEDICINE

## 2017-12-21 PROCEDURE — 80053 COMPREHEN METABOLIC PANEL: CPT

## 2017-12-21 PROCEDURE — 63600175 PHARM REV CODE 636 W HCPCS: Performed by: STUDENT IN AN ORGANIZED HEALTH CARE EDUCATION/TRAINING PROGRAM

## 2017-12-21 PROCEDURE — 81003 URINALYSIS AUTO W/O SCOPE: CPT

## 2017-12-21 PROCEDURE — 87086 URINE CULTURE/COLONY COUNT: CPT

## 2017-12-21 PROCEDURE — 94761 N-INVAS EAR/PLS OXIMETRY MLT: CPT

## 2017-12-21 PROCEDURE — 21400001 HC TELEMETRY ROOM

## 2017-12-21 RX ORDER — ACETAMINOPHEN 325 MG/1
650 TABLET ORAL ONCE
Status: COMPLETED | OUTPATIENT
Start: 2017-12-21 | End: 2017-12-21

## 2017-12-21 RX ADMIN — HYDROCODONE BITARTRATE AND ACETAMINOPHEN 1 TABLET: 5; 325 TABLET ORAL at 08:12

## 2017-12-21 RX ADMIN — MICONAZOLE NITRATE: 20 CREAM TOPICAL at 08:12

## 2017-12-21 RX ADMIN — CEFAZOLIN SODIUM 1 G: 1 SOLUTION INTRAVENOUS at 12:12

## 2017-12-21 RX ADMIN — NICOTINE 1 PATCH: 21 PATCH, EXTENDED RELEASE TRANSDERMAL at 08:12

## 2017-12-21 RX ADMIN — INSULIN ASPART 2 UNITS: 100 INJECTION, SOLUTION INTRAVENOUS; SUBCUTANEOUS at 04:12

## 2017-12-21 RX ADMIN — HYDROCODONE BITARTRATE AND ACETAMINOPHEN 1 TABLET: 5; 325 TABLET ORAL at 12:12

## 2017-12-21 RX ADMIN — ENOXAPARIN SODIUM 40 MG: 100 INJECTION SUBCUTANEOUS at 04:12

## 2017-12-21 RX ADMIN — CLOPIDOGREL 75 MG: 75 TABLET, FILM COATED ORAL at 08:12

## 2017-12-21 RX ADMIN — CEFAZOLIN SODIUM 1 G: 1 SOLUTION INTRAVENOUS at 04:12

## 2017-12-21 RX ADMIN — TUBERCULIN PURIFIED PROTEIN DERIVATIVE 5 UNITS: 5 INJECTION INTRADERMAL at 04:12

## 2017-12-21 RX ADMIN — MORPHINE SULFATE 4 MG: 10 INJECTION INTRAVENOUS at 04:12

## 2017-12-21 RX ADMIN — CEFAZOLIN SODIUM 1 G: 1 SOLUTION INTRAVENOUS at 08:12

## 2017-12-21 RX ADMIN — ACETAMINOPHEN 650 MG: 325 TABLET ORAL at 04:12

## 2017-12-21 RX ADMIN — MAGNESIUM OXIDE TAB 400 MG (241.3 MG ELEMENTAL MG) 400 MG: 400 (241.3 MG) TAB at 08:12

## 2017-12-21 RX ADMIN — CLORAZEPATE DIPOTASSIUM 15 MG: 3.75 TABLET ORAL at 08:12

## 2017-12-21 RX ADMIN — HYDROCODONE BITARTRATE AND ACETAMINOPHEN 1 TABLET: 5; 325 TABLET ORAL at 04:12

## 2017-12-21 RX ADMIN — ASPIRIN 81 MG: 81 TABLET, COATED ORAL at 08:12

## 2017-12-21 RX ADMIN — HYDROCHLOROTHIAZIDE 12.5 MG: 12.5 TABLET ORAL at 08:12

## 2017-12-21 RX ADMIN — CARVEDILOL 6.25 MG: 6.25 TABLET, FILM COATED ORAL at 08:12

## 2017-12-21 RX ADMIN — MORPHINE SULFATE 4 MG: 10 INJECTION INTRAVENOUS at 10:12

## 2017-12-21 RX ADMIN — SIMVASTATIN 40 MG: 20 TABLET, FILM COATED ORAL at 08:12

## 2017-12-21 NOTE — PROGRESS NOTES
The Sw faxed the pt's info to various nh's in the area via [employer].    3:42pm The pt was denied by [facility] via [employer] due to payer source.

## 2017-12-21 NOTE — ASSESSMENT & PLAN NOTE
WBC 14 on admit, waxing and waning improvement  Vanc and Zosyn switched to [place] HOD#3 with regression  On Ancef, stable today, may get repeat imaging  Compression with ACE wrap, elevating leg  WBC now stable at 11.64  PT/OT recs for home health and equipment are in, will order  Discuss with ID plans for future treatment

## 2017-12-21 NOTE — PROGRESS NOTES
Received voicemail from patient's sister that patient MAY be agreeable to nursing home placement if he can't walk independently. She stated she will be here this afternoon to discuss with patient again.    PASRR signed yesterday by MD.    Maci Mansfield, RN, [facility], CMSRN  RN Transition Navigator  [phone]

## 2017-12-21 NOTE — SUBJECTIVE & OBJECTIVE
Interval History: Had some foot pain overnight which resolved with pain medication and has not happened again but was new per patient.  Remains with leg elevated.  Some improvement in edema.  Afebrile and HD stable.  No tachycardia.    Medications:  Continuous Infusions:  Scheduled Meds:   aspirin  81 mg Oral Daily    carvedilol  6.25 mg Oral BID WM    ceFAZolin (ANCEF) IVPB  1 g Intravenous Q8H    clopidogrel  75 mg Oral Daily    clorazepate  15 mg Oral BID    enoxaparin  40 mg Subcutaneous Daily    hydroCHLOROthiazide  12.5 mg Oral Daily    insulin detemir  8 Units Subcutaneous BID    magnesium oxide  400 mg Oral BID    miconazole   Topical (Top) BID    nicotine  1 patch Transdermal Daily    simvastatin  40 mg Oral QHS     PRN Meds:calcium carbonate, dextrose 50%, dextrose 50%, glucagon (human recombinant), glucose, glucose, hydrocodone-acetaminophen 5-325mg, influenza, insulin aspart, loperamide, lorazepam, morphine, pneumoc 13-jose g conj-dip cr(PF), sodium chloride 0.9%     Review of patient's allergies indicates:   Allergen Reactions    Iodine and iodide containing products     Shellfish containing products      Objective:     Vital Signs (Most Recent):  Temp: 99.7 °F (37.6 °C) (12/21/17 1127)  Pulse: 72 (12/21/17 1127)  Resp: 18 (12/21/17 1127)  BP: 126/76 (12/21/17 1127)  SpO2: (!) 94 % (12/21/17 0652) Vital Signs (24h Range):  Temp:  [96.9 °F (36.1 °C)-99.7 °F (37.6 °C)] 99.7 °F (37.6 °C)  Pulse:  [72-74] 72  Resp:  [18-20] 18  SpO2:  [92 %-98 %] 94 %  BP: (112-152)/(60-83) 126/76     Weight: 95 kg (209 lb 6.4 oz)  Body mass index is 34.85 kg/m².    Intake/Output - Last 3 Shifts       12/19 0700 - 12/20 0659 12/20 0700 - 12/21 0659 12/21 0700 - 12/22 0659    P.O. 500 1111     IV Piggyback 50 150     Total Intake(mL/kg) 550 (5.8) 1261 (13.3)     Urine (mL/kg/hr) 1325 (0.6) 2500 (1.1)     Stool 0 (0) 0 (0)     Total Output 1325 2500      Net -775 -1239             Urine Occurrence  1 x     Stool  Occurrence 1 x 1 x           Physical Exam   Constitutional: He is oriented to person, place, and time. He appears well-developed and well-nourished. No distress (mild when LLE examined).   HENT:   Head: Normocephalic and atraumatic.   Eyes: Conjunctivae and EOM are normal. No scleral icterus.   Neck: Normal range of motion.   Cardiovascular: Normal rate and regular rhythm.    Pulmonary/Chest: Effort normal. No respiratory distress.   Abdominal: Soft. He exhibits distension (mild). There is no tenderness. There is no rebound and no guarding. A hernia (incarcerated recurrent umbilical hernia; partially reducible; non-tender; no overlying skin changes) is present.   Musculoskeletal:   RLE with chronic venous stasis-related changes; no cellulitis.  LLE with significant cellulitic changes diffusely with sparing only of the distal food and superior-medial thigh.  This has improved over the past couple of days but appears mostly similar to yesterday.  Incision clean and dry.  No fluctuance. +induration.  Blanching erythema.  Tenderness significantly improved   Neurological: He is alert and oriented to person, place, and time.   Nursing note and vitals reviewed.      Significant Labs:  CBC:     Recent Labs  Lab 12/21/17  0445   WBC 11.64   RBC 3.92*   HGB 13.3*   HCT 40.8      *   MCH 33.9*   MCHC 32.6     CMP:     Recent Labs  Lab 12/21/17  0445   *   CALCIUM 9.6   ALBUMIN 2.3*   PROT 7.3      K 4.7   CO2 32*      BUN 31*   CREATININE 1.4   ALKPHOS 90   ALT 18   AST 23   BILITOT 0.4     Significant Diagnostics:  None new to review

## 2017-12-21 NOTE — PLAN OF CARE
Problem: Patient Care Overview  Goal: Plan of Care Review  Outcome: Ongoing (interventions implemented as appropriate)  Pt has slept on and off during the night. He has complained of pain in his lower left leg which was relieved for a short time with pain medications.  Pt continues to receive antibiotics.  Blood glucose levels have been monitored and did require coverage. Pt changes his position frequently.  He has had no falls this shift.

## 2017-12-21 NOTE — PROGRESS NOTES
[facility]  Infectious Disease  Progress Note    Patient Name: Reji Guerra  MRN: [id number]  Admission Date: 12/12/2017  Length of Stay: 9 days  Attending Physician: Berkley Johnson MD  Primary Care Provider: [facility]    Isolation Status: No active isolations  Assessment/Plan:      Cellulitis of left lower extremity    62 M with LLE cellulitis, erythema, warmth and swelling primarily to knee with redness and warmth tracking to groin on medial and lateral aspects of L thigh. Initially on vanc and zosyn with minimal improvement. Currently on cefazolin. More significant improvement in exam last 24 hours but with concerning area posteriorly to calf.     Recs  - continue cefazolin 1g q8hrs; pain much improved--erythema and edema improved as well but concerning pocket posterior calf--would consider imaging to determine if collection that needs drainage   - repeat blood cultures NGTD            Anticipated Disposition: unclear    Thank you for your consult. I will follow-up with patient. Please contact us if you have any additional questions.    HALEY Mcmillan  Infectious Disease  [facility]    Subjective:     Principal Problem:Cellulitis of left lower extremity without foot    HPI: 62M with history of CAD (s/p PCI with three stents), DMII, HTN, HLD who presented to ED on 12/12 with left lower extremity swelling and erythema. He reports that the swelling and redness began a few days before admission, worsening until he was unable to bear weight on his left leg. He denies fevers and chills while at home, recent trauma to his L leg. He has had no aquatic exposures.     Hospital course significant for initial improvement (per patient, though imaging in chart does not suggest there was improvement) with vancomycin and zosyn with transition to clindamycin on 12/15. He has not had much clinical improvement since transitioning to clindamycin. Ortho  consulted initially because of concern for septic arthritis (knee) but ruled out clinically (tap deferred due to surrounding cellulitis). Venous US of lower extremity was without evidence of DVT and MRI showed nonspecific subcutaneous edema and cellulitis without abscess, osteomyelitis, septic joint of left lower leg. Patient has remained afebrile but with continuing mild leukocytosis (up to 15, 13.8 today). Blood cultures NGTD. Urine culture NGTD. CXR not suggestive of consolidation.     Patient has had many loose bowel movements and is complaining of abdominal pain and distention.     12/18 switched to cefazolin. Slight improvement  Interval History: Continued improvement in leg pain. Less redness and swelling. Patient without complaint this AM.     Review of Systems   Constitutional: Negative for chills and fever.   HENT: Negative for sore throat.    Eyes: Negative for photophobia.   Respiratory: Negative for cough and shortness of breath.    Cardiovascular: Negative for chest pain and palpitations.   Gastrointestinal: Positive for abdominal distention. Negative for abdominal pain, constipation, diarrhea, nausea and vomiting.   Genitourinary: Negative for dysuria.   Musculoskeletal: Negative for back pain and neck stiffness.   Skin: Negative for pallor and rash.   Neurological: Negative for dizziness, facial asymmetry, speech difficulty and weakness.   Psychiatric/Behavioral: Negative for confusion.     Objective:     Vital Signs (Most Recent):  Temp: 97.8 °F (36.6 °C) (12/21/17 0803)  Pulse: 72 (12/21/17 0803)  Resp: 20 (12/21/17 0803)  BP: (!) 152/83 (12/21/17 0803)  SpO2: (!) 92 % (12/21/17 0410) Vital Signs (24h Range):  Temp:  [96.9 °F (36.1 °C)-99.2 °F (37.3 °C)] 97.8 °F (36.6 °C)  Pulse:  [65-74] 72  Resp:  [18-20] 20  SpO2:  [92 %-99 %] 92 %  BP: (112-152)/(60-83) 152/83     Weight: 95 kg (209 lb 6.4 oz)  Body mass index is 34.85 kg/m².    Estimated Creatinine Clearance: 56.5 mL/min (based on SCr of 1.4  mg/dL).    Physical Exam   Constitutional: He appears well-developed and well-nourished. No distress.   HENT:   Head: Normocephalic and atraumatic.   Mouth/Throat: Oropharynx is clear and moist.   Eyes: Conjunctivae and EOM are normal. No scleral icterus.   Neck: Normal range of motion. Neck supple.   Cardiovascular: Normal rate, regular rhythm and normal heart sounds.    No murmur heard.  Pulmonary/Chest: Effort normal and breath sounds normal. No stridor. No respiratory distress. He has no wheezes. He has no rales.   Abdominal: Soft. Bowel sounds are normal. He exhibits distension. There is no tenderness. There is no rebound and no guarding.   Musculoskeletal:   LLE with erythema, warmth and edema from toes to knee, with erythema tracking medial aspect of thigh to groin as well as lateral aspect of thigh to buttocks--improving from previous; much less TTP  RLE with venous stasis dermatitis     Neurological: He is alert. He exhibits normal muscle tone.   Skin: No rash noted. He is not diaphoretic.       Significant Labs:   Blood Culture:   Recent Labs  Lab 12/12/17  1119 12/12/17  1226 12/18/17  1512   LABBLOO No growth after 5 days. No growth after 5 days. No Growth to date  No Growth to date  No Growth to date  No Growth to date  No Growth to date  No Growth to date     CBC:   Recent Labs  Lab 12/20/17  0459 12/21/17  0445   WBC 11.08 11.64   HGB 13.1* 13.3*   HCT 40.0 40.8    324     CMP:   Recent Labs  Lab 12/20/17  0459 12/21/17  0445    139   K 4.4 4.7    100   CO2 32* 32*   * 140*   BUN 29* 31*   CREATININE 1.2 1.4   CALCIUM 9.4 9.6   PROT 7.1 7.3   ALBUMIN 2.2* 2.3*   BILITOT 0.4 0.4   ALKPHOS 91 90   AST 22 23   ALT 20 18   ANIONGAP 8 7*   EGFRNONAA >60 53*     Urine Culture:   Recent Labs  Lab 12/12/17  1207   LABURIN No significant growth     Urine Studies:   Recent Labs  Lab 12/12/17  1207   COLORU Yellow   APPEARANCEUA Clear   PHUR 7.0   SPECGRAV <=1.005*   PROTEINUA 2+*    GLUCUA Negative   KETONESU Negative   BILIRUBINUA Negative   OCCULTUA 1+*   NITRITE Negative   UROBILINOGEN 1.0   LEUKOCYTESUR Negative   RBCUA 1   WBCUA 0   BACTERIA Rare   SQUAMEPITHEL 2   HYALINECASTS 0     Wound Culture:   Recent Labs  Lab 12/18/17  1343   LABAERO No growth       Significant Imaging:   Imaging Results          X-Ray Abdomen AP 1 View (Final result)  Result time 12/19/17 08:04:21    Final result by Jorge Allred MD (12/19/17 08:04:21)                 Impression:     Localized proximal small bowel ileus question.      Electronically signed by: ANTONIO ALLRED MD  Date:     12/19/17  Time:    08:04              Narrative:    Single view of the abdomen, cervical mental opacities from anterior abdominal wall repair graft left periumbilical.  Minimal mild distention proximal small bowel.  Nondistended air stomach and colon.  Lower pelvis not included on exam.                             X-Ray Chest 1 View (Final result)  Result time 12/18/17 08:13:11    Final result by Jorge Allred MD (12/18/17 08:13:11)                 Impression:     Stable chest.  No active process.      Electronically signed by: ANTONIO ALLRED MD  Date:     12/18/17  Time:    08:13              Narrative:    Single view chest, comparison 12/12/2017.  Cardiomegaly stable.  Incomplete inspiration, interstitial markings remain mildly prominent.  There are chronic rib deformity left lateral fifth sixth and seventh ribs.                             MRI Lower Extremity W WO Contrast Left (Final result)  Result time 12/15/17 16:53:12    Final result by Jorge Allred MD (12/15/17 16:53:12)                 Impression:     Nonspecific subcutaneous edema and Cellulitis without abscess, osteomyelitis, septic joint left lower leg.      Electronically signed by: ANTONIO ALLRED MD  Date:     12/15/17  Time:    16:53              Narrative:    Comparison ultrasound vein left leg, radiographs left knee dating  back to 12/12/17.  MR on left tibia from knee to ankle without, and with gadovist 10 cc intravenous.    Asymmetrical subcutaneous edema left lower extremity compared to contralateral side.  Bone and joint structures normal.  No fracture dislocation.  Neurovascular structures appear intact.  Muscle tendon, ligament structures normal.  No abscess.  Heterogeneous enhancement of subcutaneous tissues, no unusual enhancement musculoskeletal structures otherwise.                             X-Ray Knee 3 View Left (Final result)  Result time 12/13/17 12:18:14    Final result by Rob Valle MD (12/13/17 12:18:14)                 Impression:     As above.      Electronically signed by: ROB VALLE MD  Date:     12/13/17  Time:    12:18              Narrative:    Knee 3 views left.    Findings: 3 views left. There is no fracture, dislocation, or bony erosion identified.                             X-Ray Chest AP Portable (Final result)  Result time 12/12/17 12:14:25    Final result by Luan Gifford MD (12/12/17 12:14:25)                 Impression:        No acute radiographic findings on single view of the chest.      Electronically signed by: LUAN GIFFORD MD  Date:     12/12/17  Time:    12:14              Narrative:    Comparison: None    Technique: Single view of the chest.    Findings: Cardiac silhouette is enlarged.  There is aortic atherosclerosis.  Lungs show no evidence of significant focal consolidation, large effusion, or pneumothorax.  The bones demonstrate degenerative changes of the spine and shoulders.                             US Lower Extremity Veins Left (Final result)  Result time 12/12/17 11:54:55    Final result by Luan Gifford MD (12/12/17 11:54:55)                 Impression:         No evidence of deep vein thrombosisin the left lower extremity.    There is lower extremity edema.        Electronically signed by: LUAN GIFFORD MD  Date:     12/12/17  Time:    11:54               Narrative:    Comparison: None.    Findings: There is no evidence of venous thrombosis in the bilateral common femoral, left femoral, greater saphenous, popliteal, posterior tibial, anterior tibial, and peroneal veins.                            Antibiotics     Start     Stop Route Frequency Ordered    12/18/17 1530  ceFAZolin (ANCEF) 1 gram in dextrose 5 % 50 mL IVPB (premix)      -- IV Every 8 hours (non-standard times) 12/18/17 141

## 2017-12-21 NOTE — ASSESSMENT & PLAN NOTE
Restarted Carvedilol, HCTZ (prior home meds from 2 years ago)   Stable  Normotensive  Cardiac diet

## 2017-12-21 NOTE — PT/OT/SLP PROGRESS
Physical Therapy Treatment    Patient Name:  Reji Guerra   MRN:  [id number]    Recommendations:     Discharge Recommendations:  other (see comments) (Will need 24/7 supervision)   Discharge Equipment Recommendations: bath bench, bedside commode   Barriers to discharge: Decreased caregiver support    Assessment:     Reji Guerra is a 62 y.o. male admitted with a medical diagnosis of Cellulitis of left lower extremity without foot.  He presents with the following impairments/functional limitations:  weakness, gait instability, impaired balance, impaired endurance, pain, decreased safety awareness, impaired cognition, impaired functional mobilty, impaired self care skills, decreased ROM, decreased lower extremity function, impaired skin Patient with severe pain and appeared to have anxiety with PT session     Rehab Prognosis:  Fair; patient would benefit from acute skilled PT services to address these deficits and reach maximum level of function.      Recent Surgery: * No surgery found *      Plan:     During this hospitalization, patient to be seen 6 x/week to address the above listed problems via gait training, therapeutic activities, therapeutic exercises  · Plan of Care Expires:  01/19/18   Plan of Care Reviewed with: patient    Subjective     Communicated with primary nurse prior to session.  Patient found supine  upon PT entry to room, agreeable to treatment.      Chief Complaint: pain weakness  Patient comments/goals: get well and go home  Pain/Comfort:  · Pain Rating 1: 5/10  · Location - Side 1: Left  · Location 1: leg  · Pain Addressed 1: Distraction, Reposition  · Pain Rating Post-Intervention 1: 10/10    Patients cultural, spiritual, Muslim conflicts given the current situation:      Objective:     Patient found with: telemetry     General Precautions: Standard, fall   Orthopedic Precautions:N/A   Braces: N/A     Functional Mobility:  · Bed Mobility:     · Supine to Sit: minimum assistance  · Sit  to Supine: maximal assistance  · Transfers:     · Sit to Stand:  minimum assistance with rolling walker  · Gait: RW unable to take steps 2/2 pain  · Balance: Poor +      AM-PAC 6 CLICK MOBILITY  Turning over in bed (including adjusting bedclothes, sheets and blankets)?: 3  Sitting down on and standing up from a chair with arms (e.g., wheelchair, bedside commode, etc.): 3  Moving from lying on back to sitting on the side of the bed?: 3  Moving to and from a bed to a chair (including a wheelchair)?: 2  Need to walk in hospital room?: 2  Climbing 3-5 steps with a railing?: 2  Total Score: 15       Therapeutic Activities and Exercises:   na    Patient left HOB elevated with all lines intact, call button in reach and [facility] nurse present..    GOALS:    Physical Therapy Goals        Problem: Physical Therapy Goal    Goal Priority Disciplines Outcome Goal Variances Interventions   Physical Therapy Goal     PT/OT, PT Ongoing (interventions implemented as appropriate)     Description:  Goals to be met by: 2018     Patient will increase functional independence with mobility by performin. Supine to sit with Modified [place]  2. Sit to stand transfer with Modified [place]  3. Gait  x 150 feet with Modified [place] using Rolling Walker.              Problem: Physical Therapy Goal    Goal Priority Disciplines Outcome Goal Variances Interventions   Physical Therapy Goal     PT/OT, PT                      Time Tracking:     PT Received On: 17  PT Start Time: 955     PT Stop Time: 1020  PT Total Time (min): 25 min     Billable Minutes: Therapeutic Activity 25    Treatment Type: Treatment  PT/PTA: PT     PTA Visit Number: 0     Mike Brody, PT  2017

## 2017-12-21 NOTE — ASSESSMENT & PLAN NOTE
-No acute surgical intervention  -Patient is non-tender with no overlying skin changes; he is tolerating regular diet and having good bowel function with no nausea or emesis  -Would not be ideal to repair hernia with patient's current active infectious process for risk of mesh infection

## 2017-12-21 NOTE — PLAN OF CARE
"Problem: Physical Therapy Goal  Goal: Physical Therapy Goal  Goals to be met by: 2018     Patient will increase functional independence with mobility by performin. Supine to sit with Modified [place]  2. Sit to stand transfer with Modified [place]  3. Gait  x 150 feet with Modified [place] using Rolling Walker.      Outcome: Ongoing (interventions implemented as appropriate)  Patient with severe pain once up standing unable to attempt 2nd stands 2/2 pain   Patient returned to bed with c/o weakness and lightheadedness " I feel faint" Primary nurse at bedside   O2 sat 78% nasal cannula reapplied O2 to 3 liters return O2 sat 97%, /72  Patient given pain medication via IV and Patient with improved symptoms       "

## 2017-12-21 NOTE — SUBJECTIVE & OBJECTIVE
Interval History: NAEON. AF. VSS. Pain improving. Tolerating PO diet. Attempting to work with PT/OT.    Review of Systems   Constitutional: Negative for chills and fever.   HENT: Negative for sore throat.    Eyes: Negative for photophobia.   Respiratory: Negative for cough, shortness of breath and wheezing.    Cardiovascular: Negative for chest pain and palpitations.   Gastrointestinal: Positive for abdominal distention and diarrhea. Negative for abdominal pain, nausea and vomiting.   Genitourinary: Negative for dysuria and flank pain.   Musculoskeletal: Negative for neck stiffness.   Skin: Positive for color change (swelling).   Neurological: Negative for facial asymmetry, weakness and headaches.   Psychiatric/Behavioral: Negative for confusion.        Objective:     Vital Signs (Most Recent):  Temp: 96.9 °F (36.1 °C) (12/20/17 1606)  Pulse: 74 (12/20/17 1606)  Resp: 18 (12/20/17 1606)  BP: 112/60 (12/20/17 1606)  SpO2: 95 % (12/20/17 1540) Vital Signs (24h Range):  Temp:  [96.9 °F (36.1 °C)-99.3 °F (37.4 °C)] 96.9 °F (36.1 °C)  Pulse:  [63-76] 74  Resp:  [12-18] 18  SpO2:  [87 %-99 %] 95 %  BP: (110-134)/(60-76) 112/60     Weight: 95 kg (209 lb 6.4 oz)  Body mass index is 34.85 kg/m².    Intake/Output Summary (Last 24 hours) at 12/20/17 1832  Last data filed at 12/20/17 0830   Gross per 24 hour   Intake              305 ml   Output              400 ml   Net              -95 ml      Physical Exam   Constitutional: He is oriented to person, place, and time. He appears well-developed and well-nourished. No distress.   Resting in bed, appears older than stated age   HENT:   Head: Normocephalic and atraumatic.   Mouth/Throat: No oropharyngeal exudate.   Eyes: Conjunctivae and EOM are normal. Pupils are equal, round, and reactive to light.   Neck: Normal range of motion. Neck supple. No JVD present.   Cardiovascular: Normal rate, regular rhythm and normal heart sounds.    No murmur heard.  Pulmonary/Chest: Effort  normal. No respiratory distress. He has no wheezes (bilat lower lung wheezes). He has no rales.   Abdominal: Soft. Bowel sounds are normal. He exhibits distension (rotund abdomen ). He exhibits no mass. There is no tenderness.   Musculoskeletal: He exhibits edema (improving).   Able to range knee with some pain (UE & RLE without erythema, pain); chronic spinal osteoarthritis   Neurological: He is alert and oriented to person, place, and time. No cranial nerve deficit.   Skin: Skin is warm. Capillary refill takes less than 2 seconds. He is not diaphoretic. There is erythema.   LLE erythema and swelling decreased extending from mid calf to knee with erythema, warmth extends up medial aspect of thigh stable (improved), decreasdin swelling and erythema at foot; dry flaking on bilat feet    bilat old healed venous stasis ulcer scaling wounds, no drainage, no scrotal involvement, no LAD     Biopsy site with packing removed   Psychiatric: He has a normal mood and affect. His behavior is normal.   Nursing note and vitals reviewed.          Significant Labs:   CBC:   Recent Labs  Lab 12/19/17  0410 12/20/17  0459   WBC 13.10* 11.08   HGB 13.0* 13.1*   HCT 39.9* 40.0    315     CMP:   Recent Labs  Lab 12/19/17  0410 12/20/17  0459    140   K 4.6 4.4    100   CO2 30* 32*   * 118*   BUN 27* 29*   CREATININE 1.3 1.2   CALCIUM 9.4 9.4   PROT 6.8 7.1   ALBUMIN 2.2* 2.2*   BILITOT 0.3 0.4   ALKPHOS 93 91   AST 20 22   ALT 24 20   ANIONGAP 8 8   EGFRNONAA 58* >60     Tissues cultures pending  Blood cultures NGTD  Stool cultures pending    Significant Imaging: I have reviewed all pertinent imaging results/findings within the past 24 hours.

## 2017-12-21 NOTE — PLAN OF CARE
Problem: Patient Care Overview  Goal: Plan of Care Review  Outcome: Ongoing (interventions implemented as appropriate)  Plan of care reviewed with patient. Patient verbalized understanding. Fall/safety precautions maintained. Slip resistant socks on. Bed in lowest position, locked, call light within reach. Side rails up x's 2. Nurse instructed patient to notify staff for any assistance and pt verbalized complete understanding.     IV abx continued for tx of cellulitis, pt LLE re wrapped with ace bandage, elevation maintained to LLE through out shift, PRN morphine given for pt with moderate relief, stool studies sent to lab, mobility in bed encouraged to prevent skin breakdown, pt stbble, NAD noted.

## 2017-12-21 NOTE — ASSESSMENT & PLAN NOTE
Restarted Carvedilol, HCTZ (prior home meds from 2 years ago)   Stable at 112-152/60-83  Normotensive  Cardiac diet

## 2017-12-21 NOTE — PROGRESS NOTES
[facility] Hospital Medicine  Progress Note    Patient Name: Reji Guerra  MRN: [id number]  Patient Class: IP- Inpatient   Admission Date: 12/12/2017  Length of Stay: 8 days  Attending Physician: Berkley Johnson MD  Primary Care Provider: [facility]    Subjective:     Principal Problem:Cellulitis of left lower extremity without foot    HPI:  63 yo male w/ CAD (3 stents in 2003 w/MI), DMT2, HTN, HLD presents to ED with 1 week of LLE swelling and erythema, which just starting tracking up his inner thigh within the past two days. No trauma to his leg, no wounds on his foot. Pain with standing and walking. 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  He does have to rest 1/2 up a full flight of stairs but denies chest pain, shortness of breath, recent illness. He sleeps flat on just 1 pillow.    He hasn't had medical care in 2 years and hasn't taken any medications for 1 year.  He thinks he was supposed to keep taking the plavix.  Baseline dry cough, minimally productive with clear sputum. Denies CP, SOB, recent illness, N/V, diarrhea, constipation. Last BM day before admission.    Hospital Course:  Pt admitted with Vanc and Zosyn. Ortho consulted for possible joint involvement, recommendations. Slow clinical improvement.  WBC improved slowly as well. Pt remained afebrile through stay.  Abx switched to IV clinda to monitor for response but WBC and clinically pt regressed.  ID and surgery brought on board for additional recs.  Leg wrapped loosely and more dedicated to elevating leg.  Good improvement on HOD# 7  Pt still with complaints about diarrhea even though C Diff neg and on imodium.     Stool studies sent as patient had chronic intermittent diarrhea prior to admission.     Interval History: NAEON. AF. VSS. Pain improving. Tolerating PO diet. Attempting to work with PT/OT.    Review of Systems   Constitutional:  Negative for chills and fever.   HENT: Negative for sore throat.    Eyes: Negative for photophobia.   Respiratory: Negative for cough, shortness of breath and wheezing.    Cardiovascular: Negative for chest pain and palpitations.   Gastrointestinal: Positive for abdominal distention and diarrhea. Negative for abdominal pain, nausea and vomiting.   Genitourinary: Negative for dysuria and flank pain.   Musculoskeletal: Negative for neck stiffness.   Skin: Positive for color change (swelling).   Neurological: Negative for facial asymmetry, weakness and headaches.   Psychiatric/Behavioral: Negative for confusion.        Objective:     Vital Signs (Most Recent):  Temp: 96.9 °F (36.1 °C) (12/20/17 1606)  Pulse: 74 (12/20/17 1606)  Resp: 18 (12/20/17 1606)  BP: 112/60 (12/20/17 1606)  SpO2: 95 % (12/20/17 1540) Vital Signs (24h Range):  Temp:  [96.9 °F (36.1 °C)-99.3 °F (37.4 °C)] 96.9 °F (36.1 °C)  Pulse:  [63-76] 74  Resp:  [12-18] 18  SpO2:  [87 %-99 %] 95 %  BP: (110-134)/(60-76) 112/60     Weight: 95 kg (209 lb 6.4 oz)  Body mass index is 34.85 kg/m².    Intake/Output Summary (Last 24 hours) at 12/20/17 1832  Last data filed at 12/20/17 0830   Gross per 24 hour   Intake              305 ml   Output              400 ml   Net              -95 ml      Physical Exam   Constitutional: He is oriented to person, place, and time. He appears well-developed and well-nourished. No distress.   Resting in bed, appears older than stated age   HENT:   Head: Normocephalic and atraumatic.   Mouth/Throat: No oropharyngeal exudate.   Eyes: Conjunctivae and EOM are normal. Pupils are equal, round, and reactive to light.   Neck: Normal range of motion. Neck supple. No JVD present.   Cardiovascular: Normal rate, regular rhythm and normal heart sounds.    No murmur heard.  Pulmonary/Chest: Effort normal. No respiratory distress. He has no wheezes (bilat lower lung wheezes). He has no rales.   Abdominal: Soft. Bowel sounds are normal. He  exhibits distension (rotund abdomen ). He exhibits no mass. There is no tenderness.   Musculoskeletal: He exhibits edema (improving).   Able to range knee with some pain (UE & RLE without erythema, pain); chronic spinal osteoarthritis   Neurological: He is alert and oriented to person, place, and time. No cranial nerve deficit.   Skin: Skin is warm. Capillary refill takes less than 2 seconds. He is not diaphoretic. There is erythema.   LLE erythema and swelling decreased extending from mid calf to knee with erythema, warmth extends up medial aspect of thigh stable (improved), decreasdin swelling and erythema at foot; dry flaking on bilat feet    bilat old healed venous stasis ulcer scaling wounds, no drainage, no scrotal involvement, no LAD     Biopsy site with packing removed   Psychiatric: He has a normal mood and affect. His behavior is normal.   Nursing note and vitals reviewed.          Significant Labs:   CBC:   Recent Labs  Lab 12/19/17 0410 12/20/17  0459   WBC 13.10* 11.08   HGB 13.0* 13.1*   HCT 39.9* 40.0    315     CMP:   Recent Labs  Lab 12/19/17 0410 12/20/17  0459    140   K 4.6 4.4    100   CO2 30* 32*   * 118*   BUN 27* 29*   CREATININE 1.3 1.2   CALCIUM 9.4 9.4   PROT 6.8 7.1   ALBUMIN 2.2* 2.2*   BILITOT 0.3 0.4   ALKPHOS 93 91   AST 20 22   ALT 24 20   ANIONGAP 8 8   EGFRNONAA 58* >60     Tissues cultures pending  Blood cultures NGTD  Stool cultures pending    Significant Imaging: I have reviewed all pertinent imaging results/findings within the past 24 hours.    Assessment/Plan:      * Cellulitis of left lower extremity without foot    WBC 14 on admit, waxing and waning improvement  Vanc and Zosyn switched to Clinda HOD#3 with subsequent regression  US neg for DVT  On Ancef per ID recs  Improving  Compression with ACE wrap  Elevating leg  Biopsy site healing, appreciate Surgery following  WBC now 11.08  Will like attempt PO [facility] for possible home soon  PT/OT recs  for home health and equipmen      Elevated troponin    Improved to normal  EKG's without St segment elevation  No chest pain        Anxiety    Original home med Traxene restarted      Coronary artery disease involving native coronary artery of native heart without angina pectoris    Hx of 3 stents in angiogram in 2003  Restarted on ASA, plavix. Potentially able to stop plavix since stents were placed over 2 years ago  Trops trended down  F/u cardiology as outpatient  H/H 13/39 but with MCV of 105, Folate and B12 were normal  No chest pain      HTN (hypertension)    Restarted Carvedilol, HCTZ (prior home meds from 2 years ago)   Stable  Normotensive  Cardiac diet      Type 2 diabetes mellitus with hyperglycemia, without long-term current use of insulin    HgbA1C 6.5  On Low Dose Corrective scale  Gluc 118-191  On Detemir 8 units BID        VTE Risk Mitigation         Ordered     enoxaparin injection 40 mg  Daily     Route:  Subcutaneous        12/12/17 1624     Medium Risk of VTE  Once      12/12/17 1624              Andrew Lombardo MD  Department of Hospital Medicine   [facility]

## 2017-12-21 NOTE — PLAN OF CARE
Problem: Occupational Therapy Goal  Goal: Occupational Therapy Goal  Goals to be met by: 12/23/2017    Patient will increase functional independence with ADLs by performing:    LE Dressing with Supervision.  Grooming while standing at sink with Supervision.  Toileting from bedside commode with Supervision for hygiene and clothing management.   Supine to sit with Modified [place].  Toilet transfer to bedside commode with Supervision.     Outcome: Ongoing (interventions implemented as appropriate)  Reji Guerra is a 62 y.o. male with a medical diagnosis of Cellulitis of left lower extremity without foot.  He presents with increased pain in LLE 10/10.  Performance deficits affecting function are weakness, impaired endurance, impaired self care skills, impaired functional mobilty, gait instability, impaired balance, decreased lower extremity function, decreased safety awareness, pain, impaired skin.  Pt with increased pain and anxiety with activity which hinders participation/tolerance of therapy. Continue OT services to address functional goals, progressing as able.  MARLO Akbar/SHANNON

## 2017-12-21 NOTE — ASSESSMENT & PLAN NOTE
-Significant improvement in pain; some improvement in surface area of erythema over the past couple days after change in Abx  -Incision site looks good; daily wound dressing changes  -Cultures still pending/no growth  -trending CRP  -ID consulted; appreciate assistance  -Will continue to follow

## 2017-12-21 NOTE — PROGRESS NOTES
The Sw left a message for the pt's sister Verito([phone]) to return the call in reference to nh choices.

## 2017-12-21 NOTE — SUBJECTIVE & OBJECTIVE
Interval History: NAEON. AF. VSS. Pt's leg improving to remaining stable.  Still much pain with walking. Sister trying to find friends/family that can help with placement on discharge.    Review of Systems   Constitutional: Negative for chills and fever.   HENT: Negative for sore throat.    Eyes: Negative for photophobia.   Respiratory: Negative for cough and shortness of breath.    Cardiovascular: Negative for chest pain and palpitations.   Gastrointestinal: Positive for abdominal distention. Negative for abdominal pain, constipation, diarrhea, nausea and vomiting.   Genitourinary: Negative for dysuria.   Musculoskeletal: Negative for back pain and neck stiffness.   Skin: Negative for pallor and rash.   Neurological: Negative for dizziness, facial asymmetry, speech difficulty and weakness.   Psychiatric/Behavioral: Negative for confusion.     Objective:     Vital Signs (Most Recent):  Temp: 97.8 °F (36.6 °C) (12/21/17 0803)  Pulse: 72 (12/21/17 0803)  Resp: 20 (12/21/17 0803)  BP: (!) 152/83 (12/21/17 0803)  SpO2: (!) 94 % (12/21/17 0652) Vital Signs (24h Range):  Temp:  [96.9 °F (36.1 °C)-99.2 °F (37.3 °C)] 97.8 °F (36.6 °C)  Pulse:  [65-74] 72  Resp:  [18-20] 20  SpO2:  [92 %-98 %] 94 %  BP: (112-152)/(60-83) 152/83     Weight: 95 kg (209 lb 6.4 oz)  Body mass index is 34.85 kg/m².    Intake/Output Summary (Last 24 hours) at 12/21/17 0942  Last data filed at 12/21/17 0032   Gross per 24 hour   Intake              956 ml   Output             2100 ml   Net            -1144 ml      Physical Exam   Constitutional: He is oriented to person, place, and time. He appears well-developed and well-nourished. No distress.   Resting in bed, appears older than stated age   HENT:   Head: Normocephalic and atraumatic.   Mouth/Throat: No oropharyngeal exudate.   Eyes: Conjunctivae and EOM are normal. Pupils are equal, round, and reactive to light.   Neck: Normal range of motion. Neck supple. No JVD present.   Cardiovascular:  Normal rate, regular rhythm and normal heart sounds.    No murmur heard.  Pulmonary/Chest: Effort normal. No respiratory distress. He has no wheezes (bilat lower lung wheezes). He has no rales.   Abdominal: Soft. Bowel sounds are normal. He exhibits distension (rotund abdomen ). He exhibits no mass. There is no tenderness.   Musculoskeletal: He exhibits edema (improving).   Able to range knee with some pain (UE & RLE without erythema, pain); chronic back pain with movement   Neurological: He is alert and oriented to person, place, and time. No cranial nerve deficit.   Skin: Skin is warm. Capillary refill takes less than 2 seconds. He is not diaphoretic. There is erythema.   LLE erythema and swelling decreased extending from mid calf to knee with erythema, warmth extends up medial aspect of thigh stable (stable), decreasdin swelling and erythema at foot; dry flaking on bilat feet    bilat old healed venous stasis ulcer scaling wounds, no drainage, no scrotal involvement, no LAD     Biopsy site healing   Psychiatric: He has a normal mood and affect. His behavior is normal.   Nursing note and vitals reviewed.    Significant Labs:   CBC:   Recent Labs  Lab 12/20/17 0459 12/21/17  0445   WBC 11.08 11.64   HGB 13.1* 13.3*   HCT 40.0 40.8    324     CMP:   Recent Labs  Lab 12/20/17 0459 12/21/17  0445    139   K 4.4 4.7    100   CO2 32* 32*   * 140*   BUN 29* 31*   CREATININE 1.2 1.4   CALCIUM 9.4 9.6   PROT 7.1 7.3   ALBUMIN 2.2* 2.3*   BILITOT 0.4 0.4   ALKPHOS 91 90   AST 22 23   ALT 20 18   ANIONGAP 8 7*   EGFRNONAA >60 53*      (from 112)    Blood cultures NGTD  Stool Cultures NGTD    Significant Imaging: I have reviewed all pertinent imaging results/findings within the past 24 hours.   None

## 2017-12-21 NOTE — PLAN OF CARE
Ultrasound of leg pending. Patient still c/o leg pain, on IV abx.    Patient is now medicaid pending.     Long discussion with patient and had spoken to patient's sister yesterday and today. Patient still have trouble walking. Patient agreeable to nursing home placement if he cannot walk as he has minimal help at home. Patient is hesitant to give up his SS check but explained to him that he would have to stay at NH at least 30 days. Will call sister to get preferences. SW notified.       12/21/17 1520   Discharge Reassessment   Assessment Type Discharge Planning Reassessment   Provided patient/caregiver education on the expected discharge date and the discharge plan Yes   Discharge Plan A New Nursing Home placement - longterm care facility   Discharge Plan B Home;Home with family     Maci Mansfield, RN, [facility], CMSRN  RN Transition Navigator  [phone]

## 2017-12-21 NOTE — SUBJECTIVE & OBJECTIVE
Interval History: Continued improvement in leg pain. Less redness and swelling. Patient without complaint this AM.     Review of Systems   Constitutional: Negative for chills and fever.   HENT: Negative for sore throat.    Eyes: Negative for photophobia.   Respiratory: Negative for cough and shortness of breath.    Cardiovascular: Negative for chest pain and palpitations.   Gastrointestinal: Positive for abdominal distention. Negative for abdominal pain, constipation, diarrhea, nausea and vomiting.   Genitourinary: Negative for dysuria.   Musculoskeletal: Negative for back pain and neck stiffness.   Skin: Negative for pallor and rash.   Neurological: Negative for dizziness, facial asymmetry, speech difficulty and weakness.   Psychiatric/Behavioral: Negative for confusion.     Objective:     Vital Signs (Most Recent):  Temp: 97.8 °F (36.6 °C) (12/21/17 0803)  Pulse: 72 (12/21/17 0803)  Resp: 20 (12/21/17 0803)  BP: (!) 152/83 (12/21/17 0803)  SpO2: (!) 92 % (12/21/17 0410) Vital Signs (24h Range):  Temp:  [96.9 °F (36.1 °C)-99.2 °F (37.3 °C)] 97.8 °F (36.6 °C)  Pulse:  [65-74] 72  Resp:  [18-20] 20  SpO2:  [92 %-99 %] 92 %  BP: (112-152)/(60-83) 152/83     Weight: 95 kg (209 lb 6.4 oz)  Body mass index is 34.85 kg/m².    Estimated Creatinine Clearance: 56.5 mL/min (based on SCr of 1.4 mg/dL).    Physical Exam   Constitutional: He appears well-developed and well-nourished. No distress.   HENT:   Head: Normocephalic and atraumatic.   Mouth/Throat: Oropharynx is clear and moist.   Eyes: Conjunctivae and EOM are normal. No scleral icterus.   Neck: Normal range of motion. Neck supple.   Cardiovascular: Normal rate, regular rhythm and normal heart sounds.    No murmur heard.  Pulmonary/Chest: Effort normal and breath sounds normal. No stridor. No respiratory distress. He has no wheezes. He has no rales.   Abdominal: Soft. Bowel sounds are normal. He exhibits distension. There is no tenderness. There is no rebound and no  guarding.   Musculoskeletal:   LLE with erythema, warmth and edema from toes to knee, with erythema tracking medial aspect of thigh to groin as well as lateral aspect of thigh to buttocks--improving from previous; much less TTP  RLE with venous stasis dermatitis     Neurological: He is alert. He exhibits normal muscle tone.   Skin: No rash noted. He is not diaphoretic.       Significant Labs:   Blood Culture:   Recent Labs  Lab 12/12/17  1119 12/12/17  1226 12/18/17  1512   LABBLOO No growth after 5 days. No growth after 5 days. No Growth to date  No Growth to date  No Growth to date  No Growth to date  No Growth to date  No Growth to date     CBC:   Recent Labs  Lab 12/20/17  0459 12/21/17  0445   WBC 11.08 11.64   HGB 13.1* 13.3*   HCT 40.0 40.8    324     CMP:   Recent Labs  Lab 12/20/17  0459 12/21/17  0445    139   K 4.4 4.7    100   CO2 32* 32*   * 140*   BUN 29* 31*   CREATININE 1.2 1.4   CALCIUM 9.4 9.6   PROT 7.1 7.3   ALBUMIN 2.2* 2.3*   BILITOT 0.4 0.4   ALKPHOS 91 90   AST 22 23   ALT 20 18   ANIONGAP 8 7*   EGFRNONAA >60 53*     Urine Culture:   Recent Labs  Lab 12/12/17  1207   LABURIN No significant growth     Urine Studies:   Recent Labs  Lab 12/12/17  1207   COLORU Yellow   APPEARANCEUA Clear   PHUR 7.0   SPECGRAV <=1.005*   PROTEINUA 2+*   GLUCUA Negative   KETONESU Negative   BILIRUBINUA Negative   OCCULTUA 1+*   NITRITE Negative   UROBILINOGEN 1.0   LEUKOCYTESUR Negative   RBCUA 1   WBCUA 0   BACTERIA Rare   SQUAMEPITHEL 2   HYALINECASTS 0     Wound Culture:   Recent Labs  Lab 12/18/17  1343   LABAERO No growth       Significant Imaging:   Imaging Results          X-Ray Abdomen AP 1 View (Final result)  Result time 12/19/17 08:04:21    Final result by Jorge Allred MD (12/19/17 08:04:21)                 Impression:     Localized proximal small bowel ileus question.      Electronically signed by: ANTONIO ALLRED MD  Date:     12/19/17  Time:    08:04               Narrative:    Single view of the abdomen, cervical mental opacities from anterior abdominal wall repair graft left periumbilical.  Minimal mild distention proximal small bowel.  Nondistended air stomach and colon.  Lower pelvis not included on exam.                             X-Ray Chest 1 View (Final result)  Result time 12/18/17 08:13:11    Final result by Jorge Allred MD (12/18/17 08:13:11)                 Impression:     Stable chest.  No active process.      Electronically signed by: ANTONIO ALLRED MD  Date:     12/18/17  Time:    08:13              Narrative:    Single view chest, comparison 12/12/2017.  Cardiomegaly stable.  Incomplete inspiration, interstitial markings remain mildly prominent.  There are chronic rib deformity left lateral fifth sixth and seventh ribs.                             MRI Lower Extremity W WO Contrast Left (Final result)  Result time 12/15/17 16:53:12    Final result by Jorge Allred MD (12/15/17 16:53:12)                 Impression:     Nonspecific subcutaneous edema and Cellulitis without abscess, osteomyelitis, septic joint left lower leg.      Electronically signed by: ANTONIO ALLRED MD  Date:     12/15/17  Time:    16:53              Narrative:    Comparison ultrasound vein left leg, radiographs left knee dating back to 12/12/17.  MR on left tibia from knee to ankle without, and with gadovist 10 cc intravenous.    Asymmetrical subcutaneous edema left lower extremity compared to contralateral side.  Bone and joint structures normal.  No fracture dislocation.  Neurovascular structures appear intact.  Muscle tendon, ligament structures normal.  No abscess.  Heterogeneous enhancement of subcutaneous tissues, no unusual enhancement musculoskeletal structures otherwise.                             X-Ray Knee 3 View Left (Final result)  Result time 12/13/17 12:18:14    Final result by Rob Willard MD (12/13/17 12:18:14)                  Impression:     As above.      Electronically signed by: ULISES VALLE MD  Date:     12/13/17  Time:    12:18              Narrative:    Knee 3 views left.    Findings: 3 views left. There is no fracture, dislocation, or bony erosion identified.                             X-Ray Chest AP Portable (Final result)  Result time 12/12/17 12:14:25    Final result by Luan Gifford MD (12/12/17 12:14:25)                 Impression:        No acute radiographic findings on single view of the chest.      Electronically signed by: LUAN GIFFORD MD  Date:     12/12/17  Time:    12:14              Narrative:    Comparison: None    Technique: Single view of the chest.    Findings: Cardiac silhouette is enlarged.  There is aortic atherosclerosis.  Lungs show no evidence of significant focal consolidation, large effusion, or pneumothorax.  The bones demonstrate degenerative changes of the spine and shoulders.                             US Lower Extremity Veins Left (Final result)  Result time 12/12/17 11:54:55    Final result by Luan Gifford MD (12/12/17 11:54:55)                 Impression:         No evidence of deep vein thrombosisin the left lower extremity.    There is lower extremity edema.        Electronically signed by: LUAN GIFFORD MD  Date:     12/12/17  Time:    11:54              Narrative:    Comparison: None.    Findings: There is no evidence of venous thrombosis in the bilateral common femoral, left femoral, greater saphenous, popliteal, posterior tibial, anterior tibial, and peroneal veins.                            Antibiotics     Start     Stop Route Frequency Ordered    12/18/17 1530  ceFAZolin (ANCEF) 1 gram in dextrose 5 % 50 mL IVPB (premix)      -- IV Every 8 hours (non-standard times) 12/18/17 1416

## 2017-12-21 NOTE — PROGRESS NOTES
[facility] Medicine  Progress Note    Patient Name: Reji Guerra  MRN: [id number]  Patient Class: IP- Inpatient   Admission Date: 12/12/2017  Length of Stay: 9 days  Attending Physician: Berkley Johnson MD  Primary Care Provider: [facility]    Subjective:     Principal Problem:Cellulitis of left lower extremity without foot    HPI:  63 yo male w/ CAD (3 stents in 2003 w/MI), DMT2, HTN, HLD presents to ED with 1 week of LLE swelling and erythema, which just starting tracking up his inner thigh within the past two days. No trauma to his leg, no wounds on his foot. Pain with standing and walking. 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  He does have to rest 1/2 up a full flight of stairs but denies chest pain, shortness of breath, recent illness. He sleeps flat on just 1 pillow.    He hasn't had medical care in 2 years and hasn't taken any medications for 1 year.  He thinks he was supposed to keep taking the plavix.  Baseline dry cough, minimally productive with clear sputum. Denies CP, SOB, recent illness, N/V, diarrhea, constipation. Last BM day before admission.    Hospital Course:  Pt admitted with Vanc and Zosyn. Ortho consulted for possible joint involvement, recommendations. Slow clinical improvement.  WBC improved slowly as well. Pt remained afebrile through stay.  Abx switched to IV clinda to monitor for response but WBC and clinically pt regressed.  ID and surgery brought on board for additional recs.  Leg wrapped loosely and more dedicated to elevating leg.  Good improvement on HOD# 7  Pt still with complaints about diarrhea even though C Diff neg and on imodium.     Stool studies sent as patient had chronic intermittent diarrhea prior to admission.     Interval History: NAEON. AF. VSS. Pt's leg improving to remaining stable.  Still much pain with walking. Sister trying to find  friends/family that can help with placement on discharge.    Review of Systems   Constitutional: Negative for chills and fever.   HENT: Negative for sore throat.    Eyes: Negative for photophobia.   Respiratory: Negative for cough and shortness of breath.    Cardiovascular: Negative for chest pain and palpitations.   Gastrointestinal: Positive for abdominal distention. Negative for abdominal pain, constipation, diarrhea, nausea and vomiting.   Genitourinary: Negative for dysuria.   Musculoskeletal: Negative for back pain and neck stiffness.   Skin: Negative for pallor and rash.   Neurological: Negative for dizziness, facial asymmetry, speech difficulty and weakness.   Psychiatric/Behavioral: Negative for confusion.     Objective:     Vital Signs (Most Recent):  Temp: 97.8 °F (36.6 °C) (12/21/17 0803)  Pulse: 72 (12/21/17 0803)  Resp: 20 (12/21/17 0803)  BP: (!) 152/83 (12/21/17 0803)  SpO2: (!) 94 % (12/21/17 0652) Vital Signs (24h Range):  Temp:  [96.9 °F (36.1 °C)-99.2 °F (37.3 °C)] 97.8 °F (36.6 °C)  Pulse:  [65-74] 72  Resp:  [18-20] 20  SpO2:  [92 %-98 %] 94 %  BP: (112-152)/(60-83) 152/83     Weight: 95 kg (209 lb 6.4 oz)  Body mass index is 34.85 kg/m².    Intake/Output Summary (Last 24 hours) at 12/21/17 0942  Last data filed at 12/21/17 0032   Gross per 24 hour   Intake              956 ml   Output             2100 ml   Net            -1144 ml      Physical Exam   Constitutional: He is oriented to person, place, and time. He appears well-developed and well-nourished. No distress.   Resting in bed, appears older than stated age   HENT:   Head: Normocephalic and atraumatic.   Mouth/Throat: No oropharyngeal exudate.   Eyes: Conjunctivae and EOM are normal. Pupils are equal, round, and reactive to light.   Neck: Normal range of motion. Neck supple. No JVD present.   Cardiovascular: Normal rate, regular rhythm and normal heart sounds.    No murmur heard.  Pulmonary/Chest: Effort normal. No respiratory distress.  He has no wheezes (bilat lower lung wheezes). He has no rales.   Abdominal: Soft. Bowel sounds are normal. He exhibits distension (rotund abdomen ). He exhibits no mass. There is no tenderness.   Musculoskeletal: He exhibits edema (improving).   Able to range knee with some pain (UE & RLE without erythema, pain); chronic back pain with movement   Neurological: He is alert and oriented to person, place, and time. No cranial nerve deficit.   Skin: Skin is warm. Capillary refill takes less than 2 seconds. He is not diaphoretic. There is erythema.   LLE erythema and swelling decreased extending from mid calf to knee with erythema, warmth extends up medial aspect of thigh stable (stable), decreasdin swelling and erythema at foot; dry flaking on bilat feet    bilat old healed venous stasis ulcer scaling wounds, no drainage, no scrotal involvement, no LAD     Biopsy site healing   Psychiatric: He has a normal mood and affect. His behavior is normal.   Nursing note and vitals reviewed.    Significant Labs:   CBC:   Recent Labs  Lab 12/20/17 0459 12/21/17  0445   WBC 11.08 11.64   HGB 13.1* 13.3*   HCT 40.0 40.8    324     CMP:   Recent Labs  Lab 12/20/17 0459 12/21/17  0445    139   K 4.4 4.7    100   CO2 32* 32*   * 140*   BUN 29* 31*   CREATININE 1.2 1.4   CALCIUM 9.4 9.6   PROT 7.1 7.3   ALBUMIN 2.2* 2.3*   BILITOT 0.4 0.4   ALKPHOS 91 90   AST 22 23   ALT 20 18   ANIONGAP 8 7*   EGFRNONAA >60 53*      (from 112)    Blood cultures NGTD  Stool Cultures NGTD    Significant Imaging: I have reviewed all pertinent imaging results/findings within the past 24 hours.   None    Assessment/Plan:      * Cellulitis of left lower extremity without foot    WBC 14 on admit, waxing and waning improvement  Vanc and Zosyn switched to Clinda HOD#3 with regression  On Ancef, stable today, may get repeat imaging  Compression with ACE wrap, elevating leg  WBC now stable at 11.64  PT/OT recs for home health  and equipment are in, will order  Discuss with ID plans for future treatment          Diarrhea    C diff neg, following further stool studies        Elevated troponin    Improved to normal  EKG's without St segment elevation  No chest pain     Anxiety    Original home med Traxene restarted          Coronary artery disease involving native coronary artery of native heart without angina pectoris    Hx of 3 stents in angiogram in 2003  Restarted on ASA, plavix. Potentially able to stop plavix since stents were placed over 2 years ago  Trops trended down  F/u cardiology as outpatient  H/H 13/39 but with MCV of 105, Folate and B12 were normal  No chest pain            HTN (hypertension)    Restarted Carvedilol, HCTZ (prior home meds from 2 years ago)   Stable at 112-152/60-83  Normotensive  Cardiac diet            Type 2 diabetes mellitus with hyperglycemia, without long-term current use of insulin    HgbA1C 6.5  On Low Dose Corrective scale  Gluc 118-140  On Detemir 8 units BID              VTE Risk Mitigation         Ordered     enoxaparin injection 40 mg  Daily     Route:  Subcutaneous        12/12/17 1624     Medium Risk of VTE  Once      12/12/17 1624              Andrew Lombardo MD  Department of Hospital Medicine   [facility]

## 2017-12-21 NOTE — ASSESSMENT & PLAN NOTE
WBC 14 on admit, waxing and waning improvement  Vanc and Zosyn switched to Clinda HOD#3 with regression  On Ancef  Improving  Compression with ACE wrap  Elevating leg  Biopsy site healing  WBC now 11.08  Will like attempt PO [facility] for possible home soon  PT/OT recs for home health and equipmen

## 2017-12-21 NOTE — PROGRESS NOTES
[facility]  General Surgery  Progress Note    Subjective:     History of Present Illness:  No notes on file    Post-Op Info:  * No surgery found *         Interval History: Had some foot pain overnight which resolved with pain medication and has not happened again but was new per patient.  Remains with leg elevated.  Some improvement in edema.  Afebrile and HD stable.  No tachycardia.    Medications:  Continuous Infusions:  Scheduled Meds:   aspirin  81 mg Oral Daily    carvedilol  6.25 mg Oral BID WM    ceFAZolin (ANCEF) IVPB  1 g Intravenous Q8H    clopidogrel  75 mg Oral Daily    clorazepate  15 mg Oral BID    enoxaparin  40 mg Subcutaneous Daily    hydroCHLOROthiazide  12.5 mg Oral Daily    insulin detemir  8 Units Subcutaneous BID    magnesium oxide  400 mg Oral BID    miconazole   Topical (Top) BID    nicotine  1 patch Transdermal Daily    simvastatin  40 mg Oral QHS     PRN Meds:calcium carbonate, dextrose 50%, dextrose 50%, glucagon (human recombinant), glucose, glucose, hydrocodone-acetaminophen 5-325mg, influenza, insulin aspart, loperamide, lorazepam, morphine, pneumoc 13-jose g conj-dip cr(PF), sodium chloride 0.9%     Review of patient's allergies indicates:   Allergen Reactions    Iodine and iodide containing products     Shellfish containing products      Objective:     Vital Signs (Most Recent):  Temp: 99.7 °F (37.6 °C) (12/21/17 1127)  Pulse: 72 (12/21/17 1127)  Resp: 18 (12/21/17 1127)  BP: 126/76 (12/21/17 1127)  SpO2: (!) 94 % (12/21/17 0652) Vital Signs (24h Range):  Temp:  [96.9 °F (36.1 °C)-99.7 °F (37.6 °C)] 99.7 °F (37.6 °C)  Pulse:  [72-74] 72  Resp:  [18-20] 18  SpO2:  [92 %-98 %] 94 %  BP: (112-152)/(60-83) 126/76     Weight: 95 kg (209 lb 6.4 oz)  Body mass index is 34.85 kg/m².    Intake/Output - Last 3 Shifts       12/19 0700 - 12/20 0659 12/20 0700 - 12/21 0659 12/21 0700 - 12/22 0659    P.O. 500 1111     IV Piggyback 50 150     Total Intake(mL/kg) 550  (5.8) 1261 (13.3)     Urine (mL/kg/hr) 1325 (0.6) 2500 (1.1)     Stool 0 (0) 0 (0)     Total Output 1325 2500      Net -775 -1239             Urine Occurrence  1 x     Stool Occurrence 1 x 1 x           Physical Exam   Constitutional: He is oriented to person, place, and time. He appears well-developed and well-nourished. No distress (mild when LLE examined).   HENT:   Head: Normocephalic and atraumatic.   Eyes: Conjunctivae and EOM are normal. No scleral icterus.   Neck: Normal range of motion.   Cardiovascular: Normal rate and regular rhythm.    Pulmonary/Chest: Effort normal. No respiratory distress.   Abdominal: Soft. He exhibits distension (mild). There is no tenderness. There is no rebound and no guarding. A hernia (incarcerated recurrent umbilical hernia; partially reducible; non-tender; no overlying skin changes) is present.   Musculoskeletal:   RLE with chronic venous stasis-related changes; no cellulitis.  LLE with significant cellulitic changes diffusely with sparing only of the distal food and superior-medial thigh.  This has improved over the past couple of days but appears mostly similar to yesterday.  Incision clean and dry.  No fluctuance. +induration.  Blanching erythema.  Tenderness significantly improved   Neurological: He is alert and oriented to person, place, and time.   Nursing note and vitals reviewed.      Significant Labs:  CBC:     Recent Labs  Lab 12/21/17  0445   WBC 11.64   RBC 3.92*   HGB 13.3*   HCT 40.8      *   MCH 33.9*   MCHC 32.6     CMP:     Recent Labs  Lab 12/21/17  0445   *   CALCIUM 9.6   ALBUMIN 2.3*   PROT 7.3      K 4.7   CO2 32*      BUN 31*   CREATININE 1.4   ALKPHOS 90   ALT 18   AST 23   BILITOT 0.4     Significant Diagnostics:  None new to review    Assessment/Plan:     * Cellulitis of left lower extremity without foot    -Significant improvement in pain; some improvement in surface area of erythema over the past couple days after  change in Abx  -Incision site looks good; daily wound dressing changes  -Cultures still pending/no growth  -trending CRP  -ID consulted; appreciate assistance  -Will continue to follow        Recurrent umbilical hernia    -No acute surgical intervention  -Patient is non-tender with no overlying skin changes; he is tolerating regular diet and having good bowel function with no nausea or emesis  -Would not be ideal to repair hernia with patient's current active infectious process for risk of mesh infection            Denny Domínguez Jr., MD  General Surgery  [facility]

## 2017-12-22 PROBLEM — R79.89 ELEVATED TROPONIN: Status: RESOLVED | Noted: 2017-12-13 | Resolved: 2017-12-22

## 2017-12-22 LAB
ALBUMIN SERPL BCP-MCNC: 2.5 G/DL
ALP SERPL-CCNC: 81 U/L
ALT SERPL W/O P-5'-P-CCNC: 16 U/L
ANION GAP SERPL CALC-SCNC: 13 MMOL/L
ANION GAP SERPL CALC-SCNC: 8 MMOL/L
AST SERPL-CCNC: 32 U/L
BASOPHILS # BLD AUTO: 0.04 K/UL
BASOPHILS NFR BLD: 0.3 %
BILIRUB SERPL-MCNC: 0.5 MG/DL
BUN SERPL-MCNC: 29 MG/DL
BUN SERPL-MCNC: 29 MG/DL
CALCIUM SERPL-MCNC: 10 MG/DL
CALCIUM SERPL-MCNC: 9.9 MG/DL
CHLORIDE SERPL-SCNC: 95 MMOL/L
CHLORIDE SERPL-SCNC: 99 MMOL/L
CO2 SERPL-SCNC: 24 MMOL/L
CO2 SERPL-SCNC: 35 MMOL/L
CREAT SERPL-MCNC: 1.1 MG/DL
CREAT SERPL-MCNC: 1.2 MG/DL
CRP SERPL-MCNC: 145.93 MG/L
DIFFERENTIAL METHOD: ABNORMAL
E COLI SXT1 STL QL IA: NEGATIVE
E COLI SXT2 STL QL IA: NEGATIVE
EOSINOPHIL # BLD AUTO: 0.2 K/UL
EOSINOPHIL NFR BLD: 1.2 %
ERYTHROCYTE [DISTWIDTH] IN BLOOD BY AUTOMATED COUNT: 13.6 %
EST. GFR  (AFRICAN AMERICAN): >60 ML/MIN/1.73 M^2
EST. GFR  (AFRICAN AMERICAN): >60 ML/MIN/1.73 M^2
EST. GFR  (NON AFRICAN AMERICAN): >60 ML/MIN/1.73 M^2
EST. GFR  (NON AFRICAN AMERICAN): >60 ML/MIN/1.73 M^2
GLUCOSE SERPL-MCNC: 124 MG/DL
GLUCOSE SERPL-MCNC: 154 MG/DL
HCT VFR BLD AUTO: 45.2 %
HGB BLD-MCNC: 14.9 G/DL
LYMPHOCYTES # BLD AUTO: 1.6 K/UL
LYMPHOCYTES NFR BLD: 11.9 %
MAGNESIUM SERPL-MCNC: 2.6 MG/DL
MCH RBC QN AUTO: 34.6 PG
MCHC RBC AUTO-ENTMCNC: 33 G/DL
MCV RBC AUTO: 105 FL
MONOCYTES # BLD AUTO: 1 K/UL
MONOCYTES NFR BLD: 7.6 %
NEUTROPHILS # BLD AUTO: 10.2 K/UL
NEUTROPHILS NFR BLD: 79 %
PHOSPHATE SERPL-MCNC: 4.5 MG/DL
PLATELET # BLD AUTO: 323 K/UL
PMV BLD AUTO: 11.3 FL
POCT GLUCOSE: 125 MG/DL (ref 70–110)
POCT GLUCOSE: 139 MG/DL (ref 70–110)
POCT GLUCOSE: 163 MG/DL (ref 70–110)
POCT GLUCOSE: 177 MG/DL (ref 70–110)
POCT GLUCOSE: 206 MG/DL (ref 70–110)
POTASSIUM SERPL-SCNC: 4.2 MMOL/L
POTASSIUM SERPL-SCNC: 5.5 MMOL/L
PROT SERPL-MCNC: 8.3 G/DL
RBC # BLD AUTO: 4.31 M/UL
SODIUM SERPL-SCNC: 136 MMOL/L
SODIUM SERPL-SCNC: 138 MMOL/L
WBC # BLD AUTO: 13.08 K/UL

## 2017-12-22 PROCEDURE — 63600175 PHARM REV CODE 636 W HCPCS: Performed by: INTERNAL MEDICINE

## 2017-12-22 PROCEDURE — 25000003 PHARM REV CODE 250: Performed by: STUDENT IN AN ORGANIZED HEALTH CARE EDUCATION/TRAINING PROGRAM

## 2017-12-22 PROCEDURE — 85025 COMPLETE CBC W/AUTO DIFF WBC: CPT

## 2017-12-22 PROCEDURE — 86141 C-REACTIVE PROTEIN HS: CPT

## 2017-12-22 PROCEDURE — 63600175 PHARM REV CODE 636 W HCPCS: Performed by: FAMILY MEDICINE

## 2017-12-22 PROCEDURE — 25000242 PHARM REV CODE 250 ALT 637 W/ HCPCS: Performed by: STUDENT IN AN ORGANIZED HEALTH CARE EDUCATION/TRAINING PROGRAM

## 2017-12-22 PROCEDURE — 97530 THERAPEUTIC ACTIVITIES: CPT

## 2017-12-22 PROCEDURE — 94761 N-INVAS EAR/PLS OXIMETRY MLT: CPT

## 2017-12-22 PROCEDURE — 80048 BASIC METABOLIC PNL TOTAL CA: CPT

## 2017-12-22 PROCEDURE — 94640 AIRWAY INHALATION TREATMENT: CPT

## 2017-12-22 PROCEDURE — 21400001 HC TELEMETRY ROOM

## 2017-12-22 PROCEDURE — S4991 NICOTINE PATCH NONLEGEND: HCPCS | Performed by: STUDENT IN AN ORGANIZED HEALTH CARE EDUCATION/TRAINING PROGRAM

## 2017-12-22 PROCEDURE — 36415 COLL VENOUS BLD VENIPUNCTURE: CPT

## 2017-12-22 PROCEDURE — 97802 MEDICAL NUTRITION INDIV IN: CPT

## 2017-12-22 PROCEDURE — 99024 POSTOP FOLLOW-UP VISIT: CPT | Mod: ,,, | Performed by: SURGERY

## 2017-12-22 PROCEDURE — 97116 GAIT TRAINING THERAPY: CPT

## 2017-12-22 PROCEDURE — 94799 UNLISTED PULMONARY SVC/PX: CPT

## 2017-12-22 PROCEDURE — 84100 ASSAY OF PHOSPHORUS: CPT

## 2017-12-22 PROCEDURE — 97535 SELF CARE MNGMENT TRAINING: CPT

## 2017-12-22 PROCEDURE — 99900035 HC TECH TIME PER 15 MIN (STAT)

## 2017-12-22 PROCEDURE — 80053 COMPREHEN METABOLIC PANEL: CPT

## 2017-12-22 PROCEDURE — 83735 ASSAY OF MAGNESIUM: CPT

## 2017-12-22 RX ORDER — ALBUTEROL SULFATE 0.83 MG/ML
2.5 SOLUTION RESPIRATORY (INHALATION) ONCE
Status: COMPLETED | OUTPATIENT
Start: 2017-12-22 | End: 2017-12-22

## 2017-12-22 RX ADMIN — INSULIN ASPART 1 UNITS: 100 INJECTION, SOLUTION INTRAVENOUS; SUBCUTANEOUS at 09:12

## 2017-12-22 RX ADMIN — MAGNESIUM OXIDE TAB 400 MG (241.3 MG ELEMENTAL MG) 400 MG: 400 (241.3 MG) TAB at 09:12

## 2017-12-22 RX ADMIN — NICOTINE 1 PATCH: 21 PATCH, EXTENDED RELEASE TRANSDERMAL at 09:12

## 2017-12-22 RX ADMIN — HYDROCHLOROTHIAZIDE 12.5 MG: 12.5 TABLET ORAL at 09:12

## 2017-12-22 RX ADMIN — CEFAZOLIN SODIUM 1 G: 1 SOLUTION INTRAVENOUS at 09:12

## 2017-12-22 RX ADMIN — SIMVASTATIN 40 MG: 20 TABLET, FILM COATED ORAL at 08:12

## 2017-12-22 RX ADMIN — CLORAZEPATE DIPOTASSIUM 15 MG: 3.75 TABLET ORAL at 01:12

## 2017-12-22 RX ADMIN — ASPIRIN 81 MG: 81 TABLET, COATED ORAL at 09:12

## 2017-12-22 RX ADMIN — HYDROCODONE BITARTRATE AND ACETAMINOPHEN 1 TABLET: 5; 325 TABLET ORAL at 02:12

## 2017-12-22 RX ADMIN — MORPHINE SULFATE 4 MG: 10 INJECTION INTRAVENOUS at 05:12

## 2017-12-22 RX ADMIN — MORPHINE SULFATE 4 MG: 10 INJECTION INTRAVENOUS at 08:12

## 2017-12-22 RX ADMIN — CLORAZEPATE DIPOTASSIUM 15 MG: 3.75 TABLET ORAL at 09:12

## 2017-12-22 RX ADMIN — ALBUTEROL SULFATE 2.5 MG: 2.5 SOLUTION RESPIRATORY (INHALATION) at 07:12

## 2017-12-22 RX ADMIN — MICONAZOLE NITRATE: 20 CREAM TOPICAL at 09:12

## 2017-12-22 RX ADMIN — CEFAZOLIN SODIUM 1 G: 1 SOLUTION INTRAVENOUS at 01:12

## 2017-12-22 RX ADMIN — CLOPIDOGREL 75 MG: 75 TABLET, FILM COATED ORAL at 09:12

## 2017-12-22 RX ADMIN — CARVEDILOL 6.25 MG: 6.25 TABLET, FILM COATED ORAL at 09:12

## 2017-12-22 RX ADMIN — MAGNESIUM OXIDE TAB 400 MG (241.3 MG ELEMENTAL MG) 400 MG: 400 (241.3 MG) TAB at 08:12

## 2017-12-22 RX ADMIN — CEFAZOLIN SODIUM 1 G: 1 SOLUTION INTRAVENOUS at 06:12

## 2017-12-22 NOTE — ASSESSMENT & PLAN NOTE
Restarted Carvedilol, HCTZ (prior home meds from 2 years ago)   Stable at 109-152/57-83  Normotensive  Cardiac diet

## 2017-12-22 NOTE — ASSESSMENT & PLAN NOTE
Hx of 3 stents in angiogram in 2003  Restarted on ASA, plavix. Potentially able to stop plavix since stents were placed over 2 years ago  Trops trended down  F/u cardiology as outpatient  H/H 14.9/45.2 but with MCV of 105, Folate and B12 were normal  No chest pain

## 2017-12-22 NOTE — PLAN OF CARE
Problem: Patient Care Overview  Goal: Plan of Care Review  Outcome: Ongoing (interventions implemented as appropriate)  Repositioned with encouragement every 2 hours.  No elevated temperature.  Pain controlled with medications.  Continuing on IV antibiotics.  Fall precautions maintained. Bed alarm on.  Blood sugar 134 this AM.  Will continue to monitor Vs, telemetry, labs, and medications as ordered.

## 2017-12-22 NOTE — PROGRESS NOTES
[facility] Medicine  Progress Note    Patient Name: Reji Guerra  MRN: [id number]  Patient Class: IP- Inpatient   Admission Date: 12/12/2017  Length of Stay: 10 days  Attending Physician: Berkley Johnson MD  Primary Care Provider: [facility]    Subjective:     Principal Problem:Cellulitis of left lower extremity without foot    HPI:  63 yo male w/ CAD (3 stents in 2003 w/MI), DMT2, HTN, HLD presents to ED with 1 week of LLE swelling and erythema, which just starting tracking up his inner thigh within the past two days. No trauma to his leg, no wounds on his foot. Pain with standing and walking. 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  He does have to rest 1/2 up a full flight of stairs but denies chest pain, shortness of breath, recent illness. He sleeps flat on just 1 pillow.    He hasn't had medical care in 2 years and hasn't taken any medications for 1 year.  He thinks he was supposed to keep taking the plavix.  Baseline dry cough, minimally productive with clear sputum. Denies CP, SOB, recent illness, N/V, diarrhea, constipation. Last BM day before admission.    Hospital Course:  Pt admitted with Vanc and Zosyn. Ortho consulted for possible joint involvement, recommendations. Slow clinical improvement.  WBC improved slowly as well. Pt remained afebrile through stay.  Abx switched to IV clinda to monitor for response but WBC and clinically pt regressed.  ID and surgery brought on board for additional recs.  Leg wrapped loosely and more dedicated to elevating leg.  Good improvement on HOD# 7  Pt still with complaints about diarrhea even though C Diff neg and on imodium.     Stool studies sent as patient had chronic intermittent diarrhea prior to admission. All cultures continued to be NGTD through HOD#10. CRP improved and then increased. Pt had temp of 100.4 and new cultures were sent.  Leg  showed slow improvement and pt was still unable to walk.    Interval History: NAEON. Temp of 100.4 yesterday. VSS.  Still continued difficulty in walking.  Does not qualify for SNF. Would have to give up SS check in order to go to nursing home. Tolerating PO diet. Tearful this AM.    Review of Systems   Constitutional: Negative for chills and fever.   HENT: Negative for sore throat.    Eyes: Negative for photophobia.   Respiratory: Negative for cough and shortness of breath.    Cardiovascular: Negative for chest pain and palpitations.   Gastrointestinal: Negative for abdominal pain, constipation, diarrhea, nausea and vomiting.   Genitourinary: Negative for dysuria.   Musculoskeletal: Positive for gait problem. Negative for back pain and neck stiffness.   Skin: Negative for pallor and rash.   Neurological: Negative for dizziness, facial asymmetry, speech difficulty and weakness.   Psychiatric/Behavioral: Positive for dysphoric mood. Negative for confusion. The patient is nervous/anxious.         Objective:     Vital Signs (Most Recent):  Temp: 97 °F (36.1 °C) (12/22/17 0426)  Pulse: 69 (12/22/17 0719)  Resp: 20 (12/22/17 0719)  BP: 138/65 (12/22/17 0426)  SpO2: 97 % (12/22/17 0719) Vital Signs (24h Range):  Temp:  [96.3 °F (35.7 °C)-100.4 °F (38 °C)] 97 °F (36.1 °C)  Pulse:  [60-75] 69  Resp:  [18-20] 20  SpO2:  [92 %-98 %] 97 %  BP: (109-138)/(56-76) 138/65     Weight: 95 kg (209 lb 6.4 oz)  Body mass index is 34.85 kg/m².    Intake/Output Summary (Last 24 hours) at 12/22/17 0805  Last data filed at 12/22/17 0700   Gross per 24 hour   Intake             1480 ml   Output             3600 ml   Net            -2120 ml      Physical Exam   Constitutional: He is oriented to person, place, and time. He appears well-developed and well-nourished. No distress.   Resting in bed, appears older than stated age   HENT:   Head: Normocephalic and atraumatic.   Mouth/Throat: No oropharyngeal exudate.   Eyes: Conjunctivae and EOM  are normal. Pupils are equal, round, and reactive to light.   Neck: Normal range of motion. Neck supple. No JVD present.   Cardiovascular: Normal rate, regular rhythm and normal heart sounds.    No murmur heard.  Pulmonary/Chest: Effort normal and breath sounds normal. No respiratory distress. He has no wheezes (bilat lower lung wheezes). He has no rales.   Abdominal: Soft. Bowel sounds are normal. He exhibits distension (rotund abdomen, soft). He exhibits no mass. There is no tenderness.   Musculoskeletal: He exhibits edema (improving).   Able to range knee with some pain (UE & RLE without erythema, pain); chronic back pain with movement   Neurological: He is alert and oriented to person, place, and time. No cranial nerve deficit.   Skin: Skin is warm. Capillary refill takes less than 2 seconds. He is not diaphoretic. There is erythema.   LLE erythema and swelling decreased extending from mid calf to knee with erythema, warmth extends up medial aspect of thigh stable (stable), decreasdin swelling and erythema at foot; dry flaking on bilat feet    bilat old healed venous stasis ulcer scaling wounds, no drainage, no scrotal involvement, no LAD     Biopsy site healing   Psychiatric: He has a normal mood and affect. His behavior is normal.   Nursing note and vitals reviewed.    Significant Labs:   CBC:   Recent Labs  Lab 12/21/17 0445 12/22/17 0453   WBC 11.64 13.08*   HGB 13.3* 14.9   HCT 40.8 45.2    323     CMP:   Recent Labs  Lab 12/21/17 0445 12/22/17  0453    136   K 4.7 5.5*    99   CO2 32* 24   * 124*   BUN 31* 29*   CREATININE 1.4 1.1   CALCIUM 9.6 9.9   PROT 7.3 8.3   ALBUMIN 2.3* 2.5*   BILITOT 0.4 0.5   ALKPHOS 90 81   AST 23 32   ALT 18 16   ANIONGAP 7* 13   EGFRNONAA 53* >60     Magnesium:   Recent Labs  Lab 12/21/17 0445 12/22/17  0453   MG 2.1 2.6     CRP: (172 > 112 > 114): 145 today  Cultures: NGTD      Significant Imaging: I have reviewed all pertinent imaging  results/findings within the past 24 hours.     US left lower leg:    Moderate amount of subcutaneous edema with no focal fluid collection.  Findings may represent edema versus cellulitis.    Assessment/Plan:      * Cellulitis of left lower extremity without foot    WBC 14 on admit, waxing and waning improvement  Vanc and Zosyn switched to Clinda HOD#3 with regression  On Ancef currently and now with rising WBC and fever 100.4    Leg stagnating, minimal improvement  US repeated with no fluid collective, no nodularity, just generalized swelling  Compression with ACE wrap, elevating leg    F/u CXR (ordered yesterday, still pending)  Other sources for fever, crp, rising WBC    Discharge planning        Recurrent umbilical hernia    Non-op per Surgery        Diarrhea    C diff neg, following further stool studies.  Thus far NGTD        Anxiety    Original home med Traxene restarted  Pt tearful and concerned about future.  Pt open to talking to psychiatrist, but may not be necessary as it is discharge planning that is concerning pt  Continue to work with sister, case management about placement plans        Coronary artery disease involving native coronary artery of native heart without angina pectoris    Hx of 3 stents in angiogram in 2003  Restarted on ASA, plavix. Potentially able to stop plavix since stents were placed over 2 years ago  Trops trended down  F/u cardiology as outpatient  H/H 14.9/45.2 but with MCV of 105, Folate and B12 were normal  No chest pain            HTN (hypertension)    Restarted Carvedilol, HCTZ (prior home meds from 2 years ago)   Stable at 109-152/57-83  Normotensive  Cardiac diet            Type 2 diabetes mellitus with hyperglycemia, without long-term current use of insulin    HgbA1C 6.5  On Low Dose Corrective scale  Gluc 124-140  On Detemir 8 units BID              VTE Risk Mitigation         Ordered     enoxaparin injection 40 mg  Daily     Route:  Subcutaneous        12/12/17 2650      Medium Risk of VTE  Once      12/12/17 1624              Andrew Lombardo MD  Department of Hospital Medicine   [facility]

## 2017-12-22 NOTE — PROGRESS NOTES
"[facility]  Adult Nutrition  Progress Note    SUMMARY     Recommendations    Recommendation/Intervention:   1. Change Boost Plus to Boost Glucose Control.   2. Encourage good intake at meals.    Goals:   Pt will consume at least 75% intake at meals  Nutrition Goal Status: new  Communication of RD Recs: reviewed with RN    Continuum of Care Plan  Referral to Outpatient Services:  (pt to d/c on ADA Cardiac diet)    Reason for Assessment  Reason for Assessment: length of stay  Diagnosis:  (cellulititis)  Relevent Medical History: DM, HTN, high cholesterol, hernia repair      General Information Comments: Pt on 1800cal ADA Cardiac diet with Boost Plus. Pt with % intake at meals. No complaints.     Nutrition Prescription Ordered  Current Diet Order: 1800 ADA Cardiac  Oral Nutrition Supplement: Boost Plus     Evaluation of Received Nutrients/Fluid Intake  % Intake of Estimated Energy Needs: 75 - 100 %  % Meal Intake: 100%     Nutrition Risk Screen  Nutrition Risk Screen: no indicators present    Nutrition/Diet History  Food Preferences: no Yazidism or cultural food prefs identified     Labs/Tests/Procedures/Meds  Pertinent Labs Reviewed: reviewed  Pertinent Labs Comments: K 5.5H, BUN 29H, Alb 2.5L  Pertinent Medications Reviewed: reviewed  Pertinent Medications Comments: aspirin    Physical Findings  Overall Physical Appearance: overweight  Tubes:  (none)  Oral/Mouth Cavity: WDL  Skin:  (Maximilaino 20-thigh incision)    Anthropometrics  Temp: 98 °F (36.7 °C)  Height: 5' 5" (165.1 cm)  Weight Method: Bed Scale  Weight: 95 kg (209 lb 7 oz)  Ideal Body Weight (IBW), Male: 136 lb  % Ideal Body Weight, Male (lb): 154 lb  BMI (Calculated): 34.9  BMI Grade: 30 - 34.9- obesity - grade I     Estimated/Assessed Needs  Weight Used For Calorie Calculations: 95 kg (209 lb 7 oz)   Energy Calorie Requirements (kcal): 2083  Energy Need Method: [place]-St Jeor (x 1.2)  RMR ([place]-St. Jeor Equation): 1666.88  Weight " Used For Protein Calculations: 61.8 kg (136 lb 3.9 oz) (IBW)  Protein Requirements: 62-74g (1.0-1.2g/kg)  Fluid Need Method: RDA Method  RDA Method (mL): 2083     Assessment and Plan  No nutrition dx at this time    Monitor and Evaluation  Food and Nutrient Intake: food and beverage intake  Food and Nutrient Adminstration: diet order  Physical Activity and Function: nutrition-related ADLs and IADLs  Anthropometric Measurements: weight  Biochemical Data, Medical Tests and Procedures: electrolyte and renal panel  Nutrition-Focused Physical Findings: overall appearance    Nutrition Risk  Level of Risk:  (1xweekly)    Nutrition Follow-Up  RD Follow-up?: Yes

## 2017-12-22 NOTE — ASSESSMENT & PLAN NOTE
-Mostly stable over the past few days  -Incision site looks good; daily wound dressing changes  -Cultures still pending/no growth  -trending CRP; trending up; may need to consider addition of Vancomycin  -ID on board  -Will continue to follow

## 2017-12-22 NOTE — SUBJECTIVE & OBJECTIVE
Interval History: Minimal improvement. Less swelling and erythema but minimal improvement in pain. US negative for fluid collection yesterday. Had one low grade fever (100.4) and WBC to 13 from 11.  Has occasional loose stools.    Review of Systems   Constitutional: Negative for chills and fever.   HENT: Negative for sore throat.    Eyes: Negative for photophobia and visual disturbance.   Respiratory: Negative for cough and shortness of breath.    Cardiovascular: Negative for chest pain and palpitations.   Gastrointestinal: Positive for diarrhea. Negative for abdominal pain, constipation, nausea and vomiting.   Genitourinary: Negative for dysuria.   Musculoskeletal: Negative for neck stiffness.   Skin: Negative for rash.   Neurological: Negative for dizziness, facial asymmetry, speech difficulty and weakness.   Psychiatric/Behavioral: Negative for confusion. The patient is nervous/anxious.      Objective:     Vital Signs (Most Recent):  Temp: 98.3 °F (36.8 °C) (12/22/17 0836)  Pulse: 86 (12/22/17 0836)  Resp: 19 (12/22/17 0836)  BP: 123/64 (12/22/17 0836)  SpO2: 97 % (12/22/17 0719) Vital Signs (24h Range):  Temp:  [96.3 °F (35.7 °C)-100.4 °F (38 °C)] 98.3 °F (36.8 °C)  Pulse:  [60-86] 86  Resp:  [18-20] 19  SpO2:  [92 %-98 %] 97 %  BP: (109-138)/(56-76) 123/64     Weight: 95 kg (209 lb 6.4 oz)  Body mass index is 34.85 kg/m².    Estimated Creatinine Clearance: 71.9 mL/min (based on SCr of 1.1 mg/dL).    Physical Exam   Constitutional: He appears well-developed and well-nourished. No distress.   HENT:   Head: Normocephalic and atraumatic.   Mouth/Throat: Oropharynx is clear and moist.   Eyes: Conjunctivae and EOM are normal. No scleral icterus.   Neck: Normal range of motion. Neck supple.   Cardiovascular: Normal rate, regular rhythm and normal heart sounds.    No murmur heard.  Pulmonary/Chest: Effort normal and breath sounds normal. No stridor. No respiratory distress. He has no wheezes. He has no rales.    Abdominal: Soft. Bowel sounds are normal. He exhibits distension. There is no tenderness. There is no rebound and no guarding.   Recurrent umbilical hernia, reducible, non-tender   Musculoskeletal:   LLE with erythema, warmth and edema from toes to knee, with erythema tracking medial aspect of thigh to groin as well as lateral aspect of thigh to buttocks--improving from previous; much less TTP but tenderness remains at calf  RLE with venous stasis dermatitis      Neurological: He is alert. He exhibits normal muscle tone.   Psychiatric: He has a normal mood and affect.   Anxious       Significant Labs:   Blood Culture:   Recent Labs  Lab 12/12/17  1119 12/12/17  1226 12/18/17  1512 12/21/17  1713 12/21/17  1714   LABBLOO No growth after 5 days. No growth after 5 days. No Growth to date  No Growth to date  No Growth to date  No Growth to date  No Growth to date  No Growth to date  No Growth to date  No Growth to date No Growth to date No Growth to date     CBC:   Recent Labs  Lab 12/21/17  0445 12/22/17  0453   WBC 11.64 13.08*   HGB 13.3* 14.9   HCT 40.8 45.2    323     CMP:   Recent Labs  Lab 12/21/17  0445 12/22/17  0453    136   K 4.7 5.5*    99   CO2 32* 24   * 124*   BUN 31* 29*   CREATININE 1.4 1.1   CALCIUM 9.6 9.9   PROT 7.3 8.3   ALBUMIN 2.3* 2.5*   BILITOT 0.4 0.5   ALKPHOS 90 81   AST 23 32   ALT 18 16   ANIONGAP 7* 13   EGFRNONAA 53* >60     Urine Culture:   Recent Labs  Lab 12/12/17  1207   LABURIN No significant growth     Urine Studies:   Recent Labs  Lab 12/12/17  1207 12/21/17  2110   COLORU Yellow Yellow   APPEARANCEUA Clear Clear   PHUR 7.0 6.0   SPECGRAV <=1.005* 1.010   PROTEINUA 2+* Negative   GLUCUA Negative Negative   KETONESU Negative Negative   BILIRUBINUA Negative Negative   OCCULTUA 1+* Negative   NITRITE Negative Negative   UROBILINOGEN 1.0 Negative   LEUKOCYTESUR Negative Negative   RBCUA 1  --    WBCUA 0  --    BACTERIA Rare  --    SQUAMEPITHEL 2  --     HYALINECASTS 0  --      Wound Culture:   Recent Labs  Lab 12/18/17  1343   LABAERO No growth       Significant Imaging:  Imaging Results          X-Ray Chest 1 View (Edited Result - FINAL)  Result time 12/22/17 08:18:17    Addendum 1 of 1 by Aurleio Kumar MD (12/22/17 08:18:17)    Epic notification system activated.       Electronically signed by: AURELIO KMUAR MD  Date:     12/22/17  Time:    08:18                Final result by Aurelio Kumar MD (12/22/17 08:14:02)                 Impression:        1.7 cm rounded opacity projects over the left midlung. This may be artifactual reflecting overlapping structures but underlying nodule cannot be excluded. Recommend repeat PA/lateral chest radiograph or CT chest for further characterization.    No focal consolidation to suggest pneumonia.    Epic notification system activated.       Electronically signed by: AURELIO KUMAR MD  Date:     12/22/17  Time:    08:14              Narrative:    XR Chest    12/21/17 18:10:54    Accession #: [id number]    CLINICAL INDICATION: 62 year old M with fever     TECHNIQUE: Single frontal chest radiograph.    COMPARISON:  12/18/2017    FINDINGS:    The cardiomediastinal silhouette is mildly enlarged, stable. Pulmonary vascularity appears within normal limits.    1.7 cm nodular opacity projects over the left midlung. No confluent pulmonary parenchymal opacity. No pleural fluid. No pneumothorax.    Remote left rib fracture.                             US Extremity Non Vascular Complete Left (Final result)  Result time 12/21/17 13:45:48    Final result by Joseph Nelson MD (12/21/17 13:45:48)                 Impression:       Moderate amount of subcutaneous edema with no focal fluid collection.  Findings may represent edema versus cellulitis.      Electronically signed by: JOSEPH NELSON MD  Date:     12/21/17  Time:    13:45              Narrative:    [id number]  Accession # [id number]    Study:  US EXTREMITY NON VASCULAR COMPLETE  LEFT    Indication: 61 yo M with persistent LLE cellulitis, reduced generalized swelling and now more nodular aspect posterior calf.    Comparison: None.    Findings:   Moderate amount of edema is noted in subcutaneous tissue of the left upper leg and calf.  No focal fluid collection is visualized.                             X-Ray Abdomen AP 1 View (Final result)  Result time 12/19/17 08:04:21    Final result by Jorge Allred MD (12/19/17 08:04:21)                 Impression:     Localized proximal small bowel ileus question.      Electronically signed by: ANTONIO ALLRED MD  Date:     12/19/17  Time:    08:04              Narrative:    Single view of the abdomen, cervical mental opacities from anterior abdominal wall repair graft left periumbilical.  Minimal mild distention proximal small bowel.  Nondistended air stomach and colon.  Lower pelvis not included on exam.                             X-Ray Chest 1 View (Final result)  Result time 12/18/17 08:13:11    Final result by Jorge Allred MD (12/18/17 08:13:11)                 Impression:     Stable chest.  No active process.      Electronically signed by: ANTONIO ALLRED MD  Date:     12/18/17  Time:    08:13              Narrative:    Single view chest, comparison 12/12/2017.  Cardiomegaly stable.  Incomplete inspiration, interstitial markings remain mildly prominent.  There are chronic rib deformity left lateral fifth sixth and seventh ribs.                             MRI Lower Extremity W WO Contrast Left (Final result)  Result time 12/15/17 16:53:12    Final result by Jorge Allred MD (12/15/17 16:53:12)                 Impression:     Nonspecific subcutaneous edema and Cellulitis without abscess, osteomyelitis, septic joint left lower leg.      Electronically signed by: ANTONIO ALLRED MD  Date:     12/15/17  Time:    16:53              Narrative:    Comparison ultrasound vein left leg, radiographs left knee dating back to  12/12/17.  MR on left tibia from knee to ankle without, and with gadovist 10 cc intravenous.    Asymmetrical subcutaneous edema left lower extremity compared to contralateral side.  Bone and joint structures normal.  No fracture dislocation.  Neurovascular structures appear intact.  Muscle tendon, ligament structures normal.  No abscess.  Heterogeneous enhancement of subcutaneous tissues, no unusual enhancement musculoskeletal structures otherwise.                             X-Ray Knee 3 View Left (Final result)  Result time 12/13/17 12:18:14    Final result by Rob Valle MD (12/13/17 12:18:14)                 Impression:     As above.      Electronically signed by: ROB VALLE MD  Date:     12/13/17  Time:    12:18              Narrative:    Knee 3 views left.    Findings: 3 views left. There is no fracture, dislocation, or bony erosion identified.                             X-Ray Chest AP Portable (Final result)  Result time 12/12/17 12:14:25    Final result by Luan Gifford MD (12/12/17 12:14:25)                 Impression:        No acute radiographic findings on single view of the chest.      Electronically signed by: LUAN GIFFORD MD  Date:     12/12/17  Time:    12:14              Narrative:    Comparison: None    Technique: Single view of the chest.    Findings: Cardiac silhouette is enlarged.  There is aortic atherosclerosis.  Lungs show no evidence of significant focal consolidation, large effusion, or pneumothorax.  The bones demonstrate degenerative changes of the spine and shoulders.                             US Lower Extremity Veins Left (Final result)  Result time 12/12/17 11:54:55    Final result by Luan Gifford MD (12/12/17 11:54:55)                 Impression:         No evidence of deep vein thrombosisin the left lower extremity.    There is lower extremity edema.        Electronically signed by: LUAN GIFFORD MD  Date:     12/12/17  Time:    11:54              Narrative:     Comparison: None.    Findings: There is no evidence of venous thrombosis in the bilateral common femoral, left femoral, greater saphenous, popliteal, posterior tibial, anterior tibial, and peroneal veins.                            Antibiotics     Start     Stop Route Frequency Ordered    12/18/17 1530  ceFAZolin (ANCEF) 1 gram in dextrose 5 % 50 mL IVPB (premix)      -- IV Every 8 hours (non-standard times) 12/18/17 1416

## 2017-12-22 NOTE — ASSESSMENT & PLAN NOTE
WBC 14 on admit, waxing and waning improvement  Vanc and Zosyn switched to Clinda HOD#3 with regression  On Ancef currently and now with rising WBC and fever 100.4    Leg stagnating, minimal improvement  US repeated with no fluid collective, no nodularity, just generalized swelling  Compression with ACE wrap, elevating leg    F/u CXR (ordered yesterday, still pending)  Other sources for fever, crp, rising WBC    Discharge planning

## 2017-12-22 NOTE — ASSESSMENT & PLAN NOTE
62 M with LLE cellulitis, erythema, warmth and swelling primarily to knee with redness and warmth tracking to groin on medial and lateral aspects of L thigh. Initially on vanc and zosyn with minimal improvement. Currently on cefazolin. More significant improvement in exam last 24 hours but with concerning area posteriorly to calf. US negative for fluid collection on 12/21    Recs  - continue cefazolin 1g q8hrs; pain much improved--erythema and edema improved as well but concerning pocket posterior calf--US negative for fluid collection  - repeat blood cultures NGTD

## 2017-12-22 NOTE — PLAN OF CARE
Problem: Occupational Therapy Goal  Goal: Occupational Therapy Goal  Goals to be met by: 12/23/2017    Patient will increase functional independence with ADLs by performing:    LE Dressing with Supervision.  Grooming while standing at sink with Supervision.  Toileting from bedside commode with Supervision for hygiene and clothing management.   Supine to sit with Modified [place].  Toilet transfer to bedside commode with Supervision.     Outcome: Ongoing (interventions implemented as appropriate)  No significant progress noted toward goals. Pt. limited by L foot pain especially with WB'ing to LLE during ambulation and stand pivot t/f with RW.  Pt highly anxious throughout session.  Continue with OT POC.

## 2017-12-22 NOTE — PROGRESS NOTES
Interval:   Re-check of patient's left leg. Patient is s/p biopsy with no growth of cultures. Prior MRI showed no fluid collections or effusion. More recent ultrasound also negative for fluid collections. Patient complains of pain in lower leg, not at the knee.    Vitals:  Temp:  [96.3 °F (35.7 °C)-100.4 °F (38 °C)] 98 °F (36.7 °C)  Pulse:  [60-86] 76  Resp:  [18-20] 18  SpO2:  [92 %-98 %] 96 %  BP: (109-138)/(56-78) 129/78    Scheduled Meds:    aspirin  81 mg Oral Daily    carvedilol  6.25 mg Oral BID WM    ceFAZolin (ANCEF) IVPB  1 g Intravenous Q8H    clopidogrel  75 mg Oral Daily    clorazepate  15 mg Oral BID    enoxaparin  40 mg Subcutaneous Daily    hydroCHLOROthiazide  12.5 mg Oral Daily    insulin detemir  8 Units Subcutaneous BID    magnesium oxide  400 mg Oral BID    miconazole   Topical (Top) BID    nicotine  1 patch Transdermal Daily    simvastatin  40 mg Oral QHS     Continuous Infusions:   PRN Meds: calcium carbonate, dextrose 50%, dextrose 50%, glucagon (human recombinant), glucose, glucose, hydrocodone-acetaminophen 5-325mg, influenza, insulin aspart, loperamide, lorazepam, morphine, pneumoc 13-jose g conj-dip cr(PF), sodium chloride 0.9%    Diet: Diet Diabetic 1800 Calories Cardiac (Low Na/Chol)    Trended Lab Data:    Recent Labs  Lab 12/20/17  0459 12/21/17  0445 12/22/17  0453 12/22/17  0912   WBC 11.08 11.64 13.08*  --    HGB 13.1* 13.3* 14.9  --    HCT 40.0 40.8 45.2  --     324 323  --    * 104* 105*  --    RDW 14.1 13.8 13.6  --     139 136 138   K 4.4 4.7 5.5* 4.2    100 99 95   CO2 32* 32* 24 35*   BUN 29* 31* 29* 29*   CREATININE 1.2 1.4 1.1 1.2   * 140* 124* 154*   PROT 7.1 7.3 8.3  --    ALBUMIN 2.2* 2.3* 2.5*  --    BILITOT 0.4 0.4 0.5  --    AST 22 23 32  --    ALKPHOS 91 90 81  --    ALT 20 18 16  --        I/O last 3 completed shifts:  In: 1750 [P.O.:1450; IV Piggyback:300]  Out: 4650 [Urine:4650]    Exam:  Gen: NAD  Resp unlabored  CV  RR  LLE  Cellulitis appears somewhat improved compared to one week ago, however still present along lower leg extending up medial thigh.  No palpable fluid collections, compartments compressible, lower leg swelling is present  Knee ROM 5-100 without pain  Tolerated full motion of ankle    Impression/Plan:  62M with LLE cellulitis  - no orthopaedic intervention indicated at this time  - PT/OT, encourage mobilization  - continue medical management per primary and ID  - please call with questions      Mike Kemp  PGY-3  [employer] [facility]

## 2017-12-22 NOTE — SUBJECTIVE & OBJECTIVE
Interval History: Pain stable.  Seems saddened this AM.  No abdominal pain, nausea, vomiting.  +bowel function.  Remains with leg elevated.  Tmax 100.4 F; HD stable.  No tachycardia.    Medications:  Continuous Infusions:  Scheduled Meds:   aspirin  81 mg Oral Daily    carvedilol  6.25 mg Oral BID WM    ceFAZolin (ANCEF) IVPB  1 g Intravenous Q8H    clopidogrel  75 mg Oral Daily    clorazepate  15 mg Oral BID    enoxaparin  40 mg Subcutaneous Daily    hydroCHLOROthiazide  12.5 mg Oral Daily    insulin detemir  8 Units Subcutaneous BID    magnesium oxide  400 mg Oral BID    miconazole   Topical (Top) BID    nicotine  1 patch Transdermal Daily    simvastatin  40 mg Oral QHS     PRN Meds:calcium carbonate, dextrose 50%, dextrose 50%, glucagon (human recombinant), glucose, glucose, hydrocodone-acetaminophen 5-325mg, influenza, insulin aspart, loperamide, lorazepam, morphine, pneumoc 13-jose g conj-dip cr(PF), sodium chloride 0.9%     Review of patient's allergies indicates:   Allergen Reactions    Iodine and iodide containing products     Shellfish containing products      Objective:     Vital Signs (Most Recent):  Temp: 98.3 °F (36.8 °C) (12/22/17 0836)  Pulse: 86 (12/22/17 0836)  Resp: 19 (12/22/17 0836)  BP: 123/64 (12/22/17 0836)  SpO2: 97 % (12/22/17 0719) Vital Signs (24h Range):  Temp:  [96.3 °F (35.7 °C)-100.4 °F (38 °C)] 98.3 °F (36.8 °C)  Pulse:  [60-86] 86  Resp:  [18-20] 19  SpO2:  [92 %-98 %] 97 %  BP: (109-138)/(56-76) 123/64     Weight: 95 kg (209 lb 6.4 oz)  Body mass index is 34.85 kg/m².    Intake/Output - Last 3 Shifts       12/20 0700 - 12/21 0659 12/21 0700 - 12/22 0659 12/22 0700 - 12/23 0659    P.O. 1111 1330     IV Piggyback 150 150     Total Intake(mL/kg) 1261 (13.3) 1480 (15.6)     Urine (mL/kg/hr) 2500 (1.1) 3000 (1.3) 600 (3.7)    Stool 0 (0)      Total Output 2500 3000 600    Net -3160 -5363 -474           Urine Occurrence 1 x      Stool Occurrence 1 x            Physical Exam    Constitutional: He is oriented to person, place, and time. He appears well-developed and well-nourished. No distress (mild when LLE examined).   HENT:   Head: Normocephalic and atraumatic.   Eyes: Conjunctivae and EOM are normal. No scleral icterus.   Neck: Normal range of motion.   Cardiovascular: Normal rate and regular rhythm.    Pulmonary/Chest: Effort normal. No respiratory distress.   Abdominal: Soft. He exhibits distension (mild). There is no tenderness. There is no rebound and no guarding. A hernia (incarcerated recurrent umbilical hernia; partially reducible; non-tender; no overlying skin changes) is present.   Musculoskeletal:   RLE with chronic venous stasis-related changes; no cellulitis.  LLE with significant cellulitic changes diffusely with sparing only of the distal foot and superior-anterior thigh.  This has remained largely stable over the past several days.  Incision to right medial thigh clean and dry.  No fluctuance. +induration over medial and posterior calf.  Blanching erythema. Tender mostly over posterior superior calf   Neurological: He is alert and oriented to person, place, and time.   Nursing note and vitals reviewed.      Significant Labs:  CBC:     Recent Labs  Lab 12/22/17  0453   WBC 13.08*   RBC 4.31*   HGB 14.9   HCT 45.2      *   MCH 34.6*   MCHC 33.0     CMP:     Recent Labs  Lab 12/22/17  0453   *   CALCIUM 9.9   ALBUMIN 2.5*   PROT 8.3      K 5.5*   CO2 24   CL 99   BUN 29*   CREATININE 1.1   ALKPHOS 81   ALT 16   AST 32   BILITOT 0.5     Significant Diagnostics:  None new to review

## 2017-12-22 NOTE — PROGRESS NOTES
The pt was declined by [facility] and [facility] via [facility]. The Sw spoke to the team and the pt had a fever last night and rise in his WBC and they will follow id's rec and the pt will not be able to be discharged until Tuesday at the earliest due to the holiday approaching and the team was notified of this info. The pt's hasn't been accepted to any nh as of yet.    10:22am The Sw spoke to [place] at [facility] who states they have medically accepted the pt and she will reach out to the pt's sister but Maci will speak with the pt's sister first.     11:20am The Sw received a call from  at [facility] stating they're reviewing the pt's info and he will reach out to the pt and his sister. The pt will have to agree to stay at their facility for 30 days or they will not be able to accept the pt but he will speak with the pt in regards to the financial info.

## 2017-12-22 NOTE — PT/OT/SLP PROGRESS
Physical Therapy Treatment    Patient Name:  Reji Guerra   MRN:  [id number]    Recommendations:     Discharge Recommendations:   (Will need 24/7 supervision)   Discharge Equipment Recommendations: bedside commode, bath bench   Barriers to discharge: Inaccessible home and Decreased caregiver support    Assessment:     Reji Guerra is a 62 y.o. male admitted with a medical diagnosis of Cellulitis of left lower extremity without foot.  He presents with the following impairments/functional limitations:  weakness, impaired endurance, impaired self care skills, impaired functional mobilty, gait instability, impaired balance, impaired cognition, decreased coordination, decreased lower extremity function, decreased safety awareness, pain, decreased ROM, edema, impaired skin. Pt requires encouragement to increase out of bed activity every treatment session. Constant verbal cueing to stay focused on task performing secondary to pt excessively talking throughout session. Needs constant verbal cueing to stand upright throughout time spent in static stance. Will benefit from further PT to address all impairments listed above.    Rehab Prognosis:  good; patient would benefit from acute skilled PT services to address these deficits and reach maximum level of function.      Recent Surgery: * No surgery found *      Plan:     During this hospitalization, patient to be seen 6 x/week to address the above listed problems via gait training, therapeutic activities, therapeutic exercises  · Plan of Care Expires:  01/19/18   Plan of Care Reviewed with: patient    Subjective     Communicated with nurse (Ruth)  prior to session.  Patient found supine and requested to sit on bedside commode upon PT entry to room, agreeable to treatment.      Chief Complaint:   Patient comments/goals:   Pain/Comfort:  · Pain Rating 1: 0/10 (did not express pain, however stated that his left LE 'hurt'.)  · Location - Side 1: Left  · Location -  Orientation 1: lower  · Location 1: leg  · Pain Addressed 1: Distraction, Reposition    Patients cultural, spiritual, Christian conflicts given the current situation: N/A    Objective:     Patient found with: peripheral IV     General Precautions: Standard, fall   Orthopedic Precautions:N/A   Braces: N/A     Functional Mobility:  · Bed Mobility:     · Rolling Right: stand by assistance  · Scooting: contact guard assistance  · Supine to Sit: contact guard assistance and minimum assistance  · Sit to Supine: minimum assistance  · Transfers:     · Sit to Stand:  minimum assistance with rolling walker  · Toilet Transfer: minimum assistance and moderate assistance with  rolling walker  using  sliding technique and at times attempted to step with RLE and BUE while moving RW  · Gait: pt not weight bearing on LLE. Attempted to scoot/slide RLE. 3-4 steps from EOB to bedside commode and again 3-4 from bedside commode to EOB. An additional 3-4 slide/scoot steps on RLE to reach HOB prior to sitting.      AM-PAC 6 CLICK MOBILITY  Turning over in bed (including adjusting bedclothes, sheets and blankets)?: 3  Sitting down on and standing up from a chair with arms (e.g., wheelchair, bedside commode, etc.): 3  Moving from lying on back to sitting on the side of the bed?: 3  Moving to and from a bed to a chair (including a wheelchair)?: 2  Need to walk in hospital room?: 2  Climbing 3-5 steps with a railing?: 2  Total Score: 15       Therapeutic Activities and Exercises:   All transfers with assistance as documented above. Pt sat on bedside commode ~20-25 minutes. PTA changed soiled bed linens. Required cleaning by PTA secondary to pt unable to perform self-hygiene. Statically stood in RW ~5 minutes for cleaning. Verbal/tactile cueing needed for upright posture during static standing. Pt repeatedly said he could not put weight on his LLE secondary to pain. Did not rate his pain at this time. Returning to bed pt assistance with  advancement of LLE. Pt extremely talkative throughout session.    Patient left right sidelying with all lines intact, call button in reach and nurse notified..    GOALS:    Physical Therapy Goals        Problem: Physical Therapy Goal    Goal Priority Disciplines Outcome Goal Variances Interventions   Physical Therapy Goal     PT/OT, PT Ongoing (interventions implemented as appropriate)     Description:  Goals to be met by: 2018     Patient will increase functional independence with mobility by performin. Supine to sit with Modified [place]  2. Sit to stand transfer with Modified [place]  3. Gait  x 150 feet with Modified [place] using Rolling Walker.              Problem: Physical Therapy Goal    Goal Priority Disciplines Outcome Goal Variances Interventions   Physical Therapy Goal     PT/OT, PT                      Time Tracking:     PT Received On: 17  PT Start Time: 0806     PT Stop Time: 0853  PT Total Time (min): 47 min     Billable Minutes: Gait Training   22 and Therapeutic Activity 25    Treatment Type: Treatment  PT/PTA: PTA     PTA Visit Number: 1     Delaney Marie, PTA  2017

## 2017-12-22 NOTE — PROGRESS NOTES
[facility]  General Surgery  Progress Note    Subjective:     History of Present Illness:  No notes on file    Post-Op Info:  * No surgery found *         Interval History: Pain stable.  Seems saddened this AM.  No abdominal pain, nausea, vomiting.  +bowel function.  Remains with leg elevated.  Tmax 100.4 F; HD stable.  No tachycardia.    Medications:  Continuous Infusions:  Scheduled Meds:   aspirin  81 mg Oral Daily    carvedilol  6.25 mg Oral BID WM    ceFAZolin (ANCEF) IVPB  1 g Intravenous Q8H    clopidogrel  75 mg Oral Daily    clorazepate  15 mg Oral BID    enoxaparin  40 mg Subcutaneous Daily    hydroCHLOROthiazide  12.5 mg Oral Daily    insulin detemir  8 Units Subcutaneous BID    magnesium oxide  400 mg Oral BID    miconazole   Topical (Top) BID    nicotine  1 patch Transdermal Daily    simvastatin  40 mg Oral QHS     PRN Meds:calcium carbonate, dextrose 50%, dextrose 50%, glucagon (human recombinant), glucose, glucose, hydrocodone-acetaminophen 5-325mg, influenza, insulin aspart, loperamide, lorazepam, morphine, pneumoc 13-jose g conj-dip cr(PF), sodium chloride 0.9%     Review of patient's allergies indicates:   Allergen Reactions    Iodine and iodide containing products     Shellfish containing products      Objective:     Vital Signs (Most Recent):  Temp: 98.3 °F (36.8 °C) (12/22/17 0836)  Pulse: 86 (12/22/17 0836)  Resp: 19 (12/22/17 0836)  BP: 123/64 (12/22/17 0836)  SpO2: 97 % (12/22/17 0719) Vital Signs (24h Range):  Temp:  [96.3 °F (35.7 °C)-100.4 °F (38 °C)] 98.3 °F (36.8 °C)  Pulse:  [60-86] 86  Resp:  [18-20] 19  SpO2:  [92 %-98 %] 97 %  BP: (109-138)/(56-76) 123/64     Weight: 95 kg (209 lb 6.4 oz)  Body mass index is 34.85 kg/m².    Intake/Output - Last 3 Shifts       12/20 0700 - 12/21 0659 12/21 0700 - 12/22 0659 12/22 0700 - 12/23 0659    P.O. 1111 1330     IV Piggyback 150 150     Total Intake(mL/kg) 1261 (13.3) 1480 (15.6)     Urine (mL/kg/hr) 2500 (1.1)  3000 (1.3) 600 (3.7)    Stool 0 (0)      Total Output 2500 3000 600    Net -1239 -1520 -600           Urine Occurrence 1 x      Stool Occurrence 1 x            Physical Exam   Constitutional: He is oriented to person, place, and time. He appears well-developed and well-nourished. No distress (mild when LLE examined).   HENT:   Head: Normocephalic and atraumatic.   Eyes: Conjunctivae and EOM are normal. No scleral icterus.   Neck: Normal range of motion.   Cardiovascular: Normal rate and regular rhythm.    Pulmonary/Chest: Effort normal. No respiratory distress.   Abdominal: Soft. He exhibits distension (mild). There is no tenderness. There is no rebound and no guarding. A hernia (incarcerated recurrent umbilical hernia; partially reducible; non-tender; no overlying skin changes) is present.   Musculoskeletal:   RLE with chronic venous stasis-related changes; no cellulitis.  LLE with significant cellulitic changes diffusely with sparing only of the distal foot and superior-anterior thigh.  This has remained largely stable over the past several days.  Incision to right medial thigh clean and dry.  No fluctuance. +induration over medial and posterior calf.  Blanching erythema. Tender mostly over posterior superior calf   Neurological: He is alert and oriented to person, place, and time.   Nursing note and vitals reviewed.      Significant Labs:  CBC:     Recent Labs  Lab 12/22/17  0453   WBC 13.08*   RBC 4.31*   HGB 14.9   HCT 45.2      *   MCH 34.6*   MCHC 33.0     CMP:     Recent Labs  Lab 12/22/17  0453   *   CALCIUM 9.9   ALBUMIN 2.5*   PROT 8.3      K 5.5*   CO2 24   CL 99   BUN 29*   CREATININE 1.1   ALKPHOS 81   ALT 16   AST 32   BILITOT 0.5     Significant Diagnostics:  None new to review    Assessment/Plan:     * Cellulitis of left lower extremity without foot    -Mostly stable over the past few days  -Incision site looks good; daily wound dressing changes  -Cultures still pending/no  growth  -trending CRP; trending up; may need to consider addition of Vancomycin  -ID on board  -Will continue to follow        Recurrent umbilical hernia    -No acute surgical intervention  -Patient is non-tender with no overlying skin changes; he is tolerating regular diet and having good bowel function with no nausea or emesis  -Would not be ideal to repair hernia with patient's current active infectious process for risk of mesh infection            Denny Domínguez Jr., MD  General Surgery  [facility]

## 2017-12-22 NOTE — PLAN OF CARE
Problem: Patient Care Overview  Goal: Plan of Care Review  Pt received on Ra, no respiratory distress noted. Will con to monitor.

## 2017-12-22 NOTE — PLAN OF CARE
Problem: Patient Care Overview  Goal: Plan of Care Review  Outcome: Ongoing (interventions implemented as appropriate)  Reviewed plan of care with patient and family member. Patient verbalized understanding. AAOx3. Pt on cardiac/diabetic diet; tolerates PO meds whole. Blood glucose monitored and covered appropriately per sliding scale. TB skin test placed to right FA at 1653. PRN pain meds given for complaints of pain to left lower leg. Dressing to leg changed by MD this morning. Pt went down for US of lower extremity today. Bed alarm set, bed in lowest position, call bell in reach. No distress noted and will continue to monitor.

## 2017-12-22 NOTE — PLAN OF CARE
Problem: Patient Care Overview  Goal: Plan of Care Review  Outcome: Ongoing (interventions implemented as appropriate)  Recommendation/Intervention:   1. Change Boost Plus to Boost Glucose Control.   2. Encourage good intake at meals.    Goals:   Pt will consume at least 75% intake at meals  Nutrition Goal Status: new  Communication of RD Recs: reviewed with RN

## 2017-12-22 NOTE — PROGRESS NOTES
[facility]  Infectious Disease  Progress Note    Patient Name: Reji Guerra  MRN: [id number]  Admission Date: 12/12/2017  Length of Stay: 10 days  Attending Physician: Berkley Johnson MD  Primary Care Provider: [facility]    Isolation Status: No active isolations  Assessment/Plan:      Cellulitis of left lower extremity    62 M with LLE cellulitis, erythema, warmth and swelling primarily to knee with redness and warmth tracking to groin on medial and lateral aspects of L thigh. Initially on vanc and zosyn with minimal improvement. Currently on cefazolin. More significant improvement in exam last 24 hours but with concerning area posteriorly to calf. US negative for fluid collection on 12/21    Recs  - continue cefazolin 1g q8hrs; pain much improved--erythema and edema improved as well but concerning pocket posterior calf--US negative for fluid collection  - repeat blood cultures NGTD            Anticipated Disposition: unclear    Thank you for your consult. I will follow-up with patient. Please contact us if you have any additional questions.    HALEY Mcmillan  Infectious Disease  [facility]    Subjective:     Principal Problem:Cellulitis of left lower extremity without foot    HPI: 62M with history of CAD (s/p PCI with three stents), DMII, HTN, HLD who presented to ED on 12/12 with left lower extremity swelling and erythema. He reports that the swelling and redness began a few days before admission, worsening until he was unable to bear weight on his left leg. He denies fevers and chills while at home, recent trauma to his L leg. He has had no aquatic exposures.     Hospital course significant for initial improvement (per patient, though imaging in chart does not suggest there was improvement) with vancomycin and zosyn with transition to clindamycin on 12/15. He has not had much clinical improvement since transitioning to clindamycin.  Ortho consulted initially because of concern for septic arthritis (knee) but ruled out clinically (tap deferred due to surrounding cellulitis). Venous US of lower extremity was without evidence of DVT and MRI showed nonspecific subcutaneous edema and cellulitis without abscess, osteomyelitis, septic joint of left lower leg. Patient has remained afebrile but with continuing mild leukocytosis (up to 15, 13.8 today). Blood cultures NGTD. Urine culture NGTD. CXR not suggestive of consolidation.     Patient has had many loose bowel movements and is complaining of abdominal pain and distention.     12/18 switched to cefazolin. Slight improvement  12/19 C diff negative    12/21 US negative for fluid collection, continue improvement   Interval History: Minimal improvement. Less swelling and erythema but minimal improvement in pain. US negative for fluid collection yesterday. Had one low grade fever (100.4) and WBC to 13 from 11.  Has occasional loose stools.    Review of Systems   Constitutional: Negative for chills and fever.   HENT: Negative for sore throat.    Eyes: Negative for photophobia and visual disturbance.   Respiratory: Negative for cough and shortness of breath.    Cardiovascular: Negative for chest pain and palpitations.   Gastrointestinal: Positive for diarrhea. Negative for abdominal pain, constipation, nausea and vomiting.   Genitourinary: Negative for dysuria.   Musculoskeletal: Negative for neck stiffness.   Skin: Negative for rash.   Neurological: Negative for dizziness, facial asymmetry, speech difficulty and weakness.   Psychiatric/Behavioral: Negative for confusion. The patient is nervous/anxious.      Objective:     Vital Signs (Most Recent):  Temp: 98.3 °F (36.8 °C) (12/22/17 0836)  Pulse: 86 (12/22/17 0836)  Resp: 19 (12/22/17 0836)  BP: 123/64 (12/22/17 0836)  SpO2: 97 % (12/22/17 0719) Vital Signs (24h Range):  Temp:  [96.3 °F (35.7 °C)-100.4 °F (38 °C)] 98.3 °F (36.8 °C)  Pulse:  [60-86]  86  Resp:  [18-20] 19  SpO2:  [92 %-98 %] 97 %  BP: (109-138)/(56-76) 123/64     Weight: 95 kg (209 lb 6.4 oz)  Body mass index is 34.85 kg/m².    Estimated Creatinine Clearance: 71.9 mL/min (based on SCr of 1.1 mg/dL).    Physical Exam   Constitutional: He appears well-developed and well-nourished. No distress.   HENT:   Head: Normocephalic and atraumatic.   Mouth/Throat: Oropharynx is clear and moist.   Eyes: Conjunctivae and EOM are normal. No scleral icterus.   Neck: Normal range of motion. Neck supple.   Cardiovascular: Normal rate, regular rhythm and normal heart sounds.    No murmur heard.  Pulmonary/Chest: Effort normal and breath sounds normal. No stridor. No respiratory distress. He has no wheezes. He has no rales.   Abdominal: Soft. Bowel sounds are normal. He exhibits distension. There is no tenderness. There is no rebound and no guarding.   Recurrent umbilical hernia, reducible, non-tender   Musculoskeletal:   LLE with erythema, warmth and edema from toes to knee, with erythema tracking medial aspect of thigh to groin as well as lateral aspect of thigh to buttocks--improving from previous; much less TTP but tenderness remains at calf  RLE with venous stasis dermatitis      Neurological: He is alert. He exhibits normal muscle tone.   Psychiatric: He has a normal mood and affect.   Anxious       Significant Labs:   Blood Culture:   Recent Labs  Lab 12/12/17  1119 12/12/17  1226 12/18/17  1512 12/21/17  1713 12/21/17  1714   LABBLOO No growth after 5 days. No growth after 5 days. No Growth to date  No Growth to date  No Growth to date  No Growth to date  No Growth to date  No Growth to date  No Growth to date  No Growth to date No Growth to date No Growth to date     CBC:   Recent Labs  Lab 12/21/17  0445 12/22/17  0453   WBC 11.64 13.08*   HGB 13.3* 14.9   HCT 40.8 45.2    323     CMP:   Recent Labs  Lab 12/21/17 0445 12/22/17  0453    136   K 4.7 5.5*    99   CO2 32* 24   GLU  140* 124*   BUN 31* 29*   CREATININE 1.4 1.1   CALCIUM 9.6 9.9   PROT 7.3 8.3   ALBUMIN 2.3* 2.5*   BILITOT 0.4 0.5   ALKPHOS 90 81   AST 23 32   ALT 18 16   ANIONGAP 7* 13   EGFRNONAA 53* >60     Urine Culture:   Recent Labs  Lab 12/12/17  1207   LABURIN No significant growth     Urine Studies:   Recent Labs  Lab 12/12/17  1207 12/21/17  2110   COLORU Yellow Yellow   APPEARANCEUA Clear Clear   PHUR 7.0 6.0   SPECGRAV <=1.005* 1.010   PROTEINUA 2+* Negative   GLUCUA Negative Negative   KETONESU Negative Negative   BILIRUBINUA Negative Negative   OCCULTUA 1+* Negative   NITRITE Negative Negative   UROBILINOGEN 1.0 Negative   LEUKOCYTESUR Negative Negative   RBCUA 1  --    WBCUA 0  --    BACTERIA Rare  --    SQUAMEPITHEL 2  --    HYALINECASTS 0  --      Wound Culture:   Recent Labs  Lab 12/18/17  1343   LABAERO No growth       Significant Imaging:  Imaging Results          X-Ray Chest 1 View (Edited Result - FINAL)  Result time 12/22/17 08:18:17    Addendum 1 of 1 by Aurelio Kumar MD (12/22/17 08:18:17)    Epic notification system activated.       Electronically signed by: AURELIO KUMAR MD  Date:     12/22/17  Time:    08:18                Final result by Aurelio Kumar MD (12/22/17 08:14:02)                 Impression:        1.7 cm rounded opacity projects over the left midlung. This may be artifactual reflecting overlapping structures but underlying nodule cannot be excluded. Recommend repeat PA/lateral chest radiograph or CT chest for further characterization.    No focal consolidation to suggest pneumonia.    Epic notification system activated.       Electronically signed by: AURELIO KUMAR MD  Date:     12/22/17  Time:    08:14              Narrative:    XR Chest    12/21/17 18:10:54    Accession #: [id number]    CLINICAL INDICATION: 62 year old M with fever     TECHNIQUE: Single frontal chest radiograph.    COMPARISON:  12/18/2017    FINDINGS:    The cardiomediastinal silhouette is mildly enlarged, stable.  Pulmonary vascularity appears within normal limits.    1.7 cm nodular opacity projects over the left midlung. No confluent pulmonary parenchymal opacity. No pleural fluid. No pneumothorax.    Remote left rib fracture.                             US Extremity Non Vascular Complete Left (Final result)  Result time 12/21/17 13:45:48    Final result by Joseph Nelson MD (12/21/17 13:45:48)                 Impression:       Moderate amount of subcutaneous edema with no focal fluid collection.  Findings may represent edema versus cellulitis.      Electronically signed by: JOSEPH NELSON MD  Date:     12/21/17  Time:    13:45              Narrative:    [id number]  Accession # [id number]    Study:  US EXTREMITY NON VASCULAR COMPLETE LEFT    Indication: 63 yo M with persistent LLE cellulitis, reduced generalized swelling and now more nodular aspect posterior calf.    Comparison: None.    Findings:   Moderate amount of edema is noted in subcutaneous tissue of the left upper leg and calf.  No focal fluid collection is visualized.                             X-Ray Abdomen AP 1 View (Final result)  Result time 12/19/17 08:04:21    Final result by Jorge Allred MD (12/19/17 08:04:21)                 Impression:     Localized proximal small bowel ileus question.      Electronically signed by: ANTONIO ALLRED MD  Date:     12/19/17  Time:    08:04              Narrative:    Single view of the abdomen, cervical mental opacities from anterior abdominal wall repair graft left periumbilical.  Minimal mild distention proximal small bowel.  Nondistended air stomach and colon.  Lower pelvis not included on exam.                             X-Ray Chest 1 View (Final result)  Result time 12/18/17 08:13:11    Final result by Jorge Allred MD (12/18/17 08:13:11)                 Impression:     Stable chest.  No active process.      Electronically signed by: ANTONIO ALLRED MD  Date:     12/18/17  Time:    08:13               Narrative:    Single view chest, comparison 12/12/2017.  Cardiomegaly stable.  Incomplete inspiration, interstitial markings remain mildly prominent.  There are chronic rib deformity left lateral fifth sixth and seventh ribs.                             MRI Lower Extremity W WO Contrast Left (Final result)  Result time 12/15/17 16:53:12    Final result by Jorge Lozano MD (12/15/17 16:53:12)                 Impression:     Nonspecific subcutaneous edema and Cellulitis without abscess, osteomyelitis, septic joint left lower leg.      Electronically signed by: ANTONIO LOZANO MD  Date:     12/15/17  Time:    16:53              Narrative:    Comparison ultrasound vein left leg, radiographs left knee dating back to 12/12/17.  MR on left tibia from knee to ankle without, and with gadovist 10 cc intravenous.    Asymmetrical subcutaneous edema left lower extremity compared to contralateral side.  Bone and joint structures normal.  No fracture dislocation.  Neurovascular structures appear intact.  Muscle tendon, ligament structures normal.  No abscess.  Heterogeneous enhancement of subcutaneous tissues, no unusual enhancement musculoskeletal structures otherwise.                             X-Ray Knee 3 View Left (Final result)  Result time 12/13/17 12:18:14    Final result by Rob Valle MD (12/13/17 12:18:14)                 Impression:     As above.      Electronically signed by: ROB VALLE MD  Date:     12/13/17  Time:    12:18              Narrative:    Knee 3 views left.    Findings: 3 views left. There is no fracture, dislocation, or bony erosion identified.                             X-Ray Chest AP Portable (Final result)  Result time 12/12/17 12:14:25    Final result by Luan Sutherland MD (12/12/17 12:14:25)                 Impression:        No acute radiographic findings on single view of the chest.      Electronically signed by: LUAN SUTHERLAND MD  Date:     12/12/17  Time:    12:14               Narrative:    Comparison: None    Technique: Single view of the chest.    Findings: Cardiac silhouette is enlarged.  There is aortic atherosclerosis.  Lungs show no evidence of significant focal consolidation, large effusion, or pneumothorax.  The bones demonstrate degenerative changes of the spine and shoulders.                             US Lower Extremity Veins Left (Final result)  Result time 12/12/17 11:54:55    Final result by Luan Gifford MD (12/12/17 11:54:55)                 Impression:         No evidence of deep vein thrombosisin the left lower extremity.    There is lower extremity edema.        Electronically signed by: LUAN GIFFORD MD  Date:     12/12/17  Time:    11:54              Narrative:    Comparison: None.    Findings: There is no evidence of venous thrombosis in the bilateral common femoral, left femoral, greater saphenous, popliteal, posterior tibial, anterior tibial, and peroneal veins.                            Antibiotics     Start     Stop Route Frequency Ordered    12/18/17 1530  ceFAZolin (ANCEF) 1 gram in dextrose 5 % 50 mL IVPB (premix)      -- IV Every 8 hours (non-standard times) 12/18/17 1416

## 2017-12-22 NOTE — PLAN OF CARE
Met with patient and sister at bedside. Patient agreeing to nursing home placement as patient cannot walk at this time.    Also received message from Emily Price with medicaid office that patient has been approved for medicaid.    Per , [facility] accepted patient and will meet with sister on Tuesday 12/26. Per SW, [facility] is evaluating patient for acceptance and needs to speak with patient/sister. Patient states he is trying to call representative back and so is sister. Patient's 1st preference would be to go to [facility].       12/22/17 1433   Discharge Reassessment   Assessment Type Discharge Planning Reassessment   Provided patient/caregiver education on the expected discharge date and the discharge plan Yes   Do you have any problems affording any of your prescribed medications? No   Discharge Plan A New Nursing Home placement - half-way care facility   Discharge Plan B Skilled Nursing Facility   Can the patient answer the patient profile reliably? Yes, cognitively intact     Maci Mansfield, RN, [facility], CMSRN  RN Transition Navigator  [phone]

## 2017-12-22 NOTE — ASSESSMENT & PLAN NOTE
Original home med [facility] restarted  Pt tearful and concerned about future.  Pt open to talking to psychiatrist, but may not be necessary as it is discharge planning that is concerning pt  Continue to work with sister, case management about placement plans

## 2017-12-22 NOTE — SUBJECTIVE & OBJECTIVE
Interval History: NAEON. Temp of 100.4 yesterday. VSS.  Still continued difficulty in walking.  Does not qualify for SNF. Would have to give up SS check in order to go to nursing home. Tolerating PO diet. Tearful this AM.    Review of Systems   Constitutional: Negative for chills and fever.   HENT: Negative for sore throat.    Eyes: Negative for photophobia.   Respiratory: Negative for cough and shortness of breath.    Cardiovascular: Negative for chest pain and palpitations.   Gastrointestinal: Negative for abdominal pain, constipation, diarrhea, nausea and vomiting.   Genitourinary: Negative for dysuria.   Musculoskeletal: Positive for gait problem. Negative for back pain and neck stiffness.   Skin: Negative for pallor and rash.   Neurological: Negative for dizziness, facial asymmetry, speech difficulty and weakness.   Psychiatric/Behavioral: Positive for dysphoric mood. Negative for confusion. The patient is nervous/anxious.         Objective:     Vital Signs (Most Recent):  Temp: 97 °F (36.1 °C) (12/22/17 0426)  Pulse: 69 (12/22/17 0719)  Resp: 20 (12/22/17 0719)  BP: 138/65 (12/22/17 0426)  SpO2: 97 % (12/22/17 0719) Vital Signs (24h Range):  Temp:  [96.3 °F (35.7 °C)-100.4 °F (38 °C)] 97 °F (36.1 °C)  Pulse:  [60-75] 69  Resp:  [18-20] 20  SpO2:  [92 %-98 %] 97 %  BP: (109-138)/(56-76) 138/65     Weight: 95 kg (209 lb 6.4 oz)  Body mass index is 34.85 kg/m².    Intake/Output Summary (Last 24 hours) at 12/22/17 0805  Last data filed at 12/22/17 0700   Gross per 24 hour   Intake             1480 ml   Output             3600 ml   Net            -2120 ml      Physical Exam   Constitutional: He is oriented to person, place, and time. He appears well-developed and well-nourished. No distress.   Resting in bed, appears older than stated age   HENT:   Head: Normocephalic and atraumatic.   Mouth/Throat: No oropharyngeal exudate.   Eyes: Conjunctivae and EOM are normal. Pupils are equal, round, and reactive to light.    Neck: Normal range of motion. Neck supple. No JVD present.   Cardiovascular: Normal rate, regular rhythm and normal heart sounds.    No murmur heard.  Pulmonary/Chest: Effort normal and breath sounds normal. No respiratory distress. He has no wheezes (bilat lower lung wheezes). He has no rales.   Abdominal: Soft. Bowel sounds are normal. He exhibits distension (rotund abdomen, soft). He exhibits no mass. There is no tenderness.   Musculoskeletal: He exhibits edema (improving).   Able to range knee with some pain (UE & RLE without erythema, pain); chronic back pain with movement   Neurological: He is alert and oriented to person, place, and time. No cranial nerve deficit.   Skin: Skin is warm. Capillary refill takes less than 2 seconds. He is not diaphoretic. There is erythema.   LLE erythema and swelling decreased extending from mid calf to knee with erythema, warmth extends up medial aspect of thigh stable (stable), decreasdin swelling and erythema at foot; dry flaking on bilat feet    bilat old healed venous stasis ulcer scaling wounds, no drainage, no scrotal involvement, no LAD     Biopsy site healing   Psychiatric: He has a normal mood and affect. His behavior is normal.   Nursing note and vitals reviewed.    Significant Labs:   CBC:   Recent Labs  Lab 12/21/17 0445 12/22/17  0453   WBC 11.64 13.08*   HGB 13.3* 14.9   HCT 40.8 45.2    323     CMP:   Recent Labs  Lab 12/21/17 0445 12/22/17  0453    136   K 4.7 5.5*    99   CO2 32* 24   * 124*   BUN 31* 29*   CREATININE 1.4 1.1   CALCIUM 9.6 9.9   PROT 7.3 8.3   ALBUMIN 2.3* 2.5*   BILITOT 0.4 0.5   ALKPHOS 90 81   AST 23 32   ALT 18 16   ANIONGAP 7* 13   EGFRNONAA 53* >60     Magnesium:   Recent Labs  Lab 12/21/17 0445 12/22/17  0453   MG 2.1 2.6     CRP: (172 > 112 > 114): 145 today  Cultures: NGTD      Significant Imaging: I have reviewed all pertinent imaging results/findings within the past 24 hours.     US left lower leg:     Moderate amount of subcutaneous edema with no focal fluid collection.  Findings may represent edema versus cellulitis.

## 2017-12-22 NOTE — PLAN OF CARE
Problem: Physical Therapy Goal  Goal: Physical Therapy Goal  Goals to be met by: 2018     Patient will increase functional independence with mobility by performin. Supine to sit with Modified [place]  2. Sit to stand transfer with Modified [place]  3. Gait  x 150 feet with Modified [place] using Rolling Walker.      Outcome: Ongoing (interventions implemented as appropriate)         Pt continues to slowly work toward established goals.

## 2017-12-22 NOTE — PT/OT/SLP PROGRESS
Occupational Therapy   Treatment    Name: Reji Guerra  MRN: [id number]  Admitting Diagnosis:  Cellulitis of left lower extremity without foot       Recommendations:     Discharge Recommendations:  (24/7  care and assist)  Discharge Equipment Recommendations:  bedside commode, bath bench  Barriers to discharge:  Decreased caregiver support    Subjective     Communicated with: nurse prior to session.  Pain/Comfort:  · Pain Rating 1: 10/10 (and burning sensation for top of leg to foot)  · Location - Side 1: Left  · Location - Orientation 1: generalized  · Location 1: leg (and foot; )  · Pain Addressed 1: Distraction, Cessation of Activity, Reposition, Nurse notified  · Pain Rating Post-Intervention 1: 10/10    Objective:     Patient found with: telemetry    General Precautions: Standard, fall   Orthopedic Precautions:LLE weight bearing as tolerated   Braces: N/A     Occupational Performance:    Bed Mobility:    · Patient completed Scooting/Bridging with stand by assistance and with side rail  · Patient completed Supine to Sit with contact guard assistance and with side rail  · Patient completed Sit to Supine with minimum assistance and for LLE lifting      Functional Mobility/Transfers:  · Patient completed Sit <> Stand Transfer with contact guard assistance and minimum assistance  with  rolling walker   · Patient completed Bed <> Bed Transfer using Stand Pivot technique with contact guard assistance with rolling walker    Activities of Daily Living:  · Toileting: stand by assistance used urinal seated EOB; declined BSC t/f 2/2 L foot pain    Patient left HOB elevated with all lines intact, call button in reach and nurse notified    [facility] 6 Click:  [facility] Total Score: 18    Treatment & Education:  Fx ambulation ~4 steps toward sink with RW and CGA; extremely slow moving 2/2 pain LLE with WB'ing unable to make it to sink 2/2 pain.  SPT back to bed CGA with RW; slow moving.  Sit<>stand 2 trials from EOB : initially min  then CGA to RW on second trial. Pt. side stepped toward HOB CGA using RW with slight increase in timelines with stepping.  Education:    Assessment:     Reji Guerra is a 62 y.o. male with a medical diagnosis of Cellulitis of left lower extremity without foot.  He presents with No significant progress noted toward goals. Pt. limited by L foot pain especially with WB'ing to LLE during ambulation and stand pivot t/f with RW.  Pt highly anxious throughout session.  Continue with OT POC.  Performance deficits affecting function are weakness, impaired self care skills, impaired balance, decreased coordination, decreased safety awareness, pain, impaired functional mobilty, impaired endurance, gait instability, impaired skin.      Rehab Prognosis:  Fair ; patient would benefit from acute skilled OT services to address these deficits and reach maximum level of function.       Plan:     Patient to be seen 5 x/week to address the above listed problems via self-care/home management, therapeutic activities, therapeutic exercises  · Plan of Care Expires: 01/15/18  · Plan of Care Reviewed with: patient    This Plan of care has been discussed with the patient who was involved in its development and understands and is in agreement with the identified goals and treatment plan    GOALS:    Occupational Therapy Goals        Problem: Occupational Therapy Goal    Goal Priority Disciplines Outcome Interventions   Occupational Therapy Goal     OT, PT/OT Ongoing (interventions implemented as appropriate)    Description:  Goals to be met by: 12/23/2017    Patient will increase functional independence with ADLs by performing:    LE Dressing with Supervision.  Grooming while standing at sink with Supervision.  Toileting from bedside commode with Supervision for hygiene and clothing management.   Supine to sit with Modified [place].  Toilet transfer to bedside commode with Supervision.                      Time Tracking:     OT Date of  Treatment: 12/22/17  OT Start Time: 1145  OT Stop Time: 1214  OT Total Time (min): 29 min    Billable Minutes:Self Care/Home Management 15  Therapeutic Activity 14  Total Time 29    Chiquita Anton OT  12/22/2017

## 2017-12-23 LAB
ALBUMIN SERPL BCP-MCNC: 2.2 G/DL
ALP SERPL-CCNC: 80 U/L
ALT SERPL W/O P-5'-P-CCNC: 10 U/L
ANION GAP SERPL CALC-SCNC: 9 MMOL/L
AST SERPL-CCNC: 17 U/L
BACTERIA BLD CULT: NORMAL
BACTERIA BLD CULT: NORMAL
BACTERIA UR CULT: NO GROWTH
BASOPHILS # BLD AUTO: 0.03 K/UL
BASOPHILS NFR BLD: 0.3 %
BILIRUB SERPL-MCNC: 0.4 MG/DL
BUN SERPL-MCNC: 32 MG/DL
CALCIUM SERPL-MCNC: 9.5 MG/DL
CHLORIDE SERPL-SCNC: 96 MMOL/L
CO2 SERPL-SCNC: 31 MMOL/L
CREAT SERPL-MCNC: 1 MG/DL
CRP SERPL-MCNC: 167.89 MG/L
DIFFERENTIAL METHOD: ABNORMAL
EOSINOPHIL # BLD AUTO: 0.2 K/UL
EOSINOPHIL NFR BLD: 1.6 %
ERYTHROCYTE [DISTWIDTH] IN BLOOD BY AUTOMATED COUNT: 13.6 %
EST. GFR  (AFRICAN AMERICAN): >60 ML/MIN/1.73 M^2
EST. GFR  (NON AFRICAN AMERICAN): >60 ML/MIN/1.73 M^2
GLUCOSE SERPL-MCNC: 126 MG/DL
HCT VFR BLD AUTO: 40.2 %
HGB BLD-MCNC: 13.1 G/DL
LYMPHOCYTES # BLD AUTO: 1.7 K/UL
LYMPHOCYTES NFR BLD: 15.3 %
MAGNESIUM SERPL-MCNC: 1.9 MG/DL
MCH RBC QN AUTO: 33.8 PG
MCHC RBC AUTO-ENTMCNC: 32.6 G/DL
MCV RBC AUTO: 104 FL
MONOCYTES # BLD AUTO: 1 K/UL
MONOCYTES NFR BLD: 8.8 %
NEUTROPHILS # BLD AUTO: 8.1 K/UL
NEUTROPHILS NFR BLD: 73.2 %
PHOSPHATE SERPL-MCNC: 4.3 MG/DL
PLATELET # BLD AUTO: 316 K/UL
PMV BLD AUTO: 11.2 FL
POCT GLUCOSE: 154 MG/DL (ref 70–110)
POCT GLUCOSE: 194 MG/DL (ref 70–110)
POCT GLUCOSE: 196 MG/DL (ref 70–110)
POCT GLUCOSE: 226 MG/DL (ref 70–110)
POTASSIUM SERPL-SCNC: 4.3 MMOL/L
PROT SERPL-MCNC: 7.4 G/DL
RBC # BLD AUTO: 3.88 M/UL
SODIUM SERPL-SCNC: 136 MMOL/L
TB INDURATION 48 - 72 HR READ: 0 MM
WBC # BLD AUTO: 11.1 K/UL

## 2017-12-23 PROCEDURE — 25000003 PHARM REV CODE 250: Performed by: FAMILY MEDICINE

## 2017-12-23 PROCEDURE — 83735 ASSAY OF MAGNESIUM: CPT

## 2017-12-23 PROCEDURE — 36415 COLL VENOUS BLD VENIPUNCTURE: CPT

## 2017-12-23 PROCEDURE — 25000003 PHARM REV CODE 250: Performed by: STUDENT IN AN ORGANIZED HEALTH CARE EDUCATION/TRAINING PROGRAM

## 2017-12-23 PROCEDURE — 94761 N-INVAS EAR/PLS OXIMETRY MLT: CPT

## 2017-12-23 PROCEDURE — 94799 UNLISTED PULMONARY SVC/PX: CPT

## 2017-12-23 PROCEDURE — 97110 THERAPEUTIC EXERCISES: CPT

## 2017-12-23 PROCEDURE — 84100 ASSAY OF PHOSPHORUS: CPT

## 2017-12-23 PROCEDURE — 63600175 PHARM REV CODE 636 W HCPCS: Performed by: INTERNAL MEDICINE

## 2017-12-23 PROCEDURE — 99232 SBSQ HOSP IP/OBS MODERATE 35: CPT | Mod: 24,,, | Performed by: SURGERY

## 2017-12-23 PROCEDURE — 80053 COMPREHEN METABOLIC PANEL: CPT

## 2017-12-23 PROCEDURE — 86141 C-REACTIVE PROTEIN HS: CPT

## 2017-12-23 PROCEDURE — 97116 GAIT TRAINING THERAPY: CPT

## 2017-12-23 PROCEDURE — 85025 COMPLETE CBC W/AUTO DIFF WBC: CPT

## 2017-12-23 PROCEDURE — 63600175 PHARM REV CODE 636 W HCPCS: Performed by: FAMILY MEDICINE

## 2017-12-23 PROCEDURE — 21400001 HC TELEMETRY ROOM

## 2017-12-23 PROCEDURE — 87040 BLOOD CULTURE FOR BACTERIA: CPT

## 2017-12-23 PROCEDURE — 63600175 PHARM REV CODE 636 W HCPCS: Performed by: STUDENT IN AN ORGANIZED HEALTH CARE EDUCATION/TRAINING PROGRAM

## 2017-12-23 PROCEDURE — S4991 NICOTINE PATCH NONLEGEND: HCPCS | Performed by: STUDENT IN AN ORGANIZED HEALTH CARE EDUCATION/TRAINING PROGRAM

## 2017-12-23 RX ADMIN — CLOPIDOGREL 75 MG: 75 TABLET, FILM COATED ORAL at 08:12

## 2017-12-23 RX ADMIN — CEFAZOLIN SODIUM 1 G: 1 SOLUTION INTRAVENOUS at 04:12

## 2017-12-23 RX ADMIN — CEFAZOLIN SODIUM 1 G: 1 SOLUTION INTRAVENOUS at 08:12

## 2017-12-23 RX ADMIN — MORPHINE SULFATE 4 MG: 10 INJECTION INTRAVENOUS at 08:12

## 2017-12-23 RX ADMIN — MAGNESIUM OXIDE TAB 400 MG (241.3 MG ELEMENTAL MG) 400 MG: 400 (241.3 MG) TAB at 09:12

## 2017-12-23 RX ADMIN — ASPIRIN 81 MG: 81 TABLET, COATED ORAL at 08:12

## 2017-12-23 RX ADMIN — MORPHINE SULFATE 4 MG: 10 INJECTION INTRAVENOUS at 04:12

## 2017-12-23 RX ADMIN — SIMVASTATIN 40 MG: 20 TABLET, FILM COATED ORAL at 09:12

## 2017-12-23 RX ADMIN — NICOTINE 1 PATCH: 21 PATCH, EXTENDED RELEASE TRANSDERMAL at 08:12

## 2017-12-23 RX ADMIN — CLORAZEPATE DIPOTASSIUM 15 MG: 3.75 TABLET ORAL at 09:12

## 2017-12-23 RX ADMIN — ENOXAPARIN SODIUM 40 MG: 100 INJECTION SUBCUTANEOUS at 04:12

## 2017-12-23 RX ADMIN — INSULIN ASPART 1 UNITS: 100 INJECTION, SOLUTION INTRAVENOUS; SUBCUTANEOUS at 09:12

## 2017-12-23 RX ADMIN — VANCOMYCIN HYDROCHLORIDE 1000 MG: 1 INJECTION, POWDER, LYOPHILIZED, FOR SOLUTION INTRAVENOUS at 06:12

## 2017-12-23 RX ADMIN — CARVEDILOL 6.25 MG: 6.25 TABLET, FILM COATED ORAL at 05:12

## 2017-12-23 RX ADMIN — CARVEDILOL 6.25 MG: 6.25 TABLET, FILM COATED ORAL at 08:12

## 2017-12-23 RX ADMIN — HYDROCODONE BITARTRATE AND ACETAMINOPHEN 1 TABLET: 5; 325 TABLET ORAL at 07:12

## 2017-12-23 RX ADMIN — MICONAZOLE NITRATE: 20 CREAM TOPICAL at 08:12

## 2017-12-23 RX ADMIN — MICONAZOLE NITRATE: 20 CREAM TOPICAL at 10:12

## 2017-12-23 RX ADMIN — CLORAZEPATE DIPOTASSIUM 15 MG: 3.75 TABLET ORAL at 08:12

## 2017-12-23 RX ADMIN — MAGNESIUM OXIDE TAB 400 MG (241.3 MG ELEMENTAL MG) 400 MG: 400 (241.3 MG) TAB at 08:12

## 2017-12-23 RX ADMIN — VANCOMYCIN HYDROCHLORIDE 1000 MG: 1 INJECTION, POWDER, LYOPHILIZED, FOR SOLUTION INTRAVENOUS at 05:12

## 2017-12-23 RX ADMIN — CEFAZOLIN SODIUM 1 G: 1 SOLUTION INTRAVENOUS at 01:12

## 2017-12-23 RX ADMIN — MORPHINE SULFATE 4 MG: 10 INJECTION INTRAVENOUS at 12:12

## 2017-12-23 RX ADMIN — HYDROCHLOROTHIAZIDE 12.5 MG: 12.5 TABLET ORAL at 08:12

## 2017-12-23 RX ADMIN — HYDROCODONE BITARTRATE AND ACETAMINOPHEN 1 TABLET: 5; 325 TABLET ORAL at 10:12

## 2017-12-23 NOTE — PROGRESS NOTES
"Vancomycin Dosing and Monitoring Pharmacy Protocol    Reji Guerra is a 62 y.o. male    Height: 5' 5" (1.651 m)   Wt Readings from Last 1 Encounters:   12/22/17 95 kg (209 lb 7 oz)     Ideal body weight: 61.5 kg (135 lb 9.3 oz)  Adjusted ideal body weight: 74.9 kg (165 lb 2 oz)    Temp Readings from Last 3 Encounters:   12/23/17 97.8 °F (36.6 °C) (Oral)   05/22/15 97.3 °F (36.3 °C) (Oral)   05/20/15 98 °F (36.7 °C)      Lab Results   Component Value Date/Time    WBC 11.10 12/23/2017 03:09 AM    WBC 13.08 (H) 12/22/2017 04:53 AM    WBC 11.64 12/21/2017 04:45 AM      Lab Results   Component Value Date/Time    CREATININE 1.0 12/23/2017 03:09 AM    CREATININE 1.2 12/22/2017 09:12 AM    CREATININE 1.1 12/22/2017 04:53 AM        Serum creatinine: 1 mg/dL 12/23/17 0309  Estimated creatinine clearance: 79.1 mL/min    Antibiotics       Start     Stop Route Frequency Ordered    12/23/17 0615  vancomycin (VANCOCIN) 1,250 mg in dextrose 5 % 250 mL IVPB  (Vancomycin IVPB with levels panel)      -- IV Every 12 hours (non-standard times) 12/23/17 0511    12/18/17 1530  ceFAZolin (ANCEF) 1 gram in dextrose 5 % 50 mL IVPB (premix)      -- IV Every 8 hours (non-standard times) 12/18/17 1416          Antifungals       Start     Stop Route Frequency Ordered    12/16/17 1015  miconazole 2 % cream      -- Top 2 times daily 12/16/17 0902            Microbiology Results (last 7 days)       Procedure Component Value Units Date/Time    Blood culture [[id number]] Collected:  12/21/17 1713    Order Status:  Completed Specimen:  Blood Updated:  12/22/17 2212     Blood Culture, Routine No Growth to date     Blood Culture, Routine No Growth to date    Narrative:       Aerobic and anaerobic bottles from Site #2    Blood culture [[id number]] Collected:  12/21/17 1714    Order Status:  Completed Specimen:  Blood Updated:  12/22/17 2212     Blood Culture, Routine No Growth to date     Blood Culture, Routine No Growth to date    Narrative:       " Aerobic and anaerobic bottles from Site #1    Blood culture [[id number]] Collected:  12/18/17 1512    Order Status:  Completed Specimen:  Blood Updated:  12/22/17 2012     Blood Culture, Routine No Growth to date     Blood Culture, Routine No Growth to date     Blood Culture, Routine No Growth to date     Blood Culture, Routine No Growth to date     Blood Culture, Routine No Growth to date    Blood culture [[id number]] Collected:  12/18/17 1512    Order Status:  Completed Specimen:  Blood Updated:  12/22/17 2012     Blood Culture, Routine No Growth to date     Blood Culture, Routine No Growth to date     Blood Culture, Routine No Growth to date     Blood Culture, Routine No Growth to date     Blood Culture, Routine No Growth to date    E. coli 0157 antigen [[id number]] Collected:  12/20/17 1431    Order Status:  Completed Specimen:  Stool from Stool Updated:  12/22/17 1247     Shiga Toxin 1 E.coli Negative     Shiga Toxin 2 E.coli Negative    Stool culture [[id number]] Collected:  12/20/17 1431    Order Status:  Completed Specimen:  Stool from Stool Updated:  12/22/17 1121     Stool Culture Nothing significant to date    Culture, Anaerobe [[id number]] Collected:  12/18/17 1343    Order Status:  Completed Specimen:  Incision site from Leg, Left Updated:  12/22/17 1002     Anaerobic Culture Culture in progress    Urine culture [[id number]] Collected:  12/21/17 2110    Order Status:  Sent Specimen:  Urine from Urine, Clean Catch Updated:  12/22/17 0124    Aerobic culture [[id number]] Collected:  12/18/17 1343    Order Status:  Completed Specimen:  Incision site from Leg, Left Updated:  12/21/17 1258     Aerobic Bacterial Culture No growth    Clostridium difficile EIA [[id number]] Collected:  12/18/17 1419    Order Status:  Completed Specimen:  Stool from Stool Updated:  12/18/17 2226     C. diff Antigen Negative     C difficile Toxins A+B, EIA Negative     Comment: Testing not recommended for children <24 months old.        Blood culture x two cultures. Draw prior to antibiotics. [[id number]] Collected:  17 1226    Order Status:  Completed Specimen:  Blood from Peripheral, Lower Arm, Right Updated:  17 181     Blood Culture, Routine No growth after 5 days.    Narrative:       Aerobic and anaerobic    Blood culture x two cultures. Draw prior to antibiotics. [[id number]] Collected:  17 1119    Order Status:  Completed Specimen:  Blood from Peripheral, Hand, Left Updated:  17 181     Blood Culture, Routine No growth after 5 days.    Narrative:       Aerobic and anaerobic            Indication:   Skin and skin structure    Target Level: 10-15 mcg/ml    Hemodialysis:   No on N/A    Dosing Weight:   AdjBW--adjusted body weight  If ABW is greater than or equal to 30% over Ideal Body Weight, AdjBW will be used to calculate vancomycin dose.    Last Vancomycin dose: N/A   Date/Time given: N/A         Vancomycin level:  No results for input(s): VANCOMYCIN-TROUGH in the last 72 hours.  No results for input(s): VANCOMYCIN, RANDOM in the last 72 hours.    Per Protocol Initial/Adjustments Dosin. Initial/Adjustment Dose: DECREASE Vancomycin will be adjusted from 1250mg q12hr to 1000mg q12hr  2. Vancomycin Trough Level will be drawn on  @17:00 date/time    Pharmacy will continue to follow.    Please contact if you have any further questions. Thank you.    Luke Levy, PharmD  [phone]

## 2017-12-23 NOTE — PROGRESS NOTES
[facility]  General Surgery  Progress Note    Subjective:     History of Present Illness:  No notes on file    Post-Op Info:  * No surgery found *         Interval History: No o/n events. Mild pain with palpation of post L calf. No new c/o. D/c planning.    Medications:  Continuous Infusions:  Scheduled Meds:   aspirin  81 mg Oral Daily    carvedilol  6.25 mg Oral BID WM    ceFAZolin (ANCEF) IVPB  1 g Intravenous Q8H    clopidogrel  75 mg Oral Daily    clorazepate  15 mg Oral BID    enoxaparin  40 mg Subcutaneous Daily    hydroCHLOROthiazide  12.5 mg Oral Daily    insulin detemir  8 Units Subcutaneous BID    magnesium oxide  400 mg Oral BID    miconazole   Topical (Top) BID    nicotine  1 patch Transdermal Daily    simvastatin  40 mg Oral QHS    vancomycin (VANCOCIN) IVPB  1,000 mg Intravenous Q12H     PRN Meds:calcium carbonate, dextrose 50%, dextrose 50%, glucagon (human recombinant), glucose, glucose, hydrocodone-acetaminophen 5-325mg, influenza, insulin aspart, loperamide, lorazepam, morphine, pneumoc 13-jose g conj-dip cr(PF), sodium chloride 0.9%     Review of patient's allergies indicates:   Allergen Reactions    Iodine and iodide containing products     Shellfish containing products      Objective:     Vital Signs (Most Recent):  Temp: 98.5 °F (36.9 °C) (12/23/17 0733)  Pulse: 68 (12/23/17 0733)  Resp: 16 (12/23/17 0733)  BP: 123/70 (12/23/17 0733)  SpO2: (!) 94 % (12/23/17 0614) Vital Signs (24h Range):  Temp:  [96.8 °F (36 °C)-99.1 °F (37.3 °C)] 98.5 °F (36.9 °C)  Pulse:  [68-86] 68  Resp:  [16-20] 16  SpO2:  [93 %-96 %] 94 %  BP: ()/(54-78) 123/70     Weight: 95 kg (209 lb 7 oz)  Body mass index is 34.85 kg/m².    Intake/Output - Last 3 Shifts       12/21 0700 - 12/22 0659 12/22 0700 - 12/23 0659 12/23 0700 - 12/24 0659    P.O. 1330 1280     IV Piggyback 150 400     Total Intake(mL/kg) 1480 (15.6) 1680 (17.7)     Urine (mL/kg/hr) 3000 (1.3) 3756 (1.6)     Stool  0 (0)      Total Output 3000 3756      Net -1520 -3874             Stool Occurrence  2 x 1 x          Physical Exam   Constitutional: He is oriented to person, place, and time. He appears well-developed and well-nourished. No distress.   HENT:   Head: Normocephalic and atraumatic.   Eyes: Conjunctivae and EOM are normal. Pupils are equal, round, and reactive to light. No scleral icterus.   Neck: Normal range of motion. Neck supple. No JVD present.   Cardiovascular: Normal rate and regular rhythm.    Pulmonary/Chest: Effort normal and breath sounds normal. No respiratory distress.   Abdominal: Soft. He exhibits no distension.   Musculoskeletal: Normal range of motion.   Resolving cellulitis and chronic venous changes of LLE noted. Mild ttp L post calf. No open wounds. Drsg removed.   Neurological: He is alert and oriented to person, place, and time. No cranial nerve deficit.   Skin: Skin is warm and dry. He is not diaphoretic.   Psychiatric: He has a normal mood and affect.       Significant Labs:  CBC:   Recent Labs  Lab 12/23/17  0309   WBC 11.10   RBC 3.88*   HGB 13.1*   HCT 40.2      *   MCH 33.8*   MCHC 32.6     CMP:   Recent Labs  Lab 12/23/17  0309   *   CALCIUM 9.5   ALBUMIN 2.2*   PROT 7.4      K 4.3   CO2 31*   CL 96   BUN 32*   CREATININE 1.0   ALKPHOS 80   ALT 10   AST 17   BILITOT 0.4     Microbiology Results (last 7 days)     Procedure Component Value Units Date/Time    Urine culture [[id number]] Collected:  12/21/17 2110    Order Status:  Completed Specimen:  Urine from Urine, Clean Catch Updated:  12/23/17 0718     Urine Culture, Routine No growth    Blood culture [[id number]] Collected:  12/21/17 1713    Order Status:  Completed Specimen:  Blood Updated:  12/22/17 2212     Blood Culture, Routine No Growth to date     Blood Culture, Routine No Growth to date    Narrative:       Aerobic and anaerobic bottles from Site #2    Blood culture [[id number]] Collected:  12/21/17 1714    Order  Status:  Completed Specimen:  Blood Updated:  12/22/17 2212     Blood Culture, Routine No Growth to date     Blood Culture, Routine No Growth to date    Narrative:       Aerobic and anaerobic bottles from Site #1    Blood culture [[id number]] Collected:  12/18/17 1512    Order Status:  Completed Specimen:  Blood Updated:  12/22/17 2012     Blood Culture, Routine No Growth to date     Blood Culture, Routine No Growth to date     Blood Culture, Routine No Growth to date     Blood Culture, Routine No Growth to date     Blood Culture, Routine No Growth to date    Blood culture [[id number]] Collected:  12/18/17 1512    Order Status:  Completed Specimen:  Blood Updated:  12/22/17 2012     Blood Culture, Routine No Growth to date     Blood Culture, Routine No Growth to date     Blood Culture, Routine No Growth to date     Blood Culture, Routine No Growth to date     Blood Culture, Routine No Growth to date    E. coli 0157 antigen [[id number]] Collected:  12/20/17 1431    Order Status:  Completed Specimen:  Stool from Stool Updated:  12/22/17 1247     Shiga Toxin 1 E.coli Negative     Shiga Toxin 2 E.coli Negative    Stool culture [[id number]] Collected:  12/20/17 1431    Order Status:  Completed Specimen:  Stool from Stool Updated:  12/22/17 1121     Stool Culture Nothing significant to date    Culture, Anaerobe [[id number]] Collected:  12/18/17 1343    Order Status:  Completed Specimen:  Incision site from Leg, Left Updated:  12/22/17 1002     Anaerobic Culture Culture in progress    Aerobic culture [[id number]] Collected:  12/18/17 1343    Order Status:  Completed Specimen:  Incision site from Leg, Left Updated:  12/21/17 1258     Aerobic Bacterial Culture No growth    Clostridium difficile EIA [[id number]] Collected:  12/18/17 1419    Order Status:  Completed Specimen:  Stool from Stool Updated:  12/18/17 2226     C. diff Antigen Negative     C difficile Toxins A+B, EIA Negative     Comment: Testing not recommended for  children <24 months old.       Blood culture x two cultures. Draw prior to antibiotics. [[id number]] Collected:  12/12/17 1226    Order Status:  Completed Specimen:  Blood from Peripheral, Lower Arm, Right Updated:  12/17/17 1812     Blood Culture, Routine No growth after 5 days.    Narrative:       Aerobic and anaerobic    Blood culture x two cultures. Draw prior to antibiotics. [[id number]] Collected:  12/12/17 1119    Order Status:  Completed Specimen:  Blood from Peripheral, Hand, Left Updated:  12/17/17 1812     Blood Culture, Routine No growth after 5 days.    Narrative:       Aerobic and anaerobic          Significant Diagnostics:  no new studies noted    Assessment/Plan:     * Cellulitis of left lower extremity without foot    Improving  No open wounds, dressing removed  Cultures noted  Cont current tx        Recurrent umbilical hernia    -No acute surgical intervention  -Patient is non-tender with no overlying skin changes; he is tolerating regular diet and having good bowel function with no nausea or emesis  -Would not be ideal to repair hernia with patient's current active infectious process for risk of mesh infection        Type 2 diabetes mellitus with hyperglycemia, without long-term current use of insulin    Cont strict BG control  Lab Results   Component Value Date    HGBA1C 6.5 (H) 12/12/2017                 Elvira Handy, DO  General Surgery  [facility]

## 2017-12-23 NOTE — ASSESSMENT & PLAN NOTE
WBC 11, improving but CRP increasing  Vanc and Zosyn switched to [place] HOD#3 with subsequent regression  On Ancef this AM    Leg stable, still unable to ambulate  US repeated with no fluid collective, no nodularity, just generalized swelling  Compression with ACE wrap, elevating leg    F/u CXR for PA/Lat for spot  Other sources for fever, crp, rising WBC    Discharge planning for [facility]

## 2017-12-23 NOTE — PLAN OF CARE
Problem: Patient Care Overview  Goal: Plan of Care Review  Outcome: Ongoing (interventions implemented as appropriate)  Reviewed plan of care with patient. Patient verbalized understanding. AAOx3. Pt on cardiac/diabetic diet; tolerates PO meds whole. Blood glucose monitored and covered appropriately per sliding scale. TB skin test read (negative). PRN pain meds given for complaints of pain to left lower leg. Dressing to leg removed by MD this morning. To remain open to air. Ambulated in room with PT. IV abx administered. Bed alarm set, bed in lowest position, call bell in reach. Will continue to monitor.

## 2017-12-23 NOTE — PLAN OF CARE
Problem: Physical Therapy Goal  Goal: Physical Therapy Goal  Goals to be met by: 2018     Patient will increase functional independence with mobility by performin. Supine to sit with Modified [place]  2. Sit to stand transfer with Modified [place]  3. Gait  x 150 feet with Modified [place] using Rolling Walker.      Outcome: Ongoing (interventions implemented as appropriate)      Pt continues to slowly work toward achieving established goals.

## 2017-12-23 NOTE — PROGRESS NOTES
[facility] Hospital Medicine  Progress Note    Patient Name: Reji Guerra  MRN: [id number]  Patient Class: IP- Inpatient   Admission Date: 12/12/2017  Length of Stay: 11 days  Attending Physician: Berkley Johnson MD  Primary Care Provider: [facility]        Subjective:     Principal Problem:Cellulitis of left lower extremity without foot    HPI:  61 yo male w/ CAD (3 stents in 2003 w/MI), DMT2, HTN, HLD presents to ED with 1 week of LLE swelling and erythema, which just starting tracking up his inner thigh within the past two days. No trauma to his leg, no wounds on his foot. Pain with standing and walking. 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  He does have to rest 1/2 up a full flight of stairs but denies chest pain, shortness of breath, recent illness. He sleeps flat on just 1 pillow.    He hasn't had medical care in 2 years and hasn't taken any medications for 1 year.  He thinks he was supposed to keep taking the plavix.  Baseline dry cough, minimally productive with clear sputum. Denies CP, SOB, recent illness, N/V, diarrhea, constipation. Last BM day before admission.    Hospital Course:  Pt admitted with Vanc and Zosyn. Ortho consulted for possible joint involvement, recommendations. Slow clinical improvement.  WBC improved slowly as well. Pt remained afebrile through stay.  Abx switched to IV clinda to monitor for response but WBC and clinically pt regressed.  ID and surgery brought on board for additional recs.  Leg wrapped loosely and more dedicated to elevating leg.  Good improvement on HOD# 7  Pt still with complaints about diarrhea even though C Diff neg and on imodium.     Stool studies sent as patient had chronic intermittent diarrhea prior to admission. All cultures continued to be NGTD through HOD#10. CRP improved and then increased. Pt had temp of 100.4 and new cultures were sent.  Leg  showed slow improvement and pt was still unable to walk.    Interval History: NAEON. AF. VSS. Still unable to walk. Case Management working on [facility] vs [facility] placement after [facility].    Review of Systems   Constitutional: Negative for chills and fever.   HENT: Negative for sore throat.    Eyes: Negative for photophobia.   Respiratory: Negative for cough and shortness of breath.    Cardiovascular: Negative for chest pain and palpitations.   Gastrointestinal: Negative for abdominal pain, constipation, diarrhea, nausea and vomiting.   Genitourinary: Negative for dysuria.        Fecal incontinence   Musculoskeletal: Positive for gait problem. Negative for back pain and neck stiffness.   Skin: Negative for pallor and rash.   Neurological: Negative for dizziness, facial asymmetry, speech difficulty and weakness.   Psychiatric/Behavioral: Positive for dysphoric mood. Negative for confusion. The patient is nervous/anxious.       Objective:     Vital Signs (Most Recent):  Temp: 98.5 °F (36.9 °C) (12/23/17 0733)  Pulse: 68 (12/23/17 0733)  Resp: 16 (12/23/17 0733)  BP: 123/70 (12/23/17 0733)  SpO2: (!) 94 % (12/23/17 0614) Vital Signs (24h Range):  Temp:  [96.8 °F (36 °C)-99.1 °F (37.3 °C)] 98.5 °F (36.9 °C)  Pulse:  [68-86] 68  Resp:  [16-20] 16  SpO2:  [93 %-96 %] 94 %  BP: ()/(54-78) 123/70     Weight: 95 kg (209 lb 7 oz)  Body mass index is 34.85 kg/m².    Intake/Output Summary (Last 24 hours) at 12/23/17 0811  Last data filed at 12/23/17 0608   Gross per 24 hour   Intake             1680 ml   Output             3156 ml   Net            -1476 ml      Physical Exam   Constitutional: He is oriented to person, place, and time. He appears well-developed and well-nourished. No distress.   Resting in bed, appears older than stated age   HENT:   Head: Normocephalic and atraumatic.   Mouth/Throat: No oropharyngeal exudate.   Eyes: Conjunctivae and EOM are normal. Pupils are equal, round, and reactive to light.    Neck: Normal range of motion. Neck supple. No JVD present.   Cardiovascular: Normal rate, regular rhythm and normal heart sounds.    No murmur heard.  Pulmonary/Chest: Effort normal and breath sounds normal. No respiratory distress. He has no wheezes (bilat lower lung wheezes). He has no rales.   Abdominal: Soft. Bowel sounds are normal. He exhibits distension (rotund abdomen, soft). He exhibits no mass. There is no tenderness.   Genitourinary:   Genitourinary Comments: weakened rectal tone but present   Musculoskeletal: He exhibits edema (improving).   Able to range knee with some pain (UE & RLE without erythema, pain); chronic back pain with movement   Neurological: He is alert and oriented to person, place, and time. No cranial nerve deficit.   Skin: Skin is warm. Capillary refill takes less than 2 seconds. He is not diaphoretic. There is erythema.   LLE erythema midcalf with severe pain,  Decreased in swelling and erythema at foot; chronic venous stasis color changes but severe pain. dry flaking on bilat feet    bilat old healed venous stasis ulcer scaling wounds, no drainage, no scrotal involvement, no LAD    Psychiatric: He has a normal mood and affect. His behavior is normal.   Nursing note and vitals reviewed.            Significant Labs:   CBC:   Recent Labs  Lab 12/22/17  0453 12/23/17  0309   WBC 13.08* 11.10   HGB 14.9 13.1*   HCT 45.2 40.2    316     CMP:   Recent Labs  Lab 12/22/17  0453 12/22/17  0912 12/23/17  0309    138 136   K 5.5* 4.2 4.3   CL 99 95 96   CO2 24 35* 31*   * 154* 126*   BUN 29* 29* 32*   CREATININE 1.1 1.2 1.0   CALCIUM 9.9 10.0 9.5   PROT 8.3  --  7.4   ALBUMIN 2.5*  --  2.2*   BILITOT 0.5  --  0.4   ALKPHOS 81  --  80   AST 32  --  17   ALT 16  --  10   ANIONGAP 13 8 9   EGFRNONAA >60 >60 >60     CRP: [phone] today    Significant Imaging: I have reviewed all pertinent imaging results/findings within the past 24 hours.    Assessment/Plan:      *  Cellulitis of left lower extremity without foot    WBC 11, improving but CRP increasing  Vanc and Zosyn switched to Clinda HOD#3 with subsequent regression  On Ancef this AM    Leg stable, still unable to ambulate  US repeated with no fluid collective, no nodularity, just generalized swelling  Compression with ACE wrap, elevating leg    F/u CXR for PA/Lat for spot  Other sources for fever, crp, rising WBC    Discharge planning for [facility] [place]        Recurrent umbilical hernia    Non-op per Surgery        Diarrhea    C diff neg, following further stool studies  Clarified, not diarrhea, but rather stool incontinences     Anxiety    Original home med Traxene restarted  Pt tearful and concerned about future.  Pt open to talking to psychiatrist, but may not be necessary as it is discharge planning that is concerning pt  Continue to work with sister, case management about placement plans        Coronary artery disease involving native coronary artery of native heart without angina pectoris    Hx of 3 stents in angiogram in 2003  Restarted on ASA, plavix. Potentially able to stop plavix since stents were placed over 2 years ago  Trops trended down  F/u cardiology as outpatient  H/H 14.9/45.2 but with MCV of 105, Folate and B12 were normal  No chest pain            HTN (hypertension)    Restarted Carvedilol, HCTZ (prior home meds from 2 years ago)   Stable at 109-152/57-83  Normotensive  Cardiac diet            Type 2 diabetes mellitus with hyperglycemia, without long-term current use of insulin    HgbA1C 6.5  On Low Dose Corrective scale  Gluc 126-154  On Detemir 8 units BID              VTE Risk Mitigation         Ordered     enoxaparin injection 40 mg  Daily     Route:  Subcutaneous        12/12/17 1624     Medium Risk of VTE  Once      12/12/17 1624              Andrew Lombardo MD  Department of Hospital Medicine   [facility]

## 2017-12-23 NOTE — SUBJECTIVE & OBJECTIVE
Interval History: NAEON. AF. VSS. Still unable to walk. Case Management working on [facility] vs [facility] placement after [facility].    Review of Systems   Constitutional: Negative for chills and fever.   HENT: Negative for sore throat.    Eyes: Negative for photophobia.   Respiratory: Negative for cough and shortness of breath.    Cardiovascular: Negative for chest pain and palpitations.   Gastrointestinal: Negative for abdominal pain, constipation, diarrhea, nausea and vomiting.   Genitourinary: Negative for dysuria.        Fecal incontinence   Musculoskeletal: Positive for gait problem. Negative for back pain and neck stiffness.   Skin: Negative for pallor and rash.   Neurological: Negative for dizziness, facial asymmetry, speech difficulty and weakness.   Psychiatric/Behavioral: Positive for dysphoric mood. Negative for confusion. The patient is nervous/anxious.       Objective:     Vital Signs (Most Recent):  Temp: 98.5 °F (36.9 °C) (12/23/17 0733)  Pulse: 68 (12/23/17 0733)  Resp: 16 (12/23/17 0733)  BP: 123/70 (12/23/17 0733)  SpO2: (!) 94 % (12/23/17 0614) Vital Signs (24h Range):  Temp:  [96.8 °F (36 °C)-99.1 °F (37.3 °C)] 98.5 °F (36.9 °C)  Pulse:  [68-86] 68  Resp:  [16-20] 16  SpO2:  [93 %-96 %] 94 %  BP: ()/(54-78) 123/70     Weight: 95 kg (209 lb 7 oz)  Body mass index is 34.85 kg/m².    Intake/Output Summary (Last 24 hours) at 12/23/17 0811  Last data filed at 12/23/17 0608   Gross per 24 hour   Intake             1680 ml   Output             3156 ml   Net            -1476 ml      Physical Exam   Constitutional: He is oriented to person, place, and time. He appears well-developed and well-nourished. No distress.   Resting in bed, appears older than stated age   HENT:   Head: Normocephalic and atraumatic.   Mouth/Throat: No oropharyngeal exudate.   Eyes: Conjunctivae and EOM are normal. Pupils are equal, round, and reactive to light.   Neck: Normal range of motion. Neck supple. No JVD present.    Cardiovascular: Normal rate, regular rhythm and normal heart sounds.    No murmur heard.  Pulmonary/Chest: Effort normal and breath sounds normal. No respiratory distress. He has no wheezes (bilat lower lung wheezes). He has no rales.   Abdominal: Soft. Bowel sounds are normal. He exhibits distension (rotund abdomen, soft). He exhibits no mass. There is no tenderness.   Genitourinary:   Genitourinary Comments: weakened rectal tone but present   Musculoskeletal: He exhibits edema (improving).   Able to range knee with some pain (UE & RLE without erythema, pain); chronic back pain with movement   Neurological: He is alert and oriented to person, place, and time. No cranial nerve deficit.   Skin: Skin is warm. Capillary refill takes less than 2 seconds. He is not diaphoretic. There is erythema.   LLE erythema midcalf with severe pain,  Decreased in swelling and erythema at foot; chronic venous stasis color changes but severe pain. dry flaking on bilat feet    bilat old healed venous stasis ulcer scaling wounds, no drainage, no scrotal involvement, no LAD    Psychiatric: He has a normal mood and affect. His behavior is normal.   Nursing note and vitals reviewed.            Significant Labs:   CBC:   Recent Labs  Lab 12/22/17  0453 12/23/17  0309   WBC 13.08* 11.10   HGB 14.9 13.1*   HCT 45.2 40.2    316     CMP:   Recent Labs  Lab 12/22/17  0453 12/22/17  0912 12/23/17  0309    138 136   K 5.5* 4.2 4.3   CL 99 95 96   CO2 24 35* 31*   * 154* 126*   BUN 29* 29* 32*   CREATININE 1.1 1.2 1.0   CALCIUM 9.9 10.0 9.5   PROT 8.3  --  7.4   ALBUMIN 2.5*  --  2.2*   BILITOT 0.5  --  0.4   ALKPHOS 81  --  80   AST 32  --  17   ALT 16  --  10   ANIONGAP 13 8 9   EGFRNONAA >60 >60 >60     CRP: [phone] today    Significant Imaging: I have reviewed all pertinent imaging results/findings within the past 24 hours.

## 2017-12-23 NOTE — PT/OT/SLP PROGRESS
Physical Therapy Treatment    Patient Name:  Reji Guerra   MRN:  [id number]    Recommendations:     Discharge Recommendations:   (24/7 care and assistance)   Discharge Equipment Recommendations: bedside commode, bath bench   Barriers to discharge: Decreased caregiver support    Assessment:     Reji Guerra is a 62 y.o. male admitted with a medical diagnosis of Cellulitis of left lower extremity without foot.  He presents with the following impairments/functional limitations:  weakness, impaired endurance, impaired self care skills, impaired functional mobilty, gait instability, impaired balance, impaired cognition, decreased coordination, decreased lower extremity function, decreased safety awareness, pain, impaired skin, edema. Pt very anxious throughout treatment session. Requires increased time with all movement and activity. Able to increase out of bed activity as well as steps taken during ambulation training. No pain expressed at beginning of treatment as pt had just received pain medication. After treatment pt stated he had pain in LLE but could not rate. Will continue PT to achieve all established goals.    Rehab Prognosis:  fair; patient would benefit from acute skilled PT services to address these deficits and reach maximum level of function.      Recent Surgery: * No surgery found *      Plan:     During this hospitalization, patient to be seen 6 x/week to address the above listed problems via gait training, therapeutic activities, therapeutic exercises  · Plan of Care Expires:  01/19/18   Plan of Care Reviewed with: patient    Subjective     Communicated with nurse (Gisselle) prior to session.  Patient found supine upon PT entry to room, agreeable to treatment.      Chief Complaint: None at this time  Patient comments/goals: Expressed he would like to be able to walk again.  Pain/Comfort:  · Pain Rating 1:  (Stated he was in no pain at begining of treatment. Expressed he just received his pain  medication.)  · Location - Side 1: Left  · Location - Orientation 1: generalized  · Location 1: leg (and left foot)    Patients cultural, spiritual, Yazidi conflicts given the current situation: non expressed    Objective:     Patient found with: peripheral IV     General Precautions: Standard, fall   Orthopedic Precautions:LLE weight bearing as tolerated   Braces: N/A     Functional Mobility:  · Bed Mobility:     · Rolling Right: supervision and stand by assistance  · Scooting: stand by assistance and with siderail  · Supine to Sit: stand by assistance, contact guard assistance and with siderail  · Sit to Supine: contact guard assistance, minimum assistance and for LLE advancement  · Transfers:     · Sit to Stand:  contact guard assistance and minimum assistance with rolling walker  · Gait: ~5 steps forward and ~5 steps backward. An additional ~5-6 side steps to reach HOB with RW and min A. plus verbal cueing for upright posture and to stay within walker boundaries.  · Balance: F-      AM-PAC 6 CLICK MOBILITY  Turning over in bed (including adjusting bedclothes, sheets and blankets)?: 3  Sitting down on and standing up from a chair with arms (e.g., wheelchair, bedside commode, etc.): 3  Moving from lying on back to sitting on the side of the bed?: 3  Moving to and from a bed to a chair (including a wheelchair)?: 2  Need to walk in hospital room?: 2  Climbing 3-5 steps with a railing?: 2  Total Score: 15       Therapeutic Activities and Exercises:   Pt performed seated therapeutic exercises at EOB BLE 1 set of 5 reps consisting of LAQ's, hip add/abd, and 10 reps of glut isometrics. Required constant verbal/tactile cueing for proper form and execution of movement and to ensure muscle activation. Ambulation training with RW and min A able to complete ~5 steps forward, ~ 5 steps backward, and ~5-6 side steps to reach HOB. Pt very cautious when weight bearing on LLE. Rest breaks needed during short gait trials,  which were taken while statically standing in RW. 2 standing rest breaks within walker boundaries of ~1minute each in length. Requires increased time to complete all movements and transfers.    Patient left supine with all lines intact, call button in reach and nurse notified..    GOALS:    Physical Therapy Goals        Problem: Physical Therapy Goal    Goal Priority Disciplines Outcome Goal Variances Interventions   Physical Therapy Goal     PT/OT, PT Ongoing (interventions implemented as appropriate)     Description:  Goals to be met by: 2018     Patient will increase functional independence with mobility by performin. Supine to sit with Modified Girard  2. Sit to stand transfer with Modified Girard  3. Gait  x 150 feet with Modified Girard using Rolling Walker.              Problem: Physical Therapy Goal    Goal Priority Disciplines Outcome Goal Variances Interventions   Physical Therapy Goal     PT/OT, PT                      Time Tracking:     PT Received On: 17  PT Start Time: 927     PT Stop Time: 100  PT Total Time (min): 39 min     Billable Minutes: Gait Training   24 and Therapeutic Exercise 15    Treatment Type: Treatment  PT/PTA: PTA     PTA Visit Number: 2     Delaney Marie, PTA  2017

## 2017-12-23 NOTE — SUBJECTIVE & OBJECTIVE
Interval History: No o/n events. Mild pain with palpation of post L calf. No new c/o. D/c planning.    Medications:  Continuous Infusions:  Scheduled Meds:   aspirin  81 mg Oral Daily    carvedilol  6.25 mg Oral BID WM    ceFAZolin (ANCEF) IVPB  1 g Intravenous Q8H    clopidogrel  75 mg Oral Daily    clorazepate  15 mg Oral BID    enoxaparin  40 mg Subcutaneous Daily    hydroCHLOROthiazide  12.5 mg Oral Daily    insulin detemir  8 Units Subcutaneous BID    magnesium oxide  400 mg Oral BID    miconazole   Topical (Top) BID    nicotine  1 patch Transdermal Daily    simvastatin  40 mg Oral QHS    vancomycin (VANCOCIN) IVPB  1,000 mg Intravenous Q12H     PRN Meds:calcium carbonate, dextrose 50%, dextrose 50%, glucagon (human recombinant), glucose, glucose, hydrocodone-acetaminophen 5-325mg, influenza, insulin aspart, loperamide, lorazepam, morphine, pneumoc 13-jose g conj-dip cr(PF), sodium chloride 0.9%     Review of patient's allergies indicates:   Allergen Reactions    Iodine and iodide containing products     Shellfish containing products      Objective:     Vital Signs (Most Recent):  Temp: 98.5 °F (36.9 °C) (12/23/17 0733)  Pulse: 68 (12/23/17 0733)  Resp: 16 (12/23/17 0733)  BP: 123/70 (12/23/17 0733)  SpO2: (!) 94 % (12/23/17 0614) Vital Signs (24h Range):  Temp:  [96.8 °F (36 °C)-99.1 °F (37.3 °C)] 98.5 °F (36.9 °C)  Pulse:  [68-86] 68  Resp:  [16-20] 16  SpO2:  [93 %-96 %] 94 %  BP: ()/(54-78) 123/70     Weight: 95 kg (209 lb 7 oz)  Body mass index is 34.85 kg/m².    Intake/Output - Last 3 Shifts       12/21 0700 - 12/22 0659 12/22 0700 - 12/23 0659 12/23 0700 - 12/24 0659    P.O. 1330 1280     IV Piggyback 150 400     Total Intake(mL/kg) 1480 (15.6) 1680 (17.7)     Urine (mL/kg/hr) 3000 (1.3) 3756 (1.6)     Stool  0 (0)     Total Output 3000 3756      Net -1520 -2076             Stool Occurrence  2 x 1 x          Physical Exam   Constitutional: He is oriented to person, place, and time. He  appears well-developed and well-nourished. No distress.   HENT:   Head: Normocephalic and atraumatic.   Eyes: Conjunctivae and EOM are normal. Pupils are equal, round, and reactive to light. No scleral icterus.   Neck: Normal range of motion. Neck supple. No JVD present.   Cardiovascular: Normal rate and regular rhythm.    Pulmonary/Chest: Effort normal and breath sounds normal. No respiratory distress.   Abdominal: Soft. He exhibits no distension.   Musculoskeletal: Normal range of motion.   Resolving cellulitis and chronic venous changes of LLE noted. Mild ttp L post calf. No open wounds. Drsg removed.   Neurological: He is alert and oriented to person, place, and time. No cranial nerve deficit.   Skin: Skin is warm and dry. He is not diaphoretic.   Psychiatric: He has a normal mood and affect.       Significant Labs:  CBC:   Recent Labs  Lab 12/23/17  0309   WBC 11.10   RBC 3.88*   HGB 13.1*   HCT 40.2      *   MCH 33.8*   MCHC 32.6     CMP:   Recent Labs  Lab 12/23/17  0309   *   CALCIUM 9.5   ALBUMIN 2.2*   PROT 7.4      K 4.3   CO2 31*   CL 96   BUN 32*   CREATININE 1.0   ALKPHOS 80   ALT 10   AST 17   BILITOT 0.4     Microbiology Results (last 7 days)     Procedure Component Value Units Date/Time    Urine culture [[id number]] Collected:  12/21/17 2110    Order Status:  Completed Specimen:  Urine from Urine, Clean Catch Updated:  12/23/17 0718     Urine Culture, Routine No growth    Blood culture [[id number]] Collected:  12/21/17 1713    Order Status:  Completed Specimen:  Blood Updated:  12/22/17 2212     Blood Culture, Routine No Growth to date     Blood Culture, Routine No Growth to date    Narrative:       Aerobic and anaerobic bottles from Site #2    Blood culture [[id number]] Collected:  12/21/17 1714    Order Status:  Completed Specimen:  Blood Updated:  12/22/17 2212     Blood Culture, Routine No Growth to date     Blood Culture, Routine No Growth to date    Narrative:        Aerobic and anaerobic bottles from Site #1    Blood culture [[id number]] Collected:  12/18/17 1512    Order Status:  Completed Specimen:  Blood Updated:  12/22/17 2012     Blood Culture, Routine No Growth to date     Blood Culture, Routine No Growth to date     Blood Culture, Routine No Growth to date     Blood Culture, Routine No Growth to date     Blood Culture, Routine No Growth to date    Blood culture [[id number]] Collected:  12/18/17 1512    Order Status:  Completed Specimen:  Blood Updated:  12/22/17 2012     Blood Culture, Routine No Growth to date     Blood Culture, Routine No Growth to date     Blood Culture, Routine No Growth to date     Blood Culture, Routine No Growth to date     Blood Culture, Routine No Growth to date    E. coli 0157 antigen [[id number]] Collected:  12/20/17 1431    Order Status:  Completed Specimen:  Stool from Stool Updated:  12/22/17 1247     Shiga Toxin 1 E.coli Negative     Shiga Toxin 2 E.coli Negative    Stool culture [[id number]] Collected:  12/20/17 1431    Order Status:  Completed Specimen:  Stool from Stool Updated:  12/22/17 1121     Stool Culture Nothing significant to date    Culture, Anaerobe [[id number]] Collected:  12/18/17 1343    Order Status:  Completed Specimen:  Incision site from Leg, Left Updated:  12/22/17 1002     Anaerobic Culture Culture in progress    Aerobic culture [[id number]] Collected:  12/18/17 1343    Order Status:  Completed Specimen:  Incision site from Leg, Left Updated:  12/21/17 1258     Aerobic Bacterial Culture No growth    Clostridium difficile EIA [[id number]] Collected:  12/18/17 1419    Order Status:  Completed Specimen:  Stool from Stool Updated:  12/18/17 2226     C. diff Antigen Negative     C difficile Toxins A+B, EIA Negative     Comment: Testing not recommended for children <24 months old.       Blood culture x two cultures. Draw prior to antibiotics. [[id number]] Collected:  12/12/17 1226    Order Status:  Completed Specimen:  Blood from  Peripheral, Lower Arm, Right Updated:  12/17/17 1812     Blood Culture, Routine No growth after 5 days.    Narrative:       Aerobic and anaerobic    Blood culture x two cultures. Draw prior to antibiotics. [[id number]] Collected:  12/12/17 1119    Order Status:  Completed Specimen:  Blood from Peripheral, Hand, Left Updated:  12/17/17 1812     Blood Culture, Routine No growth after 5 days.    Narrative:       Aerobic and anaerobic          Significant Diagnostics:  no new studies noted

## 2017-12-23 NOTE — PLAN OF CARE
Problem: Patient Care Overview  Goal: Plan of Care Review  Pt on RA SPO2 93 %.  No apparent distress.  Will continue to monitor.

## 2017-12-23 NOTE — PLAN OF CARE
Problem: Patient Care Overview  Goal: Plan of Care Review  Outcome: Ongoing (interventions implemented as appropriate)  No pressure ulcers noted.  Repositioned every two hours.  No fevers, continues on antibiotics.  Pain helped with medications.  Blood sugar 194 this AM.   Fall precautions maintained.  Will continue to monitor VS, labs, And medications as ordered.

## 2017-12-24 PROBLEM — R93.89 ABNORMAL CXR: Status: ACTIVE | Noted: 2017-12-24

## 2017-12-24 LAB
ALBUMIN SERPL BCP-MCNC: 2.4 G/DL
ALP SERPL-CCNC: 80 U/L
ALT SERPL W/O P-5'-P-CCNC: 11 U/L
ANION GAP SERPL CALC-SCNC: 10 MMOL/L
AST SERPL-CCNC: 18 U/L
BASOPHILS # BLD AUTO: 0.03 K/UL
BASOPHILS NFR BLD: 0.2 %
BILIRUB SERPL-MCNC: 0.6 MG/DL
BILIRUB UR QL STRIP: NEGATIVE
BUN SERPL-MCNC: 32 MG/DL
CALCIUM SERPL-MCNC: 9.7 MG/DL
CHLORIDE SERPL-SCNC: 97 MMOL/L
CLARITY UR: CLEAR
CO2 SERPL-SCNC: 28 MMOL/L
COLOR UR: YELLOW
CREAT SERPL-MCNC: 1 MG/DL
CRP SERPL-MCNC: 174.23 MG/L
DIFFERENTIAL METHOD: ABNORMAL
EOSINOPHIL # BLD AUTO: 0.1 K/UL
EOSINOPHIL NFR BLD: 0.9 %
ERYTHROCYTE [DISTWIDTH] IN BLOOD BY AUTOMATED COUNT: 13.3 %
EST. GFR  (AFRICAN AMERICAN): >60 ML/MIN/1.73 M^2
EST. GFR  (NON AFRICAN AMERICAN): >60 ML/MIN/1.73 M^2
GLUCOSE SERPL-MCNC: 148 MG/DL
GLUCOSE UR QL STRIP: NEGATIVE
HCT VFR BLD AUTO: 39 %
HGB BLD-MCNC: 13 G/DL
HGB UR QL STRIP: NEGATIVE
KETONES UR QL STRIP: NEGATIVE
LEUKOCYTE ESTERASE UR QL STRIP: NEGATIVE
LYMPHOCYTES # BLD AUTO: 1.1 K/UL
LYMPHOCYTES NFR BLD: 8.5 %
MAGNESIUM SERPL-MCNC: 1.8 MG/DL
MCH RBC QN AUTO: 34.2 PG
MCHC RBC AUTO-ENTMCNC: 33.3 G/DL
MCV RBC AUTO: 103 FL
MONOCYTES # BLD AUTO: 1 K/UL
MONOCYTES NFR BLD: 7.7 %
NEUTROPHILS # BLD AUTO: 11 K/UL
NEUTROPHILS NFR BLD: 82.2 %
NITRITE UR QL STRIP: NEGATIVE
PH UR STRIP: 8 [PH] (ref 5–8)
PHOSPHATE SERPL-MCNC: 3.2 MG/DL
PLATELET # BLD AUTO: 336 K/UL
PMV BLD AUTO: 11.9 FL
POCT GLUCOSE: 114 MG/DL (ref 70–110)
POCT GLUCOSE: 120 MG/DL (ref 70–110)
POCT GLUCOSE: 133 MG/DL (ref 70–110)
POCT GLUCOSE: 173 MG/DL (ref 70–110)
POTASSIUM SERPL-SCNC: 4.6 MMOL/L
PROT SERPL-MCNC: 7.8 G/DL
PROT UR QL STRIP: NEGATIVE
RBC # BLD AUTO: 3.8 M/UL
SODIUM SERPL-SCNC: 135 MMOL/L
SP GR UR STRIP: 1.01 (ref 1–1.03)
URN SPEC COLLECT METH UR: NORMAL
UROBILINOGEN UR STRIP-ACNC: NEGATIVE EU/DL
VANCOMYCIN TROUGH SERPL-MCNC: 17.6 UG/ML
WBC # BLD AUTO: 13.35 K/UL

## 2017-12-24 PROCEDURE — 94799 UNLISTED PULMONARY SVC/PX: CPT

## 2017-12-24 PROCEDURE — 63600175 PHARM REV CODE 636 W HCPCS: Performed by: FAMILY MEDICINE

## 2017-12-24 PROCEDURE — 63600175 PHARM REV CODE 636 W HCPCS: Performed by: INTERNAL MEDICINE

## 2017-12-24 PROCEDURE — 87086 URINE CULTURE/COLONY COUNT: CPT

## 2017-12-24 PROCEDURE — 84100 ASSAY OF PHOSPHORUS: CPT

## 2017-12-24 PROCEDURE — 83735 ASSAY OF MAGNESIUM: CPT

## 2017-12-24 PROCEDURE — 81003 URINALYSIS AUTO W/O SCOPE: CPT

## 2017-12-24 PROCEDURE — 25000003 PHARM REV CODE 250: Performed by: STUDENT IN AN ORGANIZED HEALTH CARE EDUCATION/TRAINING PROGRAM

## 2017-12-24 PROCEDURE — 85025 COMPLETE CBC W/AUTO DIFF WBC: CPT

## 2017-12-24 PROCEDURE — 63600175 PHARM REV CODE 636 W HCPCS: Performed by: STUDENT IN AN ORGANIZED HEALTH CARE EDUCATION/TRAINING PROGRAM

## 2017-12-24 PROCEDURE — 36415 COLL VENOUS BLD VENIPUNCTURE: CPT

## 2017-12-24 PROCEDURE — 99900035 HC TECH TIME PER 15 MIN (STAT)

## 2017-12-24 PROCEDURE — 86141 C-REACTIVE PROTEIN HS: CPT

## 2017-12-24 PROCEDURE — 99232 SBSQ HOSP IP/OBS MODERATE 35: CPT | Mod: 24,,, | Performed by: SURGERY

## 2017-12-24 PROCEDURE — 21400001 HC TELEMETRY ROOM

## 2017-12-24 PROCEDURE — 80202 ASSAY OF VANCOMYCIN: CPT

## 2017-12-24 PROCEDURE — 80053 COMPREHEN METABOLIC PANEL: CPT

## 2017-12-24 PROCEDURE — 25000003 PHARM REV CODE 250: Performed by: FAMILY MEDICINE

## 2017-12-24 PROCEDURE — 94761 N-INVAS EAR/PLS OXIMETRY MLT: CPT

## 2017-12-24 PROCEDURE — S4991 NICOTINE PATCH NONLEGEND: HCPCS | Performed by: STUDENT IN AN ORGANIZED HEALTH CARE EDUCATION/TRAINING PROGRAM

## 2017-12-24 RX ADMIN — HYDROCODONE BITARTRATE AND ACETAMINOPHEN 1 TABLET: 5; 325 TABLET ORAL at 06:12

## 2017-12-24 RX ADMIN — MAGNESIUM OXIDE TAB 400 MG (241.3 MG ELEMENTAL MG) 400 MG: 400 (241.3 MG) TAB at 10:12

## 2017-12-24 RX ADMIN — VANCOMYCIN HYDROCHLORIDE 1000 MG: 1 INJECTION, POWDER, LYOPHILIZED, FOR SOLUTION INTRAVENOUS at 07:12

## 2017-12-24 RX ADMIN — MICONAZOLE NITRATE: 20 CREAM TOPICAL at 10:12

## 2017-12-24 RX ADMIN — HYDROCHLOROTHIAZIDE 12.5 MG: 12.5 TABLET ORAL at 10:12

## 2017-12-24 RX ADMIN — MAGNESIUM OXIDE TAB 400 MG (241.3 MG ELEMENTAL MG) 400 MG: 400 (241.3 MG) TAB at 08:12

## 2017-12-24 RX ADMIN — NICOTINE 1 PATCH: 21 PATCH, EXTENDED RELEASE TRANSDERMAL at 09:12

## 2017-12-24 RX ADMIN — SIMVASTATIN 40 MG: 20 TABLET, FILM COATED ORAL at 08:12

## 2017-12-24 RX ADMIN — CARVEDILOL 6.25 MG: 6.25 TABLET, FILM COATED ORAL at 04:12

## 2017-12-24 RX ADMIN — MORPHINE SULFATE 4 MG: 10 INJECTION INTRAVENOUS at 07:12

## 2017-12-24 RX ADMIN — MORPHINE SULFATE 4 MG: 10 INJECTION INTRAVENOUS at 08:12

## 2017-12-24 RX ADMIN — CLOPIDOGREL 75 MG: 75 TABLET, FILM COATED ORAL at 10:12

## 2017-12-24 RX ADMIN — CEFAZOLIN SODIUM 1 G: 1 SOLUTION INTRAVENOUS at 01:12

## 2017-12-24 RX ADMIN — CLORAZEPATE DIPOTASSIUM 15 MG: 3.75 TABLET ORAL at 08:12

## 2017-12-24 RX ADMIN — ENOXAPARIN SODIUM 40 MG: 100 INJECTION SUBCUTANEOUS at 04:12

## 2017-12-24 RX ADMIN — MORPHINE SULFATE 4 MG: 10 INJECTION INTRAVENOUS at 04:12

## 2017-12-24 RX ADMIN — CEFAZOLIN SODIUM 1 G: 1 SOLUTION INTRAVENOUS at 04:12

## 2017-12-24 RX ADMIN — VANCOMYCIN HYDROCHLORIDE 1000 MG: 1 INJECTION, POWDER, LYOPHILIZED, FOR SOLUTION INTRAVENOUS at 06:12

## 2017-12-24 RX ADMIN — CEFAZOLIN SODIUM 1 G: 1 SOLUTION INTRAVENOUS at 10:12

## 2017-12-24 RX ADMIN — CARVEDILOL 6.25 MG: 6.25 TABLET, FILM COATED ORAL at 07:12

## 2017-12-24 RX ADMIN — ASPIRIN 81 MG: 81 TABLET, COATED ORAL at 10:12

## 2017-12-24 RX ADMIN — MICONAZOLE NITRATE: 20 CREAM TOPICAL at 08:12

## 2017-12-24 RX ADMIN — HYDROCODONE BITARTRATE AND ACETAMINOPHEN 1 TABLET: 5; 325 TABLET ORAL at 09:12

## 2017-12-24 RX ADMIN — CLORAZEPATE DIPOTASSIUM 15 MG: 3.75 TABLET ORAL at 10:12

## 2017-12-24 NOTE — PLAN OF CARE
Problem: Patient Care Overview  Goal: Plan of Care Review  Outcome: Ongoing (interventions implemented as appropriate)  Reviewed plan of care with patient. Patient verbalized understanding. AAOx3. Pt on cardiac/diabetic diet; tolerates PO meds whole. Blood glucose monitored and covered appropriately per sliding scale. PRN pain meds given for complaints of pain to left lower leg. Remains open to air. Up with assistance to bedside commode. IV abx administered. Bed alarm set, bed in lowest position, call bell in reach. Will continue to monitor.

## 2017-12-24 NOTE — ASSESSMENT & PLAN NOTE
-No acute surgical intervention, recommend avoiding constipation/exertion  -Patient is non-tender with no overlying skin changes; he is tolerating regular diet and having good bowel function with no nausea or emesis  -Would not be ideal to repair hernia with patient's current active infectious process for risk of mesh infection

## 2017-12-24 NOTE — SUBJECTIVE & OBJECTIVE
Interval History: WBC and CRP increased this am. Pt still c/o mild LLE discomfort with movt. Ambulation improved with PT yesterday. Had abN CXR with read of possible nodule, however, CT of chest today does not show nodule. No new c/o. No fever/chills.    Medications:  Continuous Infusions:  Scheduled Meds:   aspirin  81 mg Oral Daily    carvedilol  6.25 mg Oral BID WM    ceFAZolin (ANCEF) IVPB  1 g Intravenous Q8H    clopidogrel  75 mg Oral Daily    clorazepate  15 mg Oral BID    enoxaparin  40 mg Subcutaneous Daily    hydroCHLOROthiazide  12.5 mg Oral Daily    insulin detemir  8 Units Subcutaneous BID    magnesium oxide  400 mg Oral BID    miconazole   Topical (Top) BID    nicotine  1 patch Transdermal Daily    simvastatin  40 mg Oral QHS    vancomycin (VANCOCIN) IVPB  1,000 mg Intravenous Q12H     PRN Meds:calcium carbonate, dextrose 50%, dextrose 50%, glucagon (human recombinant), glucose, glucose, hydrocodone-acetaminophen 5-325mg, influenza, insulin aspart, loperamide, lorazepam, morphine, pneumoc 13-jose g conj-dip cr(PF), sodium chloride 0.9%     Review of patient's allergies indicates:   Allergen Reactions    Iodine and iodide containing products     Shellfish containing products      Objective:     Vital Signs (Most Recent):  Temp: 98.6 °F (37 °C) (12/24/17 0754)  Pulse: 71 (12/24/17 0754)  Resp: 18 (12/24/17 0754)  BP: 135/75 (12/24/17 0754)  SpO2: 96 % (12/24/17 0729) Vital Signs (24h Range):  Temp:  [97.5 °F (36.4 °C)-98.7 °F (37.1 °C)] 98.6 °F (37 °C)  Pulse:  [66-76] 71  Resp:  [14-20] 18  SpO2:  [94 %-96 %] 96 %  BP: (102-135)/(52-75) 135/75     Weight: 95 kg (209 lb 7 oz)  Body mass index is 34.85 kg/m².    Intake/Output - Last 3 Shifts       12/22 0700 - 12/23 0659 12/23 0700 - 12/24 0659 12/24 0700 - 12/25 0659    P.O. 1280 930     IV Piggyback 400 100     Total Intake(mL/kg) 1680 (17.7) 1030 (10.8)     Urine (mL/kg/hr) 3756 (1.6) 2350 (1)     Stool 0 (0) 0 (0)     Total Output 3756  7954      Net -2076 -1320             Stool Occurrence 2 x 1 x           Physical Exam   Constitutional: He is oriented to person, place, and time. He appears well-developed and well-nourished. No distress.   HENT:   Head: Normocephalic and atraumatic.   Eyes: Conjunctivae and EOM are normal. Pupils are equal, round, and reactive to light. No scleral icterus.   Neck: Normal range of motion. Neck supple. No JVD present.   Cardiovascular: Normal rate and regular rhythm.    Pulmonary/Chest: Effort normal and breath sounds normal. No respiratory distress.   Abdominal: Soft. Bowel sounds are normal. He exhibits no distension. A hernia (umbilical hernia, no skin changes, mostly reducible, ntttp except with deep palpation) is present.   Musculoskeletal: Normal range of motion.   LLE erythema/petechia noted, mild ttp left posterior calf, no new lesions, dry peeling skin noted from knee to ankle, dry healing crusts noted, no new lesions, hyperpigmented skin noted, minimal edema   Neurological: He is alert and oriented to person, place, and time. No cranial nerve deficit.   Skin: Skin is warm and dry. He is not diaphoretic.   Psychiatric: He has a normal mood and affect.       Significant Labs:  CBC:   Recent Labs  Lab 12/24/17  0757   WBC 13.35*   RBC 3.80*   HGB 13.0*   HCT 39.0*      *   MCH 34.2*   MCHC 33.3      (167)    CMP:   Recent Labs  Lab 12/24/17  0756   *   CALCIUM 9.7   ALBUMIN 2.4*   PROT 7.8   *   K 4.6   CO2 28   CL 97   BUN 32*   CREATININE 1.0   ALKPHOS 80   ALT 11   AST 18   BILITOT 0.6     Microbiology Results (last 7 days)     Procedure Component Value Units Date/Time    Blood culture [[id number]] Collected:  12/23/17 1842    Order Status:  Completed Specimen:  Blood Updated:  12/24/17 0715     Blood Culture, Routine No Growth to date    Blood culture [[id number]] Collected:  12/23/17 1841    Order Status:  Completed Specimen:  Blood Updated:  12/24/17 0715     Blood  Culture, Routine No Growth to date    Blood culture [[id number]] Collected:  12/21/17 1713    Order Status:  Completed Specimen:  Blood Updated:  12/23/17 2212     Blood Culture, Routine No Growth to date     Blood Culture, Routine No Growth to date     Blood Culture, Routine No Growth to date    Narrative:       Aerobic and anaerobic bottles from Site #2    Blood culture [[id number]] Collected:  12/21/17 1714    Order Status:  Completed Specimen:  Blood Updated:  12/23/17 2212     Blood Culture, Routine No Growth to date     Blood Culture, Routine No Growth to date     Blood Culture, Routine No Growth to date    Narrative:       Aerobic and anaerobic bottles from Site #1    Blood culture [[id number]] Collected:  12/18/17 1512    Order Status:  Completed Specimen:  Blood Updated:  12/23/17 2012     Blood Culture, Routine No growth after 5 days.    Blood culture [[id number]] Collected:  12/18/17 1512    Order Status:  Completed Specimen:  Blood Updated:  12/23/17 2012     Blood Culture, Routine No growth after 5 days.    Urine culture [[id number]]     Order Status:  No result Specimen:  Urine     Stool culture [[id number]] Collected:  12/20/17 1431    Order Status:  Completed Specimen:  Stool from Stool Updated:  12/23/17 1333     Stool Culture Nothing significant to date    Urine culture [[id number]] Collected:  12/21/17 2110    Order Status:  Completed Specimen:  Urine from Urine, Clean Catch Updated:  12/23/17 0718     Urine Culture, Routine No growth    E. coli 0157 antigen [[id number]] Collected:  12/20/17 1431    Order Status:  Completed Specimen:  Stool from Stool Updated:  12/22/17 1247     Shiga Toxin 1 E.coli Negative     Shiga Toxin 2 E.coli Negative    Culture, Anaerobe [[id number]] Collected:  12/18/17 1343    Order Status:  Completed Specimen:  Incision site from Leg, Left Updated:  12/22/17 1002     Anaerobic Culture Culture in progress    Aerobic culture [[id number]] Collected:  12/18/17 1343    Order Status:   Completed Specimen:  Incision site from Leg, Left Updated:  12/21/17 1258     Aerobic Bacterial Culture No growth    Clostridium difficile EIA [[id number]] Collected:  12/18/17 1419    Order Status:  Completed Specimen:  Stool from Stool Updated:  12/18/17 2226     C. diff Antigen Negative     C difficile Toxins A+B, EIA Negative     Comment: Testing not recommended for children <24 months old.       Blood culture x two cultures. Draw prior to antibiotics. [[id number]] Collected:  12/12/17 1226    Order Status:  Completed Specimen:  Blood from Peripheral, Lower Arm, Right Updated:  12/17/17 1812     Blood Culture, Routine No growth after 5 days.    Narrative:       Aerobic and anaerobic    Blood culture x two cultures. Draw prior to antibiotics. [[id number]] Collected:  12/12/17 1119    Order Status:  Completed Specimen:  Blood from Peripheral, Hand, Left Updated:  12/17/17 1812     Blood Culture, Routine No growth after 5 days.    Narrative:       Aerobic and anaerobic          Significant Diagnostics:  Recent U/S reports reviewed   12/13/17 LLE US - no DVT    12/21/17 LLE US edema noted, no discrete fluid collection    12/23/17 CXR report and images personally reviewed.   Increased interstitial opacification of the lower lung zones bilaterally.  No pleural effusion or pneumothorax.  Round opacity in left midlung zone less conspicuous on today's examination.  No pneumothorax.   Cardiac silhouette remains enlarged.    12/24/17 CT chest:  -Lungs: No nodules or infiltrates.  Specifically questionable nodule identified on 12/21/2017 likely artifact.  This is possibly related to old left-sided rib fracture.  -Pleura: Minimal pleural thickening identified both lung bases.  -Mediastinum/Christiana:No significant adenopathy.    -Axilla: No adenopathy.  -Heart/Aorta: No pericardial effusion. Aorta normal caliber.  -Bones/Chest Wall: Old LEFT lateral fractures.  No significant abnormality.   -Upper Abdomen: Negative

## 2017-12-24 NOTE — ASSESSMENT & PLAN NOTE
Improving  No open wounds, dressing removed, ok to leave LLE open to air  Cultures noted  Cont current tx  Encouraged pt to perform pedal pumps while at rest  Encouraged continued participation with PT

## 2017-12-24 NOTE — ASSESSMENT & PLAN NOTE
Improving steadily - likely can switch to PO keflex soon.  The increasing CRP is of concern but not clear on exam why.  The leg is improving - the IV site is a bit tender and could lead to that  - not sure that vancomycin was needed prior to pan-culture.     Plan:   1. Pan culture urine, blood, chest x-ray  - this was discussed with the primary team but no clear orders entered - ID will order   2. Continue vanc and cephazolin for now =if culture results negative then would stop the vanc and watch CRPs

## 2017-12-24 NOTE — PLAN OF CARE
Problem: Patient Care Overview  Goal: Plan of Care Review  Outcome: Ongoing (interventions implemented as appropriate)  Continue to monitor patient's oxygenation status.Encourage patient to use incentive spirometer frequently.

## 2017-12-24 NOTE — PLAN OF CARE
Problem: Skin and Soft Tissue Infection (Adult)  Goal: Signs and Symptoms of Listed Potential Problems Will be Absent, Minimized or Managed (Skin and Soft Tissue Infection)  Signs and symptoms of listed potential problems will be absent, minimized or managed by discharge/transition of care (reference Skin and Soft Tissue Infection (Adult) CPG).   Outcome: Ongoing (interventions implemented as appropriate)  Discussed importance of movement while in bed.  Cream applied per orders to bilat legs.

## 2017-12-24 NOTE — PROGRESS NOTES
[facility]  General Surgery  Progress Note    Subjective:     History of Present Illness:  No notes on file    Post-Op Info:  * No surgery found *         Interval History: WBC and CRP increased this am. Pt still c/o mild LLE discomfort with movt. Ambulation improved with PT yesterday. Had abN CXR with read of possible nodule, however, CT of chest today does not show nodule. No new c/o. No fever/chills.    Medications:  Continuous Infusions:  Scheduled Meds:   aspirin  81 mg Oral Daily    carvedilol  6.25 mg Oral BID WM    ceFAZolin (ANCEF) IVPB  1 g Intravenous Q8H    clopidogrel  75 mg Oral Daily    clorazepate  15 mg Oral BID    enoxaparin  40 mg Subcutaneous Daily    hydroCHLOROthiazide  12.5 mg Oral Daily    insulin detemir  8 Units Subcutaneous BID    magnesium oxide  400 mg Oral BID    miconazole   Topical (Top) BID    nicotine  1 patch Transdermal Daily    simvastatin  40 mg Oral QHS    vancomycin (VANCOCIN) IVPB  1,000 mg Intravenous Q12H     PRN Meds:calcium carbonate, dextrose 50%, dextrose 50%, glucagon (human recombinant), glucose, glucose, hydrocodone-acetaminophen 5-325mg, influenza, insulin aspart, loperamide, lorazepam, morphine, pneumoc 13-jose g conj-dip cr(PF), sodium chloride 0.9%     Review of patient's allergies indicates:   Allergen Reactions    Iodine and iodide containing products     Shellfish containing products      Objective:     Vital Signs (Most Recent):  Temp: 98.6 °F (37 °C) (12/24/17 0754)  Pulse: 71 (12/24/17 0754)  Resp: 18 (12/24/17 0754)  BP: 135/75 (12/24/17 0754)  SpO2: 96 % (12/24/17 0729) Vital Signs (24h Range):  Temp:  [97.5 °F (36.4 °C)-98.7 °F (37.1 °C)] 98.6 °F (37 °C)  Pulse:  [66-76] 71  Resp:  [14-20] 18  SpO2:  [94 %-96 %] 96 %  BP: (102-135)/(52-75) 135/75     Weight: 95 kg (209 lb 7 oz)  Body mass index is 34.85 kg/m².    Intake/Output - Last 3 Shifts       12/22 0700 - 12/23 0659 12/23 0700 - 12/24 0659 12/24 0700 - 12/25 0659     P.O. 1280 930     IV Piggyback 400 100     Total Intake(mL/kg) 1680 (17.7) 1030 (10.8)     Urine (mL/kg/hr) 3756 (1.6) 2350 (1)     Stool 0 (0) 0 (0)     Total Output 3756 2350      Net -2076 -1320             Stool Occurrence 2 x 1 x           Physical Exam   Constitutional: He is oriented to person, place, and time. He appears well-developed and well-nourished. No distress.   HENT:   Head: Normocephalic and atraumatic.   Eyes: Conjunctivae and EOM are normal. Pupils are equal, round, and reactive to light. No scleral icterus.   Neck: Normal range of motion. Neck supple. No JVD present.   Cardiovascular: Normal rate and regular rhythm.    Pulmonary/Chest: Effort normal and breath sounds normal. No respiratory distress.   Abdominal: Soft. Bowel sounds are normal. He exhibits no distension. A hernia (umbilical hernia, no skin changes, mostly reducible, ntttp except with deep palpation) is present.   Musculoskeletal: Normal range of motion.   LLE erythema/petechia noted, mild ttp left posterior calf, no new lesions, dry peeling skin noted from knee to ankle, dry healing crusts noted, no new lesions, hyperpigmented skin noted, minimal edema   Neurological: He is alert and oriented to person, place, and time. No cranial nerve deficit.   Skin: Skin is warm and dry. He is not diaphoretic.   Psychiatric: He has a normal mood and affect.       Significant Labs:  CBC:   Recent Labs  Lab 12/24/17  0757   WBC 13.35*   RBC 3.80*   HGB 13.0*   HCT 39.0*      *   MCH 34.2*   MCHC 33.3      (167)    CMP:   Recent Labs  Lab 12/24/17  0756   *   CALCIUM 9.7   ALBUMIN 2.4*   PROT 7.8   *   K 4.6   CO2 28   CL 97   BUN 32*   CREATININE 1.0   ALKPHOS 80   ALT 11   AST 18   BILITOT 0.6     Microbiology Results (last 7 days)     Procedure Component Value Units Date/Time    Blood culture [[id number]] Collected:  12/23/17 0120    Order Status:  Completed Specimen:  Blood Updated:  12/24/17 0453     Blood  Culture, Routine No Growth to date    Blood culture [[id number]] Collected:  12/23/17 1841    Order Status:  Completed Specimen:  Blood Updated:  12/24/17 0715     Blood Culture, Routine No Growth to date    Blood culture [[id number]] Collected:  12/21/17 1713    Order Status:  Completed Specimen:  Blood Updated:  12/23/17 2212     Blood Culture, Routine No Growth to date     Blood Culture, Routine No Growth to date     Blood Culture, Routine No Growth to date    Narrative:       Aerobic and anaerobic bottles from Site #2    Blood culture [[id number]] Collected:  12/21/17 1714    Order Status:  Completed Specimen:  Blood Updated:  12/23/17 2212     Blood Culture, Routine No Growth to date     Blood Culture, Routine No Growth to date     Blood Culture, Routine No Growth to date    Narrative:       Aerobic and anaerobic bottles from Site #1    Blood culture [[id number]] Collected:  12/18/17 1512    Order Status:  Completed Specimen:  Blood Updated:  12/23/17 2012     Blood Culture, Routine No growth after 5 days.    Blood culture [[id number]] Collected:  12/18/17 1512    Order Status:  Completed Specimen:  Blood Updated:  12/23/17 2012     Blood Culture, Routine No growth after 5 days.    Urine culture [[id number]]     Order Status:  No result Specimen:  Urine     Stool culture [[id number]] Collected:  12/20/17 1431    Order Status:  Completed Specimen:  Stool from Stool Updated:  12/23/17 1333     Stool Culture Nothing significant to date    Urine culture [[id number]] Collected:  12/21/17 2110    Order Status:  Completed Specimen:  Urine from Urine, Clean Catch Updated:  12/23/17 0718     Urine Culture, Routine No growth    E. coli 0157 antigen [[id number]] Collected:  12/20/17 1431    Order Status:  Completed Specimen:  Stool from Stool Updated:  12/22/17 1247     Shiga Toxin 1 E.coli Negative     Shiga Toxin 2 E.coli Negative    Culture, Anaerobe [[id number]] Collected:  12/18/17 1343    Order Status:  Completed Specimen:   Incision site from Leg, Left Updated:  12/22/17 1002     Anaerobic Culture Culture in progress    Aerobic culture [[id number]] Collected:  12/18/17 1343    Order Status:  Completed Specimen:  Incision site from Leg, Left Updated:  12/21/17 1258     Aerobic Bacterial Culture No growth    Clostridium difficile EIA [[id number]] Collected:  12/18/17 1419    Order Status:  Completed Specimen:  Stool from Stool Updated:  12/18/17 2226     C. diff Antigen Negative     C difficile Toxins A+B, EIA Negative     Comment: Testing not recommended for children <24 months old.       Blood culture x two cultures. Draw prior to antibiotics. [[id number]] Collected:  12/12/17 1226    Order Status:  Completed Specimen:  Blood from Peripheral, Lower Arm, Right Updated:  12/17/17 1812     Blood Culture, Routine No growth after 5 days.    Narrative:       Aerobic and anaerobic    Blood culture x two cultures. Draw prior to antibiotics. [[id number]] Collected:  12/12/17 1119    Order Status:  Completed Specimen:  Blood from Peripheral, Hand, Left Updated:  12/17/17 1812     Blood Culture, Routine No growth after 5 days.    Narrative:       Aerobic and anaerobic          Significant Diagnostics:  Recent U/S reports reviewed   12/13/17 LLE US - no DVT    12/21/17 LLE US edema noted, no discrete fluid collection    12/23/17 CXR report and images personally reviewed.   Increased interstitial opacification of the lower lung zones bilaterally.  No pleural effusion or pneumothorax.  Round opacity in left midlung zone less conspicuous on today's examination.  No pneumothorax.   Cardiac silhouette remains enlarged.    12/24/17 CT chest:  -Lungs: No nodules or infiltrates.  Specifically questionable nodule identified on 12/21/2017 likely artifact.  This is possibly related to old left-sided rib fracture.  -Pleura: Minimal pleural thickening identified both lung bases.  -Mediastinum/Christiana:No significant adenopathy.    -Axilla: No  adenopathy.  -Heart/Aorta: No pericardial effusion. Aorta normal caliber.  -Bones/Chest Wall: Old LEFT lateral fractures.  No significant abnormality.   -Upper Abdomen: Negative    Assessment/Plan:     * Cellulitis of left lower extremity without foot    Improving  No open wounds, dressing removed, ok to leave LLE open to air  Cultures noted  Cont current tx  Encouraged pt to perform pedal pumps while at rest  Encouraged continued participation with PT        Abnormal CXR    CT chest performed to work up abN CXR  No masses or nodules noted on CT  Discussed with patient        Recurrent umbilical hernia    -No acute surgical intervention, recommend avoiding constipation/exertion  -Patient is non-tender with no overlying skin changes; he is tolerating regular diet and having good bowel function with no nausea or emesis  -Would not be ideal to repair hernia with patient's current active infectious process for risk of mesh infection        Diabetes mellitus, type II, insulin dependent    Cont strict BG control  Lab Results   Component Value Date    HGBA1C 6.5 (H) 12/12/2017               Afebrile, but leukocytosis and increased CRP noted. No new lesions seen today. Will continue to follow closely.    Elvira Handy, DO  General Surgery  [facility]

## 2017-12-24 NOTE — PROGRESS NOTES
PGY-1 Progress Note [facility]     Hospital Stay Day 12    Subjective: Patient seen and examined. Reports pain is controlled on medications.  Denies n/v, f/c, sob, chest pain, abdo pain.  Reports continued episodes of loose stools.      Scheduled Meds:   aspirin  81 mg Oral Daily    carvedilol  6.25 mg Oral BID WM    ceFAZolin (ANCEF) IVPB  1 g Intravenous Q8H    clopidogrel  75 mg Oral Daily    clorazepate  15 mg Oral BID    enoxaparin  40 mg Subcutaneous Daily    hydroCHLOROthiazide  12.5 mg Oral Daily    insulin detemir  8 Units Subcutaneous BID    magnesium oxide  400 mg Oral BID    miconazole   Topical (Top) BID    nicotine  1 patch Transdermal Daily    simvastatin  40 mg Oral QHS    vancomycin (VANCOCIN) IVPB  1,000 mg Intravenous Q12H     Continuous Infusions:  PRN Meds:calcium carbonate, dextrose 50%, dextrose 50%, glucagon (human recombinant), glucose, glucose, hydrocodone-acetaminophen 5-325mg, influenza, insulin aspart, loperamide, lorazepam, morphine, pneumoc 13-jose g conj-dip cr(PF), sodium chloride 0.9%    Review of patient's allergies indicates:   Allergen Reactions    Iodine and iodide containing products     Shellfish containing products        Objectives:     Vitals(Most Recent)      BP  Min: 102/52  Max: 135/75  Temp  Av.3 °F (36.8 °C)  Min: 97.5 °F (36.4 °C)  Max: 98.7 °F (37.1 °C)  Pulse  Av.3  Min: 66  Max: 76  Resp  Av.3  Min: 14  Max: 20  SpO2  Av.7 %  Min: 94 %  Max: 96 %             Vitals([id number])  Temp:  [97.5 °F (36.4 °C)-98.7 °F (37.1 °C)]   Pulse:  [66-76]   Resp:  [14-20]   BP: (102-135)/(52-75)   SpO2:  [94 %-96 %]     I & O(Vopy42g)    Intake/Output Summary (Last 24 hours) at 17 0818  Last data filed at 17 0400   Gross per 24 hour   Intake             1030 ml   Output             2350 ml   Net            -1320 ml     Urinalysis  No results for input(s): COLORU, CLARITYU, SPECGRAV, PHUR, PROTEINUA, GLUCOSEU, BILIRUBINCON, BLOODU, WBCU, RBCU,  BACTERIA, MUCUS, NITRITE, LEUKOCYTESUR, UROBILINOGEN, HYALINECASTS in the last 24 hours.    General: AAOx3. NAD.  HEENT: NCAT. PERRLA. EOMI.   Neck: Supple. No JVD. No LAD.    CV: RRR. NL S1/S2. No M/R/G.   Chest: NL effort. Bilateral lower lung wheezing   Abd: BS present, soft, distended, NT. No rebound or guarding.   Ext: Mild bilateral lower extremity edema. Peripheral pulses intact. NL ROM.   Skin: LLE erythema midcalf with severe pain,  Decreased in swelling and erythema at foot; chronic venous stasis color changes but severe pain. dry flaking on bilat feet  bilat old healed venous stasis ulcer scaling wounds, no drainage, no scrotal involvement, no LAD    Neuro: CN II-XII intact. No focal deficit. Strength 5/5 throughout. Sensation intact.   Psych: Good judgement and reason. No A/V hallucinations. NL affect. No abnormal behaviors noted    LABS  CBC    Recent Labs  Lab 12/21/17  0445 12/22/17  0453 12/23/17  0309   WBC 11.64 13.08* 11.10   RBC 3.92* 4.31* 3.88*   HGB 13.3* 14.9 13.1*   HCT 40.8 45.2 40.2    323 316   * 105* 104*   MCH 33.9* 34.6* 33.8*   MCHC 32.6 33.0 32.6     BMP    Recent Labs  Lab 12/22/17  0453 12/22/17  0912 12/23/17  0309    138 136   K 5.5* 4.2 4.3   CO2 24 35* 31*   CL 99 95 96   BUN 29* 29* 32*   CREATININE 1.1 1.2 1.0   * 154* 126*       POCT-Glucose  POCT Glucose   Date Value Ref Range Status   12/24/2017 120 (H) 70 - 110 mg/dL Final   12/23/2017 226 (H) 70 - 110 mg/dL Final   12/23/2017 154 (H) 70 - 110 mg/dL Final   12/23/2017 196 (H) 70 - 110 mg/dL Final   12/23/2017 194 (H) 70 - 110 mg/dL Final   12/22/2017 206 (H) 70 - 110 mg/dL Final   12/22/2017 177 (H) 70 - 110 mg/dL Final   12/22/2017 163 (H) 70 - 110 mg/dL Final   12/22/2017 125 (H) 70 - 110 mg/dL Final   12/22/2017 139 (H) 70 - 110 mg/dL Final   12/21/2017 162 (H) 70 - 110 mg/dL Final   12/21/2017 206 (H) 70 - 110 mg/dL Final   12/21/2017 175 (H) 70 - 110 mg/dL Final         Recent Labs  Lab  12/21/17  0445 12/22/17  0453 12/22/17  0912 12/23/17  0309   CALCIUM 9.6 9.9 10.0 9.5   MG 2.1 2.6  --  1.9   PHOS 4.8* 4.5  --  4.3     LFT    Recent Labs  Lab 12/21/17  0445 12/22/17  0453 12/23/17  0309   PROT 7.3 8.3 7.4   ALBUMIN 2.3* 2.5* 2.2*   BILITOT 0.4 0.5 0.4   AST 23 32 17   ALKPHOS 90 81 80   ALT 18 16 10         COAGS  No results for input(s): PT, INR, APTT in the last 168 hours.  CE  No results for input(s): TROPONINI, CKTOTAL, CKMB in the last 168 hours.  ABGs  No results for input(s): PH, PCO2, PO2, HCO3, POCSATURATED, BE in the last 24 hours.  BNP  No results for input(s): BNP in the last 168 hours.  UA  No results for input(s): COLORU, CLARITYU, SPECGRAV, PHUR, PROTEINUA, GLUCOSEU, BLOODU, WBCU, RBCU, BACTERIA, MUCUS in the last 24 hours.    Invalid input(s):  BILIRUBINCON  LAST HbA1c  Lab Results   Component Value Date    HGBA1C 6.5 (H) 12/12/2017           Imaging  Imaging Results          X-Ray Chest 1 View (Final result)  Result time 12/24/17 07:56:48    Final result by Za Ponce MD (12/24/17 07:56:48)                 Impression:     As above.      Electronically signed by: ZA PONCE MD  Date:     12/24/17  Time:    07:56              Narrative:    Chest x-ray, 1 view    Comparison: 12/21/17    Findings: Increased interstitial opacification of the lower lung zones bilaterally.  No pleural effusion or pneumothorax.  Round opacity in left midlung zone less conspicuous on today's examination.  No pneumothorax.   Cardiac silhouette remains enlarged.                             X-Ray Chest 1 View (Edited Result - FINAL)  Result time 12/22/17 08:18:17    Addendum 1 of 1 by Aurelio Kumar MD (12/22/17 08:18:17)    Epic notification system activated.       Electronically signed by: AURELIO KUMAR MD  Date:     12/22/17  Time:    08:18                Final result by Aurelio Kumar MD (12/22/17 08:14:02)                 Impression:        1.7 cm rounded opacity projects over the left  midlung. This may be artifactual reflecting overlapping structures but underlying nodule cannot be excluded. Recommend repeat PA/lateral chest radiograph or CT chest for further characterization.    No focal consolidation to suggest pneumonia.    Epic notification system activated.       Electronically signed by: AURELIO KUMAR MD  Date:     12/22/17  Time:    08:14              Narrative:    XR Chest    12/21/17 18:10:54    Accession #: [id number]    CLINICAL INDICATION: 62 year old M with fever     TECHNIQUE: Single frontal chest radiograph.    COMPARISON:  12/18/2017    FINDINGS:    The cardiomediastinal silhouette is mildly enlarged, stable. Pulmonary vascularity appears within normal limits.    1.7 cm nodular opacity projects over the left midlung. No confluent pulmonary parenchymal opacity. No pleural fluid. No pneumothorax.    Remote left rib fracture.                             US Extremity Non Vascular Complete Left (Final result)  Result time 12/21/17 13:45:48    Final result by Joseph Nelson MD (12/21/17 13:45:48)                 Impression:       Moderate amount of subcutaneous edema with no focal fluid collection.  Findings may represent edema versus cellulitis.      Electronically signed by: JOSEPH NELSON MD  Date:     12/21/17  Time:    13:45              Narrative:    [id number]  Accession # [id number]    Study:  US EXTREMITY NON VASCULAR COMPLETE LEFT    Indication: 63 yo M with persistent LLE cellulitis, reduced generalized swelling and now more nodular aspect posterior calf.    Comparison: None.    Findings:   Moderate amount of edema is noted in subcutaneous tissue of the left upper leg and calf.  No focal fluid collection is visualized.                             X-Ray Abdomen AP 1 View (Final result)  Result time 12/19/17 08:04:21    Final result by Jorge Lozano MD (12/19/17 08:04:21)                 Impression:     Localized proximal small bowel ileus  question.      Electronically signed by: ANTONIO ALLRED MD  Date:     12/19/17  Time:    08:04              Narrative:    Single view of the abdomen, cervical mental opacities from anterior abdominal wall repair graft left periumbilical.  Minimal mild distention proximal small bowel.  Nondistended air stomach and colon.  Lower pelvis not included on exam.                             X-Ray Chest 1 View (Final result)  Result time 12/18/17 08:13:11    Final result by Jorge Allred MD (12/18/17 08:13:11)                 Impression:     Stable chest.  No active process.      Electronically signed by: ANTONIO ALLRED MD  Date:     12/18/17  Time:    08:13              Narrative:    Single view chest, comparison 12/12/2017.  Cardiomegaly stable.  Incomplete inspiration, interstitial markings remain mildly prominent.  There are chronic rib deformity left lateral fifth sixth and seventh ribs.                             MRI Lower Extremity W WO Contrast Left (Final result)  Result time 12/15/17 16:53:12    Final result by Jorge Allred MD (12/15/17 16:53:12)                 Impression:     Nonspecific subcutaneous edema and Cellulitis without abscess, osteomyelitis, septic joint left lower leg.      Electronically signed by: ANTONIO ALLRED MD  Date:     12/15/17  Time:    16:53              Narrative:    Comparison ultrasound vein left leg, radiographs left knee dating back to 12/12/17.  MR on left tibia from knee to ankle without, and with gadovist 10 cc intravenous.    Asymmetrical subcutaneous edema left lower extremity compared to contralateral side.  Bone and joint structures normal.  No fracture dislocation.  Neurovascular structures appear intact.  Muscle tendon, ligament structures normal.  No abscess.  Heterogeneous enhancement of subcutaneous tissues, no unusual enhancement musculoskeletal structures otherwise.                             X-Ray Knee 3 View Left (Final result)  Result time  12/13/17 12:18:14    Final result by Rob Valle MD (12/13/17 12:18:14)                 Impression:     As above.      Electronically signed by: ROB VALLE MD  Date:     12/13/17  Time:    12:18              Narrative:    Knee 3 views left.    Findings: 3 views left. There is no fracture, dislocation, or bony erosion identified.                             X-Ray Chest AP Portable (Final result)  Result time 12/12/17 12:14:25    Final result by Luan Gifford MD (12/12/17 12:14:25)                 Impression:        No acute radiographic findings on single view of the chest.      Electronically signed by: LUAN GIFFORD MD  Date:     12/12/17  Time:    12:14              Narrative:    Comparison: None    Technique: Single view of the chest.    Findings: Cardiac silhouette is enlarged.  There is aortic atherosclerosis.  Lungs show no evidence of significant focal consolidation, large effusion, or pneumothorax.  The bones demonstrate degenerative changes of the spine and shoulders.                             US Lower Extremity Veins Left (Final result)  Result time 12/12/17 11:54:55    Final result by Luan Gifford MD (12/12/17 11:54:55)                 Impression:         No evidence of deep vein thrombosisin the left lower extremity.    There is lower extremity edema.        Electronically signed by: LUAN GIFFORD MD  Date:     12/12/17  Time:    11:54              Narrative:    Comparison: None.    Findings: There is no evidence of venous thrombosis in the bilateral common femoral, left femoral, greater saphenous, popliteal, posterior tibial, anterior tibial, and peroneal veins.                              Micro:  Microbiology Results (last 7 days)     Procedure Component Value Units Date/Time    Blood culture [[id number]] Collected:  12/23/17 1842    Order Status:  Completed Specimen:  Blood Updated:  12/24/17 0715     Blood Culture, Routine No Growth to date    Blood culture [[id number]] Collected:   12/23/17 1841    Order Status:  Completed Specimen:  Blood Updated:  12/24/17 0715     Blood Culture, Routine No Growth to date    Blood culture [[id number]] Collected:  12/21/17 1713    Order Status:  Completed Specimen:  Blood Updated:  12/23/17 2212     Blood Culture, Routine No Growth to date     Blood Culture, Routine No Growth to date     Blood Culture, Routine No Growth to date    Narrative:       Aerobic and anaerobic bottles from Site #2    Blood culture [[id number]] Collected:  12/21/17 1714    Order Status:  Completed Specimen:  Blood Updated:  12/23/17 2212     Blood Culture, Routine No Growth to date     Blood Culture, Routine No Growth to date     Blood Culture, Routine No Growth to date    Narrative:       Aerobic and anaerobic bottles from Site #1    Blood culture [[id number]] Collected:  12/18/17 1512    Order Status:  Completed Specimen:  Blood Updated:  12/23/17 2012     Blood Culture, Routine No growth after 5 days.    Blood culture [[id number]] Collected:  12/18/17 1512    Order Status:  Completed Specimen:  Blood Updated:  12/23/17 2012     Blood Culture, Routine No growth after 5 days.    Urine culture [[id number]]     Order Status:  No result Specimen:  Urine     Stool culture [[id number]] Collected:  12/20/17 1431    Order Status:  Completed Specimen:  Stool from Stool Updated:  12/23/17 1333     Stool Culture Nothing significant to date    Urine culture [[id number]] Collected:  12/21/17 2110    Order Status:  Completed Specimen:  Urine from Urine, Clean Catch Updated:  12/23/17 0718     Urine Culture, Routine No growth    E. coli 0157 antigen [[id number]] Collected:  12/20/17 1431    Order Status:  Completed Specimen:  Stool from Stool Updated:  12/22/17 1247     Shiga Toxin 1 E.coli Negative     Shiga Toxin 2 E.coli Negative    Culture, Anaerobe [[id number]] Collected:  12/18/17 1343    Order Status:  Completed Specimen:  Incision site from Leg, Left Updated:  12/22/17 1002     Anaerobic Culture  Culture in progress    Aerobic culture [[id number]] Collected:  12/18/17 1343    Order Status:  Completed Specimen:  Incision site from Leg, Left Updated:  12/21/17 1258     Aerobic Bacterial Culture No growth    Clostridium difficile EIA [[id number]] Collected:  12/18/17 1419    Order Status:  Completed Specimen:  Stool from Stool Updated:  12/18/17 2226     C. diff Antigen Negative     C difficile Toxins A+B, EIA Negative     Comment: Testing not recommended for children <24 months old.       Blood culture x two cultures. Draw prior to antibiotics. [[id number]] Collected:  12/12/17 1226    Order Status:  Completed Specimen:  Blood from Peripheral, Lower Arm, Right Updated:  12/17/17 1812     Blood Culture, Routine No growth after 5 days.    Narrative:       Aerobic and anaerobic    Blood culture x two cultures. Draw prior to antibiotics. [[id number]] Collected:  12/12/17 1119    Order Status:  Completed Specimen:  Blood from Peripheral, Hand, Left Updated:  12/17/17 1812     Blood Culture, Routine No growth after 5 days.    Narrative:       Aerobic and anaerobic               Assessment/Plan:          * Cellulitis of left lower extremity without foot     WBC 11, improving but CRP increasing  Vanc and Zosyn switched to Clinda HOD#3 with subsequent regression  On Ancef this AM  Started on Vanc yesterday evening      Leg stable, still unable to ambulate  US repeated with no fluid collective, no nodularity, just generalized swelling  Compression with ACE wrap, elevating leg     F/u CXR for PA/Lat for spot  F/u CT Chest   Other sources for fever, crp, rising WBC     Discharge planning for [facility]       Recurrent umbilical hernia     Non-op per Surgery       Diarrhea     C diff neg, following further stool studies  Clarified, not diarrhea, but rather stool incontinences   Anxiety     Original home med Traxene restarted  Pt tearful and concerned about future.  Pt open to talking to psychiatrist, but may not be  necessary as it is discharge planning that is concerning pt  Continue to work with sister, case management about placement plans       Coronary artery disease involving native coronary artery of native heart without angina pectoris     Hx of 3 stents in angiogram in 2003  Restarted on ASA, plavix. Potentially able to stop plavix since stents were placed over 2 years ago  Trops trended down  F/u cardiology as outpatient  H/H 14.9/45.2 but with MCV of 105, Folate and B12 were normal  No chest pain             HTN (hypertension)     Restarted Carvedilol, HCTZ (prior home meds from 2 years ago)   Stable at 109-152/57-83  Normotensive  Cardiac diet             Type 2 diabetes mellitus with hyperglycemia, without long-term current use of insulin     HgbA1C 6.5  On Low Dose Corrective scale  Gluc 126-154  On Detemir 8 units BID         Smitha Hernandez MD  \[facility] [place] [facility]\"" Family Medicine PGY1   12/24/2017

## 2017-12-24 NOTE — PROGRESS NOTES
[facility]  Infectious Disease  Progress Note    Patient Name: Reji Guerra  MRN: [id number]  Admission Date: 12/12/2017  Length of Stay: 11 days  Attending Physician: Berkley Johnson MD  Primary Care Provider: [facility]    Isolation Status: No active isolations  Assessment/Plan:      * Cellulitis of left lower extremity without foot    Improving steadily - likely can switch to PO keflex soon.  The increasing CRP is of concern but not clear on exam why.  The leg is improving - the IV site is a bit tender and could lead to that  - not sure that vancomycin was needed prior to pan-culture.     Plan:   1. Pan culture urine, blood, chest x-ray  - this was discussed with the primary team but no clear orders entered - ID will order   2. Continue vanc and cephazolin for now =if culture results negative then would stop the vanc and watch CRPs             Anticipated Disposition:     Thank you for your consult. I will follow-up with patient. Please contact us if you have any additional questions.    Jeffrey Lisa MD  Infectious Disease  [facility]    Subjective:     Principal Problem:Cellulitis of left lower extremity without foot    HPI: 62M with history of CAD (s/p PCI with three stents), DMII, HTN, HLD who presented to ED on 12/12 with left lower extremity swelling and erythema. He reports that the swelling and redness began a few days before admission, worsening until he was unable to bear weight on his left leg. He denies fevers and chills while at home, recent trauma to his L leg. He has had no aquatic exposures.     Hospital course significant for initial improvement (per patient, though imaging in chart does not suggest there was improvement) with vancomycin and zosyn with transition to clindamycin on 12/15. He has not had much clinical improvement since transitioning to clindamycin. Ortho consulted initially because of concern for septic arthritis  (knee) but ruled out clinically (tap deferred due to surrounding cellulitis). Venous US of lower extremity was without evidence of DVT and MRI showed nonspecific subcutaneous edema and cellulitis without abscess, osteomyelitis, septic joint of left lower leg. Patient has remained afebrile but with continuing mild leukocytosis (up to 15, 13.8 today). Blood cultures NGTD. Urine culture NGTD. CXR not suggestive of consolidation.     Patient has had many loose bowel movements and is complaining of abdominal pain and distention.     12/18 switched to cefazolin. Slight improvement  12/19 C diff negative    12/21 US negative for fluid collection, continue improvement     12/23/17 added vancomycin fro elevation of CRP   Interval History: Patient feel well - no new issues     Review of Systems   Constitutional: Positive for activity change and fever.   HENT: Negative.    Eyes: Negative.    Respiratory: Negative.    Cardiovascular: Negative.    Gastrointestinal: Negative.    Endocrine: Negative.    Musculoskeletal: Negative.    Skin: Positive for color change and rash.   Allergic/Immunologic: Negative.    Neurological: Negative.      Objective:     Vital Signs (Most Recent):  Temp: 98.4 °F (36.9 °C) (12/23/17 1523)  Pulse: 72 (12/23/17 1523)  Resp: 14 (12/23/17 1523)  BP: 116/63 (12/23/17 1523)  SpO2: (!) 94 % (12/23/17 1632) Vital Signs (24h Range):  Temp:  [96.8 °F (36 °C)-98.5 °F (36.9 °C)] 98.4 °F (36.9 °C)  Pulse:  [66-74] 72  Resp:  [14-20] 14  SpO2:  [93 %-94 %] 94 %  BP: (102-123)/(52-70) 116/63     Weight: 95 kg (209 lb 7 oz)  Body mass index is 34.85 kg/m².    Estimated Creatinine Clearance: 79.1 mL/min (based on SCr of 1 mg/dL).    Physical Exam   Constitutional: He is oriented to person, place, and time. He appears well-developed and well-nourished.   HENT:   Head: Normocephalic.   Eyes: Pupils are equal, round, and reactive to light.   Neck: Normal range of motion. Neck supple.   Cardiovascular: Normal rate and  regular rhythm.    Pulmonary/Chest: Effort normal and breath sounds normal.   Abdominal: Soft.   Musculoskeletal: He exhibits tenderness.   Back of left calf near the knee still painful but reduced in intensity    Neurological: He is alert and oriented to person, place, and time.   Skin: Rash noted. There is erythema.   Left leg rash improved        Significant Labs: All pertinent labs within the past 24 hours have been reviewed.    Significant Imaging: I have reviewed all pertinent imaging results/findings within the past 24 hours.

## 2017-12-24 NOTE — SUBJECTIVE & OBJECTIVE
Interval History: Patient feel well - no new issues     Review of Systems   Constitutional: Positive for activity change and fever.   HENT: Negative.    Eyes: Negative.    Respiratory: Negative.    Cardiovascular: Negative.    Gastrointestinal: Negative.    Endocrine: Negative.    Musculoskeletal: Negative.    Skin: Positive for color change and rash.   Allergic/Immunologic: Negative.    Neurological: Negative.      Objective:     Vital Signs (Most Recent):  Temp: 98.4 °F (36.9 °C) (12/23/17 1523)  Pulse: 72 (12/23/17 1523)  Resp: 14 (12/23/17 1523)  BP: 116/63 (12/23/17 1523)  SpO2: (!) 94 % (12/23/17 1632) Vital Signs (24h Range):  Temp:  [96.8 °F (36 °C)-98.5 °F (36.9 °C)] 98.4 °F (36.9 °C)  Pulse:  [66-74] 72  Resp:  [14-20] 14  SpO2:  [93 %-94 %] 94 %  BP: (102-123)/(52-70) 116/63     Weight: 95 kg (209 lb 7 oz)  Body mass index is 34.85 kg/m².    Estimated Creatinine Clearance: 79.1 mL/min (based on SCr of 1 mg/dL).    Physical Exam   Constitutional: He is oriented to person, place, and time. He appears well-developed and well-nourished.   HENT:   Head: Normocephalic.   Eyes: Pupils are equal, round, and reactive to light.   Neck: Normal range of motion. Neck supple.   Cardiovascular: Normal rate and regular rhythm.    Pulmonary/Chest: Effort normal and breath sounds normal.   Abdominal: Soft.   Musculoskeletal: He exhibits tenderness.   Back of left calf near the knee still painful but reduced in intensity    Neurological: He is alert and oriented to person, place, and time.   Skin: Rash noted. There is erythema.   Left leg rash improved        Significant Labs: All pertinent labs within the past 24 hours have been reviewed.    Significant Imaging: I have reviewed all pertinent imaging results/findings within the past 24 hours.

## 2017-12-25 PROBLEM — R19.7 DIARRHEA: Status: RESOLVED | Noted: 2017-12-19 | Resolved: 2017-12-25

## 2017-12-25 PROBLEM — R93.89 ABNORMAL CXR: Status: RESOLVED | Noted: 2017-12-24 | Resolved: 2017-12-25

## 2017-12-25 LAB
ALBUMIN SERPL BCP-MCNC: 2.3 G/DL
ALP SERPL-CCNC: 75 U/L
ALT SERPL W/O P-5'-P-CCNC: 10 U/L
ANION GAP SERPL CALC-SCNC: 8 MMOL/L
AST SERPL-CCNC: 19 U/L
BASOPHILS # BLD AUTO: 0.05 K/UL
BASOPHILS NFR BLD: 0.6 %
BILIRUB SERPL-MCNC: 0.5 MG/DL
BUN SERPL-MCNC: 32 MG/DL
CALCIUM SERPL-MCNC: 9.6 MG/DL
CHLORIDE SERPL-SCNC: 95 MMOL/L
CO2 SERPL-SCNC: 33 MMOL/L
CREAT SERPL-MCNC: 1 MG/DL
CRP SERPL-MCNC: 172.68 MG/L
DIFFERENTIAL METHOD: ABNORMAL
EOSINOPHIL # BLD AUTO: 0.2 K/UL
EOSINOPHIL NFR BLD: 2.7 %
ERYTHROCYTE [DISTWIDTH] IN BLOOD BY AUTOMATED COUNT: 13.5 %
EST. GFR  (AFRICAN AMERICAN): >60 ML/MIN/1.73 M^2
EST. GFR  (NON AFRICAN AMERICAN): >60 ML/MIN/1.73 M^2
GLUCOSE SERPL-MCNC: 138 MG/DL
HCT VFR BLD AUTO: 39.5 %
HGB BLD-MCNC: 13 G/DL
LYMPHOCYTES # BLD AUTO: 1.4 K/UL
LYMPHOCYTES NFR BLD: 16.5 %
MAGNESIUM SERPL-MCNC: 2 MG/DL
MCH RBC QN AUTO: 34.1 PG
MCHC RBC AUTO-ENTMCNC: 32.9 G/DL
MCV RBC AUTO: 104 FL
MONOCYTES # BLD AUTO: 0.8 K/UL
MONOCYTES NFR BLD: 9.1 %
NEUTROPHILS # BLD AUTO: 5.8 K/UL
NEUTROPHILS NFR BLD: 70.6 %
PHOSPHATE SERPL-MCNC: 4 MG/DL
PLATELET # BLD AUTO: 313 K/UL
PMV BLD AUTO: 10.9 FL
POCT GLUCOSE: 156 MG/DL (ref 70–110)
POCT GLUCOSE: 172 MG/DL (ref 70–110)
POCT GLUCOSE: 183 MG/DL (ref 70–110)
POCT GLUCOSE: 226 MG/DL (ref 70–110)
POTASSIUM SERPL-SCNC: 4.5 MMOL/L
PROT SERPL-MCNC: 7.6 G/DL
RBC # BLD AUTO: 3.81 M/UL
SODIUM SERPL-SCNC: 136 MMOL/L
WBC # BLD AUTO: 8.22 K/UL

## 2017-12-25 PROCEDURE — 94799 UNLISTED PULMONARY SVC/PX: CPT

## 2017-12-25 PROCEDURE — 83735 ASSAY OF MAGNESIUM: CPT

## 2017-12-25 PROCEDURE — 80053 COMPREHEN METABOLIC PANEL: CPT

## 2017-12-25 PROCEDURE — 84100 ASSAY OF PHOSPHORUS: CPT

## 2017-12-25 PROCEDURE — 63600175 PHARM REV CODE 636 W HCPCS: Performed by: INTERNAL MEDICINE

## 2017-12-25 PROCEDURE — 99232 SBSQ HOSP IP/OBS MODERATE 35: CPT | Mod: 24,,, | Performed by: SURGERY

## 2017-12-25 PROCEDURE — 63600175 PHARM REV CODE 636 W HCPCS: Performed by: STUDENT IN AN ORGANIZED HEALTH CARE EDUCATION/TRAINING PROGRAM

## 2017-12-25 PROCEDURE — 21400001 HC TELEMETRY ROOM

## 2017-12-25 PROCEDURE — 25000003 PHARM REV CODE 250: Performed by: STUDENT IN AN ORGANIZED HEALTH CARE EDUCATION/TRAINING PROGRAM

## 2017-12-25 PROCEDURE — 36415 COLL VENOUS BLD VENIPUNCTURE: CPT

## 2017-12-25 PROCEDURE — 86141 C-REACTIVE PROTEIN HS: CPT

## 2017-12-25 PROCEDURE — 85025 COMPLETE CBC W/AUTO DIFF WBC: CPT

## 2017-12-25 PROCEDURE — 25000003 PHARM REV CODE 250: Performed by: FAMILY MEDICINE

## 2017-12-25 PROCEDURE — 94761 N-INVAS EAR/PLS OXIMETRY MLT: CPT

## 2017-12-25 PROCEDURE — S4991 NICOTINE PATCH NONLEGEND: HCPCS | Performed by: STUDENT IN AN ORGANIZED HEALTH CARE EDUCATION/TRAINING PROGRAM

## 2017-12-25 PROCEDURE — 63600175 PHARM REV CODE 636 W HCPCS: Performed by: FAMILY MEDICINE

## 2017-12-25 RX ORDER — CARVEDILOL 6.25 MG/1
6.25 TABLET ORAL 2 TIMES DAILY WITH MEALS
Qty: 60 TABLET | Refills: 11 | Status: SHIPPED | OUTPATIENT
Start: 2017-12-26 | End: 2019-03-18 | Stop reason: SDUPTHER

## 2017-12-25 RX ORDER — CARVEDILOL 6.25 MG/1
6.25 TABLET ORAL 2 TIMES DAILY WITH MEALS
Start: 2017-12-25 | End: 2018-01-30 | Stop reason: SDUPTHER

## 2017-12-25 RX ORDER — HYDROCHLOROTHIAZIDE 12.5 MG/1
12.5 CAPSULE ORAL DAILY
Start: 2017-12-25 | End: 2018-01-30 | Stop reason: SDUPTHER

## 2017-12-25 RX ORDER — HYDROCHLOROTHIAZIDE 12.5 MG/1
12.5 TABLET ORAL DAILY
Qty: 30 TABLET | Refills: 11 | Status: SHIPPED | OUTPATIENT
Start: 2017-12-26 | End: 2018-01-30

## 2017-12-25 RX ORDER — CEPHALEXIN 500 MG/1
500 CAPSULE ORAL EVERY 12 HOURS
Status: DISCONTINUED | OUTPATIENT
Start: 2017-12-25 | End: 2017-12-27 | Stop reason: HOSPADM

## 2017-12-25 RX ORDER — CEPHALEXIN 500 MG/1
500 CAPSULE ORAL EVERY 12 HOURS
Start: 2017-12-25 | End: 2017-12-26

## 2017-12-25 RX ORDER — GLIPIZIDE 5 MG/1
5 TABLET ORAL
Start: 2017-12-25 | End: 2017-12-27 | Stop reason: HOSPADM

## 2017-12-25 RX ORDER — CLOPIDOGREL BISULFATE 75 MG/1
75 TABLET ORAL DAILY
Qty: 30 TABLET | Refills: 11
Start: 2017-12-26 | End: 2018-01-30 | Stop reason: SDUPTHER

## 2017-12-25 RX ORDER — CLOPIDOGREL BISULFATE 75 MG/1
75 TABLET ORAL DAILY
Qty: 30 TABLET | Refills: 11 | Status: SHIPPED | OUTPATIENT
Start: 2017-12-26 | End: 2017-12-25

## 2017-12-25 RX ORDER — HYDROXYZINE HYDROCHLORIDE 25 MG/1
25 TABLET, FILM COATED ORAL 2 TIMES DAILY PRN
Status: DISCONTINUED | OUTPATIENT
Start: 2017-12-25 | End: 2017-12-27 | Stop reason: HOSPADM

## 2017-12-25 RX ORDER — HYDROCODONE BITARTRATE AND ACETAMINOPHEN 5; 325 MG/1; MG/1
1 TABLET ORAL EVERY 6 HOURS PRN
Refills: 0
Start: 2017-12-25 | End: 2017-12-25

## 2017-12-25 RX ORDER — SIMVASTATIN 40 MG/1
40 TABLET, FILM COATED ORAL NIGHTLY
Start: 2017-12-25 | End: 2018-01-30 | Stop reason: SDUPTHER

## 2017-12-25 RX ORDER — SIMVASTATIN 40 MG/1
40 TABLET, FILM COATED ORAL NIGHTLY
Qty: 90 TABLET | Refills: 3 | Status: SHIPPED | OUTPATIENT
Start: 2017-12-25 | End: 2018-01-30 | Stop reason: SDUPTHER

## 2017-12-25 RX ORDER — HYDROCODONE BITARTRATE AND ACETAMINOPHEN 5; 325 MG/1; MG/1
1 TABLET ORAL EVERY 8 HOURS PRN
Qty: 21 TABLET | Refills: 0 | Status: SHIPPED | OUTPATIENT
Start: 2017-12-25 | End: 2017-12-26

## 2017-12-25 RX ORDER — NAPROXEN 500 MG/1
500 TABLET ORAL 2 TIMES DAILY
Start: 2017-12-25 | End: 2018-01-30 | Stop reason: SDUPTHER

## 2017-12-25 RX ADMIN — MORPHINE SULFATE 4 MG: 10 INJECTION INTRAVENOUS at 05:12

## 2017-12-25 RX ADMIN — CEFAZOLIN SODIUM 1 G: 1 SOLUTION INTRAVENOUS at 12:12

## 2017-12-25 RX ADMIN — CLOPIDOGREL 75 MG: 75 TABLET, FILM COATED ORAL at 09:12

## 2017-12-25 RX ADMIN — HYDROXYZINE HYDROCHLORIDE 25 MG: 25 TABLET, FILM COATED ORAL at 08:12

## 2017-12-25 RX ADMIN — ENOXAPARIN SODIUM 40 MG: 100 INJECTION SUBCUTANEOUS at 04:12

## 2017-12-25 RX ADMIN — MORPHINE SULFATE 4 MG: 10 INJECTION INTRAVENOUS at 08:12

## 2017-12-25 RX ADMIN — CARVEDILOL 6.25 MG: 6.25 TABLET, FILM COATED ORAL at 04:12

## 2017-12-25 RX ADMIN — CLORAZEPATE DIPOTASSIUM 15 MG: 3.75 TABLET ORAL at 08:12

## 2017-12-25 RX ADMIN — CEFAZOLIN SODIUM 1 G: 1 SOLUTION INTRAVENOUS at 10:12

## 2017-12-25 RX ADMIN — MORPHINE SULFATE 4 MG: 10 INJECTION INTRAVENOUS at 04:12

## 2017-12-25 RX ADMIN — HYDROCODONE BITARTRATE AND ACETAMINOPHEN 1 TABLET: 5; 325 TABLET ORAL at 08:12

## 2017-12-25 RX ADMIN — MAGNESIUM OXIDE TAB 400 MG (241.3 MG ELEMENTAL MG) 400 MG: 400 (241.3 MG) TAB at 08:12

## 2017-12-25 RX ADMIN — MORPHINE SULFATE 4 MG: 10 INJECTION INTRAVENOUS at 12:12

## 2017-12-25 RX ADMIN — SIMVASTATIN 40 MG: 20 TABLET, FILM COATED ORAL at 08:12

## 2017-12-25 RX ADMIN — VANCOMYCIN HYDROCHLORIDE 1000 MG: 1 INJECTION, POWDER, LYOPHILIZED, FOR SOLUTION INTRAVENOUS at 05:12

## 2017-12-25 RX ADMIN — CARVEDILOL 6.25 MG: 6.25 TABLET, FILM COATED ORAL at 08:12

## 2017-12-25 RX ADMIN — HYDROCHLOROTHIAZIDE 12.5 MG: 12.5 TABLET ORAL at 08:12

## 2017-12-25 RX ADMIN — MICONAZOLE NITRATE: 20 CREAM TOPICAL at 08:12

## 2017-12-25 RX ADMIN — CLORAZEPATE DIPOTASSIUM 15 MG: 3.75 TABLET ORAL at 11:12

## 2017-12-25 RX ADMIN — CEPHALEXIN 500 MG: 500 CAPSULE ORAL at 08:12

## 2017-12-25 RX ADMIN — ASPIRIN 81 MG: 81 TABLET, COATED ORAL at 08:12

## 2017-12-25 RX ADMIN — NICOTINE 1 PATCH: 21 PATCH, EXTENDED RELEASE TRANSDERMAL at 08:12

## 2017-12-25 NOTE — ASSESSMENT & PLAN NOTE
Improving steadily - likely can switch to PO keflex soon.  The increasing CRP is of concern but not clear on exam why.  The leg is improving - the IV site is a bit tender and could lead to that  - not sure that vancomycin was needed prior to pan-culture.     Plan:   1. Pan culture urine, blood, final results pending   2. Continue vanc and cephazolin for now =if culture results negative then would stop the vanc and watch CRPs  - will wait for AM micro results and then ananda can stop vanc and switch to PO keflex

## 2017-12-25 NOTE — PLAN OF CARE
[facility]    FACILITY TRANSFER ORDERS      Patient Name: Reji Guerra  YOB: 1955    PCP: [facility]IL[facility] MAL[facility]   PCP Address: None  PCP Phone Number: None  PCP Fax: None    Encounter Date: 12/27/2017    Admit to: [facility]    Vital Signs:  Routine    Diagnoses:   Active Hospital Problems    Diagnosis  POA    *Cellulitis of left lower extremity without foot [L03.116]  No     Priority: 1 - High    Diabetes mellitus, type II, insulin dependent [E11.9, Z79.4]  Yes     Priority: 2     Coronary artery disease involving native coronary artery of native heart without angina pectoris [I25.10]  Yes     Priority: 3     HTN (hypertension) [I10]  Yes     Priority: 4     Anxiety [F41.9]  Yes     Priority: 5     Recurrent umbilical hernia [K42.9]  Yes     Priority: 6       Resolved Hospital Problems    Diagnosis Date Resolved POA    Abnormal CXR [R93.8] 12/25/2017 Yes    Diarrhea [R19.7] 12/25/2017 No    Elevated troponin [R74.8] 12/22/2017 Yes       Allergies:  Review of patient's allergies indicates:   Allergen Reactions    Iodine and iodide containing products     Shellfish containing products      Diet: cardiac diet and diabetic diet: 2000 calorie    Activities: Activity as tolerated with assistance and fall precautions    Nursing: Vital signs per protocol, Accuchecks 4 x daily before mails and at bedtime    Labs: None       CONSULTS:    Physical Therapy to evaluate and treat, restorative care, Occupational Therapy to evaluate and treat five, restorative care. and  to evaluate for community resources/long-range planning as needed.    MISCELLANEOUS CARE:        WOUND CARE ORDERS   None    Medications: Review discharge medications with patient and family and provide education.      Current Discharge Medication List      START taking these medications    Details   cephALEXin (KEFLEX) 500 MG capsule Take 1 capsule (500 mg total) by  mouth every 12 (twelve) hours for 7 days (end date: 1/1/2018)      Metformin 500 mg tablet Take 1 tab (500 mg) twice a day       hydroCHLOROthiazide (HYDRODIURIL) 12.5 MG Tab Take 1 tablet (12.5 mg total) by mouth once daily.  Qty: 30 tablet, Refills: 11      hydrocodone-acetaminophen 5-325mg (NORCO) 5-325 mg per tablet Take 1 tablet by mouth every 8 (eight) hours as needed for 1 week (end date 1/1/18).         CONTINUE these medications which have CHANGED    Details   !! carvedilol (COREG) 6.25 MG tablet Take 1 tablet (6.25 mg total) by mouth 2 (two) times daily with meals.  Qty: 60 tablet, Refills: 11      glipiZIDE (GLUCOTROL) 5 MG tablet Take 1 tablet (5 mg total) by mouth 2 (two) times daily before meals.      hydroCHLOROthiazide (MICROZIDE) 12.5 mg capsule Take 1 capsule (12.5 mg total) by mouth once daily.      Clorazepate (Tranxene) 15 mg Take one tab by mouth twice daily.       !! simvastatin (ZOCOR) 40 MG tablet Take 1 tablet (40 mg total) by mouth every evening.  Qty: 90 tablet, Refills: 3         CONTINUE these medications which have NOT CHANGED    Details   !! clopidogrel (PLAVIX) 75 mg tablet Take 75 mg by mouth once daily.      clorazepate (TRANXENE) 15 MG tablet Take 15 mg by mouth 2 (two) times daily.      naproxen (NAPROSYN) 500 MG tablet Take 500 mg (1 tab) as needed every 4-6 hours for 2 weeks.                  _________________________________  Andrew Lombardo MD  12/27/2017

## 2017-12-25 NOTE — PROGRESS NOTES
[facility] Hospital Medicine  Progress Note    Patient Name: Reji Guerra  MRN: [id number]  Patient Class: IP- Inpatient   Admission Date: 12/12/2017  Length of Stay: 13 days  Attending Physician: Berkley Johnson MD  Primary Care Provider: [facility]        Subjective:     Principal Problem:Cellulitis of left lower extremity without foot    HPI:  61 yo male w/ CAD (3 stents in 2003 w/MI), DMT2, HTN, HLD presents to ED with 1 week of LLE swelling and erythema, which just starting tracking up his inner thigh within the past two days. No trauma to his leg, no wounds on his foot. Pain with standing and walking. 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  He does have to rest 1/2 up a full flight of stairs but denies chest pain, shortness of breath, recent illness. He sleeps flat on just 1 pillow.    He hasn't had medical care in 2 years and hasn't taken any medications for 1 year.  He thinks he was supposed to keep taking the plavix.  Baseline dry cough, minimally productive with clear sputum. Denies CP, SOB, recent illness, N/V, diarrhea, constipation. Last BM day before admission.    Hospital Course:  Pt admitted with Vanc and Zosyn. Ortho consulted for possible joint involvement, recommendations. Slow clinical improvement.  WBC improved slowly as well. Pt remained afebrile through stay.  Abx switched to IV clinda to monitor for response but WBC and clinically pt regressed.  ID and surgery brought on board for additional recs.  Leg wrapped loosely and more dedicated to elevating leg.  Good improvement on HOD# 7  Pt still with complaints about diarrhea even though C Diff neg and on imodium.     Stool studies sent as patient had chronic intermittent diarrhea prior to admission. All cultures continued to be NGTD through HOD#10. CRP improved and then increased. Pt had temp of 100.4 and new cultures were sent.  Leg  showed slow improvement and pt was still unable to walk.    Interval History: NAEON. Af. VSS. Pt complaints of being woken up with blood draws. Awaiting placement    Review of Systems   Constitutional: Negative for chills and fever.   HENT: Negative for sore throat.    Eyes: Negative for photophobia.   Respiratory: Negative for cough and shortness of breath.    Cardiovascular: Negative for chest pain and palpitations.   Gastrointestinal: Negative for abdominal pain, constipation, diarrhea, nausea and vomiting.   Genitourinary: Negative for dysuria.        Fecal incontinence   Musculoskeletal: Positive for gait problem. Negative for back pain and neck stiffness.   Skin: Negative for pallor and rash.   Neurological: Negative for dizziness, facial asymmetry, speech difficulty and weakness.   Psychiatric/Behavioral: Positive for dysphoric mood. Negative for confusion. The patient is nervous/anxious.      Objective:     Vital Signs (Most Recent):  Temp: 98.6 °F (37 °C) (12/25/17 0322)  Pulse: 68 (12/25/17 0322)  Resp: 18 (12/25/17 0322)  BP: 126/67 (12/25/17 0322)  SpO2: 96 % (12/24/17 2130) Vital Signs (24h Range):  Temp:  [98.6 °F (37 °C)-99.4 °F (37.4 °C)] 98.6 °F (37 °C)  Pulse:  [67-73] 68  Resp:  [18-22] 18  SpO2:  [95 %-96 %] 96 %  BP: ()/(57-75) 126/67     Weight: 95 kg (209 lb 7 oz)  Body mass index is 34.85 kg/m².    Intake/Output Summary (Last 24 hours) at 12/25/17 0731  Last data filed at 12/25/17 0600   Gross per 24 hour   Intake             1030 ml   Output             1200 ml   Net             -170 ml      Physical Exam   Constitutional: He is oriented to person, place, and time. He appears well-developed and well-nourished. No distress.   Resting in bed, appears older than stated age   HENT:   Head: Normocephalic and atraumatic.   Mouth/Throat: No oropharyngeal exudate.   Eyes: Conjunctivae and EOM are normal. Pupils are equal, round, and reactive to light.   Neck: Normal range of motion. Neck  supple. No JVD present.   Cardiovascular: Normal rate, regular rhythm and normal heart sounds.    No murmur heard.  Pulmonary/Chest: Effort normal. No respiratory distress. He has wheezes (bilat lower lung wheezes). He has no rales.   Abdominal: Soft. Bowel sounds are normal. He exhibits distension (rotund abdomen, soft). He exhibits no mass. There is no tenderness.   Genitourinary:   Genitourinary Comments: weakened rectal tone but present   Musculoskeletal: He exhibits edema (improving).   Able to range knee with some pain (UE & RLE without erythema, pain); chronic back pain with movement   Neurological: He is alert and oriented to person, place, and time. No cranial nerve deficit.   Skin: Skin is warm. Capillary refill takes less than 2 seconds. He is not diaphoretic. There is erythema.   LLE erythema midcalf with mild pain, induration in posterior calf, dry skin, edema gone    bilat old healed venous stasis ulcer scaling wounds, no drainage, no scrotal involvement, no LAD    Psychiatric: He has a normal mood and affect. His behavior is normal.   Nursing note and vitals reviewed.        Significant Labs:   CBC:   Recent Labs  Lab 12/24/17  0757 12/25/17  0446   WBC 13.35* 8.22   HGB 13.0* 13.0*   HCT 39.0* 39.5*    313     CMP:   Recent Labs  Lab 12/24/17  0756 12/25/17  0446   * 136   K 4.6 4.5   CL 97 95   CO2 28 33*   * 138*   BUN 32* 32*   CREATININE 1.0 1.0   CALCIUM 9.7 9.6   PROT 7.8 7.6   ALBUMIN 2.4* 2.3*   BILITOT 0.6 0.5   ALKPHOS 80 75   AST 18 19   ALT 11 10   ANIONGAP 10 8   EGFRNONAA >60 >60       Significant Imaging: I have reviewed all pertinent imaging results/findings within the past 24 hours.     CT chest (for nodule vs artifactt on CXR):  No evidence for pulmonary nodule to correspond with presumed artifactual 21 2017 chest radiograph.     Assessment/Plan:      * Cellulitis of left lower extremity without foot    WBC 8.22, improving but CRP plateau elevated for unknown  "reason  Vanc and Zosyn initially, switched to Cipro then Cefazolin per  Improved ability to ambulate "my arms hurt [from the blood draws] more than my leg now"  All cx neg thus far, will f/u today  S/p MRI, US, CT- all neg for DVT, nodule, etc  Discharge planning for [place], [place]        Recurrent umbilical hernia    Non-op per Surgery        Anxiety    Original home med Traxene restarted, added hydroxyzine  Pt tearful and concerned about future.  Pt open to talking to psychiatrist, but may not be necessary as it is discharge planning that is concerning pt  Continue to work with sister, case management about placement plans        Coronary artery disease involving native coronary artery of native heart without angina pectoris    Hx of 3 stents in angiogram in 2003  Restarted on ASA, plavix. Potentially able to stop plavix since stents were placed over 2 years ago.  Trops trended down  F/u cardiology as outpatient  H/H 14.9/45.2 but with MCV of 105, Folate and B12 were normal  No chest pain            HTN (hypertension)    Restarted Carvedilol, HCTZ (prior home meds from 2 years ago)   Stable  Normotensive  Cardiac diet            Diabetes mellitus, type II, insulin dependent    HgbA1C 6.5  On Low Dose Corrective scale  Gluc 138-148  On Detemir 8 units BID              VTE Risk Mitigation         Ordered     enoxaparin injection 40 mg  Daily     Route:  Subcutaneous        12/12/17 1624     Medium Risk of VTE  Once      12/12/17 1624              Andrew Lombardo MD  Department of Hospital Medicine   [facility]  "

## 2017-12-25 NOTE — PROGRESS NOTES
"[facility]  Infectious Disease  Progress Note    Patient Name: Rjei Guerra  MRN: [id number]  Admission Date: 12/12/2017  Length of Stay: 13 days  Attending Physician: Berkley Johnson MD  Primary Care Provider: [facility]    Isolation Status: No active isolations  Assessment/Plan:      * Cellulitis of left lower extremity without foot    Improving steadily - the CRP is somewhat lower but still quite elevated and it is not clear what his "baseline" on this and may be high due to multiple other medical problems.  All cultures negative - leg is better    ID recs:   1. Stop vanc in AM 12/26   2. Switch cephalexin IV to PO Keflex 500 q 12 hours for another 7 days also OK to switch 12/26     ID will sign off at this time - thanks for the consult               Anticipated Disposition:     Thank you for your consult. I will sign off. Please contact us if you have any additional questions.    Jeffrey Lisa MD  Infectious Disease  [facility]    Subjective:     Principal Problem:Cellulitis of left lower extremity without foot    HPI: 62M with history of CAD (s/p PCI with three stents), DMII, HTN, HLD who presented to ED on 12/12 with left lower extremity swelling and erythema. He reports that the swelling and redness began a few days before admission, worsening until he was unable to bear weight on his left leg. He denies fevers and chills while at home, recent trauma to his L leg. He has had no aquatic exposures.     Hospital course significant for initial improvement (per patient, though imaging in chart does not suggest there was improvement) with vancomycin and zosyn with transition to clindamycin on 12/15. He has not had much clinical improvement since transitioning to clindamycin. Ortho consulted initially because of concern for septic arthritis (knee) but ruled out clinically (tap deferred due to surrounding cellulitis). Venous US of lower extremity " was without evidence of DVT and MRI showed nonspecific subcutaneous edema and cellulitis without abscess, osteomyelitis, septic joint of left lower leg. Patient has remained afebrile but with continuing mild leukocytosis (up to 15, 13.8 today). Blood cultures NGTD. Urine culture NGTD. CXR not suggestive of consolidation.     Patient has had many loose bowel movements and is complaining of abdominal pain and distention.     12/18 switched to cefazolin. Slight improvement  12/19 C diff negative    12/21 US negative for fluid collection, continue improvement     12/23/17 added vancomycin fro elevation of CRP   Interval History: Patient feels a lot better - less pain in left lower leg Interval History: Stable - still pain in the back of the left leg but able to bear weight at times Interval History: Patient feel well - no new issues     Review of Systems   Constitutional: Positive for activity change and fever.   HENT: Negative.    Eyes: Negative.    Respiratory: Negative.    Cardiovascular: Negative.    Gastrointestinal: Negative.    Endocrine: Negative.    Musculoskeletal: Negative.    Skin: Positive for color change and rash.   Allergic/Immunologic: Negative.    Neurological: Negative.      Objective:     Vital Signs (Most Recent):  Temp: 98.4 °F (36.9 °C) (12/23/17 1523)  Pulse: 72 (12/23/17 1523)  Resp: 14 (12/23/17 1523)  BP: 116/63 (12/23/17 1523)  SpO2: (!) 94 % (12/23/17 1632) Vital Signs (24h Range):  Temp:  [96.8 °F (36 °C)-98.5 °F (36.9 °C)] 98.4 °F (36.9 °C)  Pulse:  [66-74] 72  Resp:  [14-20] 14  SpO2:  [93 %-94 %] 94 %  BP: (102-123)/(52-70) 116/63     Weight: 95 kg (209 lb 7 oz)  Body mass index is 34.85 kg/m².    Estimated Creatinine Clearance: 79.1 mL/min (based on SCr of 1 mg/dL).    Physical Exam   Constitutional: He is oriented to person, place, and time. He appears well-developed and well-nourished.   HENT:   Head: Normocephalic.   Eyes: Pupils are equal, round, and reactive to light.   Neck:  Normal range of motion. Neck supple.   Cardiovascular: Normal rate and regular rhythm.    Pulmonary/Chest: Effort normal and breath sounds normal.   Abdominal: Soft.   Musculoskeletal: He exhibits tenderness.   Back of left calf near the knee still painful but reduced in intensity - better today    Neurological: He is alert and oriented to person, place, and time.   Skin: Rash noted. There is erythema.   Left leg rash improved        Significant Labs: All pertinent labs within the past 24 hours have been reviewed.    Significant Imaging: I have reviewed all pertinent imaging results/findings within the past 24 hours.    Review of Systems  Objective:     Vital Signs (Most Recent):  Temp: 98.6 °F (37 °C) (12/24/17 1920)  Pulse: 73 (12/24/17 1920)  Resp: 19 (12/24/17 2130)  BP: 117/69 (12/24/17 1920)  SpO2: 96 % (12/24/17 2130) Vital Signs (24h Range):  Temp:  [97.5 °F (36.4 °C)-99.4 °F (37.4 °C)] 98.6 °F (37 °C)  Pulse:  [67-75] 73  Resp:  [18-22] 19  SpO2:  [95 %-96 %] 96 %  BP: ()/(57-75) 117/69     Weight: 95 kg (209 lb 7 oz)  Body mass index is 34.85 kg/m².    Estimated Creatinine Clearance: 79.1 mL/min (based on SCr of 1 mg/dL).    Physical Exam    Significant Labs: All pertinent labs within the past 24 hours have been reviewed.    Significant Imaging: I have reviewed all pertinent imaging results/findings within the past 24 hours.    Review of Systems  Objective:     Vital Signs (Most Recent):  Temp: 97.8 °F (36.6 °C) (12/25/17 1147)  Pulse: 68 (12/25/17 1147)  Resp: 20 (12/25/17 1147)  BP: (!) 107/53 (12/25/17 1147)  SpO2: 96 % (12/25/17 0902) Vital Signs (24h Range):  Temp:  [96.3 °F (35.7 °C)-98.8 °F (37.1 °C)] 97.8 °F (36.6 °C)  Pulse:  [67-73] 68  Resp:  [18-22] 20  SpO2:  [96 %] 96 %  BP: (107-164)/(53-72) 107/53     Weight: 95 kg (209 lb 7 oz)  Body mass index is 34.85 kg/m².    Estimated Creatinine Clearance: 79.1 mL/min (based on SCr of 1 mg/dL).    Physical Exam    Significant Labs: All pertinent  labs within the past 24 hours have been reviewed.    Significant Imaging: I have reviewed all pertinent imaging results/findings within the past 24 hours.

## 2017-12-25 NOTE — PLAN OF CARE
Problem: Patient Care Overview  Goal: Plan of Care Review  Outcome: Revised  IV antibiotics administered. Left leg elevated on pillow. Pain meds administered as needed. IV CDI. Fall precautions maintained.

## 2017-12-25 NOTE — ASSESSMENT & PLAN NOTE
Improving  No open wounds, dressing removed, ok to leave LLE open to air  Cultures noted, cont current tx  Encouraged pt to perform pedal pumps while at rest  Encouraged continued participation with PT  D/c planning in progress

## 2017-12-25 NOTE — ASSESSMENT & PLAN NOTE
"Improving steadily - the CRP is somewhat lower but still quite elevated and it is not clear what his "baseline" on this and may be high due to multiple other medical problems.  All cultures negative - leg is better    ID recs:   1. Stop vanc in AM 12/26   2. Switch cephalexin IV to PO Keflex 500 q 12 hours for another 7 days also OK to switch 12/26     ID will sign off at this time - thanks for the consult     "

## 2017-12-25 NOTE — PLAN OF CARE
Pt ambulated to bathroom and back with walker. Tolerated well. No SOB noted. Back in bed resting. Bed alarm set.

## 2017-12-25 NOTE — SUBJECTIVE & OBJECTIVE
Interval History: No new c/o. Pain improving. WBC and CRP improved.     Medications:  Continuous Infusions:  Scheduled Meds:   aspirin  81 mg Oral Daily    carvedilol  6.25 mg Oral BID WM    cephALEXin  500 mg Oral Q12H    clopidogrel  75 mg Oral Daily    clorazepate  15 mg Oral BID    enoxaparin  40 mg Subcutaneous Daily    hydroCHLOROthiazide  12.5 mg Oral Daily    insulin detemir  8 Units Subcutaneous BID    magnesium oxide  400 mg Oral BID    miconazole   Topical (Top) BID    nicotine  1 patch Transdermal Daily    simvastatin  40 mg Oral QHS     PRN Meds:calcium carbonate, dextrose 50%, dextrose 50%, glucagon (human recombinant), glucose, glucose, hydrocodone-acetaminophen 5-325mg, hydrOXYzine HCl, influenza, insulin aspart, loperamide, lorazepam, morphine, pneumoc 13-jose g conj-dip cr(PF), sodium chloride 0.9%     Review of patient's allergies indicates:   Allergen Reactions    Iodine and iodide containing products     Shellfish containing products      Objective:     Vital Signs (Most Recent):  Temp: 97.8 °F (36.6 °C) (12/25/17 1147)  Pulse: 68 (12/25/17 1147)  Resp: 20 (12/25/17 1147)  BP: (!) 107/53 (12/25/17 1147)  SpO2: 96 % (12/25/17 0902) Vital Signs (24h Range):  Temp:  [96.3 °F (35.7 °C)-98.8 °F (37.1 °C)] 97.8 °F (36.6 °C)  Pulse:  [67-73] 68  Resp:  [18-22] 20  SpO2:  [96 %] 96 %  BP: (107-164)/(53-72) 107/53     Weight: 95 kg (209 lb 7 oz)  Body mass index is 34.85 kg/m².    Intake/Output - Last 3 Shifts       12/23 0700 - 12/24 0659 12/24 0700 - 12/25 0659 12/25 0700 - 12/26 0659    P.O. 930 680 375    IV Piggyback 100 600     Total Intake(mL/kg) 1030 (10.8) 1280 (13.5) 375 (3.9)    Urine (mL/kg/hr) 2350 (1) 1200 (0.5)     Stool 0 (0)      Total Output 2350 1200      Net -1320 +80 +375           Stool Occurrence 1 x            Physical Exam   Constitutional: He is oriented to person, place, and time. He appears well-developed and well-nourished. No distress.   HENT:   Head:  Normocephalic and atraumatic.   Eyes: Conjunctivae and EOM are normal. Pupils are equal, round, and reactive to light. No scleral icterus.   Neck: Normal range of motion. Neck supple. No JVD present.   Cardiovascular: Normal rate.    Pulmonary/Chest: Effort normal. No respiratory distress.   Abdominal: Soft. He exhibits no distension.   Musculoskeletal:   Still mild ttp post LLE, scaling skin noted, erythema slowly improving, chronic skin changes also noted   Neurological: He is alert and oriented to person, place, and time. No cranial nerve deficit.   Skin: Skin is warm and dry. He is not diaphoretic.   Psychiatric: He has a normal mood and affect.       Significant Labs:  CBC:   Recent Labs  Lab 12/25/17 0446   WBC 8.22   RBC 3.81*   HGB 13.0*   HCT 39.5*      *   MCH 34.1*   MCHC 32.9     CMP:   Recent Labs  Lab 12/25/17 0446   *   CALCIUM 9.6   ALBUMIN 2.3*   PROT 7.6      K 4.5   CO2 33*   CL 95   BUN 32*   CREATININE 1.0   ALKPHOS 75   ALT 10   AST 19   BILITOT 0.5      ( from 174)    Microbiology Results (last 7 days)     Procedure Component Value Units Date/Time    Blood culture [[id number]] Collected:  12/23/17 1842    Order Status:  Completed Specimen:  Blood Updated:  12/25/17 0613     Blood Culture, Routine No Growth to date     Blood Culture, Routine No Growth to date    Blood culture [[id number]] Collected:  12/23/17 1841    Order Status:  Completed Specimen:  Blood Updated:  12/25/17 0613     Blood Culture, Routine No Growth to date     Blood Culture, Routine No Growth to date    Blood culture [[id number]] Collected:  12/21/17 1713    Order Status:  Completed Specimen:  Blood Updated:  12/24/17 2212     Blood Culture, Routine No Growth to date     Blood Culture, Routine No Growth to date     Blood Culture, Routine No Growth to date     Blood Culture, Routine No Growth to date    Narrative:       Aerobic and anaerobic bottles from Site #2    Blood culture [[id number]]  Collected:  12/21/17 1714    Order Status:  Completed Specimen:  Blood Updated:  12/24/17 2212     Blood Culture, Routine No Growth to date     Blood Culture, Routine No Growth to date     Blood Culture, Routine No Growth to date     Blood Culture, Routine No Growth to date    Narrative:       Aerobic and anaerobic bottles from Site #1    Urine culture [[id number]] Collected:  12/24/17 1554    Order Status:  Sent Specimen:  Urine from Urine, Clean Catch Updated:  12/24/17 1940    Blood culture [[id number]] Collected:  12/18/17 1512    Order Status:  Completed Specimen:  Blood Updated:  12/23/17 2012     Blood Culture, Routine No growth after 5 days.    Blood culture [[id number]] Collected:  12/18/17 1512    Order Status:  Completed Specimen:  Blood Updated:  12/23/17 2012     Blood Culture, Routine No growth after 5 days.    Stool culture [[id number]] Collected:  12/20/17 1431    Order Status:  Completed Specimen:  Stool from Stool Updated:  12/23/17 1333     Stool Culture Nothing significant to date    Urine culture [[id number]] Collected:  12/21/17 2110    Order Status:  Completed Specimen:  Urine from Urine, Clean Catch Updated:  12/23/17 0718     Urine Culture, Routine No growth    E. coli 0157 antigen [[id number]] Collected:  12/20/17 1431    Order Status:  Completed Specimen:  Stool from Stool Updated:  12/22/17 1247     Shiga Toxin 1 E.coli Negative     Shiga Toxin 2 E.coli Negative    Culture, Anaerobe [[id number]] Collected:  12/18/17 1343    Order Status:  Completed Specimen:  Incision site from Leg, Left Updated:  12/22/17 1002     Anaerobic Culture Culture in progress    Aerobic culture [[id number]] Collected:  12/18/17 1343    Order Status:  Completed Specimen:  Incision site from Leg, Left Updated:  12/21/17 1258     Aerobic Bacterial Culture No growth    Clostridium difficile EIA [[id number]] Collected:  12/18/17 1419    Order Status:  Completed Specimen:  Stool from Stool Updated:  12/18/17 2226     C. diff  Antigen Negative     C difficile Toxins A+B, EIA Negative     Comment: Testing not recommended for children <24 months old.             Significant Diagnostics:  no new images

## 2017-12-25 NOTE — SUBJECTIVE & OBJECTIVE
Interval History: NAEON. Af. VSS. Pt complaints of being woken up with blood draws. Awaiting placement    Review of Systems   Constitutional: Negative for chills and fever.   HENT: Negative for sore throat.    Eyes: Negative for photophobia.   Respiratory: Negative for cough and shortness of breath.    Cardiovascular: Negative for chest pain and palpitations.   Gastrointestinal: Negative for abdominal pain, constipation, diarrhea, nausea and vomiting.   Genitourinary: Negative for dysuria.        Fecal incontinence   Musculoskeletal: Positive for gait problem. Negative for back pain and neck stiffness.   Skin: Negative for pallor and rash.   Neurological: Negative for dizziness, facial asymmetry, speech difficulty and weakness.   Psychiatric/Behavioral: Positive for dysphoric mood. Negative for confusion. The patient is nervous/anxious.      Objective:     Vital Signs (Most Recent):  Temp: 98.6 °F (37 °C) (12/25/17 0322)  Pulse: 68 (12/25/17 0322)  Resp: 18 (12/25/17 0322)  BP: 126/67 (12/25/17 0322)  SpO2: 96 % (12/24/17 2130) Vital Signs (24h Range):  Temp:  [98.6 °F (37 °C)-99.4 °F (37.4 °C)] 98.6 °F (37 °C)  Pulse:  [67-73] 68  Resp:  [18-22] 18  SpO2:  [95 %-96 %] 96 %  BP: ()/(57-75) 126/67     Weight: 95 kg (209 lb 7 oz)  Body mass index is 34.85 kg/m².    Intake/Output Summary (Last 24 hours) at 12/25/17 0731  Last data filed at 12/25/17 0600   Gross per 24 hour   Intake             1030 ml   Output             1200 ml   Net             -170 ml      Physical Exam   Constitutional: He is oriented to person, place, and time. He appears well-developed and well-nourished. No distress.   Resting in bed, appears older than stated age   HENT:   Head: Normocephalic and atraumatic.   Mouth/Throat: No oropharyngeal exudate.   Eyes: Conjunctivae and EOM are normal. Pupils are equal, round, and reactive to light.   Neck: Normal range of motion. Neck supple. No JVD present.   Cardiovascular: Normal rate, regular  rhythm and normal heart sounds.    No murmur heard.  Pulmonary/Chest: Effort normal. No respiratory distress. He has wheezes (bilat lower lung wheezes). He has no rales.   Abdominal: Soft. Bowel sounds are normal. He exhibits distension (rotund abdomen, soft). He exhibits no mass. There is no tenderness.   Genitourinary:   Genitourinary Comments: weakened rectal tone but present   Musculoskeletal: He exhibits edema (improving).   Able to range knee with some pain (UE & RLE without erythema, pain); chronic back pain with movement   Neurological: He is alert and oriented to person, place, and time. No cranial nerve deficit.   Skin: Skin is warm. Capillary refill takes less than 2 seconds. He is not diaphoretic. There is erythema.   LLE erythema midcalf with mild pain, induration in posterior calf, dry skin, edema gone    bilat old healed venous stasis ulcer scaling wounds, no drainage, no scrotal involvement, no LAD    Psychiatric: He has a normal mood and affect. His behavior is normal.   Nursing note and vitals reviewed.        Significant Labs:   CBC:   Recent Labs  Lab 12/24/17  0757 12/25/17  0446   WBC 13.35* 8.22   HGB 13.0* 13.0*   HCT 39.0* 39.5*    313     CMP:   Recent Labs  Lab 12/24/17  0756 12/25/17  0446   * 136   K 4.6 4.5   CL 97 95   CO2 28 33*   * 138*   BUN 32* 32*   CREATININE 1.0 1.0   CALCIUM 9.7 9.6   PROT 7.8 7.6   ALBUMIN 2.4* 2.3*   BILITOT 0.6 0.5   ALKPHOS 80 75   AST 18 19   ALT 11 10   ANIONGAP 10 8   EGFRNONAA >60 >60       Significant Imaging: I have reviewed all pertinent imaging results/findings within the past 24 hours.     CT chest (for nodule vs artifactt on CXR):  No evidence for pulmonary nodule to correspond with presumed artifactual 21 2017 chest radiograph.

## 2017-12-25 NOTE — PROGRESS NOTES
[facility]  General Surgery  Progress Note    Subjective:     History of Present Illness:  No notes on file    Post-Op Info:  * No surgery found *         Interval History: No new c/o. Pain improving. WBC and CRP improved.     Medications:  Continuous Infusions:  Scheduled Meds:   aspirin  81 mg Oral Daily    carvedilol  6.25 mg Oral BID WM    cephALEXin  500 mg Oral Q12H    clopidogrel  75 mg Oral Daily    clorazepate  15 mg Oral BID    enoxaparin  40 mg Subcutaneous Daily    hydroCHLOROthiazide  12.5 mg Oral Daily    insulin detemir  8 Units Subcutaneous BID    magnesium oxide  400 mg Oral BID    miconazole   Topical (Top) BID    nicotine  1 patch Transdermal Daily    simvastatin  40 mg Oral QHS     PRN Meds:calcium carbonate, dextrose 50%, dextrose 50%, glucagon (human recombinant), glucose, glucose, hydrocodone-acetaminophen 5-325mg, hydrOXYzine HCl, influenza, insulin aspart, loperamide, lorazepam, morphine, pneumoc 13-jose g conj-dip cr(PF), sodium chloride 0.9%     Review of patient's allergies indicates:   Allergen Reactions    Iodine and iodide containing products     Shellfish containing products      Objective:     Vital Signs (Most Recent):  Temp: 97.8 °F (36.6 °C) (12/25/17 1147)  Pulse: 68 (12/25/17 1147)  Resp: 20 (12/25/17 1147)  BP: (!) 107/53 (12/25/17 1147)  SpO2: 96 % (12/25/17 0902) Vital Signs (24h Range):  Temp:  [96.3 °F (35.7 °C)-98.8 °F (37.1 °C)] 97.8 °F (36.6 °C)  Pulse:  [67-73] 68  Resp:  [18-22] 20  SpO2:  [96 %] 96 %  BP: (107-164)/(53-72) 107/53     Weight: 95 kg (209 lb 7 oz)  Body mass index is 34.85 kg/m².    Intake/Output - Last 3 Shifts       12/23 0700 - 12/24 0659 12/24 0700 - 12/25 0659 12/25 0700 - 12/26 0659    P.O. 930 680 375    IV Piggyback 100 600     Total Intake(mL/kg) 1030 (10.8) 1280 (13.5) 375 (3.9)    Urine (mL/kg/hr) 2350 (1) 1200 (0.5)     Stool 0 (0)      Total Output 2350 1200      Net -1320 +80 +375           Stool Occurrence 1 x             Physical Exam   Constitutional: He is oriented to person, place, and time. He appears well-developed and well-nourished. No distress.   HENT:   Head: Normocephalic and atraumatic.   Eyes: Conjunctivae and EOM are normal. Pupils are equal, round, and reactive to light. No scleral icterus.   Neck: Normal range of motion. Neck supple. No JVD present.   Cardiovascular: Normal rate.    Pulmonary/Chest: Effort normal. No respiratory distress.   Abdominal: Soft. He exhibits no distension.   Musculoskeletal:   Still mild ttp post LLE, scaling skin noted, erythema slowly improving, chronic skin changes also noted   Neurological: He is alert and oriented to person, place, and time. No cranial nerve deficit.   Skin: Skin is warm and dry. He is not diaphoretic.   Psychiatric: He has a normal mood and affect.       Significant Labs:  CBC:   Recent Labs  Lab 12/25/17 0446   WBC 8.22   RBC 3.81*   HGB 13.0*   HCT 39.5*      *   MCH 34.1*   MCHC 32.9     CMP:   Recent Labs  Lab 12/25/17 0446   *   CALCIUM 9.6   ALBUMIN 2.3*   PROT 7.6      K 4.5   CO2 33*   CL 95   BUN 32*   CREATININE 1.0   ALKPHOS 75   ALT 10   AST 19   BILITOT 0.5      ( from 174)    Microbiology Results (last 7 days)     Procedure Component Value Units Date/Time    Blood culture [[id number]] Collected:  12/23/17 1842    Order Status:  Completed Specimen:  Blood Updated:  12/25/17 0613     Blood Culture, Routine No Growth to date     Blood Culture, Routine No Growth to date    Blood culture [[id number]] Collected:  12/23/17 1841    Order Status:  Completed Specimen:  Blood Updated:  12/25/17 0613     Blood Culture, Routine No Growth to date     Blood Culture, Routine No Growth to date    Blood culture [[id number]] Collected:  12/21/17 1713    Order Status:  Completed Specimen:  Blood Updated:  12/24/17 2212     Blood Culture, Routine No Growth to date     Blood Culture, Routine No Growth to date     Blood Culture,  Routine No Growth to date     Blood Culture, Routine No Growth to date    Narrative:       Aerobic and anaerobic bottles from Site #2    Blood culture [[id number]] Collected:  12/21/17 1714    Order Status:  Completed Specimen:  Blood Updated:  12/24/17 2212     Blood Culture, Routine No Growth to date     Blood Culture, Routine No Growth to date     Blood Culture, Routine No Growth to date     Blood Culture, Routine No Growth to date    Narrative:       Aerobic and anaerobic bottles from Site #1    Urine culture [[id number]] Collected:  12/24/17 1554    Order Status:  Sent Specimen:  Urine from Urine, Clean Catch Updated:  12/24/17 1940    Blood culture [[id number]] Collected:  12/18/17 1512    Order Status:  Completed Specimen:  Blood Updated:  12/23/17 2012     Blood Culture, Routine No growth after 5 days.    Blood culture [[id number]] Collected:  12/18/17 1512    Order Status:  Completed Specimen:  Blood Updated:  12/23/17 2012     Blood Culture, Routine No growth after 5 days.    Stool culture [[id number]] Collected:  12/20/17 1431    Order Status:  Completed Specimen:  Stool from Stool Updated:  12/23/17 1333     Stool Culture Nothing significant to date    Urine culture [[id number]] Collected:  12/21/17 2110    Order Status:  Completed Specimen:  Urine from Urine, Clean Catch Updated:  12/23/17 0718     Urine Culture, Routine No growth    E. coli 0157 antigen [[id number]] Collected:  12/20/17 1431    Order Status:  Completed Specimen:  Stool from Stool Updated:  12/22/17 1247     Shiga Toxin 1 E.coli Negative     Shiga Toxin 2 E.coli Negative    Culture, Anaerobe [[id number]] Collected:  12/18/17 1343    Order Status:  Completed Specimen:  Incision site from Leg, Left Updated:  12/22/17 1002     Anaerobic Culture Culture in progress    Aerobic culture [[id number]] Collected:  12/18/17 1343    Order Status:  Completed Specimen:  Incision site from Leg, Left Updated:  12/21/17 1258     Aerobic Bacterial Culture No  growth    Clostridium difficile EIA [[id number]] Collected:  12/18/17 1419    Order Status:  Completed Specimen:  Stool from Stool Updated:  12/18/17 2226     C. diff Antigen Negative     C difficile Toxins A+B, EIA Negative     Comment: Testing not recommended for children <24 months old.             Significant Diagnostics:  no new images    Assessment/Plan:     * Cellulitis of left lower extremity without foot    Improving  No open wounds, dressing removed, ok to leave LLE open to air  Cultures noted, cont current tx  Encouraged pt to perform pedal pumps while at rest  Encouraged continued participation with PT  D/c planning in progress        Recurrent umbilical hernia    -No acute surgical intervention, recommend avoiding constipation/exertion  -Patient is non-tender with no overlying skin changes; he is tolerating regular diet and having good bowel function with no nausea or emesis  -Would not be ideal to repair hernia with patient's current active infectious process for risk of mesh infection        Diabetes mellitus, type II, insulin dependent    Cont strict BG control  Lab Results   Component Value Date    HGBA1C 6.5 (H) 12/12/2017                 Elvira Handy,   General Surgery  [facility]

## 2017-12-25 NOTE — PROGRESS NOTES
[facility]  Infectious Disease  Progress Note    Patient Name: Reji Guerra  MRN: [id number]  Admission Date: 12/12/2017  Length of Stay: 12 days  Attending Physician: Berkley Johnson MD  Primary Care Provider: [facility]    Isolation Status: No active isolations  Assessment/Plan:      * Cellulitis of left lower extremity without foot    Improving steadily - likely can switch to PO keflex soon.  The increasing CRP is of concern but not clear on exam why.  The leg is improving - the IV site is a bit tender and could lead to that  - not sure that vancomycin was needed prior to pan-culture.     Plan:   1. Pan culture urine, blood, final results pending   2. Continue vanc and cephazolin for now =if culture results negative then would stop the vanc and watch CRPs  - will wait for AM micro results and then ananda can stop vanc and switch to PO keflex             Anticipated Disposition:     Thank you for your consult. I will follow-up with patient. Please contact us if you have any additional questions.    Jeffrey Lisa MD  Infectious Disease  [facility]    Subjective:     Principal Problem:Cellulitis of left lower extremity without foot    HPI: 62M with history of CAD (s/p PCI with three stents), DMII, HTN, HLD who presented to ED on 12/12 with left lower extremity swelling and erythema. He reports that the swelling and redness began a few days before admission, worsening until he was unable to bear weight on his left leg. He denies fevers and chills while at home, recent trauma to his L leg. He has had no aquatic exposures.     Hospital course significant for initial improvement (per patient, though imaging in chart does not suggest there was improvement) with vancomycin and zosyn with transition to clindamycin on 12/15. He has not had much clinical improvement since transitioning to clindamycin. Ortho consulted initially because of concern for septic  arthritis (knee) but ruled out clinically (tap deferred due to surrounding cellulitis). Venous US of lower extremity was without evidence of DVT and MRI showed nonspecific subcutaneous edema and cellulitis without abscess, osteomyelitis, septic joint of left lower leg. Patient has remained afebrile but with continuing mild leukocytosis (up to 15, 13.8 today). Blood cultures NGTD. Urine culture NGTD. CXR not suggestive of consolidation.     Patient has had many loose bowel movements and is complaining of abdominal pain and distention.     12/18 switched to cefazolin. Slight improvement  12/19 C diff negative    12/21 US negative for fluid collection, continue improvement     12/23/17 added vancomycin fro elevation of CRP   Interval History: Stable - still pain in the back of the left leg but able to bear weight at times Interval History: Patient feel well - no new issues     Review of Systems   Constitutional: Positive for activity change and fever.   HENT: Negative.    Eyes: Negative.    Respiratory: Negative.    Cardiovascular: Negative.    Gastrointestinal: Negative.    Endocrine: Negative.    Musculoskeletal: Negative.    Skin: Positive for color change and rash.   Allergic/Immunologic: Negative.    Neurological: Negative.      Objective:     Vital Signs (Most Recent):  Temp: 98.4 °F (36.9 °C) (12/23/17 1523)  Pulse: 72 (12/23/17 1523)  Resp: 14 (12/23/17 1523)  BP: 116/63 (12/23/17 1523)  SpO2: (!) 94 % (12/23/17 1632) Vital Signs (24h Range):  Temp:  [96.8 °F (36 °C)-98.5 °F (36.9 °C)] 98.4 °F (36.9 °C)  Pulse:  [66-74] 72  Resp:  [14-20] 14  SpO2:  [93 %-94 %] 94 %  BP: (102-123)/(52-70) 116/63     Weight: 95 kg (209 lb 7 oz)  Body mass index is 34.85 kg/m².    Estimated Creatinine Clearance: 79.1 mL/min (based on SCr of 1 mg/dL).    Physical Exam   Constitutional: He is oriented to person, place, and time. He appears well-developed and well-nourished.   HENT:   Head: Normocephalic.   Eyes: Pupils are equal,  round, and reactive to light.   Neck: Normal range of motion. Neck supple.   Cardiovascular: Normal rate and regular rhythm.    Pulmonary/Chest: Effort normal and breath sounds normal.   Abdominal: Soft.   Musculoskeletal: He exhibits tenderness.   Back of left calf near the knee still painful but reduced in intensity    Neurological: He is alert and oriented to person, place, and time.   Skin: Rash noted. There is erythema.   Left leg rash improved        Significant Labs: All pertinent labs within the past 24 hours have been reviewed.    Significant Imaging: I have reviewed all pertinent imaging results/findings within the past 24 hours.    Review of Systems  Objective:     Vital Signs (Most Recent):  Temp: 98.6 °F (37 °C) (12/24/17 1920)  Pulse: 73 (12/24/17 1920)  Resp: 19 (12/24/17 2130)  BP: 117/69 (12/24/17 1920)  SpO2: 96 % (12/24/17 2130) Vital Signs (24h Range):  Temp:  [97.5 °F (36.4 °C)-99.4 °F (37.4 °C)] 98.6 °F (37 °C)  Pulse:  [67-75] 73  Resp:  [18-22] 19  SpO2:  [95 %-96 %] 96 %  BP: ()/(57-75) 117/69     Weight: 95 kg (209 lb 7 oz)  Body mass index is 34.85 kg/m².    Estimated Creatinine Clearance: 79.1 mL/min (based on SCr of 1 mg/dL).    Physical Exam    Significant Labs: All pertinent labs within the past 24 hours have been reviewed.    Significant Imaging: I have reviewed all pertinent imaging results/findings within the past 24 hours.

## 2017-12-25 NOTE — ASSESSMENT & PLAN NOTE
Original home med Traxene restarted, added hydroxyzine  Pt tearful and concerned about future.  Pt open to talking to psychiatrist, but may not be necessary as it is discharge planning that is concerning pt  Continue to work with sister, case management about placement plans

## 2017-12-25 NOTE — ASSESSMENT & PLAN NOTE
Hx of 3 stents in angiogram in 2003  Restarted on ASA, plavix. Potentially able to stop plavix since stents were placed over 2 years ago.  Trops trended down  F/u cardiology as outpatient  H/H 14.9/45.2 but with MCV of 105, Folate and B12 were normal  No chest pain

## 2017-12-25 NOTE — SUBJECTIVE & OBJECTIVE
Interval History: Patient feels a lot better - less pain in left lower leg Interval History: Stable - still pain in the back of the left leg but able to bear weight at times Interval History: Patient feel well - no new issues     Review of Systems   Constitutional: Positive for activity change and fever.   HENT: Negative.    Eyes: Negative.    Respiratory: Negative.    Cardiovascular: Negative.    Gastrointestinal: Negative.    Endocrine: Negative.    Musculoskeletal: Negative.    Skin: Positive for color change and rash.   Allergic/Immunologic: Negative.    Neurological: Negative.      Objective:     Vital Signs (Most Recent):  Temp: 98.4 °F (36.9 °C) (12/23/17 1523)  Pulse: 72 (12/23/17 1523)  Resp: 14 (12/23/17 1523)  BP: 116/63 (12/23/17 1523)  SpO2: (!) 94 % (12/23/17 1632) Vital Signs (24h Range):  Temp:  [96.8 °F (36 °C)-98.5 °F (36.9 °C)] 98.4 °F (36.9 °C)  Pulse:  [66-74] 72  Resp:  [14-20] 14  SpO2:  [93 %-94 %] 94 %  BP: (102-123)/(52-70) 116/63     Weight: 95 kg (209 lb 7 oz)  Body mass index is 34.85 kg/m².    Estimated Creatinine Clearance: 79.1 mL/min (based on SCr of 1 mg/dL).    Physical Exam   Constitutional: He is oriented to person, place, and time. He appears well-developed and well-nourished.   HENT:   Head: Normocephalic.   Eyes: Pupils are equal, round, and reactive to light.   Neck: Normal range of motion. Neck supple.   Cardiovascular: Normal rate and regular rhythm.    Pulmonary/Chest: Effort normal and breath sounds normal.   Abdominal: Soft.   Musculoskeletal: He exhibits tenderness.   Back of left calf near the knee still painful but reduced in intensity - better today    Neurological: He is alert and oriented to person, place, and time.   Skin: Rash noted. There is erythema.   Left leg rash improved        Significant Labs: All pertinent labs within the past 24 hours have been reviewed.    Significant Imaging: I have reviewed all pertinent imaging results/findings within the past 24  hours.    Review of Systems  Objective:     Vital Signs (Most Recent):  Temp: 98.6 °F (37 °C) (12/24/17 1920)  Pulse: 73 (12/24/17 1920)  Resp: 19 (12/24/17 2130)  BP: 117/69 (12/24/17 1920)  SpO2: 96 % (12/24/17 2130) Vital Signs (24h Range):  Temp:  [97.5 °F (36.4 °C)-99.4 °F (37.4 °C)] 98.6 °F (37 °C)  Pulse:  [67-75] 73  Resp:  [18-22] 19  SpO2:  [95 %-96 %] 96 %  BP: ()/(57-75) 117/69     Weight: 95 kg (209 lb 7 oz)  Body mass index is 34.85 kg/m².    Estimated Creatinine Clearance: 79.1 mL/min (based on SCr of 1 mg/dL).    Physical Exam    Significant Labs: All pertinent labs within the past 24 hours have been reviewed.    Significant Imaging: I have reviewed all pertinent imaging results/findings within the past 24 hours.    Review of Systems  Objective:     Vital Signs (Most Recent):  Temp: 97.8 °F (36.6 °C) (12/25/17 1147)  Pulse: 68 (12/25/17 1147)  Resp: 20 (12/25/17 1147)  BP: (!) 107/53 (12/25/17 1147)  SpO2: 96 % (12/25/17 0902) Vital Signs (24h Range):  Temp:  [96.3 °F (35.7 °C)-98.8 °F (37.1 °C)] 97.8 °F (36.6 °C)  Pulse:  [67-73] 68  Resp:  [18-22] 20  SpO2:  [96 %] 96 %  BP: (107-164)/(53-72) 107/53     Weight: 95 kg (209 lb 7 oz)  Body mass index is 34.85 kg/m².    Estimated Creatinine Clearance: 79.1 mL/min (based on SCr of 1 mg/dL).    Physical Exam    Significant Labs: All pertinent labs within the past 24 hours have been reviewed.    Significant Imaging: I have reviewed all pertinent imaging results/findings within the past 24 hours.

## 2017-12-25 NOTE — ASSESSMENT & PLAN NOTE
"WBC 8.22, improving but CRP plateau elevated for unknown reason  Vanc and Zosyn initially, switched to Cipro then Cefazolin per  Improved ability to ambulate "my arms hurt [from the blood draws] more than my leg now"  All cx neg thus far, will f/u today  S/p MRI, US, CT- all neg for DVT, nodule, etc  Discharge planning for [facility]  "

## 2017-12-25 NOTE — SUBJECTIVE & OBJECTIVE
Interval History: Stable - still pain in the back of the left leg but able to bear weight at times Interval History: Patient feel well - no new issues     Review of Systems   Constitutional: Positive for activity change and fever.   HENT: Negative.    Eyes: Negative.    Respiratory: Negative.    Cardiovascular: Negative.    Gastrointestinal: Negative.    Endocrine: Negative.    Musculoskeletal: Negative.    Skin: Positive for color change and rash.   Allergic/Immunologic: Negative.    Neurological: Negative.      Objective:     Vital Signs (Most Recent):  Temp: 98.4 °F (36.9 °C) (12/23/17 1523)  Pulse: 72 (12/23/17 1523)  Resp: 14 (12/23/17 1523)  BP: 116/63 (12/23/17 1523)  SpO2: (!) 94 % (12/23/17 1632) Vital Signs (24h Range):  Temp:  [96.8 °F (36 °C)-98.5 °F (36.9 °C)] 98.4 °F (36.9 °C)  Pulse:  [66-74] 72  Resp:  [14-20] 14  SpO2:  [93 %-94 %] 94 %  BP: (102-123)/(52-70) 116/63     Weight: 95 kg (209 lb 7 oz)  Body mass index is 34.85 kg/m².    Estimated Creatinine Clearance: 79.1 mL/min (based on SCr of 1 mg/dL).    Physical Exam   Constitutional: He is oriented to person, place, and time. He appears well-developed and well-nourished.   HENT:   Head: Normocephalic.   Eyes: Pupils are equal, round, and reactive to light.   Neck: Normal range of motion. Neck supple.   Cardiovascular: Normal rate and regular rhythm.    Pulmonary/Chest: Effort normal and breath sounds normal.   Abdominal: Soft.   Musculoskeletal: He exhibits tenderness.   Back of left calf near the knee still painful but reduced in intensity    Neurological: He is alert and oriented to person, place, and time.   Skin: Rash noted. There is erythema.   Left leg rash improved        Significant Labs: All pertinent labs within the past 24 hours have been reviewed.    Significant Imaging: I have reviewed all pertinent imaging results/findings within the past 24 hours.    Review of Systems  Objective:     Vital Signs (Most Recent):  Temp: 98.6 °F (37  °C) (12/24/17 1920)  Pulse: 73 (12/24/17 1920)  Resp: 19 (12/24/17 2130)  BP: 117/69 (12/24/17 1920)  SpO2: 96 % (12/24/17 2130) Vital Signs (24h Range):  Temp:  [97.5 °F (36.4 °C)-99.4 °F (37.4 °C)] 98.6 °F (37 °C)  Pulse:  [67-75] 73  Resp:  [18-22] 19  SpO2:  [95 %-96 %] 96 %  BP: ()/(57-75) 117/69     Weight: 95 kg (209 lb 7 oz)  Body mass index is 34.85 kg/m².    Estimated Creatinine Clearance: 79.1 mL/min (based on SCr of 1 mg/dL).    Physical Exam    Significant Labs: All pertinent labs within the past 24 hours have been reviewed.    Significant Imaging: I have reviewed all pertinent imaging results/findings within the past 24 hours.

## 2017-12-26 LAB
ALBUMIN SERPL BCP-MCNC: 2.3 G/DL
ALP SERPL-CCNC: 91 U/L
ALT SERPL W/O P-5'-P-CCNC: 12 U/L
ANION GAP SERPL CALC-SCNC: 10 MMOL/L
AST SERPL-CCNC: 21 U/L
BACTERIA BLD CULT: NORMAL
BACTERIA BLD CULT: NORMAL
BACTERIA SPEC ANAEROBE CULT: NORMAL
BACTERIA STL CULT: NORMAL
BACTERIA UR CULT: NO GROWTH
BASOPHILS # BLD AUTO: 0.06 K/UL
BASOPHILS NFR BLD: 0.6 %
BILIRUB SERPL-MCNC: 0.3 MG/DL
BUN SERPL-MCNC: 35 MG/DL
CALCIUM SERPL-MCNC: 9.8 MG/DL
CHLORIDE SERPL-SCNC: 100 MMOL/L
CO2 SERPL-SCNC: 30 MMOL/L
CREAT SERPL-MCNC: 1 MG/DL
CRP SERPL-MCNC: 158.84 MG/L
DIFFERENTIAL METHOD: ABNORMAL
EOSINOPHIL # BLD AUTO: 0.2 K/UL
EOSINOPHIL NFR BLD: 2 %
ERYTHROCYTE [DISTWIDTH] IN BLOOD BY AUTOMATED COUNT: 13.3 %
EST. GFR  (AFRICAN AMERICAN): >60 ML/MIN/1.73 M^2
EST. GFR  (NON AFRICAN AMERICAN): >60 ML/MIN/1.73 M^2
GLUCOSE SERPL-MCNC: 192 MG/DL
HCT VFR BLD AUTO: 40.6 %
HGB BLD-MCNC: 13.3 G/DL
LYMPHOCYTES # BLD AUTO: 1.4 K/UL
LYMPHOCYTES NFR BLD: 14.5 %
MAGNESIUM SERPL-MCNC: 2 MG/DL
MCH RBC QN AUTO: 34.5 PG
MCHC RBC AUTO-ENTMCNC: 32.8 G/DL
MCV RBC AUTO: 106 FL
MONOCYTES # BLD AUTO: 0.9 K/UL
MONOCYTES NFR BLD: 9.3 %
NEUTROPHILS # BLD AUTO: 7.1 K/UL
NEUTROPHILS NFR BLD: 73.2 %
PHOSPHATE SERPL-MCNC: 4.4 MG/DL
PLATELET # BLD AUTO: 326 K/UL
PMV BLD AUTO: 11.2 FL
POCT GLUCOSE: 135 MG/DL (ref 70–110)
POCT GLUCOSE: 151 MG/DL (ref 70–110)
POCT GLUCOSE: 178 MG/DL (ref 70–110)
POCT GLUCOSE: 190 MG/DL (ref 70–110)
POTASSIUM SERPL-SCNC: 4.3 MMOL/L
PROT SERPL-MCNC: 7.9 G/DL
RBC # BLD AUTO: 3.85 M/UL
SODIUM SERPL-SCNC: 140 MMOL/L
WBC # BLD AUTO: 9.74 K/UL

## 2017-12-26 PROCEDURE — S4991 NICOTINE PATCH NONLEGEND: HCPCS | Performed by: STUDENT IN AN ORGANIZED HEALTH CARE EDUCATION/TRAINING PROGRAM

## 2017-12-26 PROCEDURE — 97530 THERAPEUTIC ACTIVITIES: CPT

## 2017-12-26 PROCEDURE — 85025 COMPLETE CBC W/AUTO DIFF WBC: CPT

## 2017-12-26 PROCEDURE — 99900035 HC TECH TIME PER 15 MIN (STAT)

## 2017-12-26 PROCEDURE — 21400001 HC TELEMETRY ROOM

## 2017-12-26 PROCEDURE — 80053 COMPREHEN METABOLIC PANEL: CPT

## 2017-12-26 PROCEDURE — 25000003 PHARM REV CODE 250: Performed by: STUDENT IN AN ORGANIZED HEALTH CARE EDUCATION/TRAINING PROGRAM

## 2017-12-26 PROCEDURE — 94761 N-INVAS EAR/PLS OXIMETRY MLT: CPT

## 2017-12-26 PROCEDURE — 83735 ASSAY OF MAGNESIUM: CPT

## 2017-12-26 PROCEDURE — 97110 THERAPEUTIC EXERCISES: CPT

## 2017-12-26 PROCEDURE — 94799 UNLISTED PULMONARY SVC/PX: CPT

## 2017-12-26 PROCEDURE — 36415 COLL VENOUS BLD VENIPUNCTURE: CPT

## 2017-12-26 PROCEDURE — 25000003 PHARM REV CODE 250: Performed by: INTERNAL MEDICINE

## 2017-12-26 PROCEDURE — 84100 ASSAY OF PHOSPHORUS: CPT

## 2017-12-26 PROCEDURE — 86141 C-REACTIVE PROTEIN HS: CPT

## 2017-12-26 PROCEDURE — 97116 GAIT TRAINING THERAPY: CPT

## 2017-12-26 PROCEDURE — 63600175 PHARM REV CODE 636 W HCPCS: Performed by: STUDENT IN AN ORGANIZED HEALTH CARE EDUCATION/TRAINING PROGRAM

## 2017-12-26 RX ORDER — CEPHALEXIN 500 MG/1
500 CAPSULE ORAL EVERY 6 HOURS
Qty: 28 CAPSULE | Refills: 0 | Status: SHIPPED | OUTPATIENT
Start: 2017-12-26 | End: 2018-01-30 | Stop reason: SDUPTHER

## 2017-12-26 RX ORDER — CLORAZEPATE DIPOTASSIUM 3.75 MG/1
7.5 TABLET ORAL ONCE
Status: COMPLETED | OUTPATIENT
Start: 2017-12-26 | End: 2017-12-26

## 2017-12-26 RX ORDER — HYDROCODONE BITARTRATE AND ACETAMINOPHEN 5; 325 MG/1; MG/1
1 TABLET ORAL EVERY 8 HOURS PRN
Qty: 21 TABLET | Refills: 0 | Status: SHIPPED | OUTPATIENT
Start: 2017-12-26 | End: 2017-12-27

## 2017-12-26 RX ADMIN — CLORAZEPATE DIPOTASSIUM 7.5 MG: 3.75 TABLET ORAL at 06:12

## 2017-12-26 RX ADMIN — NICOTINE 1 PATCH: 21 PATCH, EXTENDED RELEASE TRANSDERMAL at 09:12

## 2017-12-26 RX ADMIN — HYDROCHLOROTHIAZIDE 12.5 MG: 12.5 TABLET ORAL at 09:12

## 2017-12-26 RX ADMIN — CLOPIDOGREL 75 MG: 75 TABLET, FILM COATED ORAL at 09:12

## 2017-12-26 RX ADMIN — CEPHALEXIN 500 MG: 500 CAPSULE ORAL at 09:12

## 2017-12-26 RX ADMIN — HYDROCODONE BITARTRATE AND ACETAMINOPHEN 1 TABLET: 5; 325 TABLET ORAL at 01:12

## 2017-12-26 RX ADMIN — CLORAZEPATE DIPOTASSIUM 15 MG: 3.75 TABLET ORAL at 09:12

## 2017-12-26 RX ADMIN — MAGNESIUM OXIDE TAB 400 MG (241.3 MG ELEMENTAL MG) 400 MG: 400 (241.3 MG) TAB at 09:12

## 2017-12-26 RX ADMIN — MICONAZOLE NITRATE: 20 CREAM TOPICAL at 09:12

## 2017-12-26 RX ADMIN — ASPIRIN 81 MG: 81 TABLET, COATED ORAL at 09:12

## 2017-12-26 RX ADMIN — CARVEDILOL 6.25 MG: 6.25 TABLET, FILM COATED ORAL at 04:12

## 2017-12-26 RX ADMIN — CARVEDILOL 6.25 MG: 6.25 TABLET, FILM COATED ORAL at 08:12

## 2017-12-26 RX ADMIN — ENOXAPARIN SODIUM 40 MG: 100 INJECTION SUBCUTANEOUS at 04:12

## 2017-12-26 RX ADMIN — HYDROCODONE BITARTRATE AND ACETAMINOPHEN 1 TABLET: 5; 325 TABLET ORAL at 09:12

## 2017-12-26 RX ADMIN — SIMVASTATIN 40 MG: 20 TABLET, FILM COATED ORAL at 09:12

## 2017-12-26 NOTE — PROGRESS NOTES
The Sw spoke to Madhav at [facility] who states he spoke with the pt but he doesn't want to stay for 30 days. The pt told him he only wants to stay for 2 weeks. Mac states the pt has to stay for 30 days in order for him to qualify for long term Medicaid. Mac will have to get permission from his  to accept the pt b/c the pt's now a flight risk due to not wanting to stay for the 30 days.

## 2017-12-26 NOTE — PLAN OF CARE
Problem: Patient Care Overview  Goal: Plan of Care Review  Outcome: Ongoing (interventions implemented as appropriate)  Plan of care reviewed with patient. Call light within reach, fall precautions maintained, bed alarm set, non skid socks on. Cream applied to LLE and pt reports relief. Nurse instructed patient to call if needs assistance. Patient verbalized complete understanding. Accuchecks done achs w/SSI as needed. PRN pain meds given for pain control. NAD noted. Awaiting placement. Will continue to monitor and continue plan of care.

## 2017-12-26 NOTE — PLAN OF CARE
Problem: Physical Therapy Goal  Goal: Physical Therapy Goal  Goals to be met by: 2018     Patient will increase functional independence with mobility by performin. Supine to sit with Modified [place]  2. Sit to stand transfer with Modified [place]  3. Gait  x 150 feet with Modified [place] using Rolling Walker.      Outcome: Ongoing (interventions implemented as appropriate)  Pt with fair tolerance to treatment session, PT will continue to follow.

## 2017-12-26 NOTE — PLAN OF CARE
Progress Note  [facility]  Admit Date: 12/12/2017   LOS: 14 days   SUBJECTIVE:   Follow-up For:    Patient seen and examined this AM.  Left leg continues to look better each day and be less tender.  Pt walking minimally from bed to wheelchair or bathroom.  Hopefully to nursing home today depending on placement availability.    Review of Systems   Respiratory: Negative for cough and shortness of breath.    Cardiovascular: Negative for chest pain, palpitations and leg swelling.   Gastrointestinal: Negative for blood in stool, constipation, diarrhea, nausea and vomiting.   Genitourinary: Negative for dysuria.   Musculoskeletal: Positive for myalgias.   Neurological: Positive for weakness. Negative for seizures.   Psychiatric/Behavioral: The patient is nervous/anxious.        OBJECTIVE:   Vital Signs (Most Recent)  Temp: 98.6 °F (37 °C) (12/26/17 0735)  Pulse: 73 (12/26/17 0735)  Resp: 18 (12/26/17 0735)  BP: 129/73 (12/26/17 0735)  SpO2: 99 % (12/26/17 0431)    I & O (Last 24H):  Intake/Output Summary (Last 24 hours) at 12/26/17 0843  Last data filed at 12/25/17 1755   Gross per 24 hour   Intake              875 ml   Output              500 ml   Net              375 ml     Wt Readings from Last 3 Encounters:   12/26/17 (!) 205.7 kg (453 lb 7.8 oz)   05/22/15 104.1 kg (229 lb 6.4 oz)   05/20/15 103.6 kg (228 lb 6.4 oz)       Current Diet Order   Procedures    Diet Diabetic 1800 Calories Cardiac (Low Na/Chol)     Order Specific Question:   Additional restrictions:     Answer:   Cardiac (Low Na/Chol)        Physical Exam   Constitutional: He is oriented to person, place, and time and well-developed, well-nourished, and in no distress.   HENT:   Head: Normocephalic and atraumatic.   Eyes: Conjunctivae are normal. Pupils are equal, round, and reactive to light.   Neck: Normal range of motion. Neck supple.   Cardiovascular: Normal rate, regular rhythm, normal heart sounds and intact distal pulses.    No murmur  heard.  Pulmonary/Chest: Effort normal and breath sounds normal. No respiratory distress. He has no wheezes.   Abdominal: Soft. Bowel sounds are normal. He exhibits distension (rotund at basleline, soft, hernia not incarcerated). There is no tenderness.   Musculoskeletal:   Left lower leg with chronic venous stasis color changes, dry skin peeling from reduced swelling, no erythema spreading up leg, decreased induration   Neurological: He is alert and oriented to person, place, and time. No cranial nerve deficit.   Skin: Skin is warm and dry. He is not diaphoretic.   Psychiatric:   Perseverates   Nursing note and vitals reviewed.            Laboratory Data:  CBC    Recent Labs  Lab 12/24/17  0757 12/25/17 0446 12/26/17  0423   WBC 13.35* 8.22 9.74   RBC 3.80* 3.81* 3.85*   HGB 13.0* 13.0* 13.3*   HCT 39.0* 39.5* 40.6    313 326   * 104* 106*   MCH 34.2* 34.1* 34.5*   MCHC 33.3 32.9 32.8     CMP    Recent Labs  Lab 12/24/17  0756 12/25/17 0446 12/26/17  0423   CALCIUM 9.7 9.6 9.8   PROT 7.8 7.6 7.9   * 136 140   K 4.6 4.5 4.3   CO2 28 33* 30*   CL 97 95 100   BUN 32* 32* 35*   CREATININE 1.0 1.0 1.0   ALKPHOS 80 75 91   ALT 11 10 12   AST 18 19 21   BILITOT 0.6 0.5 0.3     POCT-Glucose  POCT Glucose   Date Value Ref Range Status   12/26/2017 178 (H) 70 - 110 mg/dL Final   12/25/2017 226 (H) 70 - 110 mg/dL Final   12/25/2017 156 (H) 70 - 110 mg/dL Final   12/25/2017 172 (H) 70 - 110 mg/dL Final   12/25/2017 183 (H) 70 - 110 mg/dL Final   12/24/2017 173 (H) 70 - 110 mg/dL Final   12/24/2017 114 (H) 70 - 110 mg/dL Final   12/24/2017 133 (H) 70 - 110 mg/dL Final   12/24/2017 120 (H) 70 - 110 mg/dL Final   12/23/2017 226 (H) 70 - 110 mg/dL Final   12/23/2017 154 (H) 70 - 110 mg/dL Final   12/23/2017 196 (H) 70 - 110 mg/dL Final       Microbiology Results (last 7 days)     Procedure Component Value Units Date/Time    Culture, Anaerobe [[id number]] Collected:  12/18/17 1343    Order Status:  Completed  Specimen:  Incision site from Leg, Left Updated:  12/26/17 0826     Anaerobic Culture No anaerobes isolated    Blood culture [[id number]] Collected:  12/23/17 1842    Order Status:  Completed Specimen:  Blood Updated:  12/26/17 0612     Blood Culture, Routine No Growth to date     Blood Culture, Routine No Growth to date     Blood Culture, Routine No Growth to date    Blood culture [[id number]] Collected:  12/23/17 1841    Order Status:  Completed Specimen:  Blood Updated:  12/26/17 0612     Blood Culture, Routine No Growth to date     Blood Culture, Routine No Growth to date     Blood Culture, Routine No Growth to date    Urine culture [[id number]] Collected:  12/24/17 1554    Order Status:  Completed Specimen:  Urine from Urine, Clean Catch Updated:  12/26/17 0417     Urine Culture, Routine No growth    Blood culture [[id number]] Collected:  12/21/17 1713    Order Status:  Completed Specimen:  Blood Updated:  12/25/17 2212     Blood Culture, Routine No Growth to date     Blood Culture, Routine No Growth to date     Blood Culture, Routine No Growth to date     Blood Culture, Routine No Growth to date     Blood Culture, Routine No Growth to date    Narrative:       Aerobic and anaerobic bottles from Site #2    Blood culture [[id number]] Collected:  12/21/17 1714    Order Status:  Completed Specimen:  Blood Updated:  12/25/17 2212     Blood Culture, Routine No Growth to date     Blood Culture, Routine No Growth to date     Blood Culture, Routine No Growth to date     Blood Culture, Routine No Growth to date     Blood Culture, Routine No Growth to date    Narrative:       Aerobic and anaerobic bottles from Site #1    Blood culture [[id number]] Collected:  12/18/17 1512    Order Status:  Completed Specimen:  Blood Updated:  12/23/17 2012     Blood Culture, Routine No growth after 5 days.    Blood culture [[id number]] Collected:  12/18/17 1512    Order Status:  Completed Specimen:  Blood Updated:  12/23/17 2012     Blood  Culture, Routine No growth after 5 days.    Stool culture [[id number]] Collected:  12/20/17 1431    Order Status:  Completed Specimen:  Stool from Stool Updated:  12/23/17 1333     Stool Culture Nothing significant to date    Urine culture [[id number]] Collected:  12/21/17 2110    Order Status:  Completed Specimen:  Urine from Urine, Clean Catch Updated:  12/23/17 0718     Urine Culture, Routine No growth    E. coli 0157 antigen [[id number]] Collected:  12/20/17 1431    Order Status:  Completed Specimen:  Stool from Stool Updated:  12/22/17 1247     Shiga Toxin 1 E.coli Negative     Shiga Toxin 2 E.coli Negative    Aerobic culture [[id number]] Collected:  12/18/17 1343    Order Status:  Completed Specimen:  Incision site from Leg, Left Updated:  12/21/17 1258     Aerobic Bacterial Culture No growth        Diagnostic Results:  Imaging in last 24 hours: None    ASSESSMENT/PLAN:   Reji Guerra is a 62 y.o. male w/ persistent Left leg cellulitis    -Neg for DVT w/ US, neg for abscess with MRI, CT, surgery and wound care consulted  -Bld cultures, urine ccxs all neg  -No involvement of joint space at knee, Ortho consulted  -HOD#15:  Finished 2 week course of IV abx, Switched to keflex 500 BID x 7 days per ID  -PT/OT working with pt, recommending placement as pt needs 24/7 care  -CXR with concerning spot shown to be artifact by subsequent CT scan  -DVT ppx with lovenox  -F/u with cardiology  -DC with pain meds for nursing home, advised he won't have morphine available and eventually no narcotic at all  -DC to nursing home today, pending placement availability- [facility] vs [facility]  -Medically cleared for discharge      12/26/2017 Andrew Lombardo MD  8:43 AM

## 2017-12-26 NOTE — PT/OT/SLP PROGRESS
Physical Therapy Treatment    Patient Name:  Reji Guerra   MRN:  [id number]    Recommendations:     Discharge Recommendations:   (24/7 care/ assistance. )   Discharge Equipment Recommendations: bedside commode, bath bench   Barriers to discharge: Decreased caregiver support    Assessment:     Reji Guerra is a 62 y.o. male admitted with a medical diagnosis of Cellulitis of left lower extremity without foot.  He presents with the following impairments/functional limitations:  weakness, impaired endurance, impaired self care skills, impaired functional mobilty, gait instability, impaired balance, decreased lower extremity function, pain, decreased safety awareness, edema. Pt requires increased time and cueing to remain on task and complete task.     Rehab Prognosis:  fair; patient would benefit from acute skilled PT services to address these deficits and reach maximum level of function.      Recent Surgery: * No surgery found *      Plan:     During this hospitalization, patient to be seen 6 x/week to address the above listed problems via gait training, therapeutic activities, therapeutic exercises  · Plan of Care Expires:  01/19/18   Plan of Care Reviewed with: patient    Subjective     Communicated with KELY Chavez prior to session.  Patient found sitting up on BSC upon PT entry to room, agreeable to treatment.      Chief Complaint: decreased mobility and increased pain in LLE.   Pain/Comfort:  · Pain Rating 1:  (Pt reported 15/10 pain while sitting at BSC stating his foot had been hanging for 30 minutes. )  · Location - Side 1: Left  · Location - Orientation 1: generalized  · Location 1: leg  · Pain Addressed 1: Pre-medicate for activity, Reposition, Distraction  · Pain Rating Post-Intervention 1: 7/10 (with leg elevated on 2 pillows. )    Patients cultural, spiritual, Christian conflicts given the current situation: none verbalized to PT.     Objective:     Patient found with: peripheral IV     General  Precautions: Standard, fall   Orthopedic Precautions:LLE weight bearing as tolerated   Braces: N/A     Functional Mobility:  · Bed Mobility:     · Rolling Left:  stand by assistance and bedrail  · Rolling Right: contact guard assistance and bedrail   · Bridging: stand by assistance  · Sit to Supine: minimum assistance and assistance wtih LLE  · Transfers:     · Sit to Stand:  minimum assistance with rolling walker  · Toilet Transfer: minimum assistance with  rolling walker  using  ambulated 7 steps to bed. Continuous verbal cueing  · Gait: ambulated 7 steps to bed from [facility]. Requiring increased time, and continuous verbal and tactile cueing for proper foot placement and RW management/safety.   · Balance: fair + with RW      AM-PAC 6 CLICK MOBILITY  Turning over in bed (including adjusting bedclothes, sheets and blankets)?: 3  Sitting down on and standing up from a chair with arms (e.g., wheelchair, bedside commode, etc.): 3  Moving from lying on back to sitting on the side of the bed?: 3  Moving to and from a bed to a chair (including a wheelchair)?: 2  Need to walk in hospital room?: 2  Climbing 3-5 steps with a railing?: 2  Total Score: 15       Therapeutic Activities and Exercises:   Seated LAQ LLE X10, Sit <>stand X2, Scooting EOB X2, Supine SLR X2 LLE, bridging X2 with SBA.     Patient left HOB elevated with LLE elevated with all lines intact, call button in reach and KELY Chavez notified..    GOALS:    Physical Therapy Goals        Problem: Physical Therapy Goal    Goal Priority Disciplines Outcome Goal Variances Interventions   Physical Therapy Goal     PT/OT, PT Ongoing (interventions implemented as appropriate)     Description:  Goals to be met by: 2018     Patient will increase functional independence with mobility by performin. Supine to sit with Modified [place]  2. Sit to stand transfer with Modified [place]  3. Gait  x 150 feet with Modified [place] using Rolling Walker.               Problem: Physical Therapy Goal    Goal Priority Disciplines Outcome Goal Variances Interventions   Physical Therapy Goal     PT/OT, PT                      Time Tracking:     PT Received On: 12/26/17  PT Start Time: 1344     PT Stop Time: 1440  PT Total Time (min): 56 min     Billable Minutes: Gait Training 10, Therapeutic Activity 35 and Therapeutic Exercise 11    Treatment Type: Treatment  PT/PTA: PT     PTA Visit Number: 0     Neto Steiner PT, DPT  12/26/2017

## 2017-12-26 NOTE — PLAN OF CARE
Patient has medicaid today, per SW, patient does NOT want to give up his check to go skilled nursing NH placement for 30 days. DEAN working on IP rehab placement as patient needs PT/OT.       12/26/17 1236   Discharge Reassessment   Assessment Type Discharge Planning Reassessment   Provided patient/caregiver education on the expected discharge date and the discharge plan Yes   Discharge Plan A Rehab   Discharge Plan B Home;Home with family     Maci Mansfield RN, CCM, CMSRN  RN Transition Navigator  [phone]

## 2017-12-26 NOTE — PROGRESS NOTES
The pt now has Medicaid and he may be able to go to an IPR facility. The Sw spoke to Katty at [facility] and she states they do have beds. The Sw spoke to the pt and he's in agreement with the IPR. The Sw faxed the pt's info to various IPR facilities via [facility].     11:14am The Sw received a call from Alesha at [facility] stating Della will be out today to assess the pt for their facility. The Sw called [facility]([phone])spoke to the rep who stated the pt does have Medicaid but his plan for [place] members 21 and over don't cover IPR in a stand alone facility it must be attached to a hospital. This pt's policy only allows members under the age of 21 to go to an IPR stand alone facility. The pt does have snf benefits but the facilities closest to the pt are 2 in [place] and 2 in [place],the others are over 280 miles away. The rep stated they don't have a lot of snf's they're in network with in the pt's area. The Sw will inform the pt of this info.     11:48am The Sw informed Alesha at [facility] of the info mentioned above.

## 2017-12-26 NOTE — PLAN OF CARE
Problem: Patient Care Overview  Goal: Plan of Care Review  Outcome: Revised  PO Keflex administered. Left leg elevated on pillow. Pain meds administered as needed. IV CDI. Fall precautions maintained.

## 2017-12-26 NOTE — PLAN OF CARE
Problem: Patient Care Overview  Goal: Plan of Care Review  Outcome: Ongoing (interventions implemented as appropriate)  Patient on RA, no respiratory distress noted. Patient does IS well and is ready for self instruct. Will continue to monitor.

## 2017-12-27 VITALS
TEMPERATURE: 99 F | HEIGHT: 65 IN | HEART RATE: 72 BPM | DIASTOLIC BLOOD PRESSURE: 90 MMHG | SYSTOLIC BLOOD PRESSURE: 132 MMHG | WEIGHT: 205.69 LBS | BODY MASS INDEX: 34.27 KG/M2 | RESPIRATION RATE: 18 BRPM | OXYGEN SATURATION: 97 %

## 2017-12-27 LAB
POCT GLUCOSE: 109 MG/DL (ref 70–110)
POCT GLUCOSE: 131 MG/DL (ref 70–110)
POCT GLUCOSE: 217 MG/DL (ref 70–110)

## 2017-12-27 PROCEDURE — 90686 IIV4 VACC NO PRSV 0.5 ML IM: CPT | Performed by: FAMILY MEDICINE

## 2017-12-27 PROCEDURE — 25000003 PHARM REV CODE 250: Performed by: STUDENT IN AN ORGANIZED HEALTH CARE EDUCATION/TRAINING PROGRAM

## 2017-12-27 PROCEDURE — 97530 THERAPEUTIC ACTIVITIES: CPT

## 2017-12-27 PROCEDURE — 90670 PCV13 VACCINE IM: CPT | Performed by: FAMILY MEDICINE

## 2017-12-27 PROCEDURE — 94799 UNLISTED PULMONARY SVC/PX: CPT

## 2017-12-27 PROCEDURE — 90472 IMMUNIZATION ADMIN EACH ADD: CPT | Performed by: FAMILY MEDICINE

## 2017-12-27 PROCEDURE — G8989 SELF CARE D/C STATUS: HCPCS | Mod: CJ

## 2017-12-27 PROCEDURE — 3E0234Z INTRODUCTION OF SERUM, TOXOID AND VACCINE INTO MUSCLE, PERCUTANEOUS APPROACH: ICD-10-PCS | Performed by: FAMILY MEDICINE

## 2017-12-27 PROCEDURE — S4991 NICOTINE PATCH NONLEGEND: HCPCS | Performed by: STUDENT IN AN ORGANIZED HEALTH CARE EDUCATION/TRAINING PROGRAM

## 2017-12-27 PROCEDURE — 97116 GAIT TRAINING THERAPY: CPT

## 2017-12-27 PROCEDURE — 63600175 PHARM REV CODE 636 W HCPCS: Performed by: FAMILY MEDICINE

## 2017-12-27 PROCEDURE — 97535 SELF CARE MNGMENT TRAINING: CPT

## 2017-12-27 PROCEDURE — 90471 IMMUNIZATION ADMIN: CPT | Performed by: FAMILY MEDICINE

## 2017-12-27 PROCEDURE — G8988 SELF CARE GOAL STATUS: HCPCS | Mod: CI

## 2017-12-27 RX ORDER — HYDROCODONE BITARTRATE AND ACETAMINOPHEN 5; 325 MG/1; MG/1
1 TABLET ORAL EVERY 8 HOURS PRN
Qty: 21 TABLET | Refills: 0 | Status: SHIPPED | OUTPATIENT
Start: 2017-12-27 | End: 2018-01-30

## 2017-12-27 RX ORDER — CLORAZEPATE DIPOTASSIUM 15 MG/1
15 TABLET ORAL 2 TIMES DAILY
Qty: 60 TABLET | Refills: 0 | Status: SHIPPED | OUTPATIENT
Start: 2017-12-27 | End: 2018-01-30 | Stop reason: DRUGHIGH

## 2017-12-27 RX ORDER — METFORMIN HYDROCHLORIDE 500 MG/1
500 TABLET ORAL 2 TIMES DAILY WITH MEALS
Qty: 180 TABLET | Refills: 3 | Status: SHIPPED | OUTPATIENT
Start: 2017-12-27 | End: 2018-01-30 | Stop reason: SDUPTHER

## 2017-12-27 RX ADMIN — INFLUENZA A VIRUS A/MICHIGAN/45/2015 X-275 (H1N1) ANTIGEN (FORMALDEHYDE INACTIVATED), INFLUENZA A VIRUS A/HONG KONG/4801/2014 X-263B (H3N2) ANTIGEN (FORMALDEHYDE INACTIVATED), INFLUENZA B VIRUS B/PHUKET/3073/2013 ANTIGEN (FORMALDEHYDE INACTIVATED), AND INFLUENZA B VIRUS B/BRISBANE/60/2008 ANTIGEN (FORMALDEHYDE INACTIVATED) 0.5 ML: 15; 15; 15; 15 INJECTION, SUSPENSION INTRAMUSCULAR at 05:12

## 2017-12-27 RX ADMIN — PNEUMOCOCCAL 13-VALENT CONJUGATE VACCINE 0.5 ML: 2.2; 2.2; 2.2; 2.2; 2.2; 4.4; 2.2; 2.2; 2.2; 2.2; 2.2; 2.2; 2.2 INJECTION, SUSPENSION INTRAMUSCULAR at 05:12

## 2017-12-27 RX ADMIN — MAGNESIUM OXIDE TAB 400 MG (241.3 MG ELEMENTAL MG) 400 MG: 400 (241.3 MG) TAB at 08:12

## 2017-12-27 RX ADMIN — MICONAZOLE NITRATE: 20 CREAM TOPICAL at 08:12

## 2017-12-27 RX ADMIN — NICOTINE 1 PATCH: 21 PATCH, EXTENDED RELEASE TRANSDERMAL at 08:12

## 2017-12-27 RX ADMIN — ASPIRIN 81 MG: 81 TABLET, COATED ORAL at 08:12

## 2017-12-27 RX ADMIN — CEPHALEXIN 500 MG: 500 CAPSULE ORAL at 08:12

## 2017-12-27 RX ADMIN — HYDROCODONE BITARTRATE AND ACETAMINOPHEN 1 TABLET: 5; 325 TABLET ORAL at 05:12

## 2017-12-27 RX ADMIN — HYDROCODONE BITARTRATE AND ACETAMINOPHEN 1 TABLET: 5; 325 TABLET ORAL at 11:12

## 2017-12-27 RX ADMIN — CLORAZEPATE DIPOTASSIUM 15 MG: 3.75 TABLET ORAL at 08:12

## 2017-12-27 RX ADMIN — CARVEDILOL 6.25 MG: 6.25 TABLET, FILM COATED ORAL at 08:12

## 2017-12-27 RX ADMIN — HYDROCHLOROTHIAZIDE 12.5 MG: 12.5 TABLET ORAL at 08:12

## 2017-12-27 RX ADMIN — INSULIN ASPART 2 UNITS: 100 INJECTION, SOLUTION INTRAVENOUS; SUBCUTANEOUS at 12:12

## 2017-12-27 RX ADMIN — CLOPIDOGREL 75 MG: 75 TABLET, FILM COATED ORAL at 08:12

## 2017-12-27 NOTE — PLAN OF CARE
Patient ended up agreeing to retirement nursing home at [facility]. SW working on final discharge arrangements. Sister was at bedside with patient talking with patient as patient was not safe to discharge to home by himself.       12/27/17 1632   Final Note   Assessment Type Final Discharge Note   Discharge Disposition detention Nu   Right Care Referral Info   Post Acute Recommendation Other   Referral Type detention Nursing Home   Facility Name [facility]     Maci Mansfield, RN, CCM, CMSRN  RN Transition Navigator  [phone]

## 2017-12-27 NOTE — PLAN OF CARE
12/27/17 1620   Final Note   Assessment Type Final Discharge Note   Discharge Disposition [facility]   [facility] Referral Info   Post Acute Recommendation Other   Referral Type assisted placement   Facility Name [facility]

## 2017-12-27 NOTE — PLAN OF CARE
Problem: Occupational Therapy Goal  Goal: Occupational Therapy Goal  Goals to be met by: 12/23/2017    Patient will increase functional independence with ADLs by performing:    LE Dressing with Supervision.  Grooming while standing at sink with Supervision.  Toileting from bedside commode with Supervision for hygiene and clothing management.   Supine to sit with Modified [place].  Toilet transfer to bedside commode with Supervision.     Outcome: Unable to achieve outcome(s) by discharge  Pt. did not meet any goals 2/2 pain LLE and weakness.  He would benefit from ongoing OT. He is to be discharged to NH today.  Dc OT at this time.

## 2017-12-27 NOTE — PLAN OF CARE
Problem: Physical Therapy Goal  Goal: Physical Therapy Goal  Goals to be met by: 2018     Patient will increase functional independence with mobility by performin. Supine to sit with Modified [place]  2. Sit to stand transfer with Modified [place]  3. Gait  x 150 feet with Modified [place] using Rolling Walker.      Outcome: Ongoing (interventions implemented as appropriate)  Working towards all goals  Patient able to take steps with RW and CG ~ 8 ft very slow emiliano.

## 2017-12-27 NOTE — PROGRESS NOTES
Per [facility] the pt meet criteria for snf. The Sw spoke to the pt who states he doesn't want to go to a snf in [place] or [place] and he understands it will be covered by his insurance. The pt states he wants to go to [facility] and he's agreeable to surrender his check for his care. The pt refused IPR stating he can't do 2-3 hours of therapy in a 24 hour period due to having stents,having a bad heart and having a hx of heart attacks. The pt insisted he will only agree to [facility] b/c he doesn't want to go to [place] or anywhere out of the [place] area. The Sw spoke to Nieves at [facility] and she states she will review the info again and call the Sw back with a determination b/c the pt has had a change of heart in reference to surrendering his check after speaking with his sister. The Sw spoke to the pt's sister Verito([phone])and informed her of the above mentioned info. She states she wants to come and speak with the pt b/c she feels he should go to [place] or [place] so he can keep his check b/c at [facility] he will have to stay 30 days and they will take his check. She will speak with the pt late and call the Sw back. She asked the Sw to submit the info to the 4 snf's in network in [place] and [place]. The Sw faxed the info to [facility] [place],[facility] and [facility] snf via [facility]. The Sw informed her the pt's ready for d/c.     12:31pm The pt was denied at [facility] and [facility] via [facility]. Yogi from [facility] states he will come and speak with th ept today and explain he will have to commit to stay at their facility for 30 days and he has to surrender the check. If the pt's agreeable he will allow him to sign his admit papers. The Sw is still awaiting the 142 from the [facility]. The pt was denied by [facility] in [place] via [employer].

## 2017-12-27 NOTE — PROGRESS NOTES
The Sw spoke to Nieves at [facility] and the pt's clear to arrive. The Sw spoke to the pt's nurse and gave her the contact info to call report along with the copied chart. The pt's going to room 249B. The Sw spoke to Miriam at [facility] w/[employer]([phone])and arranged a w/c van transport for 5:30pm. The Sw left a message for the pt's sister Susy notifying her of the transport time. The pt choice form has been completed and placed in the pt's chart.

## 2017-12-27 NOTE — ASSESSMENT & PLAN NOTE
WBC normalized  On Ancef now, was on Vanc, Zosyn, Clinda in the past two weeks  Improved ability to ambulate  All cultures  S/p MRI, US, CT- all neg for DVT, nodule, mass  Medicaid came through  Placement pending

## 2017-12-27 NOTE — PROGRESS NOTES
The pt's sister Susy came into the room with the Yogi Britton from [facility] and she spoke to the pt and informed him he can't go home like this. The pt agreed and the admit paperwork was signed. The team wrote the d/c orders and the scripts. The Sw faxed the d/c orders and the scripts to [facility] via [facility]. The Sw informed the pt's nurse of this info mentioned above.

## 2017-12-27 NOTE — PLAN OF CARE
Problem: Patient Care Overview  Goal: Plan of Care Review  Outcome: Revised  Plan of care discussed with patient and/or family present. Patient and/or family verbalized complete understanding.   Fall precautions maintained. Bed in lowest position, locked, call light within reach, and bed alarm on. Side rails up x's 2 with slip resistant socks on. Nurse instructed patient to notify staff for any assistance and pt verbalized complete understanding.

## 2017-12-27 NOTE — PROGRESS NOTES
The Sw informed informed the pt's sister of the updated info on the case and she's in agreement for the pt to go to [facility] at d/ b/c he's been denied at 3 out of the 4 possible snf's that are in network with his insurance company.

## 2017-12-27 NOTE — ASSESSMENT & PLAN NOTE
HgbA1C 6.5  On Low Dose Corrective scale  Gluc 138-148  On Detemir 8 units BID  Planning for outpatient regimen

## 2017-12-27 NOTE — PT/OT/SLP PROGRESS
Physical Therapy Treatment    Patient Name:  Reji Guerra   MRN:  [id number]    Recommendations:     Discharge Recommendations:  nursing facility, skilled   Discharge Equipment Recommendations: bedside commode, bath bench   Barriers to discharge: Decreased caregiver support    Assessment:     Reji Guerra is a 62 y.o. male admitted with a medical diagnosis of Cellulitis of left lower extremity without foot.  He presents with the following impairments/functional limitations:  weakness, gait instability, impaired endurance, impaired balance, decreased lower extremity function, impaired functional mobilty, impaired self care skills, pain, edema, impaired skin, decreased safety awareness, impaired cognition Patient very obtuse with speech rambles on with poor safety awareness.    Rehab Prognosis:  Fair; patient would benefit from acute skilled PT services to address these deficits and reach maximum level of function.      Recent Surgery: * No surgery found *      Plan:     During this hospitalization, patient to be seen 6 x/week to address the above listed problems via gait training, therapeutic activities, therapeutic exercises  · Plan of Care Expires:  01/19/18   Plan of Care Reviewed with: patient    Subjective     Communicated with primary nurse prior to session.  Patient found HOB raised supine  upon PT entry to room, agreeable to treatment.      Chief Complaint: pain  Patient comments/goals: none voiced  Pain/Comfort:  · Pain Rating 1: 4/10  · Location - Side 1: Left  · Location 1: leg  · Pain Addressed 1: Reposition, Distraction  · Pain Rating Post-Intervention 1: 4/10    Patients cultural, spiritual, Sabianist conflicts given the current situation: none verbalized to PT.     Objective:     Patient found with: telemetry     General Precautions: Standard, fall   Orthopedic Precautions:N/A   Braces: N/A     Functional Mobility:  · Bed Mobility:     · Supine to Sit: supervision  · Sit to Supine:  supervision  · Transfers:     · Sit to Stand:  stand by assistance with rolling walker  · Gait: 8 ft with RW CG assist  · Balance: Fair with RW      AM-PAC 6 CLICK MOBILITY  Turning over in bed (including adjusting bedclothes, sheets and blankets)?: 4  Sitting down on and standing up from a chair with arms (e.g., wheelchair, bedside commode, etc.): 4  Moving from lying on back to sitting on the side of the bed?: 3  Moving to and from a bed to a chair (including a wheelchair)?: 3  Need to walk in hospital room?: 3  Climbing 3-5 steps with a railing?: 2  Total Score: 19       Therapeutic Activities and Exercises:   reviewed in and out of bed technique to assist patient's mobility    Patient left HOB elevated with call button in reach and primary nurse notified..    GOALS:    Physical Therapy Goals        Problem: Physical Therapy Goal    Goal Priority Disciplines Outcome Goal Variances Interventions   Physical Therapy Goal     PT/OT, PT Ongoing (interventions implemented as appropriate)     Description:  Goals to be met by: 2018     Patient will increase functional independence with mobility by performin. Supine to sit with Modified [place]  2. Sit to stand transfer with Modified [place]  3. Gait  x 150 feet with Modified [place] using Rolling Walker.              Problem: Physical Therapy Goal    Goal Priority Disciplines Outcome Goal Variances Interventions   Physical Therapy Goal     PT/OT, PT                      Time Tracking:     PT Received On: 17  PT Start Time: 09     PT Stop Time: 1000  PT Total Time (min): 25 min     Billable Minutes: Gait Training 10 and Therapeutic Activity 15    Treatment Type: Treatment  PT/PTA: PT     PTA Visit Number: 0     Mike Brody, PT  2017

## 2017-12-27 NOTE — SUBJECTIVE & OBJECTIVE
Interval History: NAEON. Af. VSS. No labs today due to difficulty with being woken in the AM suddenly with blood draws.  Medicaid accepted yesterday.    Review of Systems   Constitutional: Negative for chills and fever.   HENT: Negative for sore throat.    Eyes: Negative for photophobia.   Respiratory: Negative for cough and shortness of breath.    Cardiovascular: Negative for chest pain and palpitations.   Gastrointestinal: Negative for abdominal pain, constipation, diarrhea, nausea and vomiting.   Genitourinary: Negative for dysuria.   Musculoskeletal: Positive for gait problem. Negative for back pain and neck stiffness.   Skin: Negative for pallor and rash.   Neurological: Negative for dizziness, facial asymmetry, speech difficulty and weakness.   Psychiatric/Behavioral: Positive for dysphoric mood. Negative for confusion. The patient is nervous/anxious.      Objective:     Vital Signs (Most Recent):  Temp: 96 °F (35.6 °C) (12/27/17 0739)  Pulse: 67 (12/27/17 0739)  Resp: 18 (12/27/17 0739)  BP: 133/69 (12/27/17 0739)  SpO2: 98 % (12/27/17 0418) Vital Signs (24h Range):  Temp:  [96 °F (35.6 °C)-98.8 °F (37.1 °C)] 96 °F (35.6 °C)  Pulse:  [65-78] 67  Resp:  [12-20] 18  SpO2:  [95 %-98 %] 98 %  BP: (117-133)/(60-74) 133/69     Weight: (!) 205.7 kg (453 lb 7.8 oz)  Body mass index is 75.46 kg/m².    Intake/Output Summary (Last 24 hours) at 12/27/17 0931  Last data filed at 12/27/17 0705   Gross per 24 hour   Intake                0 ml   Output             1150 ml   Net            -1150 ml      Physical Exam   Constitutional: He is oriented to person, place, and time. He appears well-developed and well-nourished. No distress.   Resting in bed, appears older than stated age   HENT:   Head: Normocephalic and atraumatic.   Mouth/Throat: No oropharyngeal exudate.   Eyes: Conjunctivae and EOM are normal. Pupils are equal, round, and reactive to light.   Neck: Normal range of motion. Neck supple. No JVD present.    Cardiovascular: Normal rate, regular rhythm and normal heart sounds.    No murmur heard.  Pulmonary/Chest: Effort normal. No respiratory distress. He has no wheezes. He has no rales.   Abdominal: Soft. Bowel sounds are normal. He exhibits distension (rotund abdomen, soft). He exhibits no mass. There is no tenderness.   Genitourinary:   Genitourinary Comments: weakened rectal tone but present   Musculoskeletal: He exhibits edema (improving).   Able to range knee with some pain (UE & RLE without erythema, pain); chronic back pain with movement   Neurological: He is alert and oriented to person, place, and time. No cranial nerve deficit.   Skin: Skin is warm. Capillary refill takes less than 2 seconds. He is not diaphoretic. There is erythema.   LLE erythema midcalf with mild pain, induration in posterior calf, dry skin, edema gone    bilat old healed venous stasis ulcer scaling wounds, no drainage, no scrotal involvement, no LAD    Psychiatric: He has a normal mood and affect. His behavior is normal.   Nursing note and vitals reviewed.    Significant Labs: No labs    Significant Imaging: I have reviewed all pertinent imaging results/findings within the past 24 hours. None

## 2017-12-27 NOTE — PROGRESS NOTES
[facility] Hospital Medicine  Progress Note    Patient Name: Reji Guerra  MRN: [id number]  Patient Class: IP- Inpatient   Admission Date: 12/12/2017  Length of Stay: 15 days  Attending Physician: Berkley Johnson MD  Primary Care Provider: [facility]        Subjective:     Principal Problem:Cellulitis of left lower extremity without foot    HPI:  61 yo male w/ CAD (3 stents in 2003 w/MI), DMT2, HTN, HLD presents to ED with 1 week of LLE swelling and erythema, which just starting tracking up his inner thigh within the past two days. No trauma to his leg, no wounds on his foot. Pain with standing and walking. 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  He does have to rest 1/2 up a full flight of stairs but denies chest pain, shortness of breath, recent illness. He sleeps flat on just 1 pillow.    He hasn't had medical care in 2 years and hasn't taken any medications for 1 year.  He thinks he was supposed to keep taking the plavix.  Baseline dry cough, minimally productive with clear sputum. Denies CP, SOB, recent illness, N/V, diarrhea, constipation. Last BM day before admission.    Hospital Course:  Pt admitted with Vanc and Zosyn. Ortho consulted for possible joint involvement, recommendations. Slow clinical improvement.  WBC improved slowly as well. Pt remained afebrile through stay.  Abx switched to IV clinda to monitor for response but WBC and clinically pt regressed.  ID and surgery brought on board for additional recs.  Leg wrapped loosely and more dedicated to elevating leg.  Good improvement on HOD# 7  Pt still with complaints about diarrhea even though C Diff neg and on imodium.     Stool studies sent as patient had chronic intermittent diarrhea prior to admission. All cultures continued to be NGTD through HOD#10. CRP improved and then increased. Pt had temp of 100.4 and new cultures were sent.  Leg  showed slow improvement and pt had difficulty with mobility.    Interval History: NAEON. Af. VSS. No labs today due to difficulty with being woken in the AM suddenly with blood draws.  Medicaid accepted yesterday.    Review of Systems   Constitutional: Negative for chills and fever.   HENT: Negative for sore throat.    Eyes: Negative for photophobia.   Respiratory: Negative for cough and shortness of breath.    Cardiovascular: Negative for chest pain and palpitations.   Gastrointestinal: Negative for abdominal pain, constipation, diarrhea, nausea and vomiting.   Genitourinary: Negative for dysuria.   Musculoskeletal: Positive for gait problem. Negative for back pain and neck stiffness.   Skin: Negative for pallor and rash.   Neurological: Negative for dizziness, facial asymmetry, speech difficulty and weakness.   Psychiatric/Behavioral: Positive for dysphoric mood. Negative for confusion. The patient is nervous/anxious.      Objective:     Vital Signs (Most Recent):  Temp: 96 °F (35.6 °C) (12/27/17 0739)  Pulse: 67 (12/27/17 0739)  Resp: 18 (12/27/17 0739)  BP: 133/69 (12/27/17 0739)  SpO2: 98 % (12/27/17 0418) Vital Signs (24h Range):  Temp:  [96 °F (35.6 °C)-98.8 °F (37.1 °C)] 96 °F (35.6 °C)  Pulse:  [65-78] 67  Resp:  [12-20] 18  SpO2:  [95 %-98 %] 98 %  BP: (117-133)/(60-74) 133/69     Weight: (!) 205.7 kg (453 lb 7.8 oz)  Body mass index is 75.46 kg/m².    Intake/Output Summary (Last 24 hours) at 12/27/17 0931  Last data filed at 12/27/17 0705   Gross per 24 hour   Intake                0 ml   Output             1150 ml   Net            -1150 ml      Physical Exam   Constitutional: He is oriented to person, place, and time. He appears well-developed and well-nourished. No distress.   Resting in bed, appears older than stated age   HENT:   Head: Normocephalic and atraumatic.   Mouth/Throat: No oropharyngeal exudate.   Eyes: Conjunctivae and EOM are normal. Pupils are equal, round, and reactive to light.   Neck:  Normal range of motion. Neck supple. No JVD present.   Cardiovascular: Normal rate, regular rhythm and normal heart sounds.    No murmur heard.  Pulmonary/Chest: Effort normal. No respiratory distress. He has no wheezes. He has no rales.   Abdominal: Soft. Bowel sounds are normal. He exhibits distension (rotund abdomen, soft). He exhibits no mass. There is no tenderness.   Genitourinary:   Genitourinary Comments: weakened rectal tone but present   Musculoskeletal: He exhibits edema (improving).   Able to range knee with some pain (UE & RLE without erythema, pain); chronic back pain with movement   Neurological: He is alert and oriented to person, place, and time. No cranial nerve deficit.   Skin: Skin is warm. Capillary refill takes less than 2 seconds. He is not diaphoretic. There is erythema.   LLE erythema midcalf with mild pain, induration in posterior calf, dry skin, edema gone    bilat old healed venous stasis ulcer scaling wounds, no drainage, no scrotal involvement, no LAD    Psychiatric: He has a normal mood and affect. His behavior is normal.   Nursing note and vitals reviewed.    Significant Labs: No labs    Significant Imaging: I have reviewed all pertinent imaging results/findings within the past 24 hours. None    Assessment/Plan:      * Cellulitis of left lower extremity without foot    WBC normalized  On Ancef now, was on Vanc, Zosyn, Clinda in the past two weeks  Improved ability to ambulate  All cultures  S/p MRI, US, CT- all neg for DVT, nodule, mass  Medicaid came through  Placement pending vs home with supplies (home is not recommended as the patient needs 24/7 supervision and help, which is not  Available)          Diabetes mellitus, type II, insulin dependent    HgbA1C 6.5  On Low Dose Corrective scale  Gluc 138-148  On Detemir 8 units BID  Planning for outpatient regimen            Coronary artery disease involving native coronary artery of native heart without angina pectoris    Hx of 3  stents in angiogram in 2003  Restarted on ASA, plavix. Potentially able to stop plavix since stents were placed over 2 years ago.  Trops trended down  F/u cardiology as outpatient  H/H 14.9/45.2 but with MCV of 105, Folate and B12 were normal  No chest pain             HTN (hypertension)    Restarted Carvedilol, HCTZ (prior home meds from 2 years ago)   Stable  Normotensive  Cardiac diet            Anxiety    Original home med Traxene restarted, added hydroxyzine  Pt tearful and concerned about future.  Pt open to talking to psychiatrist, but may not be necessary as it is discharge planning that is concerning pt  Continue to work with sister, case management about placement plans        Recurrent umbilical hernia    Non-op per Surgery          VTE Risk Mitigation         Ordered     enoxaparin injection 40 mg  Daily     Route:  Subcutaneous        12/12/17 1624     Medium Risk of VTE  Once      12/12/17 1624          Andrew Lombardo MD  Department of Hospital Medicine   [facility]

## 2017-12-27 NOTE — PROGRESS NOTES
The pt was denied at [facility] via [employer]. The rep from [facility] is currently here evaluating the pt in his room for their facility.

## 2017-12-27 NOTE — NURSING
Pt transferring to [facility]. Report called in to nurse Brown. Allowed time for questions. All questions answered.       PIV discontinued. Catheter tip intact. Secured with gauze and coban. Patient tolerated well. No acute distress noted.     [facility] scheduled to pick pt up at 1730.

## 2017-12-27 NOTE — PT/OT/SLP PROGRESS
Occupational Therapy   Treatment/Discharge Summary     Name: Reji Guerra  MRN: [id number]  Admitting Diagnosis:  Cellulitis of left lower extremity without foot       Recommendations:     Discharge Recommendations: nursing facility, skilled  Discharge Equipment Recommendations:  bedside commode, bath bench  Barriers to discharge:  Decreased caregiver support    Subjective     Communicated with: nurse prior to session.  Pain/Comfort:  · Pain Rating 1: 0/10  · Location - Side 1: Left  · Pain Rating Post-Intervention 1: 0/10    Objective:     Patient found with: bed alarm    General Precautions: Standard, fall   Orthopedic Precautions:N/A   Braces: N/A     Occupational Performance:    Bed Mobility:    · Patient completed Supine to Sit with stand by assistance and with side rail     Functional Mobility/Transfers:  · Patient completed Sit <> Stand Transfer with stand by assistance  with  rolling walker   · Patient completed Bed <> BedTransfer using Step Transfer technique with stand by assistance with rolling walker  · Patient completed Toilet Transfer Step Transfer technique with stand by assistance with  rolling walker    Activities of Daily Living:  · Grooming: contact guard assistance standing at sink with RW  · UB Dressing: setup sseated EOB  · LB Dressing: stand by assistance for underwear and pants with RW; increased time seated EOB  · Toileting: stand by assistance from toilet with RW; increased time    Patient left seated EOB with all lines intact, call button in reach and PCT and sister present    [facility] 6 Click:  [facility] Total Score: 20    Treatment & Education:  safety with hand placement sit<.stand from bed and toilet using wall garb bar; fx ambulation with RW to bathroom, sink and back to bed with CGA; slow pace but no LOB.  Education:    Assessment:     Reji Guerra is a 62 y.o. male with a medical diagnosis of Cellulitis of left lower extremity without foot.  He presents with no goals met 2/2 pain LLE  and weakness.  He would benefit from ongoing OT. He is to be discharged to NH today.  Dc OT at this time .  Performance deficits affecting function are weakness, impaired functional mobilty, decreased safety awareness, impaired endurance, impaired balance, impaired self care skills, decreased lower extremity function, pain.      Rehab Prognosis:  good; patient would benefit from acute skilled OT services to address these deficits and reach maximum level of function.       Plan:     Patient to be seen 5 x/week to address the above listed problems via self-care/home management, therapeutic activities, therapeutic exercises  · Plan of Care Expires: 01/15/18  · Plan of Care Reviewed with: patient, sibling    This Plan of care has been discussed with the patient who was involved in its development and understands and is in agreement with the identified goals and treatment plan    GOALS:    Occupational Therapy Goals        Problem: Occupational Therapy Goal    Goal Priority Disciplines Outcome Interventions   Occupational Therapy Goal     OT, PT/OT Unable to achieve outcome(s) by discharge    Description:  Goals to be met by: 12/23/2017    Patient will increase functional independence with ADLs by performing:    LE Dressing with Supervision.  Grooming while standing at sink with Supervision.  Toileting from bedside commode with Supervision for hygiene and clothing management.   Supine to sit with Modified [place].  Toilet transfer to bedside commode with Supervision.                      Time Tracking:     OT Date of Treatment: 12/27/17  OT Start Time: 1500  OT Stop Time: 1554  OT Total Time (min): 54 min    Billable Minutes:Self Care/Home Management 40  Therapeutic Activity 14  Total Time 54    Chiquita Anton OT  12/27/2017

## 2017-12-28 NOTE — PT/OT/SLP DISCHARGE
Physical Therapy Discharge Summary    Name: Reji Guerra  MRN: [id number]   Principal Problem: Cellulitis of left lower extremity without foot     Patient Discharged from acute Physical Therapy on `2017.  Please refer to prior PT noted date on 2017 for functional status.     Assessment:     Patient appropriate for care in another setting.    Objective:     GOALS:    Physical Therapy Goals        Problem: Physical Therapy Goal    Goal Priority Disciplines Outcome Goal Variances Interventions   Physical Therapy Goal     PT/OT, PT Ongoing (interventions implemented as appropriate)     Description:  Goals to be met by: 2018     Patient will increase functional independence with mobility by performin. Supine to sit with Modified [place]  2. Sit to stand transfer with Modified [place]  3. Gait  x 150 feet with Modified [place] using Rolling Walker.              Problem: Physical Therapy Goal    Goal Priority Disciplines Outcome Goal Variances Interventions   Physical Therapy Goal     PT/OT, PT                      Reasons for Discontinuation of Therapy Services  Transfer to alternate level of care.      Plan:     Patient Discharged to: Skilled Nursing Facility.    Mike Brody, PT  2017

## 2017-12-29 LAB
BACTERIA BLD CULT: NORMAL
BACTERIA BLD CULT: NORMAL

## 2017-12-31 NOTE — DISCHARGE SUMMARY
[facility] Hospital Medicine  Discharge Summary      Patient Name: Reji Guerra  MRN: [id number]  Admission Date: 12/12/2017  Hospital Length of Stay: 15 days  Discharge Date and Time: 12/27/2017  6:40 PM  Attending Physician: No att. providers found   Discharging Provider: Andrew Lombardo MD  Primary Care Provider: [facility]      HPI:   63 yo male w/ CAD (3 stents in 2003 w/MI), DMT2, HTN, HLD presents to ED with 1 week of LLE swelling and erythema, which just starting tracking up his inner thigh within the past two days. No trauma to his leg, no wounds on his foot. Pain with standing and walking. 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  He does have to rest 1/2 up a full flight of stairs but denies chest pain, shortness of breath, recent illness. He sleeps flat on just 1 pillow.    He hasn't had medical care in 2 years and hasn't taken any medications for 1 year.  He thinks he was supposed to keep taking the plavix.  Baseline dry cough, minimally productive with clear sputum. Denies CP, SOB, recent illness, N/V, diarrhea, constipation. Last BM day before admission.    * No surgery found *      Hospital Course:   Pt admitted with Vanc and Zosyn. Ortho consulted for possible joint involvement, recommendations. Slow clinical improvement.  WBC improved slowly as well. Pt remained afebrile through stay.  Abx switched to IV clinda to monitor for response but WBC and clinically pt regressed.  ID and surgery brought on board for additional recs.  Leg wrapped loosely and more dedicated to elevating leg.  Good improvement on HOD# 7  Pt still with complaints about diarrhea even though C Diff neg and on imodium.     Stool studies sent as patient had chronic intermittent diarrhea prior to admission. All cultures continued to be NGTD through HOD#10. CRP improved and then increased. Pt had temp of 100.4 and new cultures  were sent.  Leg showed slow improvement and pt had difficulty with mobility.    All cultures continued to show no growth.  Pt began to describe that morning lab blood sticks were worse than his leg pain.  He was still having difficulty mobilizing and PT/OT recommend rehab but pt declined being able to handle a total of 3 hours of therapy a day.    His medicaid came through but pt still declined placement.  He was initially refusing nursing home intermediate care as well, but he had no assistance available for 24/7 care at home.  His sister came and convinced him that [facility] would be the best option. He agreed.    He had 7 more days of keflex to complete.  He continued to have some difficulty with unexpectedly loosing stool or urine when coughing or when moving positions. He was tolerating PO diet and pain was managed with pain meds.  He did have significant difficulties with anxiety and decision making.     Consults:   Consults         Status Ordering Provider     Inpatient consult to General Surgery  Once     Provider:  Messi Zambrano MD    Completed YFN SANTOYO     Inpatient consult to Infectious Diseases  Once     Provider:  Eliza Cordero MD    Completed RAFI KOCH     Inpatient consult to Orthopedic Surgery  Once     Provider:  Mike Kemp MD    Completed VAIBHAV GARCIA     IP consult to case management  Once     Provider:  (Not yet assigned)    Completed RAYRAY MANN          No new Assessment & Plan notes have been filed under this hospital service since the last note was generated.  Service: Hospital Medicine    Final Active Diagnoses:    Diagnosis Date Noted POA    PRINCIPAL PROBLEM:  Cellulitis of left lower extremity without foot [L03.116] 12/13/2017 No    Diabetes mellitus, type II, insulin dependent [E11.9, Z79.4] 05/20/2015 Yes    Coronary artery disease involving native coronary artery of native heart without angina pectoris [I25.10] 05/20/2015 Yes    HTN  "(hypertension) [I10] 05/20/2015 Yes    Anxiety [F41.9] 05/20/2015 Yes    Recurrent umbilical hernia [K42.9] 12/21/2017 Yes      Problems Resolved During this Admission:    Diagnosis Date Noted Date Resolved POA    Abnormal CXR [R93.8] 12/24/2017 12/25/2017 Yes    Diarrhea [R19.7] 12/19/2017 12/25/2017 No    Elevated troponin [R74.8] 12/13/2017 12/22/2017 Yes       Discharged Condition: stable    Disposition: Long Term Care    Follow Up:  Follow-up Information     [facility] In 1 week.    Specialty:  Family Medicine  Contact information:  [address], [address]  [place] [address] [phone]               Patient Instructions:     COMMODE FOR HOME USE   Order Specific Question Answer Comments   Type: Standard    Height: 5' 5" (1.651 m)    Weight: 95 kg (209 lb 7 oz)    Does patient have medical equipment at home? walker, rolling    Length of need (1-99 months): 99      BATH/SHOWER CHAIR FOR HOME USE   Order Specific Question Answer Comments   Height: 5' 5" (1.651 m)    Weight: 95 kg (209 lb 7 oz)    Does patient have medical equipment at home? walker, rolling    Length of need (1-99 months): 99    Type: With back      Ambulatory Referral to Cardiology   Referral Priority: Routine Referral Type: Consultation   Referral Reason: Specialty Services Required    Requested Specialty: Cardiology    Number of Visits Requested: 1      Ambulatory referral to Home Health   Referral Priority: Routine Referral Type: Home Health   Referral Reason: Specialty Services Required    Requested Specialty: Home Health Services    Number of Visits Requested: 1      Activity as tolerated   Scheduling Instructions: With assistance, fall precautions     Notify your health care provider if you experience any of the following:  persistent nausea and vomiting or diarrhea     Notify your health care provider if you experience any of the following:  severe uncontrolled pain     Notify your health care " provider if you experience any of the following:  redness, tenderness, or signs of infection (pain, swelling, redness, odor or green/yellow discharge around incision site)     Notify your health care provider if you experience any of the following:  difficulty breathing or increased cough     Notify your health care provider if you experience any of the following:  severe persistent headache     Notify your health care provider if you experience any of the following:  worsening rash     Notify your health care provider if you experience any of the following:  persistent dizziness, light-headedness, or visual disturbances     Notify your health care provider if you experience any of the following:  increased confusion or weakness     Notify your health care provider if you experience any of the following:   Scheduling Instructions: > 101         Significant Diagnostic Studies: CBC, CMP, Mag, Phos, Cultures, CT- neg except for swelling, MRI- neg except for swelling, US- neg for DVT, CXR- potential spot, Chest CT- neg    Pending Diagnostic Studies:     None         Medications:  Reconciled Home Medications:   Discharge Medication List as of 12/27/2017  6:42 PM      START taking these medications    Details   hydroCHLOROthiazide (HYDRODIURIL) 12.5 MG Tab Take 1 tablet (12.5 mg total) by mouth once daily., Starting Tue 12/26/2017, Until Wed 12/26/2018, Normal      metFORMIN (GLUCOPHAGE) 500 MG tablet Take 1 tablet (500 mg total) by mouth 2 (two) times daily with meals., Starting Wed 12/27/2017, Until Thu 12/27/2018, Normal         CONTINUE these medications which have CHANGED    Details   !! carvedilol (COREG) 6.25 MG tablet Take 1 tablet (6.25 mg total) by mouth 2 (two) times daily with meals., Starting Tue 12/26/2017, Until Wed 12/26/2018, Normal      !! carvedilol (COREG) 6.25 MG tablet Take 1 tablet (6.25 mg total) by mouth 2 (two) times daily with meals., Starting Mon 12/25/2017, No Print      cephALEXin (KEFLEX)  500 MG capsule Take 1 capsule (500 mg total) by mouth every 6 (six) hours., Starting Tue 12/26/2017, Normal      !! clopidogrel (PLAVIX) 75 mg tablet Take 1 tablet (75 mg total) by mouth once daily., Starting Tue 12/26/2017, Until Wed 12/26/2018, No Print      clorazepate (TRANXENE) 15 MG tablet Take 1 tablet (15 mg total) by mouth 2 (two) times daily., Starting Wed 12/27/2017, Print      hydroCHLOROthiazide (MICROZIDE) 12.5 mg capsule Take 1 capsule (12.5 mg total) by mouth once daily., Starting Mon 12/25/2017, No Print      hydrocodone-acetaminophen 5-325mg (NORCO) 5-325 mg per tablet Take 1 tablet by mouth every 8 (eight) hours as needed., Starting Wed 12/27/2017, Print      naproxen (NAPROSYN) 500 MG tablet Take 1 tablet (500 mg total) by mouth 2 (two) times daily., Starting Mon 12/25/2017, No Print      !! simvastatin (ZOCOR) 40 MG tablet Take 1 tablet (40 mg total) by mouth every evening., Starting Mon 12/25/2017, Until Tue 12/25/2018, Normal      !! simvastatin (ZOCOR) 40 MG tablet Take 1 tablet (40 mg total) by mouth every evening., Starting Mon 12/25/2017, No Print       !! - Potential duplicate medications found. Please discuss with provider.      CONTINUE these medications which have NOT CHANGED    Details   !! clopidogrel (PLAVIX) 75 mg tablet Take 75 mg by mouth once daily., Until Discontinued, Historical Med       !! - Potential duplicate medications found. Please discuss with provider.      STOP taking these medications       glipiZIDE (GLUCOTROL) 5 MG tablet Comments:   Reason for Stopping:               Indwelling Lines/Drains at time of discharge:   Lines/Drains/Airways          No matching active lines, drains, or airways          Time spent on the discharge of patient: 120 minutes  Patient was seen and examined on the date of discharge and determined to be suitable for discharge.         Andrew Lombardo MD  Department of Hospital Medicine  [facility]

## 2018-01-16 PROBLEM — I15.2 HYPERTENSION ASSOCIATED WITH DIABETES: Status: ACTIVE | Noted: 2018-01-16

## 2018-01-16 PROBLEM — M72.6 NECROTIZING FASCIITIS: Status: ACTIVE | Noted: 2018-01-16

## 2018-01-16 PROBLEM — E11.59 HYPERTENSION ASSOCIATED WITH DIABETES: Status: ACTIVE | Noted: 2018-01-16

## 2018-01-30 ENCOUNTER — OFFICE VISIT (OUTPATIENT)
Dept: FAMILY MEDICINE | Facility: HOSPITAL | Age: 63
End: 2018-01-30
Attending: FAMILY MEDICINE
Payer: MEDICAID

## 2018-01-30 VITALS
WEIGHT: 224.44 LBS | SYSTOLIC BLOOD PRESSURE: 140 MMHG | DIASTOLIC BLOOD PRESSURE: 78 MMHG | BODY MASS INDEX: 38.32 KG/M2 | HEIGHT: 64 IN | HEART RATE: 86 BPM

## 2018-01-30 DIAGNOSIS — E11.59 HYPERTENSION ASSOCIATED WITH DIABETES: ICD-10-CM

## 2018-01-30 DIAGNOSIS — I15.2 HYPERTENSION ASSOCIATED WITH DIABETES: ICD-10-CM

## 2018-01-30 DIAGNOSIS — L03.116 CELLULITIS OF LEFT LOWER EXTREMITY WITHOUT FOOT: ICD-10-CM

## 2018-01-30 DIAGNOSIS — Z72.0 TOBACCO USE: ICD-10-CM

## 2018-01-30 DIAGNOSIS — Z23 ENCOUNTER FOR VACCINATION: ICD-10-CM

## 2018-01-30 DIAGNOSIS — I25.10 CORONARY ARTERY DISEASE INVOLVING NATIVE CORONARY ARTERY OF NATIVE HEART WITHOUT ANGINA PECTORIS: ICD-10-CM

## 2018-01-30 DIAGNOSIS — I10 ESSENTIAL HYPERTENSION: Primary | ICD-10-CM

## 2018-01-30 PROCEDURE — 99214 OFFICE O/P EST MOD 30 MIN: CPT | Performed by: FAMILY MEDICINE

## 2018-01-30 RX ORDER — CLORAZEPATE DIPOTASSIUM 7.5 MG/1
TABLET ORAL
COMMUNITY
Start: 2018-01-23 | End: 2019-05-17

## 2018-01-30 RX ORDER — CLINDAMYCIN HYDROCHLORIDE 300 MG/1
CAPSULE ORAL
COMMUNITY
Start: 2018-01-08 | End: 2018-02-15

## 2018-01-30 RX ORDER — GLIPIZIDE 5 MG/1
2.5 TABLET ORAL 2 TIMES DAILY
COMMUNITY
Start: 2018-01-22 | End: 2018-02-15

## 2018-01-30 RX ORDER — HYDROCHLOROTHIAZIDE 25 MG/1
TABLET ORAL
COMMUNITY
Start: 2017-12-27 | End: 2018-02-15 | Stop reason: SDUPTHER

## 2018-01-30 RX ORDER — SIMVASTATIN 40 MG/1
40 TABLET, FILM COATED ORAL NIGHTLY
Qty: 90 TABLET | Refills: 3 | Status: SHIPPED | OUTPATIENT
Start: 2018-01-30 | End: 2019-03-18 | Stop reason: SDUPTHER

## 2018-01-30 RX ORDER — CEPHALEXIN 500 MG/1
500 CAPSULE ORAL EVERY 6 HOURS
Qty: 28 CAPSULE | Refills: 0 | Status: SHIPPED | OUTPATIENT
Start: 2018-01-30 | End: 2018-02-15

## 2018-01-30 RX ORDER — NAPROXEN 500 MG/1
500 TABLET ORAL 2 TIMES DAILY
Qty: 30 TABLET | Refills: 0 | Status: SHIPPED | OUTPATIENT
Start: 2018-01-30 | End: 2018-02-15

## 2018-01-30 RX ORDER — FUROSEMIDE 20 MG/1
TABLET ORAL
COMMUNITY
Start: 2018-01-09 | End: 2018-02-15

## 2018-01-30 RX ORDER — METFORMIN HYDROCHLORIDE 500 MG/1
500 TABLET ORAL 2 TIMES DAILY WITH MEALS
Qty: 180 TABLET | Refills: 3 | Status: SHIPPED | OUTPATIENT
Start: 2018-01-30 | End: 2019-03-28 | Stop reason: SDUPTHER

## 2018-01-30 NOTE — PROGRESS NOTES
Subjective:       Patient ID: Reji Guerra is a 62 y.o. male.    Chief Complaint: Hospital Follow Up (Cellulitis)    63 yo male w/ CAD (3 stents in 2003 w/MI), DM2 with last A1C 6.5, HTN and recent cellulitis who presents for follow-up. Recently got out of [facility] last Saturday.   CAD- does not currently see a cardiologist, denies any issues. Reports taking ASA, plavix. Denies CP/SOB. Can walk a couple blocks but has issues walking up stairs. Sleeps flat, does not have PND.   DM2- has been taking Metformin. BS's have been 120's at home. Has been eating eggs, grits, cereal with low sugar, banana, fruit, grapes. Occasionally cheats with pizza. Slight burning pains in legs have resolved since wrapping. Has been using a diabetic lotion. Checks feet at home, has been unable to soak because he couldn't sit down but has a chair now.   HTN- Lasix, HCTZ. Has not been checking BP at home. Denies urinary issues or headaches.   Cellulitis- patient with wrapped legs. Reports some improvement. No f/chills.     Current smoker, got patch. Lives alone.         Review of Systems   Constitutional: Negative for chills and fever.   HENT: Negative for congestion and sore throat.    Eyes: Negative for pain and discharge.   Respiratory: Negative for cough and shortness of breath.    Cardiovascular: Negative for chest pain.   Gastrointestinal: Negative for abdominal pain, diarrhea, nausea and vomiting.   Genitourinary: Negative for difficulty urinating and dysuria.   Musculoskeletal: Negative for back pain and neck pain.   Skin: Negative for rash.   Neurological: Negative for light-headedness and headaches.   Hematological: Does not bruise/bleed easily.   Psychiatric/Behavioral: Negative for agitation and behavioral problems.       Objective:      Vitals:    01/30/18 1419   BP: (!) 140/78   Pulse: 86     Physical Exam   Constitutional: He is oriented to person, place, and time. He appears well-developed and well-nourished. No  distress.   BMI 38.52   HENT:   Head: Normocephalic and atraumatic.   Right Ear: External ear normal.   Left Ear: External ear normal.   Mouth/Throat: No oropharyngeal exudate.   Eyes: Conjunctivae and EOM are normal. Right eye exhibits no discharge. Left eye exhibits no discharge.   Neck: Normal range of motion. Neck supple.   Cardiovascular: Normal rate, regular rhythm and normal heart sounds.  Exam reveals no gallop and no friction rub.    No murmur heard.  Pulmonary/Chest: Effort normal and breath sounds normal. No respiratory distress.   Abdominal: Soft. He exhibits no distension. There is no tenderness.   Musculoskeletal: He exhibits no edema or deformity.   Legs wrapped bilaterally, erythema severe bilaterally. No skin breakdown.    Neurological: He is alert and oriented to person, place, and time.   Skin: Skin is warm and dry. He is not diaphoretic.   Psychiatric: He has a normal mood and affect. His behavior is normal.   Nursing note and vitals reviewed.      Assessment:       1. Essential hypertension    2. Coronary artery disease involving native coronary artery of native heart without angina pectoris    3. Hypertension associated with diabetes    4. Cellulitis of left lower extremity without foot    5. Encounter for vaccination        Plan:       Essential hypertension    Coronary artery disease involving native coronary artery of native heart without angina pectoris    Hypertension associated with diabetes    Cellulitis of left lower extremity without foot  -     cephALEXin (KEFLEX) 500 MG capsule; Take 1 capsule (500 mg total) by mouth every 6 (six) hours.  Dispense: 28 capsule; Refill: 0    Tobacco use    Will stop benzos and norcos. Patient recently restarted ASA and plavix. Recommended signing up for smoking cessation trust. Refilled medications. F/u home health at next visit. Consider PT/OT and f/u wound care.     Follow-up in about 1 month (around 2/28/2018).

## 2018-01-30 NOTE — PROGRESS NOTES
I have reviewed the notes, assessments, and/or procedures performed by Dr Parmar, I concur with her/his documentation of Reji Guerra.

## 2018-01-30 NOTE — LETTER
January 30, 2018    Reji Guerra  [address] [place] [address]         [facility] [address], [address]  [place] [address]  Phone: [phone]  Fax: [phone] January 30, 2018     Patient: Reji Guerra   YOB: 1955   Date of Visit: 1/30/2018       To Whom It May Concern:    Mr. Reji Guerra was in the [facility] hospital from 12/12/18-12/27/18 for medical management. He was then a resident at [facility] from 12/27/18 to 01/27/18.     If you have any questions or concerns, please don't hesitate to call.    Sincerely,        Jaxon Parmar MD

## 2018-02-15 ENCOUNTER — LAB VISIT (OUTPATIENT)
Dept: LAB | Facility: HOSPITAL | Age: 63
End: 2018-02-15
Attending: FAMILY MEDICINE
Payer: MEDICAID

## 2018-02-15 ENCOUNTER — OFFICE VISIT (OUTPATIENT)
Dept: FAMILY MEDICINE | Facility: HOSPITAL | Age: 63
End: 2018-02-15
Payer: MEDICAID

## 2018-02-15 VITALS
HEIGHT: 64 IN | BODY MASS INDEX: 37.3 KG/M2 | WEIGHT: 218.5 LBS | DIASTOLIC BLOOD PRESSURE: 70 MMHG | HEART RATE: 76 BPM | SYSTOLIC BLOOD PRESSURE: 123 MMHG

## 2018-02-15 DIAGNOSIS — L03.116 CELLULITIS OF LEFT LOWER EXTREMITY WITHOUT FOOT: ICD-10-CM

## 2018-02-15 DIAGNOSIS — E11.65 TYPE 2 DIABETES MELLITUS WITH HYPERGLYCEMIA, WITHOUT LONG-TERM CURRENT USE OF INSULIN: ICD-10-CM

## 2018-02-15 DIAGNOSIS — I15.2 HYPERTENSION ASSOCIATED WITH DIABETES: ICD-10-CM

## 2018-02-15 DIAGNOSIS — I10 ESSENTIAL HYPERTENSION: Primary | ICD-10-CM

## 2018-02-15 DIAGNOSIS — L89.90 PRESSURE ULCER, UNSPECIFIED LOCATION, UNSPECIFIED ULCER STAGE: ICD-10-CM

## 2018-02-15 DIAGNOSIS — Z11.59 ENCOUNTER FOR HEPATITIS C SCREENING TEST FOR LOW RISK PATIENT: ICD-10-CM

## 2018-02-15 DIAGNOSIS — I87.8 VENOUS STASIS: ICD-10-CM

## 2018-02-15 DIAGNOSIS — I25.10 CORONARY ARTERY DISEASE INVOLVING NATIVE CORONARY ARTERY OF NATIVE HEART WITHOUT ANGINA PECTORIS: ICD-10-CM

## 2018-02-15 DIAGNOSIS — E11.59 HYPERTENSION ASSOCIATED WITH DIABETES: ICD-10-CM

## 2018-02-15 LAB
CHOLEST SERPL-MCNC: 127 MG/DL
CHOLEST/HDLC SERPL: 2.8 {RATIO}
HDLC SERPL-MCNC: 46 MG/DL
HDLC SERPL: 36.2 %
LDLC SERPL CALC-MCNC: 68.6 MG/DL
NONHDLC SERPL-MCNC: 81 MG/DL
TRIGL SERPL-MCNC: 62 MG/DL

## 2018-02-15 PROCEDURE — 36415 COLL VENOUS BLD VENIPUNCTURE: CPT

## 2018-02-15 PROCEDURE — 99214 OFFICE O/P EST MOD 30 MIN: CPT | Performed by: FAMILY MEDICINE

## 2018-02-15 PROCEDURE — 80061 LIPID PANEL: CPT

## 2018-02-15 PROCEDURE — 86803 HEPATITIS C AB TEST: CPT

## 2018-02-15 RX ORDER — GLIPIZIDE 5 MG/1
2.5 TABLET ORAL 2 TIMES DAILY
Qty: 60 TABLET | Refills: 0 | Status: SHIPPED | OUTPATIENT
Start: 2018-02-15 | End: 2019-05-17

## 2018-02-15 RX ORDER — HYDROCHLOROTHIAZIDE 25 MG/1
25 TABLET ORAL DAILY
Qty: 90 TABLET | Refills: 1 | Status: SHIPPED | OUTPATIENT
Start: 2018-02-15 | End: 2018-10-11 | Stop reason: SDUPTHER

## 2018-02-15 NOTE — PROGRESS NOTES
Subjective:       Patient ID: Reji Guerra is a 62 y.o. male.    Chief Complaint: Follow-up (hospital)    61 yo male w/ CAD (3 stents in 2003 w/MI), DM2 with last A1C 6.5, HTN and recent cellulitis who presents for follow-up. Reports he has been feeling good at home. States he is happy to be out of [facility], felt like it was group home. Denies CP/sob. Reports some leg weakness. Has been keeping his leg elevated. Continues to wrap them at home. Wondering if he should continue mupirocin cream. Denies f/chills. States he gave up meat for lent. Does not currently have home health. States his insurance did go through.     CAD- does not currently see a cardiologist, denies any issues. Reports taking ASA, plavix. Denies CP/SOB. Can walk a couple blocks but has issues walking up stairs. Sleeps flat, does not have PND.   DM2- has been taking Metformin. BS's have been 70-80's mostly. Has been eating eggs, grits, cereal with low sugar, banana, fruit, grapes. States he has been avoiding the pizza. States he has lost some weight, 224 to 218. Has been drinking more water before eating. Has been using a diabetic lotion. Checks feet at home. Multiple ulcers. History of venous stasis.   HTN- Lasix, HCTZ. Has not been checking BP at home. Denies urinary issues or headaches.   Cellulitis- patient with wrapped legs. Reports some improvement. No f/chills.      Current smoker, has been cutting back. 2-3/day, ocassionally goes days without smoking. Difficult habit, likes smoking. Lives alone.       Review of Systems   Constitutional: Negative for chills and fever.   HENT: Negative for congestion and sore throat.    Eyes: Negative for pain and discharge.   Respiratory: Negative for cough and shortness of breath.    Cardiovascular: Negative for chest pain.   Gastrointestinal: Negative for abdominal pain, diarrhea, nausea and vomiting.   Genitourinary: Negative for difficulty urinating and dysuria.   Musculoskeletal: Positive for back pain.  Negative for neck pain.   Skin: Negative for rash.   Neurological: Negative for light-headedness and headaches.   Hematological: Bruises/bleeds easily.   Psychiatric/Behavioral: Negative for agitation and behavioral problems.       Objective:      Vitals:    02/15/18 1404   BP: 123/70   Pulse: 76     Physical Exam   Constitutional: He is oriented to person, place, and time. He appears well-developed and well-nourished. No distress.   HENT:   Head: Normocephalic and atraumatic.   Right Ear: External ear normal.   Left Ear: External ear normal.   Mouth/Throat: No oropharyngeal exudate.   Eyes: Conjunctivae and EOM are normal. Right eye exhibits no discharge. Left eye exhibits no discharge.   Neck: Normal range of motion. Neck supple.   Cardiovascular: Normal rate, regular rhythm and normal heart sounds.  Exam reveals no gallop and no friction rub.    No murmur heard.  Pulmonary/Chest: Effort normal and breath sounds normal. No respiratory distress.   Abdominal: Soft. He exhibits no distension. There is no tenderness.   Musculoskeletal:   Legs unwrapped and feet examined. Pulses present. Both legs erythematous, consistent with venous insufficiency. Poor hygeine over feet. L and R pressure ulcers over forefeet, well healing. Sensation intact.    Neurological: He is alert and oriented to person, place, and time.   Skin: Skin is warm and dry. He is not diaphoretic.   Psychiatric: He has a normal mood and affect. His behavior is normal.   Nursing note and vitals reviewed.      Assessment:       1. Essential hypertension    2. Coronary artery disease involving native coronary artery of native heart without angina pectoris    3. Hypertension associated with diabetes    4. Type 2 diabetes mellitus with hyperglycemia, without long-term current use of insulin    5. Cellulitis of left lower extremity without foot    6. Venous stasis    7. Pressure ulcer, unspecified location, unspecified ulcer stage    8. Encounter for hepatitis  C screening test for low risk patient        Plan:       Essential hypertension  -     hydroCHLOROthiazide (HYDRODIURIL) 25 MG tablet; Take 1 tablet (25 mg total) by mouth once daily.  Dispense: 90 tablet; Refill: 1    Coronary artery disease involving native coronary artery of native heart without angina pectoris  -     Ambulatory referral to Cardiology    Hypertension associated with diabetes  -     Ambulatory referral to Cardiology    Type 2 diabetes mellitus with hyperglycemia, without long-term current use of insulin  -     Ambulatory Referral to Podiatry  -     Lipid panel; Future; Expected date: 02/15/2018  -     Ambulatory Referral to Ophthalmology  -     Microalbumin/creatinine urine ratio; Future; Expected date: 02/15/2018  -     Ambulatory referral to Wound Clinic    Cellulitis of left lower extremity without foot  -     Ambulatory referral to Wound Clinic    Venous stasis  -     Ambulatory Referral to Podiatry  -     Ambulatory referral to Wound Clinic  -     Ambulatory referral to Cardiology    Pressure ulcer, unspecified location, unspecified ulcer stage  -     Ambulatory Referral to Podiatry  -     Ambulatory referral to Wound Clinic    Encounter for hepatitis C screening test for low risk patient  -     HEPATITIS C ANTIBODY; Future; Expected date: 02/15/2018    Other orders  -     glipiZIDE (GLUCOTROL) 5 MG tablet; Take 0.5 tablets (2.5 mg total) by mouth 2 (two) times daily.  Dispense: 60 tablet; Refill: 0      Follow-up in about 3 months (around 5/15/2018).

## 2018-02-16 LAB — HCV AB SERPL QL IA: NEGATIVE

## 2018-02-26 ENCOUNTER — HOSPITAL ENCOUNTER (OUTPATIENT)
Dept: WOUND CARE | Facility: HOSPITAL | Age: 63
Discharge: HOME | End: 2018-02-26
Attending: EMERGENCY MEDICINE
Payer: MEDICAID

## 2018-02-26 VITALS
DIASTOLIC BLOOD PRESSURE: 65 MMHG | WEIGHT: 220 LBS | HEIGHT: 65 IN | TEMPERATURE: 98 F | SYSTOLIC BLOOD PRESSURE: 150 MMHG | BODY MASS INDEX: 36.65 KG/M2 | HEART RATE: 85 BPM

## 2018-02-26 DIAGNOSIS — L03.116 CELLULITIS OF LEFT LOWER EXTREMITY WITHOUT FOOT: ICD-10-CM

## 2018-02-26 DIAGNOSIS — L89.90 PRESSURE ULCER, UNSPECIFIED LOCATION, UNSPECIFIED ULCER STAGE: ICD-10-CM

## 2018-02-26 DIAGNOSIS — I25.10 CORONARY ARTERY DISEASE INVOLVING NATIVE CORONARY ARTERY OF NATIVE HEART WITHOUT ANGINA PECTORIS: Primary | ICD-10-CM

## 2018-02-26 DIAGNOSIS — Z95.5 HISTORY OF HEART ARTERY STENT: ICD-10-CM

## 2018-02-26 DIAGNOSIS — I15.2 HYPERTENSION ASSOCIATED WITH DIABETES: Primary | ICD-10-CM

## 2018-02-26 DIAGNOSIS — I25.10 CORONARY ARTERY DISEASE INVOLVING NATIVE CORONARY ARTERY OF NATIVE HEART WITHOUT ANGINA PECTORIS: ICD-10-CM

## 2018-02-26 DIAGNOSIS — I10 ESSENTIAL HYPERTENSION: ICD-10-CM

## 2018-02-26 DIAGNOSIS — E11.59 HYPERTENSION ASSOCIATED WITH DIABETES: Primary | ICD-10-CM

## 2018-02-26 PROCEDURE — 99214 OFFICE O/P EST MOD 30 MIN: CPT

## 2018-02-26 NOTE — PROGRESS NOTES
Much of the documentation for this visit was completed in the [facility] system. Please see the attached documentation for further details about this patient's care.     Alejandra Vu LPN

## 2018-03-02 ENCOUNTER — HOSPITAL ENCOUNTER (OUTPATIENT)
Dept: WOUND CARE | Facility: HOSPITAL | Age: 63
Discharge: HOME | End: 2018-03-02
Attending: EMERGENCY MEDICINE
Payer: MEDICAID

## 2018-03-02 DIAGNOSIS — L89.90 PRESSURE ULCER, UNSPECIFIED LOCATION, UNSPECIFIED ULCER STAGE: Primary | ICD-10-CM

## 2018-03-02 DIAGNOSIS — L03.116 CELLULITIS OF LEFT LOWER EXTREMITY WITHOUT FOOT: ICD-10-CM

## 2018-03-02 PROCEDURE — 93923 UPR/LXTR ART STDY 3+ LVLS: CPT | Mod: CCAT

## 2018-03-02 PROCEDURE — 99214 OFFICE O/P EST MOD 30 MIN: CPT

## 2018-03-09 ENCOUNTER — HOSPITAL ENCOUNTER (OUTPATIENT)
Dept: WOUND CARE | Facility: HOSPITAL | Age: 63
Discharge: HOME | End: 2018-03-09
Attending: EMERGENCY MEDICINE
Payer: MEDICAID

## 2018-03-09 VITALS
BODY MASS INDEX: 36.65 KG/M2 | HEIGHT: 65 IN | DIASTOLIC BLOOD PRESSURE: 65 MMHG | SYSTOLIC BLOOD PRESSURE: 144 MMHG | HEART RATE: 76 BPM | TEMPERATURE: 98 F | WEIGHT: 220 LBS

## 2018-03-09 DIAGNOSIS — L89.90 PRESSURE ULCER, UNSPECIFIED LOCATION, UNSPECIFIED ULCER STAGE: Primary | ICD-10-CM

## 2018-03-09 PROCEDURE — 99213 OFFICE O/P EST LOW 20 MIN: CPT

## 2018-03-14 ENCOUNTER — DOCUMENTATION ONLY (OUTPATIENT)
Dept: FAMILY MEDICINE | Facility: HOSPITAL | Age: 63
End: 2018-03-14

## 2018-03-14 NOTE — PROGRESS NOTES
Patient with diabetic eye exam with Dr. Lincoln on 03/06/18. Patient found to have early cataracts with stable eye exam and no diabetic retinopathy. Recommended repeat exam in one year (03/06/19).    3/14/2018 9:12 AM Jaxon Parmar M.D.

## 2018-03-15 DIAGNOSIS — I25.10 CORONARY ARTERY DISEASE, ANGINA PRESENCE UNSPECIFIED, UNSPECIFIED VESSEL OR LESION TYPE, UNSPECIFIED WHETHER NATIVE OR TRANSPLANTED HEART: ICD-10-CM

## 2018-03-15 DIAGNOSIS — I25.2 HISTORY OF MI (MYOCARDIAL INFARCTION): Primary | ICD-10-CM

## 2018-03-15 DIAGNOSIS — I10 ESSENTIAL HYPERTENSION: ICD-10-CM

## 2018-03-15 DIAGNOSIS — Z95.5 HISTORY OF CORONARY ARTERY STENT PLACEMENT: ICD-10-CM

## 2018-03-23 ENCOUNTER — HOSPITAL ENCOUNTER (OUTPATIENT)
Dept: WOUND CARE | Facility: HOSPITAL | Age: 63
Discharge: HOME | End: 2018-03-23
Attending: EMERGENCY MEDICINE
Payer: MEDICAID

## 2018-03-23 VITALS — SYSTOLIC BLOOD PRESSURE: 129 MMHG | DIASTOLIC BLOOD PRESSURE: 68 MMHG | TEMPERATURE: 97 F | HEART RATE: 93 BPM

## 2018-03-23 PROCEDURE — 99212 OFFICE O/P EST SF 10 MIN: CPT

## 2018-03-23 NOTE — PROGRESS NOTES
Much of the documentation for this visit was completed in the [facility] system. Please see the attached documentation for further details about this patient's care.     Tess Jimenez RN

## 2018-04-06 ENCOUNTER — HOSPITAL ENCOUNTER (OUTPATIENT)
Dept: WOUND CARE | Facility: HOSPITAL | Age: 63
Discharge: HOME | End: 2018-04-06
Attending: EMERGENCY MEDICINE
Payer: MEDICAID

## 2018-04-06 VITALS
WEIGHT: 220 LBS | SYSTOLIC BLOOD PRESSURE: 117 MMHG | BODY MASS INDEX: 36.65 KG/M2 | DIASTOLIC BLOOD PRESSURE: 56 MMHG | HEIGHT: 65 IN | TEMPERATURE: 99 F | HEART RATE: 79 BPM

## 2018-04-06 PROCEDURE — 99212 OFFICE O/P EST SF 10 MIN: CPT

## 2018-04-12 ENCOUNTER — TELEPHONE (OUTPATIENT)
Dept: CARDIOLOGY | Facility: CLINIC | Age: 63
End: 2018-04-12

## 2018-04-12 DIAGNOSIS — I73.9 PAD (PERIPHERAL ARTERY DISEASE): ICD-10-CM

## 2018-04-12 DIAGNOSIS — I87.2 VENOUS INSUFFICIENCY OF BOTH LOWER EXTREMITIES: Primary | ICD-10-CM

## 2018-04-12 NOTE — TELEPHONE ENCOUNTER
Left detailed message, asked him to call scheduling at [phone] to schedule his Venous and Arterial US-    I also advised him to try to get these studies done soon, so he could have results prior to seeing Dr Rutledge.      Would you please contact patient to schedule US follow up?    Thank you.

## 2018-04-12 NOTE — TELEPHONE ENCOUNTER
Hello SDMP      He needs both an arterial and venous ultrasound       Visit with me or Dr. Rutledge      Thanks        ZN

## 2018-05-04 ENCOUNTER — HOSPITAL ENCOUNTER (OUTPATIENT)
Dept: WOUND CARE | Facility: HOSPITAL | Age: 63
Discharge: HOME | End: 2018-05-04
Attending: EMERGENCY MEDICINE
Payer: MEDICAID

## 2018-05-04 VITALS — SYSTOLIC BLOOD PRESSURE: 164 MMHG | HEART RATE: 80 BPM | TEMPERATURE: 98 F | DIASTOLIC BLOOD PRESSURE: 80 MMHG

## 2018-05-04 DIAGNOSIS — L89.90 PRESSURE ULCER, UNSPECIFIED LOCATION, UNSPECIFIED ULCER STAGE: Primary | ICD-10-CM

## 2018-05-04 PROCEDURE — 99212 OFFICE O/P EST SF 10 MIN: CPT

## 2018-05-08 ENCOUNTER — HOSPITAL ENCOUNTER (EMERGENCY)
Facility: HOSPITAL | Age: 63
Discharge: HOME | End: 2018-05-08
Attending: EMERGENCY MEDICINE
Payer: MEDICAID

## 2018-05-08 VITALS
WEIGHT: 220 LBS | TEMPERATURE: 98 F | HEIGHT: 64 IN | SYSTOLIC BLOOD PRESSURE: 156 MMHG | RESPIRATION RATE: 18 BRPM | DIASTOLIC BLOOD PRESSURE: 86 MMHG | HEART RATE: 79 BPM | BODY MASS INDEX: 37.56 KG/M2 | OXYGEN SATURATION: 97 %

## 2018-05-08 DIAGNOSIS — M79.606 LEG PAIN: ICD-10-CM

## 2018-05-08 DIAGNOSIS — L03.116 CELLULITIS OF LEFT LEG: Primary | ICD-10-CM

## 2018-05-08 LAB
ALBUMIN SERPL BCP-MCNC: 3.4 G/DL
ALP SERPL-CCNC: 82 U/L
ALT SERPL W/O P-5'-P-CCNC: 24 U/L
ANION GAP SERPL CALC-SCNC: 8 MMOL/L
APTT BLDCRRT: 26.1 SEC
AST SERPL-CCNC: 35 U/L
BASOPHILS # BLD AUTO: 0.01 K/UL
BASOPHILS NFR BLD: 0.1 %
BILIRUB SERPL-MCNC: 0.4 MG/DL
BUN SERPL-MCNC: 20 MG/DL
CALCIUM SERPL-MCNC: 9.6 MG/DL
CHLORIDE SERPL-SCNC: 104 MMOL/L
CO2 SERPL-SCNC: 29 MMOL/L
CREAT SERPL-MCNC: 0.8 MG/DL
DIFFERENTIAL METHOD: ABNORMAL
EOSINOPHIL # BLD AUTO: 0.1 K/UL
EOSINOPHIL NFR BLD: 1.7 %
ERYTHROCYTE [DISTWIDTH] IN BLOOD BY AUTOMATED COUNT: 13.7 %
EST. GFR  (AFRICAN AMERICAN): >60 ML/MIN/1.73 M^2
EST. GFR  (NON AFRICAN AMERICAN): >60 ML/MIN/1.73 M^2
GLUCOSE SERPL-MCNC: 100 MG/DL
GLUCOSE SERPL-MCNC: 131 MG/DL (ref 70–110)
HCT VFR BLD AUTO: 42.6 %
HGB BLD-MCNC: 14 G/DL
INR PPP: 0.9
LYMPHOCYTES # BLD AUTO: 0.9 K/UL
LYMPHOCYTES NFR BLD: 13 %
MCH RBC QN AUTO: 33.8 PG
MCHC RBC AUTO-ENTMCNC: 32.9 G/DL
MCV RBC AUTO: 103 FL
MONOCYTES # BLD AUTO: 0.7 K/UL
MONOCYTES NFR BLD: 9.5 %
NEUTROPHILS # BLD AUTO: 5.5 K/UL
NEUTROPHILS NFR BLD: 75.4 %
PLATELET # BLD AUTO: 169 K/UL
PMV BLD AUTO: 11.2 FL
POCT GLUCOSE: 131 MG/DL (ref 70–110)
POTASSIUM SERPL-SCNC: 4.5 MMOL/L
PROT SERPL-MCNC: 6.8 G/DL
PROTHROMBIN TIME: 9.9 SEC
RBC # BLD AUTO: 4.14 M/UL
SODIUM SERPL-SCNC: 141 MMOL/L
WBC # BLD AUTO: 7.24 K/UL

## 2018-05-08 PROCEDURE — S0077 INJECTION, CLINDAMYCIN PHOSP: HCPCS | Performed by: NURSE PRACTITIONER

## 2018-05-08 PROCEDURE — 99284 EMERGENCY DEPT VISIT MOD MDM: CPT | Mod: 25

## 2018-05-08 PROCEDURE — 82962 GLUCOSE BLOOD TEST: CPT

## 2018-05-08 PROCEDURE — 85730 THROMBOPLASTIN TIME PARTIAL: CPT

## 2018-05-08 PROCEDURE — 80053 COMPREHEN METABOLIC PANEL: CPT

## 2018-05-08 PROCEDURE — 85025 COMPLETE CBC W/AUTO DIFF WBC: CPT

## 2018-05-08 PROCEDURE — 96365 THER/PROPH/DIAG IV INF INIT: CPT

## 2018-05-08 PROCEDURE — 85610 PROTHROMBIN TIME: CPT

## 2018-05-08 PROCEDURE — 25000003 PHARM REV CODE 250: Performed by: NURSE PRACTITIONER

## 2018-05-08 RX ORDER — CLINDAMYCIN PHOSPHATE 600 MG/50ML
600 INJECTION, SOLUTION INTRAVENOUS
Status: COMPLETED | OUTPATIENT
Start: 2018-05-08 | End: 2018-05-08

## 2018-05-08 RX ORDER — TRAMADOL HYDROCHLORIDE 50 MG/1
50 TABLET ORAL EVERY 6 HOURS PRN
Qty: 12 TABLET | Refills: 0 | Status: SHIPPED | OUTPATIENT
Start: 2018-05-08 | End: 2018-05-16

## 2018-05-08 RX ORDER — CLINDAMYCIN HYDROCHLORIDE 150 MG/1
300 CAPSULE ORAL EVERY 6 HOURS
Qty: 80 CAPSULE | Refills: 0 | Status: SHIPPED | OUTPATIENT
Start: 2018-05-08 | End: 2018-05-18

## 2018-05-08 RX ADMIN — CLINDAMYCIN IN 5 PERCENT DEXTROSE 600 MG: 12 INJECTION, SOLUTION INTRAVENOUS at 12:05

## 2018-05-08 NOTE — DISCHARGE INSTRUCTIONS
You have cellulitis of your leg.  You should have very close follow up and return to the ED immediately if you have any worsening swelling or redness, worsening pain, fever, vomiting, or if you have any concerns.  FINISH ALL OF YOUR ANTIBIOTICS.

## 2018-05-08 NOTE — ED PROVIDER NOTES
Encounter Date: 5/8/2018       History     Chief Complaint   Patient presents with    Leg Swelling     Left lower leg swelling with hx of cellulitis to area in December.       62-year-old male with past medical history of diabetes, hypertension, hyperlipidemia, and cellulitis presents to the ED for left leg pain and swelling for 2 days.  No trauma, fever/chills, weakness, numbness/tingling, chest pain, shortness of breath, or cough.  He has noticed the pain and redness has been getting worse gradually.  He reports that he has had calluses on his feet for which he sees wound care, but does not know of any other wounds.  No exacerbating or relieving modifiers.  He has tried nothing for the pain or swelling.  This is the extent of the patient's complaints at this time.      The history is provided by the patient.     Review of patient's allergies indicates:   Allergen Reactions    Iodine and iodide containing products     Shellfish containing products      Past Medical History:   Diagnosis Date    Diabetes mellitus, type 2     Heart disease     High cholesterol     Hypertension      Past Surgical History:   Procedure Laterality Date    ANGIOPLASTY      HERNIA REPAIR       History reviewed. No pertinent family history.  Social History   Substance Use Topics    Smoking status: Current Every Day Smoker     Packs/day: 0.25    Smokeless tobacco: Never Used      Comment: pt smokes about 3 cigarettes a day    Alcohol use 2.4 - 3.6 oz/week     2 - 3 Cans of beer, 2 - 3 Standard drinks or equivalent per week     Review of Systems   Constitutional: Negative for activity change, chills and fever.   HENT: Negative for congestion.    Respiratory: Negative for cough, chest tightness and shortness of breath.    Cardiovascular: Positive for leg swelling. Negative for chest pain.   Gastrointestinal: Negative for abdominal pain, diarrhea, nausea and vomiting.   Musculoskeletal: Negative for back pain, joint swelling,  myalgias and neck pain.   Skin: Positive for color change.        Calluses on bilateral feet.   Allergic/Immunologic: Positive for immunocompromised state (Dm).   Neurological: Negative for dizziness, weakness and headaches.   Psychiatric/Behavioral: Negative for confusion.   All other systems reviewed and are negative.      Physical Exam     Initial Vitals [05/08/18 1014]   BP Pulse Resp Temp SpO2   (!) 132/98 96 16 99.1 °F (37.3 °C) 98 %      MAP       109.33         Physical Exam    Nursing note and vitals reviewed.  Constitutional: Vital signs are normal. He appears well-developed and well-nourished. He is Obese . He is active and cooperative. He is easily aroused.  Non-toxic appearance. He does not have a sickly appearance. He does not appear ill. No distress.   HENT:   Head: Normocephalic and atraumatic.   Eyes: Conjunctivae are normal.   Neck: Normal range of motion.   Cardiovascular: Normal rate, regular rhythm and normal heart sounds.   Pulses:       Dorsalis pedis pulses are 2+ on the right side, and 2+ on the left side.   Pulmonary/Chest: Effort normal and breath sounds normal.   Abdominal: Soft. Normal appearance and bowel sounds are normal. There is no tenderness.   Musculoskeletal:        Left knee: Normal.        Left upper leg: Normal.        Right lower leg: Normal.        Left lower leg: He exhibits tenderness and edema. He exhibits no bony tenderness, no swelling, no deformity and no laceration.        Legs:       Right foot: Normal.        Left foot: There is swelling. There is normal range of motion, no tenderness, no bony tenderness, normal capillary refill, no crepitus, no deformity and no laceration.   Negative Dannie's sign bilaterally.    Neurological: He is alert, oriented to person, place, and time and easily aroused. GCS eye subscore is 4. GCS verbal subscore is 5. GCS motor subscore is 6.   Skin: Skin is warm, dry and intact. Capillary refill takes less than 2 seconds. No abrasion, no  bruising, no lesion, no rash and no abscess noted. There is erythema. No pallor.   Calluses to bilateral plantar surfaces of feet.  No wounds.   Psychiatric: He has a normal mood and affect. His speech is normal and behavior is normal. Judgment and thought content normal. Cognition and memory are normal.         ED Course   Procedures  Labs Reviewed   CBC W/ AUTO DIFFERENTIAL - Abnormal; Notable for the following:        Result Value    RBC 4.14 (*)      (*)     MCH 33.8 (*)     Lymph # 0.9 (*)     Gran% 75.4 (*)     Lymph% 13.0 (*)     All other components within normal limits   COMPREHENSIVE METABOLIC PANEL - Abnormal; Notable for the following:     Albumin 3.4 (*)     All other components within normal limits   POCT GLUCOSE - Abnormal; Notable for the following:     POCT Glucose 131 (*)     All other components within normal limits   POCT GLUCOSE MONITORING CONTINUOUS - Abnormal; Notable for the following:     POC Glucose 131 (*)     All other components within normal limits   APTT   PROTIME-INR         Imaging Results          US Lower Extremity Veins Left (Final result)  Result time 05/08/18 11:14:45    Final result by Luan Gifford MD (05/08/18 11:14:45)                 Impression:      No evidence of deep venous thrombosis in the left lower extremity.    There is lower extremity edema in the left calf      Electronically signed by: Luan Gifford MD  Date:    05/08/2018  Time:    11:14             Narrative:    EXAMINATION:  US LOWER EXTREMITY VEINS LEFT    CLINICAL HISTORY:  Pain in leg, unspecified    TECHNIQUE:  Duplex and color flow Doppler evaluation and graded compression of the left lower extremity veins was performed.    COMPARISON:  None    FINDINGS:  Left thigh veins: The common femoral, femoral, popliteal, upper greater saphenous, and deep femoral veins are patent and free of thrombus. The veins are normally compressible and have normal phasic flow and augmentation response.    Left calf  veins: The visualized calf veins are patent.    Contralateral CFV: The contralateral (right) common femoral vein is patent and free of thrombus.    Miscellaneous: None                                     Medical Decision Making:   Initial Assessment:   62-year-old diabetic male here for left lower extremity pain, erythema, and swelling.  No fever, chest pain, shortness of breath, cough, weakness, numbness/tingling.  Patient appears well, nontoxic.  Vitals stable.  There is erythema to the left lower extremity with tenderness to palpation.  He has calluses to bilateral plantar surfaces of his feet.  Sensation and strength intact in bilateral lower extremities.  DP 2+ bilaterally.  Differential Diagnosis:   Cellulitis, DVT, contact dermatitis, electrolyte derangement  Clinical Tests:   Lab Tests: Ordered and Reviewed  Radiological Study: Ordered and Reviewed  ED Management:  Labs, US, IV clindamycin  US negative for DVT.  WBC normal.  Afebrile.  Pt was given clindamycin in the ED for cellulitis.  No signs of systemic infection.  He is stable for outpatient treatment.  He is tolerating PO fluids without difficulty and reports that he feels comfortable with DC at this time.  I stressed the importance of close f/u with his PCP.  I reviewed strict return precautions including fever, worsening or change of condition, or if he has any concern.  I advised him to check his blood glucose at least one more time per day than usual.  Pt to follow up with PCP within 2 days.  In addition, pt is to return to the ED if condition changes, progresses, or if there are any concerns.  Pt verbalized understanding, compliance, and agreement with the treatment plan.    RX clindamycin and Ultram.                       Clinical Impression:   The primary encounter diagnosis was Cellulitis of left leg. A diagnosis of Leg pain was also pertinent to this visit.                           Daisy Castro, THOMAS  05/08/18 [id number]

## 2018-05-16 ENCOUNTER — LAB VISIT (OUTPATIENT)
Dept: LAB | Facility: HOSPITAL | Age: 63
End: 2018-05-16
Attending: INTERNAL MEDICINE
Payer: MEDICAID

## 2018-05-16 ENCOUNTER — OFFICE VISIT (OUTPATIENT)
Dept: FAMILY MEDICINE | Facility: HOSPITAL | Age: 63
End: 2018-05-16
Payer: MEDICAID

## 2018-05-16 VITALS
DIASTOLIC BLOOD PRESSURE: 69 MMHG | BODY MASS INDEX: 37.48 KG/M2 | HEART RATE: 76 BPM | WEIGHT: 219.56 LBS | SYSTOLIC BLOOD PRESSURE: 117 MMHG | HEIGHT: 64 IN

## 2018-05-16 DIAGNOSIS — E11.65 TYPE 2 DIABETES MELLITUS WITH HYPERGLYCEMIA, WITHOUT LONG-TERM CURRENT USE OF INSULIN: ICD-10-CM

## 2018-05-16 DIAGNOSIS — L03.90 CELLULITIS, UNSPECIFIED CELLULITIS SITE: Primary | ICD-10-CM

## 2018-05-16 DIAGNOSIS — Z95.5 HISTORY OF HEART ARTERY STENT: ICD-10-CM

## 2018-05-16 DIAGNOSIS — I73.9 PAD (PERIPHERAL ARTERY DISEASE): ICD-10-CM

## 2018-05-16 DIAGNOSIS — I10 ESSENTIAL HYPERTENSION: ICD-10-CM

## 2018-05-16 LAB
ESTIMATED AVG GLUCOSE: 128 MG/DL
HBA1C MFR BLD HPLC: 6.1 %

## 2018-05-16 PROCEDURE — 36415 COLL VENOUS BLD VENIPUNCTURE: CPT

## 2018-05-16 PROCEDURE — 83036 HEMOGLOBIN GLYCOSYLATED A1C: CPT

## 2018-05-16 PROCEDURE — 99213 OFFICE O/P EST LOW 20 MIN: CPT | Performed by: FAMILY MEDICINE

## 2018-05-16 NOTE — PROGRESS NOTES
"Subjective:       Patient ID: Reji Guerra is a 62 y.o. male.    Chief Complaint: Diabetes    63 yo male w/ CAD (3 stents in 2003 w/MI), DM2 with last A1C 6.5, HTN, PAD and recent cellulitis who presents for "cellulitis". Patient reports he was seen in the ED on 05/08, diagnosed with cellulitis and given Clindamycin or 10 days. Patient is still taking. States he has felt weak since but the swelling and redness has improved. Denies fever or chills. Has three more days of abx. Has been having some diarhea and N. BS's have been 120's, as high as 130's, as low as 118. Taking glipizide and metformin. Has been eating more fiber. BP's have been well controlled, taking BP medication.     Sees Dr. Balderrama for PAD. Ultrasounds ordered.     1/4 PPD, "need to quit but not ready", not drinking alcohol.       Review of Systems   Constitutional: Negative for chills and fever.   HENT: Negative for congestion and sore throat.    Eyes: Negative for pain and discharge.   Respiratory: Negative for cough and shortness of breath.    Cardiovascular: Negative for chest pain.   Gastrointestinal: Negative for abdominal pain, diarrhea, nausea and vomiting.   Genitourinary: Negative for difficulty urinating and dysuria.   Musculoskeletal: Negative for back pain and neck pain.   Skin: Negative for rash.   Neurological: Negative for light-headedness and headaches.   Hematological: Does not bruise/bleed easily.   Psychiatric/Behavioral: Negative for agitation and behavioral problems.       Objective:      Vitals:    05/16/18 1149   BP: 117/69   Pulse: 76     Physical Exam   Constitutional: He is oriented to person, place, and time. He appears well-developed and well-nourished. No distress.   HENT:   Head: Normocephalic and atraumatic.   Right Ear: External ear normal.   Left Ear: External ear normal.   Mouth/Throat: No oropharyngeal exudate.   Eyes: Conjunctivae and EOM are normal. Right eye exhibits no discharge. Left eye exhibits no " "discharge.   Neck: Normal range of motion. Neck supple. No tracheal deviation present.   Cardiovascular: Normal rate, regular rhythm and normal heart sounds.  Exam reveals no gallop and no friction rub.    No murmur heard.  Pulmonary/Chest: Effort normal and breath sounds normal. No respiratory distress.   Abdominal: Soft. He exhibits no distension. There is no tenderness.   Musculoskeletal: He exhibits no edema or deformity.   Erythema to bilateral lower extremities, +warmth, worse on L leg. ROM intact, +ttp over LE's bilaterally. Some scabbing, no skin breakdown.    Neurological: He is alert and oriented to person, place, and time.   Skin: Skin is warm and dry. He is not diaphoretic.   Psychiatric: He has a normal mood and affect. His behavior is normal.   Nursing note and vitals reviewed.      Assessment:       1. Cellulitis, unspecified cellulitis site    2. PAD (peripheral artery disease)    3. Essential hypertension    4. Type 2 diabetes mellitus with hyperglycemia, without long-term current use of insulin    5. History of heart artery stent        Plan:       Cellulitis, unspecified cellulitis site    PAD (peripheral artery disease)    Essential hypertension    Type 2 diabetes mellitus with hyperglycemia, without long-term current use of insulin    History of heart artery stent    Patient due to ultrasound arterial and venous ordered by Dr. Balderrama. Encouraged patient to schedule today. DM and BP"s controlled. Concern for venous stasis dermatitis on top of cellulitis. Can try topical steroids after patient finishes abx dose. Appointment with wound care 06/01. Patient with n/lh 2/2 ultram, avoid in the future. Counseled to avoid opiates.     Follow-up in about 1 month (around 6/16/2018).      "

## 2018-05-17 ENCOUNTER — HOSPITAL ENCOUNTER (OUTPATIENT)
Dept: RADIOLOGY | Facility: HOSPITAL | Age: 63
Discharge: HOME | End: 2018-05-17
Attending: INTERNAL MEDICINE
Payer: MEDICAID

## 2018-05-17 DIAGNOSIS — I87.2 VENOUS INSUFFICIENCY OF BOTH LOWER EXTREMITIES: ICD-10-CM

## 2018-05-17 DIAGNOSIS — I73.9 PAD (PERIPHERAL ARTERY DISEASE): ICD-10-CM

## 2018-05-17 PROCEDURE — 93925 LOWER EXTREMITY STUDY: CPT | Mod: TC

## 2018-05-17 PROCEDURE — 93925 LOWER EXTREMITY STUDY: CPT | Mod: 26,,, | Performed by: RADIOLOGY

## 2018-05-17 PROCEDURE — 93970 EXTREMITY STUDY: CPT | Mod: 26,,, | Performed by: RADIOLOGY

## 2018-05-17 PROCEDURE — 93970 EXTREMITY STUDY: CPT | Mod: TC

## 2018-05-18 ENCOUNTER — TELEPHONE (OUTPATIENT)
Dept: CARDIOLOGY | Facility: HOSPITAL | Age: 63
End: 2018-05-18

## 2018-05-18 DIAGNOSIS — I73.9 PAD (PERIPHERAL ARTERY DISEASE): Primary | ICD-10-CM

## 2018-05-18 NOTE — PROGRESS NOTES
Your ultrasound showed reflux disease in both legs      We will review the results with you and come up with a treatment plan to help with wound healing       Thanks      ZN

## 2018-05-18 NOTE — TELEPHONE ENCOUNTER
[facility]      Can you set him with Dr. Rutledge initially       Later we will do his EVLT      Thanks      ZN

## 2018-05-19 NOTE — PROGRESS NOTES
Your ultrasound revealed mild peripheral arterial disease      These results will be discussed in detail during your visit       Thanks      ZN

## 2018-06-08 ENCOUNTER — HOSPITAL ENCOUNTER (OUTPATIENT)
Dept: WOUND CARE | Facility: HOSPITAL | Age: 63
Discharge: HOME | End: 2018-06-08
Attending: EMERGENCY MEDICINE
Payer: MEDICAID

## 2018-06-08 VITALS — TEMPERATURE: 98 F | SYSTOLIC BLOOD PRESSURE: 160 MMHG | HEART RATE: 89 BPM | DIASTOLIC BLOOD PRESSURE: 68 MMHG

## 2018-06-08 DIAGNOSIS — L03.116 CELLULITIS OF LEFT LOWER EXTREMITY WITHOUT FOOT: Primary | ICD-10-CM

## 2018-06-08 DIAGNOSIS — S81.812A SKIN TEAR OF LEFT LOWER LEG WITHOUT COMPLICATION, INITIAL ENCOUNTER: ICD-10-CM

## 2018-06-08 PROCEDURE — 99213 OFFICE O/P EST LOW 20 MIN: CPT

## 2018-06-22 ENCOUNTER — HOSPITAL ENCOUNTER (OUTPATIENT)
Dept: WOUND CARE | Facility: HOSPITAL | Age: 63
Discharge: HOME | End: 2018-06-22
Attending: EMERGENCY MEDICINE
Payer: MEDICAID

## 2018-06-22 VITALS
SYSTOLIC BLOOD PRESSURE: 153 MMHG | WEIGHT: 219 LBS | TEMPERATURE: 98 F | HEART RATE: 84 BPM | DIASTOLIC BLOOD PRESSURE: 88 MMHG | HEIGHT: 64 IN | BODY MASS INDEX: 37.39 KG/M2

## 2018-06-22 PROCEDURE — 99211 OFF/OP EST MAY X REQ PHY/QHP: CPT

## 2018-09-16 ENCOUNTER — HOSPITAL ENCOUNTER (EMERGENCY)
Facility: HOSPITAL | Age: 63
Discharge: HOME | End: 2018-09-16
Attending: EMERGENCY MEDICINE
Payer: MEDICARE

## 2018-09-16 VITALS
SYSTOLIC BLOOD PRESSURE: 156 MMHG | TEMPERATURE: 99 F | HEART RATE: 87 BPM | WEIGHT: 215 LBS | OXYGEN SATURATION: 100 % | DIASTOLIC BLOOD PRESSURE: 82 MMHG | HEIGHT: 64 IN | BODY MASS INDEX: 36.7 KG/M2 | RESPIRATION RATE: 18 BRPM

## 2018-09-16 DIAGNOSIS — M79.672 FOOT PAIN, LEFT: ICD-10-CM

## 2018-09-16 DIAGNOSIS — L03.116 CELLULITIS OF LEFT LOWER EXTREMITY: ICD-10-CM

## 2018-09-16 DIAGNOSIS — S92.514A CLOSED NONDISPLACED FRACTURE OF PROXIMAL PHALANX OF LESSER TOE OF RIGHT FOOT, INITIAL ENCOUNTER: ICD-10-CM

## 2018-09-16 DIAGNOSIS — M79.89 RIGHT LEG SWELLING: ICD-10-CM

## 2018-09-16 DIAGNOSIS — M79.673 FOOT PAIN: Primary | ICD-10-CM

## 2018-09-16 LAB
ALBUMIN SERPL BCP-MCNC: 3.7 G/DL
ALP SERPL-CCNC: 68 U/L
ALT SERPL W/O P-5'-P-CCNC: 22 U/L
ANION GAP SERPL CALC-SCNC: 11 MMOL/L
AST SERPL-CCNC: 21 U/L
BASOPHILS # BLD AUTO: 0.01 K/UL
BASOPHILS NFR BLD: 0.1 %
BILIRUB SERPL-MCNC: 0.6 MG/DL
BUN SERPL-MCNC: 18 MG/DL
CALCIUM SERPL-MCNC: 9.9 MG/DL
CHLORIDE SERPL-SCNC: 101 MMOL/L
CO2 SERPL-SCNC: 27 MMOL/L
CREAT SERPL-MCNC: 0.8 MG/DL
DIFFERENTIAL METHOD: ABNORMAL
EOSINOPHIL # BLD AUTO: 0.1 K/UL
EOSINOPHIL NFR BLD: 1.6 %
ERYTHROCYTE [DISTWIDTH] IN BLOOD BY AUTOMATED COUNT: 12.8 %
EST. GFR  (AFRICAN AMERICAN): >60 ML/MIN/1.73 M^2
EST. GFR  (NON AFRICAN AMERICAN): >60 ML/MIN/1.73 M^2
GLUCOSE SERPL-MCNC: 135 MG/DL
HCT VFR BLD AUTO: 44.9 %
HGB BLD-MCNC: 14.7 G/DL
LYMPHOCYTES # BLD AUTO: 1.3 K/UL
LYMPHOCYTES NFR BLD: 17 %
MCH RBC QN AUTO: 33.9 PG
MCHC RBC AUTO-ENTMCNC: 32.7 G/DL
MCV RBC AUTO: 104 FL
MONOCYTES # BLD AUTO: 0.7 K/UL
MONOCYTES NFR BLD: 9.4 %
NEUTROPHILS # BLD AUTO: 5.5 K/UL
NEUTROPHILS NFR BLD: 71.6 %
PLATELET # BLD AUTO: 142 K/UL
PMV BLD AUTO: 11.6 FL
POTASSIUM SERPL-SCNC: 3.6 MMOL/L
PROT SERPL-MCNC: 6.8 G/DL
RBC # BLD AUTO: 4.33 M/UL
SODIUM SERPL-SCNC: 139 MMOL/L
WBC # BLD AUTO: 7.65 K/UL

## 2018-09-16 PROCEDURE — 80053 COMPREHEN METABOLIC PANEL: CPT

## 2018-09-16 PROCEDURE — 85025 COMPLETE CBC W/AUTO DIFF WBC: CPT

## 2018-09-16 PROCEDURE — 99284 EMERGENCY DEPT VISIT MOD MDM: CPT | Mod: 25

## 2018-09-16 RX ORDER — ACETAMINOPHEN 500 MG
500 TABLET ORAL EVERY 6 HOURS PRN
Qty: 25 TABLET | Refills: 0 | Status: SHIPPED | OUTPATIENT
Start: 2018-09-16 | End: 2019-05-17

## 2018-09-16 RX ORDER — SULFAMETHOXAZOLE AND TRIMETHOPRIM 800; 160 MG/1; MG/1
1 TABLET ORAL 2 TIMES DAILY
Qty: 14 TABLET | Refills: 0 | Status: SHIPPED | OUTPATIENT
Start: 2018-09-16 | End: 2018-09-23

## 2018-09-16 NOTE — ED NOTES
APPEARANCE: Alert, oriented and in no acute distress.  CARDIAC: Normal rate and rhythm, no murmur heard.   PERIPHERAL VASCULAR: peripheral pulses present. Normal cap refill. No edema. Warm to touch. Diminished sensation to lle    RESPIRATORY:Normal rate and effort, breath sounds clear bilaterally throughout chest. Respirations are equal and unlabored no obvious signs of distress.  GASTRO: soft, bowel sounds normal, no tenderness, no abdominal distention.  MUSC: Full ROM. Swelling to left ankle. Redness to LLE, healing scabs to ble, callus to ball of right foot.  SKIN: Skin is warm and dry, normal skin turgor, mucous membranes moist. Dry flaky skin to bilat feet.   NEURO: . Guy coma scale: eyes open spontaneously-4, oriented & converses-5, obeys commands-6. No neurological abnormalities.   MENTAL STATUS: awake, alert and aware of environment.

## 2018-09-16 NOTE — ED TRIAGE NOTES
Pt presents to er with c/o pain and swelling to ball of left foot & ankle. Stated pain started Friday. Reports hx of cellulitis & venous stasis.

## 2018-09-16 NOTE — ED PROVIDER NOTES
Encounter Date: 9/16/2018    SCRIBE #1 NOTE: I, Ham Curiel, am scribing for, and in the presence of,  Dr. Galindo. I have scribed the entire note.       History     Chief Complaint   Patient presents with    Foot Pain     pt has pain to left foot with swelling, redness-pt denies opened wounds but does have some areas that he has been scratching. pt had cellulitis here and thinks it is back. hx of diabetes      Reji Guerra is a 63 y.o. male who  has a past medical history of Diabetes mellitus, type 2, Heart disease, High cholesterol, and Hypertension.    The patient presents to the ED due to left foot pain, swelling, and redness starting 2 days ago. Patient reports pain with ambulation, specifically to the ball and toes of his left foot. He denies any open wounds, but does state he has been scratching the foot. Patient denies any recent trauma, fever, nausea, vomiting, CP, SOB, abdominal pain, diarrhea, cough, congestion, or any other concerning symptoms. He reports a prior history of similar symptoms, stating he had cellulitis to his left foot twice in the past year. He was treated at that time with antibiotics. He has a history of DM, and notes he was seen by wound care after being diagnosed with cellulitis. Patient has no history of bone infections. Patient has no history of blood clots, and is unsure if he is currently on anticoagulants; he does has a history of MI and cardiac stents.      The history is provided by the patient.     Review of patient's allergies indicates:   Allergen Reactions    Iodine and iodide containing products     Shellfish containing products      Past Medical History:   Diagnosis Date    Diabetes mellitus, type 2     Heart disease     High cholesterol     Hypertension      Past Surgical History:   Procedure Laterality Date    ANGIOPLASTY      HERNIA REPAIR       No family history on file.  Social History     Tobacco Use    Smoking status: Current Every Day Smoker      Packs/day: 0.25    Smokeless tobacco: Never Used    Tobacco comment: pt smokes about 3 cigarettes a day   Substance Use Topics    Alcohol use: Yes     Alcohol/week: 2.4 - 3.6 oz     Types: 2 - 3 Cans of beer, 2 - 3 Standard drinks or equivalent per week    Drug use: No     Review of Systems   Constitutional: Negative for chills and fever.   HENT: Negative for sore throat.    Respiratory: Negative for cough and shortness of breath.    Cardiovascular: Negative for chest pain.   Gastrointestinal: Negative for nausea and vomiting.   Genitourinary: Negative for dysuria, frequency and urgency.   Musculoskeletal: Positive for arthralgias (left foot pain and swelling). Negative for back pain, neck pain and neck stiffness.   Skin: Positive for color change (redness to left foot). Negative for rash and wound.   Neurological: Negative for syncope and weakness.   Hematological: Does not bruise/bleed easily.   Psychiatric/Behavioral: Negative for agitation, behavioral problems and confusion.     Physical Exam     Initial Vitals [09/16/18 1036]   BP Pulse Resp Temp SpO2   (!) 146/92 82 18 98.3 °F (36.8 °C) 98 %      MAP       --         Physical Exam    Nursing note and vitals reviewed.  Constitutional: He appears well-developed and well-nourished. He is not diaphoretic. No distress.   HENT:   Head: Normocephalic and atraumatic.   Mouth/Throat: Oropharynx is clear and moist.   Eyes: EOM are normal. Pupils are equal, round, and reactive to light.   Neck: No tracheal deviation present.   Cardiovascular: Normal rate, regular rhythm, normal heart sounds and intact distal pulses.   Pulmonary/Chest: Breath sounds normal. No stridor. No respiratory distress.   Abdominal: Soft. He exhibits no distension and no mass. There is no tenderness.   Musculoskeletal: Normal range of motion. He exhibits tenderness. He exhibits no edema.   Slight swelling to left ankle and left foot  Left calf TTP; slightly indurated, no erythema  Diminished  sensation to left foot   Neurological: He is alert and oriented to person, place, and time. No cranial nerve deficit or sensory deficit.   Skin: Skin is warm and dry. Capillary refill takes less than 2 seconds. No rash noted.   No apparent ulceration to left or right foot  Healing scabs to the anterior of left shin   Psychiatric: He has a normal mood and affect. His behavior is normal. Thought content normal.       ED Course   Procedures  Labs Reviewed   CBC W/ AUTO DIFFERENTIAL - Abnormal; Notable for the following components:       Result Value    RBC 4.33 (*)      (*)     MCH 33.9 (*)     Platelets 142 (*)     Lymph% 17.0 (*)     All other components within normal limits   COMPREHENSIVE METABOLIC PANEL - Abnormal; Notable for the following components:    Glucose 135 (*)     All other components within normal limits            X-Rays:   Independently Interpreted Readings:   Other Readings:  Reviewed by myself, read by radiology.    Imaging Results          X-Ray Ankle Complete Left (Final result)  Result time 09/16/18 13:01:25    Final result by Mike Fung Jr., MD (09/16/18 13:01:25)                 Impression:      Appears to be a fracture through the distal fibula though the age is indeterminate.  Soft tissue swelling noted.  Correlation with any prior trauma would be helpful.  No prior films for comparison.      Electronically signed by: Mike Fung MD  Date:    09/16/2018  Time:    13:01             Narrative:    EXAMINATION:  XR ANKLE COMPLETE 3 VIEW LEFT    CLINICAL HISTORY:  Pain in unspecified foot    TECHNIQUE:  AP, lateral and oblique views of the left ankle were performed.    COMPARISON:  None    FINDINGS:  There is soft tissue swelling about the ankle.  With there appears to be a nondisplaced fracture of the distal fibula though its age is indeterminate.  Remainder of the bones are intact.                               US Lower Extremity Veins Bilateral (Final result)  Result time  09/16/18 12:08:04    Final result by Jaxon Ortiz MD (09/16/18 12:08:04)                 Impression:      No evidence of deep venous thrombosis in either lower extremity.      Electronically signed by: Jaxon Ortiz MD  Date:    09/16/2018  Time:    12:08             Narrative:    EXAMINATION:  US LOWER EXTREMITY VEINS BILATERAL    CLINICAL HISTORY:  Other specified soft tissue disorders    TECHNIQUE:  Duplex and color flow Doppler and dynamic compression was performed of the bilateral lower extremity veins was performed.    COMPARISON:  05/08/2018    FINDINGS:  Right thigh veins: The common femoral, femoral, popliteal, upper greater saphenous, and deep femoral veins are patent and free of thrombus. The veins are normally compressible and have normal phasic flow and augmentation response.    Right calf veins: The visualized calf veins are patent.    Left thigh veins: The common femoral, femoral, popliteal, upper greater saphenous, and deep femoral veins are patent and free of thrombus. The veins are normally compressible and have normal phasic flow and augmentation response.    Left calf veins: The visualized calf veins are patent.    Miscellaneous: None                               X-Ray Foot Complete Left (Final result)     Abnormal  Result time 09/16/18 11:34:04    Final result by Mike Fung Jr., MD (09/16/18 11:34:04)                 Impression:      Suspected distal fibular fracture.  Ankle films may be of benefit.    There may be a healing fracture proximal phalanx left little toe.  There is significant lucency involving the middle phalanx of the left little toe.  Correlation with clinical findings suggested.    This report was flagged in Epic as abnormal.      Electronically signed by: Mike Fung MD  Date:    09/16/2018  Time:    11:34             Narrative:    EXAMINATION:  XR FOOT COMPLETE 3 VIEW LEFT    CLINICAL HISTORY:  .  Pain in unspecified foot    TECHNIQUE:  AP, lateral and oblique  views of the left foot were performed.    COMPARISON:  None    FINDINGS:  Bones are demineralized.  Transverse sclerotic band proximal phalanx little toe may represent healing fracture.  Significant lucency involving the middle phalanx of the little toe.  Correlation with clinical findings suggested.  The some irregularity about the distal fibula as seen on the oblique projection.                              Medical Decision Making:   Initial Assessment:   Patient with unilateral leg swelling; no skin changes suggestive of cellulitis. Will obtain labs, XR, and US to evaluate for osteomyelitis.  Differential Diagnosis:   Cellulitis, venostasis, DVT osteomyelitis fracture  Clinical Tests:   Lab Tests: Ordered and Reviewed  Radiological Study: Ordered and Reviewed  ED Management:  13:20  XR revealed old ankle and toe fracture. Patient questioned again regarding trauma with this incident of pain; denies any trauma.  Patient reports follow up with PCP later this week. Instructed patient to return if he experiences increase in redness, swelling, pain, fever, or any other concern.    After taking into careful account the historical factors and physical exam findings of the patient's presentation today, in conjunction with the empirical and objective data obtained on ED workup, no acute emergent medical condition has been identified. The patient appears to be low risk for an emergent medical condition and I feel it is safe and appropriate at this time for the patient to be discharged to follow-up as detailed in their discharge instructions for reevaluation and possible continued outpatient workup and management. I have discussed the specifics of the workup with the patient and the patient has verbalized understanding of the details of the workup, the diagnosis, the treatment plan, and the need for outpatient follow-up.  Although the patient has no emergent etiology today this does not preclude the development of an  emergent condition so in addition, I have advised the patient that they can return to the ED and/or activate EMS at any time with worsening of their symptoms, change of their symptoms, or with any other medical complaint.  The patient remained comfortable and stable during their visit in the ED.  Discharge and follow-up instructions discussed with the patient who expressed understanding and willingness to comply with my recommendations.                      Clinical Impression:     1. Foot pain    2. Right leg swelling    3. Closed nondisplaced fracture of proximal phalanx of lesser toe of right foot, initial encounter    4. Foot pain, left         Disposition:   Disposition: Discharged  Condition: Stable       Scribe attestation I, Reji Galindo,  personally performed the services described in this documentation. All medical record entries made by the scribe were at my direction and in my presence.  I have reviewed the chart and agree that the record reflects my personal performance and is accurate and complete. Reji Galindo M.D. 1:25 PM09/16/2018         Reji Galindo Jr., MD  09/16/18 [id number]

## 2018-09-19 ENCOUNTER — OFFICE VISIT (OUTPATIENT)
Dept: FAMILY MEDICINE | Facility: HOSPITAL | Age: 63
End: 2018-09-19
Attending: FAMILY MEDICINE
Payer: MEDICARE

## 2018-09-19 VITALS
HEIGHT: 64 IN | BODY MASS INDEX: 38.2 KG/M2 | HEART RATE: 80 BPM | WEIGHT: 223.75 LBS | SYSTOLIC BLOOD PRESSURE: 133 MMHG | DIASTOLIC BLOOD PRESSURE: 78 MMHG

## 2018-09-19 DIAGNOSIS — E11.65 TYPE 2 DIABETES MELLITUS WITH HYPERGLYCEMIA, WITHOUT LONG-TERM CURRENT USE OF INSULIN: ICD-10-CM

## 2018-09-19 DIAGNOSIS — F41.9 ANXIETY: ICD-10-CM

## 2018-09-19 DIAGNOSIS — L03.116 CELLULITIS OF LEFT LOWER EXTREMITY WITHOUT FOOT: Primary | ICD-10-CM

## 2018-09-19 DIAGNOSIS — I25.10 CORONARY ARTERY DISEASE INVOLVING NATIVE CORONARY ARTERY OF NATIVE HEART WITHOUT ANGINA PECTORIS: ICD-10-CM

## 2018-09-19 DIAGNOSIS — Z23 NEED FOR PROPHYLACTIC VACCINATION AND INOCULATION AGAINST INFLUENZA: ICD-10-CM

## 2018-09-19 DIAGNOSIS — I10 ESSENTIAL HYPERTENSION: ICD-10-CM

## 2018-09-19 DIAGNOSIS — I73.9 PAD (PERIPHERAL ARTERY DISEASE): ICD-10-CM

## 2018-09-19 PROCEDURE — 99213 OFFICE O/P EST LOW 20 MIN: CPT | Mod: 25 | Performed by: STUDENT IN AN ORGANIZED HEALTH CARE EDUCATION/TRAINING PROGRAM

## 2018-09-19 PROCEDURE — 90662 IIV NO PRSV INCREASED AG IM: CPT

## 2018-09-19 RX ORDER — BUSPIRONE HYDROCHLORIDE 15 MG/1
15 TABLET ORAL 3 TIMES DAILY
Qty: 90 TABLET | Refills: 11 | Status: SHIPPED | OUTPATIENT
Start: 2018-09-19 | End: 2019-10-10

## 2018-09-19 NOTE — PROGRESS NOTES
Subjective:       Patient ID: Reji Guerra is a 63 y.o. male.    Chief Complaint: ED follow up    62 yo male w/ CAD (3 stents in 2003 w/MI), DM2 with last A1C 6.1, HTN, PAD and recent cellulitis who presents for ER follow up. Patient was seen in the ED on 09/16/18, diagnosed with cellulitis and given a course of bactrim.  Patient states that the swelling and redness has improved since visit to ED. Denies fever or chills. Has not seen wound care since 6/22 per chart review.  Patient reports compliance with his diabetic regimen and is currently taking glipizide and metformin. BP's have been well controlled, taking BP medication as scheduled. States that he is still smoking and states that he is not quite ready to quit. Patient requesting prescriptions for clorazepate and norco, which he received in January while in the nursing home. States that the medications helped with his anxiety as well as with his arthritis.       Review of Systems   Constitutional: Negative for chills and fever.   HENT: Negative for congestion and sore throat.    Eyes: Negative for pain and discharge.   Respiratory: Negative for cough and shortness of breath.    Cardiovascular: Negative for chest pain.   Gastrointestinal: Negative for abdominal pain, diarrhea, nausea and vomiting.   Genitourinary: Negative for difficulty urinating and dysuria.   Musculoskeletal: Positive for arthralgias. Negative for back pain and neck pain.   Skin: Positive for color change. Negative for rash.   Neurological: Negative for light-headedness and headaches.   Hematological: Does not bruise/bleed easily.   Psychiatric/Behavioral: Negative for agitation and behavioral problems. The patient is nervous/anxious.        Objective:      Vitals:    09/19/18 1503   BP: 133/78   Pulse: 80     Physical Exam   Constitutional: He is oriented to person, place, and time. He appears well-developed and well-nourished. No distress.   HENT:   Head: Normocephalic and atraumatic.    Right Ear: External ear normal.   Left Ear: External ear normal.   Mouth/Throat: No oropharyngeal exudate.   Eyes: Conjunctivae and EOM are normal. Right eye exhibits no discharge. Left eye exhibits no discharge.   Neck: Normal range of motion. Neck supple. No tracheal deviation present.   Cardiovascular: Normal rate, regular rhythm and normal heart sounds. Exam reveals no gallop and no friction rub.   No murmur heard.  Pulmonary/Chest: Effort normal and breath sounds normal. No respiratory distress.   Abdominal: Soft. He exhibits no distension. There is no tenderness.   Musculoskeletal: He exhibits no edema or deformity.   Erythema to bilateral lower extremities, +warmth, worse on L leg. ROM intact, +ttp over LE's bilaterally. Some scabbing, no skin breakdown.    Neurological: He is alert and oriented to person, place, and time.   Skin: Skin is warm and dry. He is not diaphoretic.   Psychiatric: He has a normal mood and affect. His behavior is normal.   Nursing note and vitals reviewed.      Assessment:       1. Cellulitis of left lower extremity without foot    2. PAD (peripheral artery disease)    3. Essential hypertension    4. Coronary artery disease involving native coronary artery of native heart without angina pectoris    5. Type 2 diabetes mellitus with hyperglycemia, without long-term current use of insulin    6. Anxiety    7. Need for prophylactic vaccination and inoculation against influenza        Plan:       Reji was seen today for ed follow up.    Diagnoses and all orders for this visit:    Cellulitis of left lower extremity without foot  Comments:  improving, continue abx    PAD (peripheral artery disease)    Essential hypertension  Comments:  stable, continue current regimen    Coronary artery disease involving native coronary artery of native heart without angina pectoris    Type 2 diabetes mellitus with hyperglycemia, without long-term current use of insulin  -     Hemoglobin A1c;  Future    Anxiety  -     busPIRone (BUSPAR) 15 MG tablet; Take 1 tablet (15 mg total) by mouth 3 (three) times daily.    Need for prophylactic vaccination and inoculation against influenza  -     Influenza - High Dose (65+) (PF) (IM)      Follow-up in about 3 months (around 12/19/2018).

## 2018-10-02 NOTE — PROGRESS NOTES
I assume primary medical responsibility for this patient, I have reviewed the case history, findings, diagnosis and treatment plan with the resident and agree that the care is reasonable and necessary. This service has been performed by a resident without the presence of a teaching physician under the primary care exception  Berkley Johnson  10/2/2018

## 2018-10-11 DIAGNOSIS — I10 ESSENTIAL HYPERTENSION: ICD-10-CM

## 2018-10-11 RX ORDER — HYDROCHLOROTHIAZIDE 25 MG/1
25 TABLET ORAL DAILY
Qty: 90 TABLET | Refills: 1 | Status: SHIPPED | OUTPATIENT
Start: 2018-10-11 | End: 2019-03-18 | Stop reason: SDUPTHER

## 2018-10-11 NOTE — TELEPHONE ENCOUNTER
----- Message from Ivonne Barnett MA sent at 10/11/2018 10:49 AM CDT -----  Patient requesting a refill on hctz; please call patient to advise.  Said he has left prior messages.  Thanks.

## 2018-10-23 ENCOUNTER — TELEPHONE (OUTPATIENT)
Dept: FAMILY MEDICINE | Facility: HOSPITAL | Age: 63
End: 2018-10-23

## 2018-10-23 NOTE — TELEPHONE ENCOUNTER
----- Message from Cleveland Marquez sent at 10/5/2018  1:59 PM CDT -----  PT NEEDS CALL BACK REGARDING QUESTIONS ON hydroCHLOROthiazide (HYDRODIURIL) 25 MG tablet.

## 2018-11-06 NOTE — ASSESSMENT & PLAN NOTE
62 M with LLE cellulitis, erythema, warmth and swelling primarily to knee with redness and warmth tracking to groin on medial and lateral aspects of L thigh. Initially on vanc and zosyn with minimal improvement. Currently on cefazolin. More significant improvement in exam last 24 hours but with concerning area posteriorly to calf.     Recs  - continue cefazolin 1g q8hrs; pain much improved--erythema and edema improved as well but concerning pocket posterior calf--would consider imaging to determine if collection that needs drainage   - repeat blood cultures NGTD   Patient able to tolerate crackers and juice at this time.

## 2019-03-18 DIAGNOSIS — I10 ESSENTIAL HYPERTENSION: ICD-10-CM

## 2019-03-18 NOTE — TELEPHONE ENCOUNTER
----- Message from Veda Resendiz sent at 3/18/2019  3:01 PM CDT -----  Pt needs a refill for carvedilol (COREG) 6.25 MG tablet, metFORMIN (GLUCOPHAGE) 500 MG tablet, simvastatin (ZOCOR) 40 MG tablet

## 2019-03-21 RX ORDER — HYDROCHLOROTHIAZIDE 25 MG/1
25 TABLET ORAL DAILY
Qty: 90 TABLET | Refills: 1 | Status: SHIPPED | OUTPATIENT
Start: 2019-03-21 | End: 2019-10-18 | Stop reason: SDUPTHER

## 2019-03-21 RX ORDER — CARVEDILOL 6.25 MG/1
6.25 TABLET ORAL 2 TIMES DAILY WITH MEALS
Qty: 60 TABLET | Refills: 5 | Status: SHIPPED | OUTPATIENT
Start: 2019-03-21 | End: 2019-10-18 | Stop reason: SDUPTHER

## 2019-03-21 RX ORDER — SIMVASTATIN 40 MG/1
40 TABLET, FILM COATED ORAL NIGHTLY
Qty: 90 TABLET | Refills: 1 | Status: SHIPPED | OUTPATIENT
Start: 2019-03-21 | End: 2019-10-18 | Stop reason: SDUPTHER

## 2019-04-02 RX ORDER — METFORMIN HYDROCHLORIDE 500 MG/1
500 TABLET ORAL 2 TIMES DAILY WITH MEALS
Qty: 180 TABLET | Refills: 0 | Status: SHIPPED | OUTPATIENT
Start: 2019-04-02 | End: 2019-05-17 | Stop reason: SDUPTHER

## 2019-04-09 ENCOUNTER — OFFICE VISIT (OUTPATIENT)
Dept: FAMILY MEDICINE | Facility: HOSPITAL | Age: 64
End: 2019-04-09
Payer: MEDICARE

## 2019-04-09 VITALS
HEIGHT: 65 IN | DIASTOLIC BLOOD PRESSURE: 86 MMHG | BODY MASS INDEX: 36.96 KG/M2 | SYSTOLIC BLOOD PRESSURE: 146 MMHG | WEIGHT: 221.81 LBS | HEART RATE: 79 BPM

## 2019-04-09 DIAGNOSIS — F41.1 GAD (GENERALIZED ANXIETY DISORDER): ICD-10-CM

## 2019-04-09 DIAGNOSIS — I73.9 PAD (PERIPHERAL ARTERY DISEASE): ICD-10-CM

## 2019-04-09 DIAGNOSIS — Z95.5 HISTORY OF HEART ARTERY STENT: ICD-10-CM

## 2019-04-09 DIAGNOSIS — E11.65 TYPE 2 DIABETES MELLITUS WITH HYPERGLYCEMIA, WITHOUT LONG-TERM CURRENT USE OF INSULIN: ICD-10-CM

## 2019-04-09 DIAGNOSIS — I10 ESSENTIAL HYPERTENSION: Primary | ICD-10-CM

## 2019-04-09 DIAGNOSIS — Z59.89 OTHER PROBLEMS RELATED TO HOUSING AND ECONOMIC CIRCUMSTANCES: ICD-10-CM

## 2019-04-09 PROBLEM — I87.2 CHRONIC VENOUS INSUFFICIENCY: Status: ACTIVE | Noted: 2018-04-12

## 2019-04-09 PROCEDURE — 99213 OFFICE O/P EST LOW 20 MIN: CPT | Performed by: FAMILY MEDICINE

## 2019-04-09 RX ORDER — SERTRALINE HYDROCHLORIDE 25 MG/1
25 TABLET, FILM COATED ORAL DAILY
Qty: 90 TABLET | Refills: 1 | Status: SHIPPED | OUTPATIENT
Start: 2019-04-09 | End: 2019-10-10

## 2019-04-09 SDOH — SOCIAL DETERMINANTS OF HEALTH (SDOH): OTHER PROBLEMS RELATED TO HOUSING AND ECONOMIC CIRCUMSTANCES: Z59.89

## 2019-04-09 NOTE — PROGRESS NOTES
I reviewed the note and medication list and discussed with Dr. Parmar. I agree with the plans. He plans a phone call with lab results and adjusting medication appropriately.

## 2019-04-09 NOTE — PROGRESS NOTES
Subjective:       Patient ID: Reji Guerra is a 63 y.o. male.    Chief Complaint: Anxiety (follow-up)    62 yo male w/ CAD (3 stents in 2003 w/MI), DMII with last A1C 6.1, HTN, PAD and recent cellulitis who presents for DMII follow-up.    STELLA: reports a lot of stress about penalties and late fees from social security. States he got a letter that he could no longer work. States he was released from wound care in June. Hasn't been taking anything, states he tried Buspar TID for half a week. States it made him feel weird and uncomfortable with his stomach. Hasn't tried Zoloft.    DMII: states he was out of his DM medication but got it before the weekend. States he is out of strips and has trouble affording it. Does take a baby asa daily.     Essential HTN: doesn't check BP's at home, has been taking his BP medicine. Denies new sxs.    CAD: saw Dana, unsure when.     Patient states clorazepate 7.5mg has been helping him.     States he is longer getting food stamps and has a lot of economical stress given this cancellation.     Smokes <1ppd, thought about quitting in the past. Considered [facility] smoking cessation, under too much stress.             Review of Systems   Constitutional: Negative for chills and fever.   HENT: Negative for congestion and sore throat.        Objective:      Vitals:    04/09/19 1411   BP: (!) 146/86   Pulse: 79     Physical Exam   Constitutional: He is oriented to person, place, and time. He appears well-developed and well-nourished. No distress.   BMI Readings from Last 3 Encounters:  04/09/19 : 36.91 kg/m²  09/19/18 : 38.41 kg/m²  09/16/18 : 36.90 kg/m²           HENT:   Head: Normocephalic and atraumatic.   Right Ear: External ear normal.   Left Ear: External ear normal.   Mouth/Throat: No oropharyngeal exudate.   Eyes: Conjunctivae and EOM are normal. Right eye exhibits no discharge. Left eye exhibits no discharge.   Neck: Normal range of motion. Neck supple. No tracheal deviation  present.   Cardiovascular: Normal rate, regular rhythm and normal heart sounds. Exam reveals no gallop and no friction rub.   No murmur heard.  Pulmonary/Chest: Effort normal and breath sounds normal. No respiratory distress.   Musculoskeletal: He exhibits no edema or deformity.   Neurological: He is alert and oriented to person, place, and time.   Skin: Skin is warm and dry. He is not diaphoretic.   Skin changes of chronic venous insufficiency, no obvious infection at this time. No pitting edema.    Psychiatric: He has a normal mood and affect. His behavior is normal.   Nursing note and vitals reviewed.      Assessment:       1. Essential hypertension    2. Type 2 diabetes mellitus with hyperglycemia, without long-term current use of insulin    3. PAD (peripheral artery disease)    4. History of heart artery stent    5. STELLA (generalized anxiety disorder)    6. Other problems related to housing and economic circumstances        Plan:       Essential hypertension  Comments:  - compliant with BP meds, stable  - f/u microalbumin  - c/w current  Orders:  -     Microalbumin/creatinine urine ratio; Future; Expected date: 04/09/2019  -     Lipid panel; Future; Expected date: 04/09/2019    Type 2 diabetes mellitus with hyperglycemia, without long-term current use of insulin  Comments:  -c/w glipizide and metformin  - f/u A1C  Orders:  -     Hemoglobin A1c; Future; Expected date: 04/09/2019  -     Microalbumin/creatinine urine ratio; Future; Expected date: 04/09/2019  -     Lipid panel; Future; Expected date: 04/09/2019    PAD (peripheral artery disease)  Comments:  -mild PAD on imaging, was supposed to follow with Dr. Rutledge  -f/u lipid panel  -c/w statin, discuss asa    History of heart artery stent  Comments:  - three stents placed in 2003  - f/u cardiology   Orders:  -     Lipid panel; Future; Expected date: 04/09/2019    STELLA (generalized anxiety disorder)  -     sertraline (ZOLOFT) 25 MG tablet; Take 1 tablet (25 mg  total) by mouth once daily. Can increase to 50mg (two tablets) daily after one week if working.  Dispense: 90 tablet; Refill: 1    Other problems related to housing and economic circumstances  -     Ambulatory consult to Social Work    Will call patient with a1c results, consider increasing metformin vs. Glipizide dose if not under control. However, patient with episodes of non-adherence so unclear at this time, patient has been well-controlled on these medications in the past. Continue to  on adherence and consider increasing BP meds at next visit if BP remains elevated. Counseled patient that Tranxene is not a good long term sleeping medication, and this prescriber will not be prescribing this medication. Can consider trazodone to adjunct zoloft. All questions answered.     Follow up in about 1 month (around 5/9/2019).

## 2019-04-10 DIAGNOSIS — Z59.9 ECONOMIC PROBLEM AFFECTING CARE: Primary | ICD-10-CM

## 2019-04-10 DIAGNOSIS — E11.29 MICROALBUMINURIA DUE TO TYPE 2 DIABETES MELLITUS: Primary | ICD-10-CM

## 2019-04-10 DIAGNOSIS — R80.9 MICROALBUMINURIA DUE TO TYPE 2 DIABETES MELLITUS: Primary | ICD-10-CM

## 2019-04-10 RX ORDER — LISINOPRIL 10 MG/1
10 TABLET ORAL DAILY
Qty: 90 TABLET | Refills: 1 | Status: SHIPPED | OUTPATIENT
Start: 2019-04-10 | End: 2019-10-10 | Stop reason: SDUPTHER

## 2019-04-10 SDOH — SOCIAL DETERMINANTS OF HEALTH (SDOH): PROBLEM RELATED TO HOUSING AND ECONOMIC CIRCUMSTANCES, UNSPECIFIED: Z59.9

## 2019-05-17 ENCOUNTER — OFFICE VISIT (OUTPATIENT)
Dept: FAMILY MEDICINE | Facility: HOSPITAL | Age: 64
End: 2019-05-17
Attending: FAMILY MEDICINE
Payer: MEDICARE

## 2019-05-17 VITALS
SYSTOLIC BLOOD PRESSURE: 135 MMHG | HEART RATE: 82 BPM | WEIGHT: 208.75 LBS | HEIGHT: 64 IN | BODY MASS INDEX: 35.64 KG/M2 | DIASTOLIC BLOOD PRESSURE: 90 MMHG

## 2019-05-17 DIAGNOSIS — I73.9 PAD (PERIPHERAL ARTERY DISEASE): ICD-10-CM

## 2019-05-17 DIAGNOSIS — E11.65 TYPE 2 DIABETES MELLITUS WITH HYPERGLYCEMIA, WITHOUT LONG-TERM CURRENT USE OF INSULIN: Primary | ICD-10-CM

## 2019-05-17 DIAGNOSIS — I10 ESSENTIAL HYPERTENSION: ICD-10-CM

## 2019-05-17 PROCEDURE — 99213 OFFICE O/P EST LOW 20 MIN: CPT | Performed by: FAMILY MEDICINE

## 2019-05-17 RX ORDER — METFORMIN HYDROCHLORIDE 500 MG/1
500 TABLET ORAL 2 TIMES DAILY WITH MEALS
Qty: 180 TABLET | Refills: 1 | Status: SHIPPED | OUTPATIENT
Start: 2019-05-17 | End: 2019-10-10 | Stop reason: SDUPTHER

## 2019-05-17 RX ORDER — ASPIRIN 81 MG/1
81 TABLET ORAL DAILY
COMMUNITY
End: 2020-11-19 | Stop reason: SDUPTHER

## 2019-05-17 NOTE — PROGRESS NOTES
Subjective:       Patient ID: Reji Guerra is a 63 y.o. male.    Chief Complaint: Hypertension and Diabetes    62 yo male w/ CAD (3 stents in 2003 w/MI), DMII with last A1C 6.1, HTN, PAD who presents for DMII and HTN follow-up.    DMII: last A1C 7.1 last month. Stable, metforming 500mg BID and stopped glipizide in March, BS's 120's at home.  Denies new symptoms.     Essential hypertension: On HCTZ, lisinopril, coreg, stable at this time. Denies symptoms or med SE's.     PAD: stable, limited pain with ambulation. Follows with Dr. Gupta.    STELLA: states he has been taking buspar, has been sleeping better and hasn't had very much anxiety.         Review of Systems   Constitutional: Negative for chills and fever.   HENT: Negative for congestion and sore throat.        Objective:      Vitals:    05/17/19 1051   BP: (!) 135/90   Pulse: 82     Physical Exam   Constitutional: He is oriented to person, place, and time. He appears well-developed and well-nourished. No distress.   BMI Readings from Last 3 Encounters:  05/17/19 : 35.84 kg/m²  04/09/19 : 36.91 kg/m²  09/19/18 : 38.41 kg/m²       HENT:   Head: Normocephalic and atraumatic.   Right Ear: External ear normal.   Left Ear: External ear normal.   Mouth/Throat: No oropharyngeal exudate.   Eyes: Conjunctivae and EOM are normal. Right eye exhibits no discharge. Left eye exhibits no discharge.   Neck: Normal range of motion. Neck supple. No tracheal deviation present.   Cardiovascular: Normal rate, regular rhythm and normal heart sounds. Exam reveals no gallop and no friction rub.   No murmur heard.  Pulmonary/Chest: Effort normal and breath sounds normal. No respiratory distress.   Musculoskeletal: He exhibits no edema or deformity.   Neurological: He is alert and oriented to person, place, and time.   Skin: Skin is warm and dry. He is not diaphoretic.   Chronic venous stasis dermatitis, stable, no cellulitis or pitting edema.    Psychiatric: He has a normal mood and  affect. His behavior is normal.   Nursing note and vitals reviewed.      Assessment:       1. Type 2 diabetes mellitus with hyperglycemia, without long-term current use of insulin    2. Essential hypertension    3. PAD (peripheral artery disease)        Plan:       Type 2 diabetes mellitus with hyperglycemia, without long-term current use of insulin  -     metFORMIN (GLUCOPHAGE) 500 MG tablet; Take 1 tablet (500 mg total) by mouth 2 (two) times daily with meals.  Dispense: 180 tablet; Refill: 1    Essential hypertension    PAD (peripheral artery disease)    DMII stable, A1C 7.1, repeat in three months. HTN stable, c/w current meds. PAD stable, c/w Dr. Gupta follow-up.     Follow up in about 3 months (around 8/17/2019).

## 2019-05-20 NOTE — PROGRESS NOTES
I have reviewed the notes, assessments, and/or procedures performed, I concur with her/his documentation of Reji Guerra.

## 2019-10-10 ENCOUNTER — OFFICE VISIT (OUTPATIENT)
Dept: FAMILY MEDICINE | Facility: HOSPITAL | Age: 64
End: 2019-10-10
Payer: MEDICARE

## 2019-10-10 VITALS
HEART RATE: 70 BPM | DIASTOLIC BLOOD PRESSURE: 92 MMHG | HEIGHT: 64 IN | SYSTOLIC BLOOD PRESSURE: 157 MMHG | WEIGHT: 212.31 LBS | BODY MASS INDEX: 36.25 KG/M2

## 2019-10-10 DIAGNOSIS — E11.29 MICROALBUMINURIA DUE TO TYPE 2 DIABETES MELLITUS: ICD-10-CM

## 2019-10-10 DIAGNOSIS — Z23 NEED FOR PROPHYLACTIC VACCINATION AND INOCULATION AGAINST INFLUENZA: Primary | ICD-10-CM

## 2019-10-10 DIAGNOSIS — E11.65 TYPE 2 DIABETES MELLITUS WITH HYPERGLYCEMIA, WITHOUT LONG-TERM CURRENT USE OF INSULIN: ICD-10-CM

## 2019-10-10 DIAGNOSIS — R80.9 MICROALBUMINURIA DUE TO TYPE 2 DIABETES MELLITUS: ICD-10-CM

## 2019-10-10 PROCEDURE — 99213 OFFICE O/P EST LOW 20 MIN: CPT | Mod: 25 | Performed by: STUDENT IN AN ORGANIZED HEALTH CARE EDUCATION/TRAINING PROGRAM

## 2019-10-10 PROCEDURE — 90662 IIV NO PRSV INCREASED AG IM: CPT

## 2019-10-10 RX ORDER — ESCITALOPRAM OXALATE 5 MG/1
5 TABLET ORAL DAILY
Qty: 30 TABLET | Refills: 1 | Status: SHIPPED | OUTPATIENT
Start: 2019-10-10 | End: 2020-05-14

## 2019-10-10 RX ORDER — METFORMIN HYDROCHLORIDE 500 MG/1
500 TABLET ORAL 2 TIMES DAILY WITH MEALS
Qty: 180 TABLET | Refills: 3 | Status: SHIPPED | OUTPATIENT
Start: 2019-10-10 | End: 2020-11-19 | Stop reason: SDUPTHER

## 2019-10-10 RX ORDER — LISINOPRIL 10 MG/1
10 TABLET ORAL DAILY
Qty: 90 TABLET | Refills: 3 | Status: SHIPPED | OUTPATIENT
Start: 2019-10-10 | End: 2020-11-19 | Stop reason: SDUPTHER

## 2019-10-10 NOTE — PROGRESS NOTES
Subjective:       Patient ID: Reji Guerra is a 64 y.o. male.    Chief Complaint: Hypertension    Patient is a 65 yo male pmhx of CAD (3 stents in 2003 w/MI), DMII with last A1C 6.1, HTN, PAD who presents today for HTN f/u. Patient requests refills on medications.       DMII: last A1C 7.1 4/2019. Stable, metforming 500mg BID and stopped glipizide in March, BS's 100's at home.  Denies new symptoms.      Essential hypertension: On HCTZ, lisinopril, coreg, stable at this time. Denies symptoms or med SE's.      Anxiety: Patient reports he takes clorazepate as needed. Last filled 1/2018          Review of Systems   Constitutional: Negative for appetite change and fever.   HENT: Negative for congestion and sore throat.    Respiratory: Negative for cough, chest tightness and shortness of breath.    Cardiovascular: Negative for chest pain and palpitations.   Gastrointestinal: Negative for abdominal pain, diarrhea and nausea.   Neurological: Negative for dizziness, weakness and headaches.       Objective:      Vitals:    10/10/19 1555   BP: (!) 157/92   Pulse: 70     Physical Exam   Constitutional: He is oriented to person, place, and time. He appears well-developed and well-nourished. No distress.   HENT:   Head: Normocephalic and atraumatic.   Mouth/Throat: No oropharyngeal exudate.   Eyes: Pupils are equal, round, and reactive to light.   Cardiovascular: Normal rate, regular rhythm and normal heart sounds.   No murmur heard.  Pulmonary/Chest: Effort normal. No respiratory distress.   Abdominal: Soft.   Musculoskeletal: He exhibits no edema.   Neurological: He is alert and oriented to person, place, and time. No cranial nerve deficit. He exhibits normal muscle tone. Coordination normal.   Skin: He is not diaphoretic.   Nursing note and vitals reviewed.      Assessment:       1. Need for prophylactic vaccination and inoculation against influenza    2. Microalbuminuria due to type 2 diabetes mellitus    3. Type 2 diabetes  mellitus with hyperglycemia, without long-term current use of insulin        Plan:       Need for prophylactic vaccination and inoculation against influenza  -     Flu Vaccine - High Dose (PF) (65+)    Microalbuminuria due to type 2 diabetes mellitus  -     lisinopril 10 MG tablet; Take 1 tablet (10 mg total) by mouth once daily.  Dispense: 90 tablet; Refill: 3    Type 2 diabetes mellitus with hyperglycemia, without long-term current use of insulin  -     metFORMIN (GLUCOPHAGE) 500 MG tablet; Take 1 tablet (500 mg total) by mouth 2 (two) times daily with meals.  Dispense: 180 tablet; Refill: 3  -     Hemoglobin A1c; Future; Expected date: 10/10/2019    Other orders  -     escitalopram oxalate (LEXAPRO) 5 MG Tab; Take 1 tablet (5 mg total) by mouth once daily.  Dispense: 30 tablet; Refill: 1      Follow up in about 4 weeks (around 11/7/2019).

## 2019-10-10 NOTE — PROGRESS NOTES
Flu consent signed by patient. Flu vaccine administered.  Flu consent signed by patient. Flu vaccine administered.

## 2019-10-18 DIAGNOSIS — I10 ESSENTIAL HYPERTENSION: ICD-10-CM

## 2019-10-18 RX ORDER — SIMVASTATIN 40 MG/1
40 TABLET, FILM COATED ORAL NIGHTLY
Qty: 90 TABLET | Refills: 3 | Status: SHIPPED | OUTPATIENT
Start: 2019-10-18 | End: 2020-11-19 | Stop reason: SDUPTHER

## 2019-10-18 RX ORDER — CARVEDILOL 6.25 MG/1
6.25 TABLET ORAL 2 TIMES DAILY WITH MEALS
Qty: 60 TABLET | Refills: 11 | Status: SHIPPED | OUTPATIENT
Start: 2019-10-18 | End: 2020-04-15

## 2019-10-18 RX ORDER — HYDROCHLOROTHIAZIDE 25 MG/1
25 TABLET ORAL DAILY
Qty: 90 TABLET | Refills: 3 | Status: SHIPPED | OUTPATIENT
Start: 2019-10-18 | End: 2020-11-19 | Stop reason: SDUPTHER

## 2019-10-18 NOTE — TELEPHONE ENCOUNTER
----- Message from Jennifer Heath sent at 10/18/2019  9:26 AM CDT -----  PT NEED REFILL ON hydroCHLOROthiazide (HYDRODIURIL) 25 MG tablet, simvastatin (ZOCOR) 40 MG tablet (), N carvedilol (COREG) 6.25 MG tablet () PT IS OUT OF HIS MEDICINE.

## 2020-05-14 ENCOUNTER — OFFICE VISIT (OUTPATIENT)
Dept: FAMILY MEDICINE | Facility: HOSPITAL | Age: 65
End: 2020-05-14
Attending: FAMILY MEDICINE
Payer: COMMERCIAL

## 2020-05-14 VITALS
HEART RATE: 81 BPM | DIASTOLIC BLOOD PRESSURE: 90 MMHG | HEIGHT: 62 IN | SYSTOLIC BLOOD PRESSURE: 149 MMHG | WEIGHT: 219.81 LBS | BODY MASS INDEX: 40.45 KG/M2

## 2020-05-14 DIAGNOSIS — E11.65 TYPE 2 DIABETES MELLITUS WITH HYPERGLYCEMIA, WITHOUT LONG-TERM CURRENT USE OF INSULIN: Primary | ICD-10-CM

## 2020-05-14 DIAGNOSIS — I83.009 CHRONIC CUTANEOUS VENOUS STASIS ULCER: ICD-10-CM

## 2020-05-14 DIAGNOSIS — F41.9 ANXIETY: ICD-10-CM

## 2020-05-14 DIAGNOSIS — I73.9 PAD (PERIPHERAL ARTERY DISEASE): ICD-10-CM

## 2020-05-14 DIAGNOSIS — L97.909 CHRONIC CUTANEOUS VENOUS STASIS ULCER: ICD-10-CM

## 2020-05-14 DIAGNOSIS — I10 ESSENTIAL HYPERTENSION: ICD-10-CM

## 2020-05-14 PROCEDURE — 99213 OFFICE O/P EST LOW 20 MIN: CPT | Performed by: STUDENT IN AN ORGANIZED HEALTH CARE EDUCATION/TRAINING PROGRAM

## 2020-05-14 RX ORDER — SERTRALINE HYDROCHLORIDE 50 MG/1
50 TABLET, FILM COATED ORAL DAILY
Qty: 90 TABLET | Refills: 0 | Status: SHIPPED | OUTPATIENT
Start: 2020-05-14 | End: 2020-11-19 | Stop reason: SDUPTHER

## 2020-05-14 RX ORDER — CARVEDILOL 6.25 MG/1
TABLET ORAL
COMMUNITY
Start: 2020-05-01 | End: 2020-11-19 | Stop reason: SDUPTHER

## 2020-05-14 NOTE — PROGRESS NOTES
Subjective:       Patient ID: Reji Guerra is a 64 y.o. male.    Chief Complaint: Anxiety    Patient is a 63 yo male pmhx of CAD (3 stents in 2003 w/MI), DMII with last A1C 6.7, HTN, PAD who presents today for anxiety f/u.  Patient stopped taking lexapro as it was having many side effects.  Interested in starting something different today for anxiety.      Also requesting blood work today      Review of Systems   Constitutional: Negative for appetite change and fever.   HENT: Negative for congestion and sore throat.    Respiratory: Negative for cough, chest tightness and shortness of breath.    Cardiovascular: Negative for chest pain and palpitations.   Gastrointestinal: Negative for abdominal pain, diarrhea and nausea.   Neurological: Negative for dizziness, weakness and headaches.       Objective:      Vitals:    05/14/20 1502   BP: (!) 149/90   Pulse: 81     Physical Exam   Constitutional: He is oriented to person, place, and time. He appears well-developed and well-nourished. No distress.   HENT:   Head: Normocephalic and atraumatic.   Eyes: Pupils are equal, round, and reactive to light.   Musculoskeletal: He exhibits no edema.   Neurological: He is alert and oriented to person, place, and time. No cranial nerve deficit. He exhibits normal muscle tone. Coordination normal.   Skin: He is not diaphoretic.   Nursing note and vitals reviewed.      Assessment:       1. Type 2 diabetes mellitus with hyperglycemia, without long-term current use of insulin    2. Essential hypertension    3. PAD (peripheral artery disease)    4. Anxiety    5. Chronic cutaneous venous stasis ulcer        Plan:       Type 2 diabetes mellitus with hyperglycemia, without long-term current use of insulin  -     CBC auto differential; Future; Expected date: 05/14/2020  -     Comprehensive metabolic panel; Future; Expected date: 05/14/2020  -     TSH; Future; Expected date: 05/14/2020  -     Lipid Panel; Future; Expected date: 05/14/2020  -      Hemoglobin A1C; Future; Expected date: 05/14/2020  -     Microalbumin/creatinine urine ratio; Future; Expected date: 05/14/2020  -     Vitamin B12; Future; Expected date: 05/14/2020  -     Folate; Future; Expected date: 05/14/2020    Essential hypertension  -     Lipid Panel; Future; Expected date: 05/14/2020    PAD (peripheral artery disease)  -     Hemoglobin A1C; Future; Expected date: 05/14/2020    Anxiety  -     TSH; Future; Expected date: 05/14/2020    Chronic cutaneous venous stasis ulcer  -     Ambulatory referral/consult to Wound Clinic; Future; Expected date: 05/21/2020  -     Vitamin B12; Future; Expected date: 05/14/2020    Other orders  -     sertraline (ZOLOFT) 50 MG tablet; Take 1 tablet (50 mg total) by mouth once daily.  Dispense: 90 tablet; Refill: 0      Follow up in about 3 months (around 8/14/2020).

## 2020-05-15 RX ORDER — FUROSEMIDE 20 MG/1
20 TABLET ORAL DAILY
Qty: 30 TABLET | Refills: 0 | Status: SHIPPED | OUTPATIENT
Start: 2020-05-15 | End: 2021-03-18 | Stop reason: SDUPTHER

## 2020-05-15 NOTE — PROGRESS NOTES
I assume primary medical responsibility for this patient. I have reviewed the history, physical, and assessement & treatment plan with the resident and agree that the care is reasonable and necessary. This service has been performed by a resident without the presence of a teaching physician under the primary care exception. If necessary, an addendum of additional findings or evaluation beyond the resident documentation will be noted below.    Mayte Duke MD

## 2020-07-29 LAB
ALBUMIN SERPL BCP-MCNC: 3.3 G/DL (ref 3.5–5.2)
ALP SERPL-CCNC: 72 U/L (ref 55–135)
ALT SERPL W/O P-5'-P-CCNC: 23 U/L (ref 10–44)
ANION GAP SERPL CALC-SCNC: 9 MMOL/L (ref 8–16)
AST SERPL-CCNC: 28 U/L (ref 10–40)
BILIRUB SERPL-MCNC: 0.5 MG/DL (ref 0.1–1)
BUN SERPL-MCNC: 25 MG/DL (ref 8–23)
CALCIUM SERPL-MCNC: 9 MG/DL (ref 8.7–10.5)
CHLORIDE SERPL-SCNC: 103 MMOL/L (ref 95–110)
CO2 SERPL-SCNC: 24 MMOL/L (ref 23–29)
CREAT SERPL-MCNC: 1.4 MG/DL (ref 0.5–1.4)
EST. GFR  (AFRICAN AMERICAN): 60 ML/MIN/1.73 M^2
EST. GFR  (NON AFRICAN AMERICAN): 52 ML/MIN/1.73 M^2
GLUCOSE SERPL-MCNC: 128 MG/DL (ref 70–110)
POCT GLUCOSE: 131 MG/DL (ref 70–110)
POTASSIUM SERPL-SCNC: 5 MMOL/L (ref 3.5–5.1)
PROT SERPL-MCNC: 6.7 G/DL (ref 6–8.4)
SODIUM SERPL-SCNC: 136 MMOL/L (ref 136–145)

## 2020-07-29 PROCEDURE — 82962 GLUCOSE BLOOD TEST: CPT

## 2020-07-29 PROCEDURE — 96375 TX/PRO/DX INJ NEW DRUG ADDON: CPT

## 2020-07-29 PROCEDURE — 85025 COMPLETE CBC W/AUTO DIFF WBC: CPT

## 2020-07-29 PROCEDURE — 99285 EMERGENCY DEPT VISIT HI MDM: CPT | Mod: 25

## 2020-07-29 PROCEDURE — 87040 BLOOD CULTURE FOR BACTERIA: CPT

## 2020-07-29 PROCEDURE — 96365 THER/PROPH/DIAG IV INF INIT: CPT

## 2020-07-29 PROCEDURE — 80053 COMPREHEN METABOLIC PANEL: CPT

## 2020-07-30 ENCOUNTER — HOSPITAL ENCOUNTER (OUTPATIENT)
Facility: HOSPITAL | Age: 65
Discharge: HOME | End: 2020-08-02
Attending: EMERGENCY MEDICINE | Admitting: FAMILY MEDICINE
Payer: COMMERCIAL

## 2020-07-30 ENCOUNTER — CLINICAL SUPPORT (OUTPATIENT)
Dept: SMOKING CESSATION | Facility: CLINIC | Age: 65
End: 2020-07-30
Payer: COMMERCIAL

## 2020-07-30 DIAGNOSIS — L03.90 CELLULITIS: ICD-10-CM

## 2020-07-30 DIAGNOSIS — I25.10 CORONARY ARTERY DISEASE INVOLVING NATIVE CORONARY ARTERY OF NATIVE HEART WITHOUT ANGINA PECTORIS: ICD-10-CM

## 2020-07-30 DIAGNOSIS — L03.116 CELLULITIS OF LEFT LOWER EXTREMITY: ICD-10-CM

## 2020-07-30 DIAGNOSIS — I87.2 CHRONIC VENOUS INSUFFICIENCY: ICD-10-CM

## 2020-07-30 DIAGNOSIS — I15.2 HYPERTENSION ASSOCIATED WITH DIABETES: ICD-10-CM

## 2020-07-30 DIAGNOSIS — I47.20 V-TACH: ICD-10-CM

## 2020-07-30 DIAGNOSIS — L03.116 LEFT LEG CELLULITIS: Primary | ICD-10-CM

## 2020-07-30 DIAGNOSIS — F41.9 ANXIETY: ICD-10-CM

## 2020-07-30 DIAGNOSIS — E11.65 TYPE 2 DIABETES MELLITUS WITH HYPERGLYCEMIA, WITHOUT LONG-TERM CURRENT USE OF INSULIN: ICD-10-CM

## 2020-07-30 DIAGNOSIS — F17.210 CIGARETTE SMOKER: Primary | ICD-10-CM

## 2020-07-30 DIAGNOSIS — M79.89 LEG SWELLING: ICD-10-CM

## 2020-07-30 DIAGNOSIS — E11.59 HYPERTENSION ASSOCIATED WITH DIABETES: ICD-10-CM

## 2020-07-30 LAB
ALBUMIN SERPL BCP-MCNC: 3 G/DL (ref 3.5–5.2)
ALP SERPL-CCNC: 62 U/L (ref 55–135)
ALT SERPL W/O P-5'-P-CCNC: 19 U/L (ref 10–44)
ANION GAP SERPL CALC-SCNC: 7 MMOL/L (ref 8–16)
ANION GAP SERPL CALC-SCNC: 8 MMOL/L (ref 8–16)
AST SERPL-CCNC: 21 U/L (ref 10–40)
BASOPHILS # BLD AUTO: 0.02 K/UL (ref 0–0.2)
BASOPHILS # BLD AUTO: 0.03 K/UL (ref 0–0.2)
BASOPHILS NFR BLD: 0.2 % (ref 0–1.9)
BASOPHILS NFR BLD: 0.3 % (ref 0–1.9)
BILIRUB SERPL-MCNC: 0.4 MG/DL (ref 0.1–1)
BUN SERPL-MCNC: 26 MG/DL (ref 8–23)
BUN SERPL-MCNC: 29 MG/DL (ref 8–23)
CALCIUM SERPL-MCNC: 8.6 MG/DL (ref 8.7–10.5)
CALCIUM SERPL-MCNC: 8.7 MG/DL (ref 8.7–10.5)
CHLORIDE SERPL-SCNC: 103 MMOL/L (ref 95–110)
CHLORIDE SERPL-SCNC: 99 MMOL/L (ref 95–110)
CO2 SERPL-SCNC: 25 MMOL/L (ref 23–29)
CO2 SERPL-SCNC: 30 MMOL/L (ref 23–29)
CREAT SERPL-MCNC: 1.4 MG/DL (ref 0.5–1.4)
CREAT SERPL-MCNC: 1.6 MG/DL (ref 0.5–1.4)
DIFFERENTIAL METHOD: ABNORMAL
DIFFERENTIAL METHOD: ABNORMAL
EOSINOPHIL # BLD AUTO: 0.5 K/UL (ref 0–0.5)
EOSINOPHIL # BLD AUTO: 0.7 K/UL (ref 0–0.5)
EOSINOPHIL NFR BLD: 5.6 % (ref 0–8)
EOSINOPHIL NFR BLD: 6.5 % (ref 0–8)
ERYTHROCYTE [DISTWIDTH] IN BLOOD BY AUTOMATED COUNT: 13.7 % (ref 11.5–14.5)
ERYTHROCYTE [DISTWIDTH] IN BLOOD BY AUTOMATED COUNT: 13.9 % (ref 11.5–14.5)
EST. GFR  (AFRICAN AMERICAN): 51 ML/MIN/1.73 M^2
EST. GFR  (AFRICAN AMERICAN): 60 ML/MIN/1.73 M^2
EST. GFR  (NON AFRICAN AMERICAN): 44 ML/MIN/1.73 M^2
EST. GFR  (NON AFRICAN AMERICAN): 52 ML/MIN/1.73 M^2
ESTIMATED AVG GLUCOSE: 126 MG/DL (ref 68–131)
GIANT PLATELETS BLD QL SMEAR: PRESENT
GLUCOSE SERPL-MCNC: 110 MG/DL (ref 70–110)
GLUCOSE SERPL-MCNC: 171 MG/DL (ref 70–110)
GRAM STN SPEC: NORMAL
GRAM STN SPEC: NORMAL
HBA1C MFR BLD HPLC: 6 % (ref 4–5.6)
HCT VFR BLD AUTO: 40.5 % (ref 40–54)
HCT VFR BLD AUTO: 42.4 % (ref 40–54)
HGB BLD-MCNC: 13.9 G/DL (ref 14–18)
HGB BLD-MCNC: 14 G/DL (ref 14–18)
IMM GRANULOCYTES # BLD AUTO: 0.04 K/UL (ref 0–0.04)
IMM GRANULOCYTES # BLD AUTO: 0.06 K/UL (ref 0–0.04)
IMM GRANULOCYTES NFR BLD AUTO: 0.4 % (ref 0–0.5)
IMM GRANULOCYTES NFR BLD AUTO: 0.6 % (ref 0–0.5)
LYMPHOCYTES # BLD AUTO: 0.9 K/UL (ref 1–4.8)
LYMPHOCYTES # BLD AUTO: 1.1 K/UL (ref 1–4.8)
LYMPHOCYTES NFR BLD: 10 % (ref 18–48)
LYMPHOCYTES NFR BLD: 10.5 % (ref 18–48)
MAGNESIUM SERPL-MCNC: 1.6 MG/DL (ref 1.6–2.6)
MCH RBC QN AUTO: 35.9 PG (ref 27–31)
MCH RBC QN AUTO: 36.7 PG (ref 27–31)
MCHC RBC AUTO-ENTMCNC: 32.8 G/DL (ref 32–36)
MCHC RBC AUTO-ENTMCNC: 34.6 G/DL (ref 32–36)
MCV RBC AUTO: 106 FL (ref 82–98)
MCV RBC AUTO: 110 FL (ref 82–98)
MONOCYTES # BLD AUTO: 0.5 K/UL (ref 0.3–1)
MONOCYTES # BLD AUTO: 0.7 K/UL (ref 0.3–1)
MONOCYTES NFR BLD: 5.5 % (ref 4–15)
MONOCYTES NFR BLD: 7 % (ref 4–15)
NEUTROPHILS # BLD AUTO: 7.3 K/UL (ref 1.8–7.7)
NEUTROPHILS # BLD AUTO: 7.9 K/UL (ref 1.8–7.7)
NEUTROPHILS NFR BLD: 75.2 % (ref 38–73)
NEUTROPHILS NFR BLD: 78.2 % (ref 38–73)
NRBC BLD-RTO: 0 /100 WBC
NRBC BLD-RTO: 0 /100 WBC
PHOSPHATE SERPL-MCNC: 2.9 MG/DL (ref 2.7–4.5)
PLATELET # BLD AUTO: 213 K/UL (ref 150–350)
PLATELET # BLD AUTO: 231 K/UL (ref 150–350)
PLATELET BLD QL SMEAR: ABNORMAL
PMV BLD AUTO: 10.2 FL (ref 9.2–12.9)
PMV BLD AUTO: 11.4 FL (ref 9.2–12.9)
POCT GLUCOSE: 129 MG/DL (ref 70–110)
POCT GLUCOSE: 167 MG/DL (ref 70–110)
POCT GLUCOSE: 216 MG/DL (ref 70–110)
POCT GLUCOSE: 302 MG/DL (ref 70–110)
POTASSIUM SERPL-SCNC: 4.3 MMOL/L (ref 3.5–5.1)
POTASSIUM SERPL-SCNC: 4.3 MMOL/L (ref 3.5–5.1)
PROT SERPL-MCNC: 5.9 G/DL (ref 6–8.4)
RBC # BLD AUTO: 3.81 M/UL (ref 4.6–6.2)
RBC # BLD AUTO: 3.87 M/UL (ref 4.6–6.2)
SARS-COV-2 RDRP RESP QL NAA+PROBE: NEGATIVE
SODIUM SERPL-SCNC: 135 MMOL/L (ref 136–145)
SODIUM SERPL-SCNC: 137 MMOL/L (ref 136–145)
T4 FREE SERPL-MCNC: 0.82 NG/DL (ref 0.71–1.51)
TSH SERPL DL<=0.005 MIU/L-ACNC: 0.33 UIU/ML (ref 0.4–4)
WBC # BLD AUTO: 10.52 K/UL (ref 3.9–12.7)
WBC # BLD AUTO: 9.32 K/UL (ref 3.9–12.7)

## 2020-07-30 PROCEDURE — 80048 BASIC METABOLIC PNL TOTAL CA: CPT

## 2020-07-30 PROCEDURE — 96375 TX/PRO/DX INJ NEW DRUG ADDON: CPT

## 2020-07-30 PROCEDURE — 25000003 PHARM REV CODE 250: Performed by: EMERGENCY MEDICINE

## 2020-07-30 PROCEDURE — 87070 CULTURE OTHR SPECIMN AEROBIC: CPT

## 2020-07-30 PROCEDURE — 99407 BEHAV CHNG SMOKING > 10 MIN: CPT | Mod: S$GLB,,,

## 2020-07-30 PROCEDURE — 84443 ASSAY THYROID STIM HORMONE: CPT

## 2020-07-30 PROCEDURE — 97165 OT EVAL LOW COMPLEX 30 MIN: CPT

## 2020-07-30 PROCEDURE — 87075 CULTR BACTERIA EXCEPT BLOOD: CPT

## 2020-07-30 PROCEDURE — 85025 COMPLETE CBC W/AUTO DIFF WBC: CPT

## 2020-07-30 PROCEDURE — G0378 HOSPITAL OBSERVATION PER HR: HCPCS

## 2020-07-30 PROCEDURE — 96361 HYDRATE IV INFUSION ADD-ON: CPT

## 2020-07-30 PROCEDURE — 87077 CULTURE AEROBIC IDENTIFY: CPT

## 2020-07-30 PROCEDURE — 87186 SC STD MICRODIL/AGAR DIL: CPT

## 2020-07-30 PROCEDURE — 87147 CULTURE TYPE IMMUNOLOGIC: CPT

## 2020-07-30 PROCEDURE — 63600175 PHARM REV CODE 636 W HCPCS: Performed by: STUDENT IN AN ORGANIZED HEALTH CARE EDUCATION/TRAINING PROGRAM

## 2020-07-30 PROCEDURE — 97530 THERAPEUTIC ACTIVITIES: CPT

## 2020-07-30 PROCEDURE — 96372 THER/PROPH/DIAG INJ SC/IM: CPT | Mod: 59

## 2020-07-30 PROCEDURE — 99999 PR PBB SHADOW E&M-EST. PATIENT-LVL I: CPT | Mod: PBBFAC,,,

## 2020-07-30 PROCEDURE — 83036 HEMOGLOBIN GLYCOSYLATED A1C: CPT

## 2020-07-30 PROCEDURE — 25000003 PHARM REV CODE 250: Performed by: STUDENT IN AN ORGANIZED HEALTH CARE EDUCATION/TRAINING PROGRAM

## 2020-07-30 PROCEDURE — 93010 EKG 12-LEAD: ICD-10-PCS | Mod: ,,, | Performed by: INTERNAL MEDICINE

## 2020-07-30 PROCEDURE — 87040 BLOOD CULTURE FOR BACTERIA: CPT

## 2020-07-30 PROCEDURE — U0002 COVID-19 LAB TEST NON-CDC: HCPCS

## 2020-07-30 PROCEDURE — 96376 TX/PRO/DX INJ SAME DRUG ADON: CPT

## 2020-07-30 PROCEDURE — 93005 ELECTROCARDIOGRAM TRACING: CPT

## 2020-07-30 PROCEDURE — 93010 ELECTROCARDIOGRAM REPORT: CPT | Mod: ,,, | Performed by: INTERNAL MEDICINE

## 2020-07-30 PROCEDURE — 97161 PT EVAL LOW COMPLEX 20 MIN: CPT

## 2020-07-30 PROCEDURE — 83735 ASSAY OF MAGNESIUM: CPT

## 2020-07-30 PROCEDURE — 99999 PR PBB SHADOW E&M-EST. PATIENT-LVL I: ICD-10-PCS | Mod: PBBFAC,,,

## 2020-07-30 PROCEDURE — 94761 N-INVAS EAR/PLS OXIMETRY MLT: CPT

## 2020-07-30 PROCEDURE — 99407 PR TOBACCO USE CESSATION INTENSIVE >10 MINUTES: ICD-10-PCS | Mod: S$GLB,,,

## 2020-07-30 PROCEDURE — 87205 SMEAR GRAM STAIN: CPT

## 2020-07-30 PROCEDURE — 36415 COLL VENOUS BLD VENIPUNCTURE: CPT

## 2020-07-30 PROCEDURE — 63600175 PHARM REV CODE 636 W HCPCS: Performed by: EMERGENCY MEDICINE

## 2020-07-30 PROCEDURE — 84100 ASSAY OF PHOSPHORUS: CPT

## 2020-07-30 PROCEDURE — 80053 COMPREHEN METABOLIC PANEL: CPT

## 2020-07-30 PROCEDURE — 84439 ASSAY OF FREE THYROXINE: CPT

## 2020-07-30 RX ORDER — IBUPROFEN 200 MG
16 TABLET ORAL
Status: DISCONTINUED | OUTPATIENT
Start: 2020-07-30 | End: 2020-08-02 | Stop reason: HOSPADM

## 2020-07-30 RX ORDER — ACETAMINOPHEN 325 MG/1
650 TABLET ORAL EVERY 8 HOURS PRN
Status: DISCONTINUED | OUTPATIENT
Start: 2020-07-30 | End: 2020-08-02 | Stop reason: HOSPADM

## 2020-07-30 RX ORDER — IBUPROFEN 200 MG
24 TABLET ORAL
Status: DISCONTINUED | OUTPATIENT
Start: 2020-07-30 | End: 2020-08-02 | Stop reason: HOSPADM

## 2020-07-30 RX ORDER — HYDROCODONE BITARTRATE AND ACETAMINOPHEN 5; 325 MG/1; MG/1
1 TABLET ORAL EVERY 4 HOURS PRN
Status: DISCONTINUED | OUTPATIENT
Start: 2020-07-30 | End: 2020-08-01

## 2020-07-30 RX ORDER — INSULIN ASPART 100 [IU]/ML
1-10 INJECTION, SOLUTION INTRAVENOUS; SUBCUTANEOUS
Status: DISCONTINUED | OUTPATIENT
Start: 2020-07-30 | End: 2020-08-02 | Stop reason: HOSPADM

## 2020-07-30 RX ORDER — SODIUM CHLORIDE, SODIUM LACTATE, POTASSIUM CHLORIDE, CALCIUM CHLORIDE 600; 310; 30; 20 MG/100ML; MG/100ML; MG/100ML; MG/100ML
INJECTION, SOLUTION INTRAVENOUS CONTINUOUS
Status: DISCONTINUED | OUTPATIENT
Start: 2020-07-30 | End: 2020-07-30

## 2020-07-30 RX ORDER — FUROSEMIDE 20 MG/1
20 TABLET ORAL DAILY
Status: DISCONTINUED | OUTPATIENT
Start: 2020-07-30 | End: 2020-07-30

## 2020-07-30 RX ORDER — CARVEDILOL 6.25 MG/1
6.25 TABLET ORAL 2 TIMES DAILY
Status: DISCONTINUED | OUTPATIENT
Start: 2020-07-30 | End: 2020-08-02 | Stop reason: HOSPADM

## 2020-07-30 RX ORDER — GLUCAGON 1 MG
1 KIT INJECTION
Status: DISCONTINUED | OUTPATIENT
Start: 2020-07-30 | End: 2020-08-02 | Stop reason: HOSPADM

## 2020-07-30 RX ORDER — SODIUM CHLORIDE, SODIUM LACTATE, POTASSIUM CHLORIDE, CALCIUM CHLORIDE 600; 310; 30; 20 MG/100ML; MG/100ML; MG/100ML; MG/100ML
INJECTION, SOLUTION INTRAVENOUS CONTINUOUS
Status: ACTIVE | OUTPATIENT
Start: 2020-07-30 | End: 2020-07-31

## 2020-07-30 RX ORDER — ENOXAPARIN SODIUM 100 MG/ML
40 INJECTION SUBCUTANEOUS EVERY 24 HOURS
Status: DISCONTINUED | OUTPATIENT
Start: 2020-07-30 | End: 2020-08-02 | Stop reason: HOSPADM

## 2020-07-30 RX ORDER — SERTRALINE HYDROCHLORIDE 50 MG/1
50 TABLET, FILM COATED ORAL DAILY
Status: DISCONTINUED | OUTPATIENT
Start: 2020-07-30 | End: 2020-08-02 | Stop reason: HOSPADM

## 2020-07-30 RX ORDER — INSULIN ASPART 100 [IU]/ML
0-5 INJECTION, SOLUTION INTRAVENOUS; SUBCUTANEOUS
Status: DISCONTINUED | OUTPATIENT
Start: 2020-07-30 | End: 2020-07-30

## 2020-07-30 RX ORDER — MORPHINE SULFATE 4 MG/ML
4 INJECTION, SOLUTION INTRAMUSCULAR; INTRAVENOUS
Status: COMPLETED | OUTPATIENT
Start: 2020-07-30 | End: 2020-07-30

## 2020-07-30 RX ORDER — ASPIRIN 81 MG/1
81 TABLET ORAL DAILY
Status: DISCONTINUED | OUTPATIENT
Start: 2020-07-30 | End: 2020-08-02 | Stop reason: HOSPADM

## 2020-07-30 RX ORDER — LISINOPRIL 10 MG/1
10 TABLET ORAL DAILY
Status: DISCONTINUED | OUTPATIENT
Start: 2020-07-30 | End: 2020-07-30

## 2020-07-30 RX ORDER — HYDROXYZINE HYDROCHLORIDE 25 MG/1
50 TABLET, FILM COATED ORAL 3 TIMES DAILY PRN
Status: DISCONTINUED | OUTPATIENT
Start: 2020-07-30 | End: 2020-08-02 | Stop reason: HOSPADM

## 2020-07-30 RX ORDER — FUROSEMIDE 10 MG/ML
40 INJECTION INTRAMUSCULAR; INTRAVENOUS DAILY
Status: DISCONTINUED | OUTPATIENT
Start: 2020-07-30 | End: 2020-07-30

## 2020-07-30 RX ORDER — MORPHINE SULFATE 4 MG/ML
4 INJECTION, SOLUTION INTRAMUSCULAR; INTRAVENOUS EVERY 4 HOURS PRN
Status: DISCONTINUED | OUTPATIENT
Start: 2020-07-30 | End: 2020-08-01

## 2020-07-30 RX ORDER — HYDRALAZINE HYDROCHLORIDE 20 MG/ML
5 INJECTION INTRAMUSCULAR; INTRAVENOUS EVERY 6 HOURS PRN
Status: DISCONTINUED | OUTPATIENT
Start: 2020-07-30 | End: 2020-08-02 | Stop reason: HOSPADM

## 2020-07-30 RX ORDER — FUROSEMIDE 10 MG/ML
40 INJECTION INTRAMUSCULAR; INTRAVENOUS DAILY
Status: DISCONTINUED | OUTPATIENT
Start: 2020-07-31 | End: 2020-07-30

## 2020-07-30 RX ORDER — TALC
9 POWDER (GRAM) TOPICAL NIGHTLY PRN
Status: DISCONTINUED | OUTPATIENT
Start: 2020-07-30 | End: 2020-07-30

## 2020-07-30 RX ORDER — ONDANSETRON 2 MG/ML
4 INJECTION INTRAMUSCULAR; INTRAVENOUS EVERY 8 HOURS PRN
Status: DISCONTINUED | OUTPATIENT
Start: 2020-07-30 | End: 2020-08-02 | Stop reason: HOSPADM

## 2020-07-30 RX ORDER — SODIUM CHLORIDE 0.9 % (FLUSH) 0.9 %
10 SYRINGE (ML) INJECTION
Status: DISCONTINUED | OUTPATIENT
Start: 2020-07-30 | End: 2020-07-31

## 2020-07-30 RX ORDER — HYDROCHLOROTHIAZIDE 25 MG/1
25 TABLET ORAL DAILY
Status: DISCONTINUED | OUTPATIENT
Start: 2020-07-30 | End: 2020-07-30

## 2020-07-30 RX ORDER — FUROSEMIDE 20 MG/1
20 TABLET ORAL 2 TIMES DAILY
Status: DISCONTINUED | OUTPATIENT
Start: 2020-07-30 | End: 2020-07-30

## 2020-07-30 RX ORDER — FUROSEMIDE 10 MG/ML
40 INJECTION INTRAMUSCULAR; INTRAVENOUS
Status: DISCONTINUED | OUTPATIENT
Start: 2020-07-30 | End: 2020-07-30

## 2020-07-30 RX ADMIN — INSULIN ASPART 2 UNITS: 100 INJECTION, SOLUTION INTRAVENOUS; SUBCUTANEOUS at 08:07

## 2020-07-30 RX ADMIN — MORPHINE SULFATE 4 MG: 4 INJECTION INTRAVENOUS at 01:07

## 2020-07-30 RX ADMIN — ENOXAPARIN SODIUM 40 MG: 40 INJECTION SUBCUTANEOUS at 06:07

## 2020-07-30 RX ADMIN — HYDROCODONE BITARTRATE AND ACETAMINOPHEN 1 TABLET: 5; 325 TABLET ORAL at 03:07

## 2020-07-30 RX ADMIN — FUROSEMIDE 40 MG: 10 INJECTION, SOLUTION INTRAVENOUS at 10:07

## 2020-07-30 RX ADMIN — PIPERACILLIN AND TAZOBACTAM 4.5 G: 4; .5 INJECTION, POWDER, LYOPHILIZED, FOR SOLUTION INTRAVENOUS; PARENTERAL at 12:07

## 2020-07-30 RX ADMIN — PIPERACILLIN AND TAZOBACTAM 4.5 G: 4; .5 INJECTION, POWDER, LYOPHILIZED, FOR SOLUTION INTRAVENOUS; PARENTERAL at 08:07

## 2020-07-30 RX ADMIN — SODIUM CHLORIDE, SODIUM LACTATE, POTASSIUM CHLORIDE, AND CALCIUM CHLORIDE: .6; .31; .03; .02 INJECTION, SOLUTION INTRAVENOUS at 07:07

## 2020-07-30 RX ADMIN — PIPERACILLIN AND TAZOBACTAM 4.5 G: 4; .5 INJECTION, POWDER, LYOPHILIZED, FOR SOLUTION INTRAVENOUS; PARENTERAL at 05:07

## 2020-07-30 RX ADMIN — CARVEDILOL 6.25 MG: 6.25 TABLET, FILM COATED ORAL at 10:07

## 2020-07-30 RX ADMIN — CEFTRIAXONE 1 G: 1 INJECTION, SOLUTION INTRAVENOUS at 01:07

## 2020-07-30 RX ADMIN — ASPIRIN 81 MG: 81 TABLET, COATED ORAL at 10:07

## 2020-07-30 RX ADMIN — VANCOMYCIN HYDROCHLORIDE 2250 MG: 100 INJECTION, POWDER, LYOPHILIZED, FOR SOLUTION INTRAVENOUS at 03:07

## 2020-07-30 RX ADMIN — HYDROXYZINE HYDROCHLORIDE 50 MG: 25 TABLET, FILM COATED ORAL at 08:07

## 2020-07-30 RX ADMIN — SODIUM CHLORIDE, SODIUM LACTATE, POTASSIUM CHLORIDE, AND CALCIUM CHLORIDE: .6; .31; .03; .02 INJECTION, SOLUTION INTRAVENOUS at 08:07

## 2020-07-30 RX ADMIN — CARVEDILOL 6.25 MG: 6.25 TABLET, FILM COATED ORAL at 08:07

## 2020-07-30 RX ADMIN — INSULIN ASPART 2 UNITS: 100 INJECTION, SOLUTION INTRAVENOUS; SUBCUTANEOUS at 06:07

## 2020-07-30 RX ADMIN — SERTRALINE HYDROCHLORIDE 50 MG: 50 TABLET ORAL at 10:07

## 2020-07-30 RX ADMIN — HYDROCODONE BITARTRATE AND ACETAMINOPHEN 1 TABLET: 5; 325 TABLET ORAL at 05:07

## 2020-07-30 RX ADMIN — Medication 1 TABLET: at 12:07

## 2020-07-30 RX ADMIN — HYDROCODONE BITARTRATE AND ACETAMINOPHEN 1 TABLET: 5; 325 TABLET ORAL at 08:07

## 2020-07-30 NOTE — ASSESSMENT & PLAN NOTE
Patient with a history of CAD with 3 vessel stenting about 20 years ago. Denies any recent chest pain or shortness of breath.   - Continue home ASA 81mg daily

## 2020-07-30 NOTE — ASSESSMENT & PLAN NOTE
Patient on 500mg Metformin BID at home. Reported fasting BG about 120.   - Low dose sliding scale   - diabetic diet

## 2020-07-30 NOTE — PROGRESS NOTES
Pharmacokinetic Initial Assessment: IV Vancomycin    Assessment/Plan:    Initiate intravenous vancomycin with loading dose of 2250 mg once followed by a maintenance dose of vancomycin 2000mg IV every 24 hours  Desired empiric serum trough concentration is 10 to 15 mcg/mL  Draw vancomycin trough level 30 min prior to third dose on 8/1/20 at approximately 0230  Pharmacy will continue to follow and monitor vancomycin.      Please contact pharmacy at extension [phone] with any questions regarding this assessment.     Thank you for the consult,   Sohan Lauren       Patient brief summary:  Reji Guerra is a 65 y.o. male initiated on antimicrobial therapy with IV Vancomycin for treatment of suspected skin & soft tissue infection    Drug Allergies:   Review of patient's allergies indicates:   Allergen Reactions    Iodine and iodide containing products     Shellfish containing products        Actual Body Weight:   99kg    Renal Function:   Estimated Creatinine Clearance: 52.6 mL/min (based on SCr of 1.4 mg/dL).,     Dialysis Method (if applicable):  N/A    CBC (last 72 hours):  Recent Labs   Lab Result Units 07/29/20  2256   WBC K/uL 9.32   Hemoglobin g/dL 14.0   Hematocrit % 40.5   Platelets K/uL 213   Gran% % 78.2*   Lymph% % 10.0*   Mono% % 5.5   Eosinophil% % 5.6   Basophil% % 0.3   Differential Method  Automated       Metabolic Panel (last 72 hours):  Recent Labs   Lab Result Units 07/29/20  2256   Sodium mmol/L 136   Potassium mmol/L 5.0   Chloride mmol/L 103   CO2 mmol/L 24   Glucose mg/dL 128*   BUN, Bld mg/dL 25*   Creatinine mg/dL 1.4   Albumin g/dL 3.3*   Total Bilirubin mg/dL 0.5   Alkaline Phosphatase U/L 72   AST U/L 28   ALT U/L 23       Drug levels (last 3 results):  No results for input(s): VANCOMYCINRA, VANCOMYCINPE, VANCOMYCINTR in the last 72 hours.    Microbiologic Results:  Microbiology Results (last 7 days)       Procedure Component Value Units Date/Time    Aerobic culture [[id number]] Collected:  07/30/20 0210    Order Status: Sent Specimen: Wound from Leg, Left Updated: 07/30/20 0215    Culture, Anaerobe [[id number]] Collected: 07/30/20 0210    Order Status: Sent Specimen: Wound from Leg, Left Updated: 07/30/20 0215    Gram stain [[id number]] Collected: 07/30/20 0210    Order Status: Sent Specimen: Wound from Leg, Left Updated: 07/30/20 0215    Blood Culture #1 **CANNOT BE ORDERED STAT** [[id number]] Collected: 07/29/20 2309    Order Status: Sent Specimen: Blood from Peripheral, Antecubital, Left Updated: 07/30/20 0203    Blood Culture #2 **CANNOT BE ORDERED STAT** [[id number]] Collected: 07/30/20 0152    Order Status: Sent Specimen: Blood from Peripheral, Antecubital, Right Updated: 07/30/20 0202

## 2020-07-30 NOTE — ASSESSMENT & PLAN NOTE
Patient with worsening swelling erythema to left lower extremity over the previous 2 weeks if not longer.  Area of erythema extends from ankle to just distal to the knee.  There are areas of wounds that are weeping pus.  Patient with likely cellulitis most likely secondary to chronic venous insufficiency.   - Vancomycin    - Zosyn   - Consider consulting wound care depending on chronic wound status s/p antibiotic and improvement

## 2020-07-30 NOTE — ED TRIAGE NOTES
Pt. To the ER with c/o bilateral leg swelling and redness for two weeks. Pt. Was taking Lasix for the swelling in his legs. Pt. Is anxious and states the swelling has become worse the past few days. Pt. Placed in gown. Side rails elevated x 2 and call light at the bedside.

## 2020-07-30 NOTE — SUBJECTIVE & OBJECTIVE
Past Medical History:   Diagnosis Date    Diabetes mellitus, type 2     Heart disease     High cholesterol     Hypertension        Past Surgical History:   Procedure Laterality Date    ANGIOPLASTY      HERNIA REPAIR         Review of patient's allergies indicates:   Allergen Reactions    Iodine and iodide containing products     Shellfish containing products        No current facility-administered medications on file prior to encounter.      Current Outpatient Medications on File Prior to Encounter   Medication Sig    aspirin (ECOTRIN) 81 MG EC tablet Take 81 mg by mouth once daily.    carvediloL (COREG) 6.25 MG tablet     furosemide (LASIX) 20 MG tablet Take 1 tablet (20 mg total) by mouth once daily. Take in the morning    hydroCHLOROthiazide (HYDRODIURIL) 25 MG tablet Take 1 tablet (25 mg total) by mouth once daily.    lisinopril 10 MG tablet Take 1 tablet (10 mg total) by mouth once daily.    metFORMIN (GLUCOPHAGE) 500 MG tablet Take 1 tablet (500 mg total) by mouth 2 (two) times daily with meals.    simvastatin (ZOCOR) 40 MG tablet Take 1 tablet (40 mg total) by mouth every evening.    sertraline (ZOLOFT) 50 MG tablet Take 1 tablet (50 mg total) by mouth once daily.     Family History     None        Tobacco Use    Smoking status: Current Every Day Smoker     Packs/day: 0.50     Types: Cigarettes    Smokeless tobacco: Never Used    Tobacco comment: pt smokes about 3 cigarettes a day   Substance and Sexual Activity    Alcohol use: Yes     Alcohol/week: 24.0 standard drinks     Types: 12 Cans of beer, 12 Standard drinks or equivalent per week     Frequency: 2-3 times a week    Drug use: No    Sexual activity: Not on file     Review of Systems   Constitutional: Negative for appetite change, chills, fatigue, fever and unexpected weight change.   Respiratory: Negative for cough, chest tightness, shortness of breath and wheezing.    Cardiovascular: Negative for chest pain and palpitations.    Gastrointestinal: Negative for abdominal distention, abdominal pain, blood in stool, constipation, diarrhea, nausea and vomiting.   Genitourinary: Negative for dysuria, flank pain, frequency, hematuria and urgency.   Musculoskeletal: Positive for myalgias. Negative for arthralgias and back pain.   Skin: Positive for color change, rash and wound.   Neurological: Positive for weakness. Negative for dizziness, light-headedness, numbness and headaches.   Psychiatric/Behavioral: Negative for behavioral problems, confusion, decreased concentration and sleep disturbance. The patient is nervous/anxious.      Objective:     Vital Signs (Most Recent):  Temp: 97.5 °F (36.4 °C) (07/29/20 2231)  Pulse: 82 (07/30/20 0101)  Resp: 20 (07/30/20 0134)  BP: (!) 142/90 (07/30/20 0101)  SpO2: 100 % (07/30/20 0104) Vital Signs (24h Range):  Temp:  [97.5 °F (36.4 °C)] 97.5 °F (36.4 °C)  Pulse:  [82-89] 82  Resp:  [18-20] 20  SpO2:  [97 %-100 %] 100 %  BP: (142-148)/(86-90) 142/90     Weight: 99.7 kg (219 lb 12.8 oz)  Body mass index is 40.2 kg/m².    Physical Exam  Constitutional:       General: He is not in acute distress.     Appearance: He is well-developed. He is obese. He is not diaphoretic.   HENT:      Head: Normocephalic and atraumatic.   Eyes:      Conjunctiva/sclera: Conjunctivae normal.      Pupils: Pupils are equal, round, and reactive to light.   Neck:      Musculoskeletal: Normal range of motion and neck supple.      Vascular: No JVD.   Cardiovascular:      Rate and Rhythm: Normal rate and regular rhythm.      Pulses: Normal pulses.      Heart sounds: Normal heart sounds. No murmur.   Pulmonary:      Effort: Pulmonary effort is normal. No respiratory distress.      Breath sounds: Normal breath sounds. No wheezing.   Abdominal:      General: Bowel sounds are normal. There is no distension.      Palpations: Abdomen is soft. There is no mass.      Tenderness: There is no abdominal tenderness.   Musculoskeletal:          General: Swelling (slight LLE) present.   Skin:     Comments: Significant area of erythema overlying distal aspect of LLE weeping pus from open wounds (erythematous areas marked); much smaller area of erythema on anterior distal RLE; evidence of stasis dermatitis over both distal lower extremities   Neurological:      General: No focal deficit present.      Mental Status: He is alert and oriented to person, place, and time. Mental status is at baseline.   Psychiatric:         Mood and Affect: Mood normal.         Behavior: Behavior normal.            Significant Labs:   CBC:   Recent Labs   Lab 07/29/20  2256   WBC 9.32   HGB 14.0   HCT 40.5        CMP:   Recent Labs   Lab 07/29/20  2256      K 5.0      CO2 24   *   BUN 25*   CREATININE 1.4   CALCIUM 9.0   PROT 6.7   ALBUMIN 3.3*   BILITOT 0.5   ALKPHOS 72   AST 28   ALT 23   ANIONGAP 9   EGFRNONAA 52*       Significant Imaging: I have reviewed and interpreted all pertinent imaging results/findings within the past 24 hours.

## 2020-07-30 NOTE — PROGRESS NOTES
Individual Follow-Up Form    7/30/2020    Quit Date: To be determined    Clinical Status of Patient: Inpatient    Length of Service: 30 minutes    Comments: Smoking cessation education provided. Pt denies nicotine withdrawal symptoms. He states that he started smoking at the age of 10 or 11 and that he smokes 1 pack of cigarettes per day on average. Pt states that he is ready and motivated to quit smoking. He is currently enrolled in the [employer]. .      Diagnosis: F17.210    Next Visit:  Ambulatory referral to Smoking Cessation program following hospital discharge

## 2020-07-30 NOTE — ASSESSMENT & PLAN NOTE
/86 on presentation.   - Continue home lisinopril 10mg daily   - Continue home Coreg 6.25mg BID   - Continue home hctz

## 2020-07-30 NOTE — ED PROVIDER NOTES
Encounter Date: 7/29/2020    SCRIBE #1 NOTE: I, Shonna Chan, am scribing for, and in the presence of,  Dr. Elizabeth. I have scribed the following portions of the note - Other sections scribed: HPI and ROS.       History     Chief Complaint   Patient presents with    Leg Swelling     Pt. c/o redness and swelling to both lower legs for a few weeks. Pt. has more swelling to the left leg with wheeping. Pt. was taking Lasix before Covid and has been unable to return to physician. Denies c/o chest pain or shortness of breath.     Facial Swelling     Pt. has swelling around eyes and mouth that began today. Pt. is allergic to Iodine, but denies eating any shellfish.      Reji Guerra is a 65 y.o. male who  has a past medical history of Diabetes mellitus, type 2, Heart disease, High cholesterol, and Hypertension.    The patient presents to the ED due to bilateral leg swelling. He reports onset of symptoms was a few weeks ago. He notes increased swelling with drainage from the left leg. The patient has associated leg pain and redness but denies any fever, chills, shortness of breath, chest pain, nausea or vomiting. He admits he was taking Lasix but has been out of the medication for months.     The history is provided by the patient.     Review of patient's allergies indicates:   Allergen Reactions    Iodine and iodide containing products     Shellfish containing products      Past Medical History:   Diagnosis Date    Diabetes mellitus, type 2     Heart disease     High cholesterol     Hypertension      Past Surgical History:   Procedure Laterality Date    ANGIOPLASTY      HERNIA REPAIR       History reviewed. No pertinent family history.  Social History     Tobacco Use    Smoking status: Current Every Day Smoker     Packs/day: 0.50     Types: Cigarettes    Smokeless tobacco: Never Used    Tobacco comment: pt smokes about 3 cigarettes a day   Substance Use Topics    Alcohol use: Yes     Alcohol/week:  24.0 standard drinks     Types: 12 Cans of beer, 12 Standard drinks or equivalent per week     Frequency: 2-3 times a week    Drug use: No     Review of Systems   Constitutional: Negative for chills and fever.   Respiratory: Negative for shortness of breath.    Cardiovascular: Positive for leg swelling. Negative for chest pain.   Skin: Positive for color change and wound.   All other systems reviewed and are negative.      Physical Exam     Initial Vitals [07/29/20 2231]   BP Pulse Resp Temp SpO2   (!) 148/86 89 18 97.5 °F (36.4 °C) 97 %      MAP       --         Physical Exam    Constitutional:   Elderly, chronically ill-appearing, acute distress   HENT:   Head: Normocephalic and atraumatic.   Eyes: Pupils are equal, round, and reactive to light.   Cardiovascular: Normal rate and regular rhythm.   Pulmonary/Chest: Breath sounds normal. No respiratory distress.   Abdominal: Soft.   Soft distended obese abdomen, no guarding or rebound   Neurological: He is alert and oriented to person, place, and time.   Skin: Skin is warm and dry. Capillary refill takes less than 2 seconds.   Right leg chronic venous stasis with dermatitis noted, some skin breakdown, without any significant erythema warmth    Left lower extremity significant leg edema greater than right, erythema warmth, skin breakdown with purulent discharge warmth anterior posterior surface concerning for cellulitis   Psychiatric: He has a normal mood and affect. Thought content normal.         ED Course   Procedures  Labs Reviewed   CBC W/ AUTO DIFFERENTIAL - Abnormal; Notable for the following components:       Result Value    RBC 3.81 (*)     Mean Corpuscular Volume 106 (*)     Mean Corpuscular Hemoglobin 36.7 (*)     Lymph # 0.9 (*)     Gran% 78.2 (*)     Lymph% 10.0 (*)     All other components within normal limits   COMPREHENSIVE METABOLIC PANEL - Abnormal; Notable for the following components:    Glucose 128 (*)     BUN, Bld 25 (*)     Albumin 3.3 (*)      eGFR if non  52 (*)     All other components within normal limits   POCT GLUCOSE - Abnormal; Notable for the following components:    POCT Glucose 131 (*)     All other components within normal limits   CULTURE, BLOOD   CULTURE, BLOOD   CULTURE, AEROBIC  (SPECIFY SOURCE)   CULTURE, ANAEROBIC   GRAM STAIN   SARS-COV-2 RNA AMPLIFICATION, QUAL   POCT GLUCOSE MONITORING CONTINUOUS          Imaging Results          US Lower Extremity Veins Bilateral (Final result)  Result time 07/30/20 03:05:49    Final result by Jaxon Hyman MD (07/30/20 03:05:49)                 Impression:      No evidence of deep venous thrombosis in either lower extremity.    Right popliteal fossa cyst.      Electronically signed by: Jaxon Hyman MD  Date:    07/30/2020  Time:    03:05             Narrative:    EXAMINATION:  US LOWER EXTREMITY VEINS BILATERAL    CLINICAL HISTORY:  Other specified soft tissue disorders    TECHNIQUE:  Duplex and color flow Doppler and dynamic compression was performed of the bilateral lower extremity veins was performed.    COMPARISON:  09/16/2018    FINDINGS:  Right thigh veins: The common femoral, femoral, popliteal, upper greater saphenous, and deep femoral veins are patent and free of thrombus. The veins are normally compressible and have normal phasic flow and augmentation response.    Right calf veins: The visualized calf veins are patent.    Left thigh veins: The common femoral, femoral, popliteal, upper greater saphenous, and deep femoral veins are patent and free of thrombus. The veins are normally compressible and have normal phasic flow and augmentation response.    Left calf veins: The visualized calf veins are patent.    Miscellaneous: There is a hypoechoic structure without associated vascularity identified within the right popliteal fossa suggestive of a popliteal cyst.  This measures 2.8 x 2.9 x 1.5 cm.                                 Medical Decision Making:   Initial  Assessment:   65-year-old male history of hypertension, diabetes, hyperlipidemia, presents the ER for evaluation left lower leg pain and swelling.  Chronic condition which was exacerbated the last few weeks.  Patient reports has history of lymphedema but has not been taking Lasix.  Reports he has been having skin breakdown with purulent discharge in worsening pain on his leg which concern to come to ER.  His left lower leg is larger than the right with significant warmth erythema and purulent discharge.  Concerning for cellulitis.  Other differential includes DVT, less likely necrotizing fasciitis or other cause.  Will obtain blood work pain control IV antibiotics and plan admission.  Patient understands agrees with plan.  Clinical Tests:   Lab Tests: Ordered and Reviewed                                 Clinical Impression:     1. Left leg cellulitis    2. Leg swelling        Disposition:   Disposition: Admitted  Condition: Fair     ED Disposition Condition    Observation            My Scribe Attestation: I acknowledge that the documentation on this chart was provided by described on the date of service noted above and that the documentation in the chart accurately reflects work and decisions made by me alone.               Sanjiv Elizabeth MD  07/30/20 0359

## 2020-07-30 NOTE — PROVIDER PROGRESS NOTES - EMERGENCY DEPT.
Emergency Department TeleTRIAGE Encounter Note      CHIEF COMPLAINT    Chief Complaint   Patient presents with    Leg Swelling     Pt. c/o redness and swelling to both lower legs for a few weeks. Pt. has more swelling to the left leg with wheeping. Pt. was taking Lasix before Covid and has been unable to return to physician. Denies c/o chest pain or shortness of breath.     Facial Swelling     Pt. has swelling around eyes and mouth that began today. Pt. is allergic to Iodine, but denies eating any shellfish.        VITAL SIGNS   Initial Vitals [07/29/20 2231]   BP Pulse Resp Temp SpO2   (!) 148/86 89 18 97.5 °F (36.4 °C) 97 %      MAP       --            ALLERGIES    Review of patient's allergies indicates:   Allergen Reactions    Iodine and iodide containing products     Shellfish containing products        PROVIDER TRIAGE NOTE  Patient is a 65-year-old male with a past medical history of hypertension, diabetes, presenting to the emergency department for evaluation of cellulitis.  States he has had an episode of this in the past.  States he is in extreme pain is requesting a pain shot.  Also states that he had some intermittent swelling his face.  No difficulty swallowing handling oral secretions.  Basic labs ordered.      ORDERS  Labs Reviewed - No data to display    ED Orders (720h ago, onward)    None            Virtual Visit Note: The provider triage portion of this emergency department evaluation and documentation was performed via [employer], a HIPAA-compliant telemedicine application, in concert with a tele-presenter in the room. A face to face patient evaluation with one of my colleagues will occur once the patient is placed in an emergency department room.      DISCLAIMER: This note was prepared with [employer] voice recognition transcription software. Garbled syntax, mangled pronouns, and other bizarre constructions may be attributed to that software system.

## 2020-07-30 NOTE — HPI
Patient is a 65 y.o. male with PMHx of CAD s/p 3 vessel stenting, HTN, DM, chronic venous insufficiency who presented with complaints of LLE swelling, pain. Patient reports for the previous 2 weeks he has noted increased swelling to his bilateral lower extremities but especially his left lower extremity.  Patient was previously seen by PCP who started him on 20 mg Lasix p.o. daily but patient did not take these daily, he was hoarding them and had not taken them for at least 2 weeks prior to presenting to the emergency department.  Patient, over the same time., noticed increased redness overlying the left lower extremity as well.  Patient had a chronic venous stasis wound to this limb and the area of erythema grew to encompass the wound and grew over the majority of the shin.  This area of erythema and swelling then was noted to begin to be weeping fluid.  This prompted the patient to present to the emergency department.  Of note, the patient has a history of cellulitis to the left lower extremity, reportedly last episode was about 4 years ago that required hospitalization and 1 episode in the intervening period that did not require hospitalization.    Upon arrival to the ED, patient's vital signs were stable and patient afebrile.  CBC did not show leukocytosis, and CMP was largely within normal limits except for slight increase in BUN/creatinine, up from baseline 30/1.0.  Patient started on vancomycin and ceftriaxone in the ED for likely cellulitis.

## 2020-07-30 NOTE — PLAN OF CARE
TN met with pt   lives with roommate Matt Rojas - pt will find phone # for TN     sister Verito Guerra   lives in [place]   pt drove self to hospital - hopes to drive home at d/c      pt went to [facility] in 2017    dx:  L lower foot cellulitis      no hh at this time, pt has a rw and st cane.         07/30/20 7144   Discharge Assessment   Assessment Type Discharge Planning Assessment   Confirmed/corrected address and phone number on facesheet? Yes   Assessment information obtained from? Patient;Medical Record   Expected Length of Stay (days) 3   Communicated expected length of stay with patient/caregiver yes   Prior to hospitilization cognitive status: Alert/Oriented   Prior to hospitalization functional status: Assistive Equipment   Current cognitive status: Alert/Oriented   Current Functional Status: Assistive Equipment   Lives With other (see comments)  (roommate Matt Rojas -- will give # to TN in am)   Is patient able to care for self after discharge? Unable to determine at this time (comments)   Who are your caregiver(s) and their phone number(s)? roommate Matt Rojas (pt looking for phone #);   sister Sima Guerra      Patient's perception of discharge disposition home or selfcare   Readmission Within the Last 30 Days no previous admission in last 30 days   Patient currently being followed by outpatient case management? No   Patient currently receives any other outside agency services? No   Equipment Currently Used at Home walker, rolling;cane, straight   Do you have any problems affording any of your prescribed medications? No  ([facility])   Is the patient taking medications as prescribed? yes   Does the patient have transportation home? Yes   Transportation Anticipated car, drives self   Discharge Plan A Home   Discharge Plan B Home;Home Health   DME Needed Upon Discharge    (tbd)   Patient/Family in Agreement with Plan yes

## 2020-07-30 NOTE — PT/OT/SLP EVAL
Occupational Therapy   Evaluation    Name: Reji Guerra  MRN: [id number]  Admitting Diagnosis:  Cellulitis of left lower extremity without foot      Recommendations:     Discharge Recommendations: (TBD pending progress; HHOT/PT vs SNF)  Discharge Equipment Recommendations:  (possibly TTB BSC)  Barriers to discharge:  Decreased caregiver support    Assessment:     Reji Guerra is a 65 y.o. male with a medical diagnosis of Cellulitis of left lower extremity without foot.  He presents with pain LLE, c/o stomach pain 2/2 hunger. Performance deficits affecting function: weakness, impaired endurance, impaired self care skills, impaired functional mobilty, gait instability, impaired balance, decreased lower extremity function, decreased upper extremity function, decreased safety awareness, impaired skin.      Rehab Prognosis: Good; patient would benefit from acute skilled OT services to address these deficits and reach maximum level of function.       Plan:     Patient to be seen 5 x/week to address the above listed problems via self-care/home management, therapeutic activities, therapeutic exercises  · Plan of Care Expires: 08/30/20  · Plan of Care Reviewed with: patient    Subjective     Chief Complaint: Pt states that he is upset because his breakfast got cold this AM and now he is worried that his lunch will get cold while working with therapies  Patient/Family Comments/goals: To eat something so that he can have pain medications (to avoid an upset stomach from pain medications)    Occupational Profile:  Living Environment: Pt lives with roommate in [facility], [facility], [facility]sh combo with [place]  Previous level of function: Mod I ADLs and functional mobility   Roles and Routines: Caretaker to self and home. Cooks, cleans, drives, completes own grocery shopping.  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 but ha been home since the pandemic started  Equipment Used at Home:  walker, rolling,  shower chair  Assistance upon Discharge: Limited, roommate is able to provide intermittent supervision but pt reports roommate is not really a friend and works full time    Pain/Comfort:  Pain Rating 1: 6/10  Location - Side 1: Left  Location - Orientation 1: lower  Location 1: leg  Pain Addressed 1: Reposition, Distraction, Cessation of Activity, Nurse notified  Pain Rating Post-Intervention 1: 6/10    Patients cultural, spiritual, Oriental orthodox conflicts given the current situation:      Objective:     Communicated with: nsg prior to session.  Patient found HOB elevated with telemetry, peripheral IV upon OT entry to room.    General Precautions: Standard, fall   Orthopedic Precautions:N/A   Braces: N/A     Occupational Performance:    Bed Mobility:    · Patient completed Rolling/Turning to Left with  minimum assistance  · Patient completed Scooting/Bridging with stand by assistance  · Patient completed Supine to Sit with minimum assistance  · Patient completed Sit to Supine with minimum assistance    Functional Mobility/Transfers:  · Patient completed Sit <> Stand Transfer with stand by assistance and contact guard assistance  with  hand-held assist   · Patient completed Toilet Transfer Step Transfer technique with contact guard assistance with  rolling walker  Functional Mobility: Pt with fair to fair- dynamic seated and standing balance.  ·  Pt took 2-3 small steps to HOB with use of BUE support on touch points on bed throughout steps    Activities of Daily Living:  · Feeding:  modified independence to self feed from tray and open packages on tray  · Upper Body Dressing: minimum assistance to don/doff gown as robe seated EOB    Cognitive/Visual Perceptual:  Cognitive/Psychosocial Skills:     -       Oriented to: Person, Place, Time and Situation   -       Follows Commands/attention:Follows one to two   Commands but demonstrates possible limited command following in MMT  -       Communication: clear/fluent  -        "Memory: No Deficits noted  -       Safety awareness/insight to disability: impaired   -       Mood/Affect/Coping skills/emotional control: Appropriate to situation, anxious    Physical Exam:  Postural examination/scapula alignment:    -       Rounded shoulders, forward head  Skin integrity: BLE red, swollen; LLE scaly/scabbed  Motor Planning:    -       WFL  Dominant hand:    -       R handed  Upper Extremity Range of Motion: BUE WFL     Upper Extremity Strength:  BUE grossly 4-/5Fine Motor Coordination:    -       Intact  Gross motor coordination:   WFL   AMPAC 6 Click ADL:  AMPAC Total Score: 18    Treatment & Education:  Pt educated on role of OT and POC.   Pt performing skills as listed above.  Pt reports that he has RW and SPC in car but that he does not generally need to use these devices. Pt becoming increasingly agitated as session continued stating that he fears he will not be able to eat his food as it would be cold. Pt repeating self and when redirected to continue evaluation and return to seated upright in bed pt began stating, "I never do anything right. I haven't done anything right since I got here." Pt reassured and supportive listening provided.   Education:    Patient left HOB elevated with all lines intact, call button in reach, bed alarm on and nsg notified    GOALS:   Multidisciplinary Problems     Occupational Therapy Goals        Problem: Occupational Therapy Goal    Goal Priority Disciplines Outcome Interventions   Occupational Therapy Goal     OT, PT/OT Ongoing, Progressing    Description: Goals to be met by: 08/30/2020      Patient will increase functional independence with ADLs by performing:    UE Dressing with Modified [place].  LE Dressing with Modified [place].  Grooming while standing with Modified [place].  Toileting from toilet or bedside commode with Modified [place] for hygiene and clothing management.   Toilet transfer to toilet or bedside commode with " Modified [place].  Increased functional strength to WFL for self care skills.  Upper extremity exercise program x10 reps per handout, with independence.                      History:     Past Medical History:   Diagnosis Date    Diabetes mellitus, type 2     Heart disease     High cholesterol     Hypertension          Past Surgical History:   Procedure Laterality Date    ANGIOPLASTY      HERNIA REPAIR         Time Tracking:     OT Date of Treatment: 07/30/20  OT Start Time: 1138  OT Stop Time: 1157  OT Total Time (min): 19 min    Billable Minutes:Evaluation 19    Queenie Barton OT  7/30/2020

## 2020-07-30 NOTE — PLAN OF CARE
Problem: Occupational Therapy Goal  Goal: Occupational Therapy Goal  Description: Goals to be met by: 08/30/2020      Patient will increase functional independence with ADLs by performing:    UE Dressing with Modified [place].  LE Dressing with Modified [place].  Grooming while standing with Modified [place].  Toileting from toilet or bedside commode with Modified [place] for hygiene and clothing management.   Toilet transfer to toilet or bedside commode with Modified [place].  Increased functional strength to WFL for self care skills.  Upper extremity exercise program x10 reps per handout, with independence.     Outcome: Ongoing, Progressing   Pt would benefit from continued OT to address deficits in self care and functional mobility. Recommendations TBD pending progress, possibly HHOT/PT vs SNF; DME needs likely TTB, BSC

## 2020-07-30 NOTE — ASSESSMENT & PLAN NOTE
Patient with reported anxiety and seemingly quite anxious throughout interview and exam. Previously on Lexapro but could not tolerate GI side effects. Requesting something else for anxiety now.   - Hydroxyzine PRN    - Consider starting another SSRI pending conversation with patient about likely short nature of GI side-effects

## 2020-07-30 NOTE — PROGRESS NOTES
Pharmacokinetic Initial Assessment: IV Vancomycin    Assessment/Plan:    Initiate intravenous vancomycin with loading dose of 2250 mg once   Please contact pharmacy at extension [phone] with any questions regarding this assessment.     Thank you for the consult,   Sohan Lauren       Patient brief summary:  Reji Guerra is a 65 y.o. male initiated on antimicrobial therapy with IV Vancomycin for treatment of suspected skin & soft tissue infection    Drug Allergies:   Review of patient's allergies indicates:   Allergen Reactions    Iodine and iodide containing products     Shellfish containing products        Actual Body Weight:   99kg    Renal Function:   Estimated Creatinine Clearance: 52.6 mL/min (based on SCr of 1.4 mg/dL).,     Dialysis Method (if applicable):  N/A    CBC (last 72 hours):  Recent Labs   Lab Result Units 07/29/20  2256   WBC K/uL 9.32   Hemoglobin g/dL 14.0   Hematocrit % 40.5   Platelets K/uL 213   Gran% % 78.2*   Lymph% % 10.0*   Mono% % 5.5   Eosinophil% % 5.6   Basophil% % 0.3   Differential Method  Automated       Metabolic Panel (last 72 hours):  Recent Labs   Lab Result Units 07/29/20  2256   Sodium mmol/L 136   Potassium mmol/L 5.0   Chloride mmol/L 103   CO2 mmol/L 24   Glucose mg/dL 128*   BUN, Bld mg/dL 25*   Creatinine mg/dL 1.4   Albumin g/dL 3.3*   Total Bilirubin mg/dL 0.5   Alkaline Phosphatase U/L 72   AST U/L 28   ALT U/L 23       Drug levels (last 3 results):  No results for input(s): VANCOMYCINRA, VANCOMYCINPE, VANCOMYCINTR in the last 72 hours.    Microbiologic Results:  Microbiology Results (last 7 days)       Procedure Component Value Units Date/Time    Blood Culture #1 **CANNOT BE ORDERED STAT** [[id number]]     Order Status: No result Specimen: Blood     Blood Culture #2 **CANNOT BE ORDERED STAT** [[id number]]     Order Status: No result Specimen: Blood

## 2020-07-30 NOTE — H&P
[facility] Medicine  History & Physical    Patient Name: Reji Guerra  MRN: [id number]  Admission Date: 7/30/2020  Attending Physician: Josr Kelly III, MD   Primary Care Provider: Smitha Hernandez MD         Patient information was obtained from patient, past medical records and ER records.     Subjective:     Principal Problem:Cellulitis of left lower extremity without foot    Chief Complaint:   Chief Complaint   Patient presents with    Leg Swelling     Pt. c/o redness and swelling to both lower legs for a few weeks. Pt. has more swelling to the left leg with wheeping. Pt. was taking Lasix before Covid and has been unable to return to physician. Denies c/o chest pain or shortness of breath.     Facial Swelling     Pt. has swelling around eyes and mouth that began today. Pt. is allergic to Iodine, but denies eating any shellfish.         HPI: Patient is a 65 y.o. male with PMHx of CAD s/p 3 vessel stenting, HTN, DM, chronic venous insufficiency who presented with complaints of LLE swelling, pain. Patient reports for the previous 2 weeks he has noted increased swelling to his bilateral lower extremities but especially his left lower extremity.  Patient was previously seen by PCP who started him on 20 mg Lasix p.o. daily but patient did not take these daily, he was hoarding them and had not taken them for at least 2 weeks prior to presenting to the emergency department.  Patient, over the same time., noticed increased redness overlying the left lower extremity as well.  Patient had a chronic venous stasis wound to this limb and the area of erythema grew to encompass the wound and grew over the majority of the shin.  This area of erythema and swelling then was noted to begin to be weeping fluid.  This prompted the patient to present to the emergency department.  Of note, the patient has a history of cellulitis to the left lower extremity, reportedly last episode was about 4  years ago that required hospitalization and 1 episode in the intervening period that did not require hospitalization.    Upon arrival to the ED, patient's vital signs were stable and patient afebrile.  CBC did not show leukocytosis, and CMP was largely within normal limits except for slight increase in BUN/creatinine, up from baseline 30/1.0.  Patient started on vancomycin and ceftriaxone in the ED for likely cellulitis.      Past Medical History:   Diagnosis Date    Diabetes mellitus, type 2     Heart disease     High cholesterol     Hypertension        Past Surgical History:   Procedure Laterality Date    ANGIOPLASTY      HERNIA REPAIR         Review of patient's allergies indicates:   Allergen Reactions    Iodine and iodide containing products     Shellfish containing products        No current facility-administered medications on file prior to encounter.      Current Outpatient Medications on File Prior to Encounter   Medication Sig    aspirin (ECOTRIN) 81 MG EC tablet Take 81 mg by mouth once daily.    carvediloL (COREG) 6.25 MG tablet     furosemide (LASIX) 20 MG tablet Take 1 tablet (20 mg total) by mouth once daily. Take in the morning    hydroCHLOROthiazide (HYDRODIURIL) 25 MG tablet Take 1 tablet (25 mg total) by mouth once daily.    lisinopril 10 MG tablet Take 1 tablet (10 mg total) by mouth once daily.    metFORMIN (GLUCOPHAGE) 500 MG tablet Take 1 tablet (500 mg total) by mouth 2 (two) times daily with meals.    simvastatin (ZOCOR) 40 MG tablet Take 1 tablet (40 mg total) by mouth every evening.    sertraline (ZOLOFT) 50 MG tablet Take 1 tablet (50 mg total) by mouth once daily.     Family History     None        Tobacco Use    Smoking status: Current Every Day Smoker     Packs/day: 0.50     Types: Cigarettes    Smokeless tobacco: Never Used    Tobacco comment: pt smokes about 3 cigarettes a day   Substance and Sexual Activity    Alcohol use: Yes     Alcohol/week: 24.0 standard  drinks     Types: 12 Cans of beer, 12 Standard drinks or equivalent per week     Frequency: 2-3 times a week    Drug use: No    Sexual activity: Not on file     Review of Systems   Constitutional: Negative for appetite change, chills, fatigue, fever and unexpected weight change.   Respiratory: Negative for cough, chest tightness, shortness of breath and wheezing.    Cardiovascular: Negative for chest pain and palpitations.   Gastrointestinal: Negative for abdominal distention, abdominal pain, blood in stool, constipation, diarrhea, nausea and vomiting.   Genitourinary: Negative for dysuria, flank pain, frequency, hematuria and urgency.   Musculoskeletal: Positive for myalgias. Negative for arthralgias and back pain.   Skin: Positive for color change, rash and wound.   Neurological: Positive for weakness. Negative for dizziness, light-headedness, numbness and headaches.   Psychiatric/Behavioral: Negative for behavioral problems, confusion, decreased concentration and sleep disturbance. The patient is nervous/anxious.      Objective:     Vital Signs (Most Recent):  Temp: 97.5 °F (36.4 °C) (07/29/20 2231)  Pulse: 82 (07/30/20 0101)  Resp: 20 (07/30/20 0134)  BP: (!) 142/90 (07/30/20 0101)  SpO2: 100 % (07/30/20 0104) Vital Signs (24h Range):  Temp:  [97.5 °F (36.4 °C)] 97.5 °F (36.4 °C)  Pulse:  [82-89] 82  Resp:  [18-20] 20  SpO2:  [97 %-100 %] 100 %  BP: (142-148)/(86-90) 142/90     Weight: 99.7 kg (219 lb 12.8 oz)  Body mass index is 40.2 kg/m².    Physical Exam  Constitutional:       General: He is not in acute distress.     Appearance: He is well-developed. He is obese. He is not diaphoretic.   HENT:      Head: Normocephalic and atraumatic.   Eyes:      Conjunctiva/sclera: Conjunctivae normal.      Pupils: Pupils are equal, round, and reactive to light.   Neck:      Musculoskeletal: Normal range of motion and neck supple.      Vascular: No JVD.   Cardiovascular:      Rate and Rhythm: Normal rate and regular  rhythm.      Pulses: Normal pulses.      Heart sounds: Normal heart sounds. No murmur.   Pulmonary:      Effort: Pulmonary effort is normal. No respiratory distress.      Breath sounds: Normal breath sounds. No wheezing.   Abdominal:      General: Bowel sounds are normal. There is no distension.      Palpations: Abdomen is soft. There is no mass.      Tenderness: There is no abdominal tenderness.   Musculoskeletal:         General: Swelling (slight LLE) present.   Skin:     Comments: Significant area of erythema overlying distal aspect of LLE weeping pus from open wounds (erythematous areas marked); much smaller area of erythema on anterior distal RLE; evidence of stasis dermatitis over both distal lower extremities   Neurological:      General: No focal deficit present.      Mental Status: He is alert and oriented to person, place, and time. Mental status is at baseline.   Psychiatric:         Mood and Affect: Mood normal.         Behavior: Behavior normal.            Significant Labs:   CBC:   Recent Labs   Lab 07/29/20  2256   WBC 9.32   HGB 14.0   HCT 40.5        CMP:   Recent Labs   Lab 07/29/20  2256      K 5.0      CO2 24   *   BUN 25*   CREATININE 1.4   CALCIUM 9.0   PROT 6.7   ALBUMIN 3.3*   BILITOT 0.5   ALKPHOS 72   AST 28   ALT 23   ANIONGAP 9   EGFRNONAA 52*       Significant Imaging: I have reviewed and interpreted all pertinent imaging results/findings within the past 24 hours.    Assessment/Plan:     * Cellulitis of left lower extremity without foot  Patient with worsening swelling erythema to left lower extremity over the previous 2 weeks if not longer.  Area of erythema extends from ankle to just distal to the knee.  There are areas of wounds that are weeping pus.  Patient with likely cellulitis most likely secondary to chronic venous insufficiency.   - Vancomycin    - Zosyn   - Consider consulting wound care depending on chronic wound status s/p antibiotic and  improvement          Chronic venous insufficiency  Patient reportedly with a significant hx of chronic venous insufficiency to the bilateral lower extremities. Patient has been previously evaluated by interventional cardiology.    - Lasix 20mg PO BID   - Continue to monitor   - Likely needs local wound care for chronic wounds/statsis dermatitis changes      Hypertension associated with diabetes  /86 on presentation.   - Continue home lisinopril 10mg daily   - Continue home Coreg 6.25mg BID   - Continue home hctz      Type 2 diabetes mellitus with hyperglycemia, without long-term current use of insulin  Patient on 500mg Metformin BID at home. Reported fasting BG about 120.   - Low dose sliding scale   - diabetic diet    Coronary artery disease involving native coronary artery of native heart without angina pectoris  Patient with a history of CAD with 3 vessel stenting about 20 years ago. Denies any recent chest pain or shortness of breath.   - Continue home ASA 81mg daily    Anxiety  Patient with reported anxiety and seemingly quite anxious throughout interview and exam. Previously on Lexapro but could not tolerate GI side effects. Requesting something else for anxiety now.   - Hydroxyzine PRN    - Consider starting another SSRI pending conversation with patient about likely short nature of GI side-effects      VTE Risk Mitigation (From admission, onward)    Lovenox 40mg daily             Horace Degroot MD PGY-II  Department of Hospital Medicine   [facility]

## 2020-07-31 LAB
ALBUMIN SERPL BCP-MCNC: 2.8 G/DL (ref 3.5–5.2)
ALP SERPL-CCNC: 57 U/L (ref 55–135)
ALT SERPL W/O P-5'-P-CCNC: 18 U/L (ref 10–44)
ANION GAP SERPL CALC-SCNC: 8 MMOL/L (ref 8–16)
AST SERPL-CCNC: 20 U/L (ref 10–40)
BASOPHILS # BLD AUTO: 0.02 K/UL (ref 0–0.2)
BASOPHILS NFR BLD: 0.2 % (ref 0–1.9)
BILIRUB SERPL-MCNC: 0.4 MG/DL (ref 0.1–1)
BUN SERPL-MCNC: 28 MG/DL (ref 8–23)
CALCIUM SERPL-MCNC: 8.6 MG/DL (ref 8.7–10.5)
CHLORIDE SERPL-SCNC: 99 MMOL/L (ref 95–110)
CO2 SERPL-SCNC: 31 MMOL/L (ref 23–29)
CREAT SERPL-MCNC: 1.4 MG/DL (ref 0.5–1.4)
DIFFERENTIAL METHOD: ABNORMAL
EOSINOPHIL # BLD AUTO: 0.6 K/UL (ref 0–0.5)
EOSINOPHIL NFR BLD: 7.1 % (ref 0–8)
ERYTHROCYTE [DISTWIDTH] IN BLOOD BY AUTOMATED COUNT: 13.6 % (ref 11.5–14.5)
EST. GFR  (AFRICAN AMERICAN): 60 ML/MIN/1.73 M^2
EST. GFR  (NON AFRICAN AMERICAN): 52 ML/MIN/1.73 M^2
GLUCOSE SERPL-MCNC: 140 MG/DL (ref 70–110)
HCT VFR BLD AUTO: 37.1 % (ref 40–54)
HGB BLD-MCNC: 12.4 G/DL (ref 14–18)
IMM GRANULOCYTES # BLD AUTO: 0.04 K/UL (ref 0–0.04)
IMM GRANULOCYTES NFR BLD AUTO: 0.5 % (ref 0–0.5)
LYMPHOCYTES # BLD AUTO: 1.1 K/UL (ref 1–4.8)
LYMPHOCYTES NFR BLD: 12.2 % (ref 18–48)
MAGNESIUM SERPL-MCNC: 1.6 MG/DL (ref 1.6–2.6)
MCH RBC QN AUTO: 36.4 PG (ref 27–31)
MCHC RBC AUTO-ENTMCNC: 33.4 G/DL (ref 32–36)
MCV RBC AUTO: 109 FL (ref 82–98)
MONOCYTES # BLD AUTO: 0.5 K/UL (ref 0.3–1)
MONOCYTES NFR BLD: 5.4 % (ref 4–15)
NEUTROPHILS # BLD AUTO: 6.6 K/UL (ref 1.8–7.7)
NEUTROPHILS NFR BLD: 74.6 % (ref 38–73)
NRBC BLD-RTO: 0 /100 WBC
PHOSPHATE SERPL-MCNC: 3.5 MG/DL (ref 2.7–4.5)
PLATELET # BLD AUTO: 233 K/UL (ref 150–350)
PMV BLD AUTO: 10.4 FL (ref 9.2–12.9)
POCT GLUCOSE: 101 MG/DL (ref 70–110)
POCT GLUCOSE: 139 MG/DL (ref 70–110)
POCT GLUCOSE: 141 MG/DL (ref 70–110)
POCT GLUCOSE: 146 MG/DL (ref 70–110)
POCT GLUCOSE: 152 MG/DL (ref 70–110)
POTASSIUM SERPL-SCNC: 4.1 MMOL/L (ref 3.5–5.1)
PROT SERPL-MCNC: 5.9 G/DL (ref 6–8.4)
RBC # BLD AUTO: 3.41 M/UL (ref 4.6–6.2)
SODIUM SERPL-SCNC: 138 MMOL/L (ref 136–145)
WBC # BLD AUTO: 8.85 K/UL (ref 3.9–12.7)

## 2020-07-31 PROCEDURE — 25000003 PHARM REV CODE 250: Performed by: STUDENT IN AN ORGANIZED HEALTH CARE EDUCATION/TRAINING PROGRAM

## 2020-07-31 PROCEDURE — 96361 HYDRATE IV INFUSION ADD-ON: CPT

## 2020-07-31 PROCEDURE — 25000003 PHARM REV CODE 250: Performed by: FAMILY MEDICINE

## 2020-07-31 PROCEDURE — 80053 COMPREHEN METABOLIC PANEL: CPT

## 2020-07-31 PROCEDURE — 63600175 PHARM REV CODE 636 W HCPCS: Performed by: FAMILY MEDICINE

## 2020-07-31 PROCEDURE — 94761 N-INVAS EAR/PLS OXIMETRY MLT: CPT

## 2020-07-31 PROCEDURE — 83735 ASSAY OF MAGNESIUM: CPT

## 2020-07-31 PROCEDURE — 84100 ASSAY OF PHOSPHORUS: CPT

## 2020-07-31 PROCEDURE — 63600175 PHARM REV CODE 636 W HCPCS: Performed by: STUDENT IN AN ORGANIZED HEALTH CARE EDUCATION/TRAINING PROGRAM

## 2020-07-31 PROCEDURE — 85025 COMPLETE CBC W/AUTO DIFF WBC: CPT

## 2020-07-31 PROCEDURE — 97535 SELF CARE MNGMENT TRAINING: CPT

## 2020-07-31 PROCEDURE — 96376 TX/PRO/DX INJ SAME DRUG ADON: CPT

## 2020-07-31 PROCEDURE — 97530 THERAPEUTIC ACTIVITIES: CPT | Mod: CQ

## 2020-07-31 PROCEDURE — 36415 COLL VENOUS BLD VENIPUNCTURE: CPT

## 2020-07-31 PROCEDURE — 97110 THERAPEUTIC EXERCISES: CPT | Mod: CQ

## 2020-07-31 PROCEDURE — G0378 HOSPITAL OBSERVATION PER HR: HCPCS

## 2020-07-31 PROCEDURE — 96372 THER/PROPH/DIAG INJ SC/IM: CPT

## 2020-07-31 RX ORDER — SODIUM CHLORIDE, SODIUM LACTATE, POTASSIUM CHLORIDE, CALCIUM CHLORIDE 600; 310; 30; 20 MG/100ML; MG/100ML; MG/100ML; MG/100ML
INJECTION, SOLUTION INTRAVENOUS CONTINUOUS
Status: DISCONTINUED | OUTPATIENT
Start: 2020-07-31 | End: 2020-07-31

## 2020-07-31 RX ORDER — AMOXICILLIN AND CLAVULANATE POTASSIUM 875; 125 MG/1; MG/1
1 TABLET, FILM COATED ORAL EVERY 12 HOURS
Status: DISCONTINUED | OUTPATIENT
Start: 2020-07-31 | End: 2020-08-01

## 2020-07-31 RX ADMIN — SERTRALINE HYDROCHLORIDE 50 MG: 50 TABLET ORAL at 10:07

## 2020-07-31 RX ADMIN — ASPIRIN 81 MG: 81 TABLET, COATED ORAL at 10:07

## 2020-07-31 RX ADMIN — SODIUM CHLORIDE, SODIUM LACTATE, POTASSIUM CHLORIDE, AND CALCIUM CHLORIDE: .6; .31; .03; .02 INJECTION, SOLUTION INTRAVENOUS at 01:07

## 2020-07-31 RX ADMIN — MORPHINE SULFATE 4 MG: 4 INJECTION INTRAVENOUS at 04:07

## 2020-07-31 RX ADMIN — ENOXAPARIN SODIUM 40 MG: 40 INJECTION SUBCUTANEOUS at 04:07

## 2020-07-31 RX ADMIN — SODIUM CHLORIDE, SODIUM LACTATE, POTASSIUM CHLORIDE, AND CALCIUM CHLORIDE: .6; .31; .03; .02 INJECTION, SOLUTION INTRAVENOUS at 08:07

## 2020-07-31 RX ADMIN — INSULIN ASPART 2 UNITS: 100 INJECTION, SOLUTION INTRAVENOUS; SUBCUTANEOUS at 12:07

## 2020-07-31 RX ADMIN — PIPERACILLIN AND TAZOBACTAM 4.5 G: 4; .5 INJECTION, POWDER, LYOPHILIZED, FOR SOLUTION INTRAVENOUS; PARENTERAL at 06:07

## 2020-07-31 RX ADMIN — HYDROXYZINE HYDROCHLORIDE 50 MG: 25 TABLET, FILM COATED ORAL at 01:07

## 2020-07-31 RX ADMIN — AMOXICILLIN AND CLAVULANATE POTASSIUM 1 TABLET: 875; 125 TABLET, FILM COATED ORAL at 10:07

## 2020-07-31 RX ADMIN — VANCOMYCIN HYDROCHLORIDE 2000 MG: 100 INJECTION, POWDER, LYOPHILIZED, FOR SOLUTION INTRAVENOUS at 04:07

## 2020-07-31 RX ADMIN — MORPHINE SULFATE 4 MG: 4 INJECTION INTRAVENOUS at 10:07

## 2020-07-31 RX ADMIN — HYDROXYZINE HYDROCHLORIDE 50 MG: 25 TABLET, FILM COATED ORAL at 08:07

## 2020-07-31 RX ADMIN — CARVEDILOL 6.25 MG: 6.25 TABLET, FILM COATED ORAL at 08:07

## 2020-07-31 RX ADMIN — CARVEDILOL 6.25 MG: 6.25 TABLET, FILM COATED ORAL at 10:07

## 2020-07-31 RX ADMIN — Medication 1 TABLET: at 10:07

## 2020-07-31 RX ADMIN — AMOXICILLIN AND CLAVULANATE POTASSIUM 1 TABLET: 875; 125 TABLET, FILM COATED ORAL at 08:07

## 2020-07-31 NOTE — PLAN OF CARE
AAO x4. Scheduled medications administered per MD order. Prn pain and anxiety medication administered. Afebrile. Blood glucose monitored. Prn insulin administered. Safety maintained. Will continue to monitor.

## 2020-07-31 NOTE — PT/OT/SLP EVAL
Physical Therapy Evaluation/Treatment    Patient Name:  Reji Guerra   MRN:  [id number]    Recommendations:     Discharge Recommendations:  (TBD pending progress (possible HH PT/OT vs SNF))   Discharge Equipment Recommendations: (possibly TTB and BSC)   Barriers to discharge: Decreased caregiver support    Assessment:     Reji Guerra is a 65 y.o. male admitted with a medical diagnosis of Cellulitis of left lower extremity without foot.  He presents with the following impairments/functional limitations:  weakness, impaired endurance, impaired self care skills, impaired functional mobilty, gait instability, impaired balance, decreased upper extremity function, decreased lower extremity function, decreased safety awareness, impaired skin pt very tired but able to use RW to amb with PWB LLE to reduce pain; d/c recs TBD pending progress (possible HH PT/OT vs SNF); will cont with POC..    Rehab Prognosis: Good; patient would benefit from acute skilled PT services to address these deficits and reach maximum level of function.    Recent Surgery: * No surgery found *      Plan:     During this hospitalization, patient to be seen 6 x/week to address the identified rehab impairments via gait training, therapeutic activities, therapeutic exercises, neuromuscular re-education and progress toward the following goals:    · Plan of Care Expires:  08/30/20    Subjective     Chief Complaint: Pt c/o not getting enough rest last night  Patient/Family Comments/goals: he was able to eat something so he could take his pain meds and avoid and upset stomach  Pain/Comfort:  · Pain Rating 1: 7/10  · Location - Side 1: Left  · Location - Orientation 1: lower  · Location 1: leg  · Pain Addressed 1: Reposition, Distraction, Cessation of Activity, Nurse notified  · Pain Rating Post-Intervention 1: 6/10(with movement)  · Location - Side 2: Left  · Location - Orientation 2: lower  · Location 2: leg  · Pain Addressed 2: Reposition, Distraction,  Cessation of Activity  · Pain Rating Post-Intervention 2: (did not rate, but more comfortable after repositioning)    Patients cultural, spiritual, Yazidism conflicts given the current situation: no    Living Environment:  [place], [place]/sh combo with [place]  Previous level of function: Mod I ADLs and functional mobility   Roles and Routines: Caretaker to self and home. Cooks, cleans, drives, completes own grocery shopping.  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  Patient found with telemetry, peripheral IV  upon PT entry to room.    General Precautions: Standard, fall   Orthopedic Precautions:    Braces: N/A     Exams:  · Cognitive Exam:  Patient is oriented to Person, Place, Time, Situation and follows one and two step commands  · Gross Motor Coordination:  slow movement 2/2 pain  · Postural Exam:  Patient presented with the following abnormalities:    · -       Rounded shoulders  · -       Forward head  · Skin Integrity/Edema:      · -       Skin integrity: BLEs reddened, swollen; L leg worse with scabs   · -       Edema: mild to moderate BLEs  · RLE ROM: WFL  · RLE Strength: WFL per observation  · LLE ROM: WFL except L knee to ~40* flexion 2/2 pain  · LLE Strength: WFL per observation    Functional Mobility:  · Bed Mobility:     · Rolling Right: minimum assistance  · Scooting: stand by assistance  · Supine to Sit: minimum assistance  · Sit to Supine: minimum assistance  · Transfers:     · Sit to Stand:  contact guard assistance with rolling walker  · Gait: Patient amb laterally toward HOB ~5ft and then forward 3ft and backward 3ft using RW  with CGA; slow emiliano, decreased stance on LLE 2/2 pain, minimal foward trunk, decreased floor clearance  · Balance: sit~fair+; stand~fair; amb~fair    Therapeutic Activities and Exercises:   Patient educated on role of PT/POC; educated on how to use RW to keep wt off the LLE to improved the insidence of pain; pt moved to EOB and performed skills as described above; movement slow 2/2 pain.     AM-PAC 6 CLICK MOBILITY  Total Score:16     Patient left right sidelying with all lines intact, call button in reach, bed alarm on and nurse notified.    GOALS:   Multidisciplinary Problems     Physical Therapy Goals        Problem: Physical Therapy Goal    Goal Priority Disciplines Outcome Goal Variances Interventions   Physical Therapy Goal     PT, PT/OT Ongoing, Progressing     Description: Goals to be met by: 2020     Patient will increase functional independence with mobility by performin. Supine to sit with Modified [place]  2. Sit to supine with Modified [place]  3. Rolling with Modified [place].  4. Sit to stand transfer with Modified [place] using Rolling Walker  5. Bed to chair transfer with Modified [place] using Rolling Walker  6. Gait  x 75 feet with Modified [place] using Rolling Walker.   7. Lower extremity exercise program x10 reps with independence                     History:     Past Medical History:   Diagnosis Date    Diabetes mellitus, type 2     Heart disease     High cholesterol     Hypertension        Past Surgical History:   Procedure Laterality Date    ANGIOPLASTY      HERNIA REPAIR         Time Tracking:     PT Received On: 20  PT Start Time: 1348     PT Stop Time: 1420  PT Total Time (min): 32 min     Billable Minutes: Evaluation 15 minutes and Therapeutic Activity 17 minutes      Margi Campos, PT  2020

## 2020-07-31 NOTE — PLAN OF CARE
Problem: Wound  Goal: Optimal Wound Healing  Outcome: Ongoing, Progressing     Problem: Fall Injury Risk  Goal: Absence of Fall and Fall-Related Injury  Outcome: Ongoing, Progressing     Problem: Adult Inpatient Plan of Care  Goal: Plan of Care Review  Outcome: Ongoing, Progressing  Goal: Patient-Specific Goal (Individualization)  Outcome: Ongoing, Progressing  Goal: Absence of Hospital-Acquired Illness or Injury  Outcome: Ongoing, Progressing  Goal: Optimal Comfort and Wellbeing  Outcome: Ongoing, Progressing  Goal: Readiness for Transition of Care  Outcome: Ongoing, Progressing  Goal: Rounds/Family Conference  Outcome: Ongoing, Progressing     Problem: Bariatric Environmental Safety  Goal: Safety Maintained with Care  Outcome: Ongoing, Progressing     Problem: Diabetes Comorbidity  Goal: Blood Glucose Level Within Desired Range  Outcome: Ongoing, Progressing     Problem: Skin Injury Risk Increased  Goal: Skin Health and Integrity  Outcome: Ongoing, Progressing     Problem: Hypertension Comorbidity  Goal: Blood Pressure in Desired Range  Outcome: Ongoing, Progressing

## 2020-07-31 NOTE — CONSULTS
"Wound care nurse consult    Pt noted with multiple BLE venous stasis wounds present on admission, admitted with B/L cellulitis. Wounds with moderate serosanguinous drainage, some wounds with slough.    Notified Dr. Giron of assessment, wound care orders given.  Wound care performed as directed.    Recommend that pt follow up with [facility] or at the least with his primary care. Communicated to Dr. Giron that pt states that he is having trouble using his shower and has been using his sink to "wash up". He feels this lead to his cellulitis. Pt also stated that he has been laid off due to covid, and his only family, 2 sisters, have recently moved away from the area.       "

## 2020-07-31 NOTE — PLAN OF CARE
Problem: Physical Therapy Goal  Goal: Physical Therapy Goal  Description: Goals to be met by: 2020     Patient will increase functional independence with mobility by performin. Supine to sit with Modified [place]  2. Sit to supine with Modified [place]  3. Rolling with Modified [place].  4. Sit to stand transfer with Modified [place] using Rolling Walker  5. Bed to chair transfer with Modified [place] using Rolling Walker  6. Gait  x 75 feet with Modified [place] using Rolling Walker.   7. Lower extremity exercise program x10 reps with independence    Outcome: Ongoing, Progressing  Pt evaluated; full report to follow; pt very tired but able to use RW to amb with PWB LLE to reduce pain; d/c recs TBD pending progress (possible HH PT/OT vs SNF); will cont with POC.

## 2020-07-31 NOTE — ASSESSMENT & PLAN NOTE
Did not fail outpatient abx, presented to ED with no prior tx  Improved w IV abx  Aerobic culture growing beta hemolytic strep, anaerobic pending, bcx NGTD  Transition to PO augmentin   Will watch another day for continued improvement w PO abx  Wound care on board  Will need outpatient follow up w wound care and PCP

## 2020-07-31 NOTE — PROGRESS NOTES
per MD - pt may d/c to home Sat with po abx   will need HH at d/c -   orders sent to numerous agencies per [facility] - pt states he has no preference for home health     f/u apt made:   Future Appointments   Date Time Provider Department Center   8/6/2020  8:40 AM Uriel Brenner PA-C [facility]     pt's roommate will pick him up at d/c.

## 2020-07-31 NOTE — PT/OT/SLP PROGRESS
Physical Therapy Treatment    Patient Name:  Reji Guerra   MRN:  [id number]    Recommendations:     Discharge Recommendations:  (HHPT/OT vs SNF)   Discharge Equipment Recommendations: bedside commode(TTB)   Barriers to discharge: Decreased caregiver support    Assessment:     Reji Guerra is a 65 y.o. male admitted with a medical diagnosis of Cellulitis of left lower extremity without foot.  He presents with the following impairments/functional limitations:  weakness, impaired endurance, impaired self care skills, impaired functional mobilty, gait instability, impaired balance, decreased upper extremity function, decreased coordination, decreased lower extremity function, impaired skin, edema.  Pt would continue to benefit from P.T. To address impairments listed above.  .    Rehab Prognosis: Fair+; patient would benefit from acute skilled PT services to address these deficits and reach maximum level of function.    Recent Surgery: * No surgery found *      Plan:     During this hospitalization, patient to be seen 6 x/week to address the identified rehab impairments via gait training, therapeutic activities, therapeutic exercises, neuromuscular re-education and progress toward the following goals:    · Plan of Care Expires:  08/30/20    Subjective     Patient/Family Comments/goals: Pt agreed to tx.  Pain/Comfort:  · Pain Rating 1: 0/10  · Pain Rating Post-Intervention 1: 0/10      Objective:     Communicated with RN (Elsy) prior to session.  Patient found supine with telemetry, peripheral IV upon PT entry to room.     General Precautions: Standard, fall   Orthopedic Precautions:    Braces:       Functional Mobility:  · Bed Mobility:     · Rolling Right: minimum assistance and to assist L hand to b/r to assist with rolling to the right  · Scooting: stand by assistance and to EOB  · Supine to Sit: minimum assistance  · Transfers:     · Sit to Stand:  contact guard assistance with rolling walker  · Gait: ~60ft with  RW and close CGA.  Pt ambulates with a 3pt gait pattern with decreased emiliano.  Pt talked through tx and stopped at times when ambulating to talk. No LOB. Increased time allowed.    · Balance: sitting good, standing fair+RW, gait fair rw      AM-PAC 6 CLICK MOBILITY  Turning over in bed (including adjusting bedclothes, sheets and blankets)?: 3  Sitting down on and standing up from a chair with arms (e.g., wheelchair, bedside commode, etc.): 3  Moving to and from a bed to a chair (including a wheelchair)?: 3  Need to walk in hospital room?: 3  Climbing 3-5 steps with a railing?: 1       Therapeutic Activities and Exercises:   Pt sat EOB x ~15 mins with Mod I.  RN gave pt medication, and then PTA tried to assure pt that everything was okay secondary to pt having increased anxiety.  AP x 15 reps.  Pt left sitting up in b/s recliner with BLE elevated on pillow with pad on pillow and another pad over LEs.  Lunch tray placed infront of pt on table.    Patient left up in chair with all lines intact, call button in reach, chair alarm on and RN notified..    GOALS:   Multidisciplinary Problems     Physical Therapy Goals        Problem: Physical Therapy Goal    Goal Priority Disciplines Outcome Goal Variances Interventions   Physical Therapy Goal     PT, PT/OT Ongoing, Progressing     Description: Goals to be met by: 2020     Patient will increase functional independence with mobility by performin. Supine to sit with Modified [place]  2. Sit to supine with Modified [place]  3. Rolling with Modified [place].  4. Sit to stand transfer with Modified [place] using Rolling Walker  5. Bed to chair transfer with Modified [place] using Rolling Walker  6. Gait  x 75 feet with Modified [place] using Rolling Walker.   7. Lower extremity exercise program x10 reps with independence                     Time Tracking:     PT Received On: 20  PT Start Time: 1153     PT Stop Time: 1235  PT  Total Time (min): 42 min     Billable Minutes: Gait Training 15 and Therapeutic Activity 27    Treatment Type: Treatment  PT/PTA: PTA     PTA Visit Number: 1     Amada Gomez PTA  07/31/2020

## 2020-07-31 NOTE — PLAN OF CARE
Problem: Physical Therapy Goal  Goal: Physical Therapy Goal  Description: Goals to be met by: 2020     Patient will increase functional independence with mobility by performin. Supine to sit with Modified [place]  2. Sit to supine with Modified [place]  3. Rolling with Modified [place].  4. Sit to stand transfer with Modified [place] using Rolling Walker  5. Bed to chair transfer with Modified [place] using Rolling Walker  6. Gait  x 75 feet with Modified [place] using Rolling Walker.   7. Lower extremity exercise program x10 reps with independence    Outcome: Ongoing, Progressing   Continue working toward goals.

## 2020-07-31 NOTE — SUBJECTIVE & OBJECTIVE
Interval History: NAEO. Transition to PO abx today. No fevers, cp, sob.    Review of Systems   Constitutional: Negative for appetite change, chills, fatigue, fever and unexpected weight change.   Respiratory: Negative for cough, chest tightness, shortness of breath and wheezing.    Cardiovascular: Negative for chest pain and palpitations.   Gastrointestinal: Negative for abdominal distention, abdominal pain, blood in stool, constipation, diarrhea, nausea and vomiting.   Genitourinary: Negative for dysuria, flank pain, frequency, hematuria and urgency.   Musculoskeletal: Positive for myalgias. Negative for arthralgias and back pain.   Skin: Positive for color change, rash and wound.   Neurological: Negative for dizziness, light-headedness, numbness and headaches.   Psychiatric/Behavioral: Negative for behavioral problems, confusion, decreased concentration and sleep disturbance. The patient is nervous/anxious.      Objective:     Vital Signs (Most Recent):  Temp: 97.3 °F (36.3 °C) (07/31/20 1134)  Pulse: 75 (07/31/20 1205)  Resp: 17 (07/31/20 1205)  BP: (!) 145/65 (07/31/20 1134)  SpO2: (!) 94 % (07/31/20 1205) Vital Signs (24h Range):  Temp:  [97.3 °F (36.3 °C)-98.5 °F (36.9 °C)] 97.3 °F (36.3 °C)  Pulse:  [56-81] 75  Resp:  [17-26] 17  SpO2:  [94 %-95 %] 94 %  BP: (126-166)/(62-87) 145/65     Weight: 103.9 kg (229 lb 0.9 oz)  Body mass index is 41.9 kg/m².    Intake/Output Summary (Last 24 hours) at 7/31/2020 1303  Last data filed at 7/31/2020 0600  Gross per 24 hour   Intake 1795.83 ml   Output 700 ml   Net 1095.83 ml      Physical Exam  Constitutional:       General: He is not in acute distress.     Appearance: He is well-developed. He is obese. He is not diaphoretic.   HENT:      Head: Normocephalic and atraumatic.   Eyes:      Conjunctiva/sclera: Conjunctivae normal.      Pupils: Pupils are equal, round, and reactive to light.   Neck:      Musculoskeletal: Normal range of motion and neck supple.      Vascular:  No JVD.   Cardiovascular:      Rate and Rhythm: Normal rate and regular rhythm.      Pulses: Normal pulses.      Heart sounds: Normal heart sounds. No murmur.   Pulmonary:      Effort: Pulmonary effort is normal. No respiratory distress.      Breath sounds: Normal breath sounds. No wheezing.   Abdominal:      General: Bowel sounds are normal. There is no distension.      Palpations: Abdomen is soft. There is no mass.      Tenderness: There is no abdominal tenderness.   Musculoskeletal:         General: Swelling (slight LLE) present.   Skin:     Comments: Significant area of erythema overlying distal aspect of LLE (improving) weeping pus from open wounds (erythematous areas marked); much smaller area of erythema on anterior distal RLE; evidence of stasis dermatitis over both distal lower extremities   Neurological:      General: No focal deficit present.      Mental Status: He is alert and oriented to person, place, and time. Mental status is at baseline.   Psychiatric:         Mood and Affect: Mood normal.         Behavior: Behavior normal.         Significant Labs:   CBC:   Recent Labs   Lab 07/29/20  2256 07/30/20  0711 07/31/20  0539   WBC 9.32 10.52 8.85   HGB 14.0 13.9* 12.4*   HCT 40.5 42.4 37.1*    231 233     CMP:   Recent Labs   Lab 07/29/20  2256 07/30/20  0711 07/30/20  1401 07/31/20  0539    135* 137 138   K 5.0 4.3 4.3 4.1    103 99 99   CO2 24 25 30* 31*   * 110 171* 140*   BUN 25* 26* 29* 28*   CREATININE 1.4 1.4 1.6* 1.4   CALCIUM 9.0 8.6* 8.7 8.6*   PROT 6.7 5.9*  --  5.9*   ALBUMIN 3.3* 3.0*  --  2.8*   BILITOT 0.5 0.4  --  0.4   ALKPHOS 72 62  --  57   AST 28 21  --  20   ALT 23 19  --  18   ANIONGAP 9 7* 8 8   EGFRNONAA 52* 52* 44* 52*       Significant Imaging: I have reviewed all pertinent imaging results/findings within the past 24 hours.

## 2020-07-31 NOTE — PLAN OF CARE
Problem: Occupational Therapy Goal  Goal: Occupational Therapy Goal  Description: Goals to be met by: 08/30/2020      Patient will increase functional independence with ADLs by performing:    UE Dressing with Modified [place].  LE Dressing with Modified [place].  Grooming while standing with Modified [place].  Toileting from toilet or bedside commode with Modified [place] for hygiene and clothing management.   Toilet transfer to toilet or bedside commode with Modified [place].  Increased functional strength to WFL for self care skills.  Upper extremity exercise program x10 reps per handout, with independence.     Outcome: Ongoing, Progressing   Pt progressing well toward OT goals. Cont OT POC

## 2020-07-31 NOTE — PT/OT/SLP PROGRESS
Occupational Therapy   Treatment    Name: Reji Guerra  MRN: [id number]  Admitting Diagnosis:  Cellulitis of left lower extremity without foot       Recommendations:     Discharge Recommendations: (Home vs HHOT/PT)  Discharge Equipment Recommendations:  (possibly TTB, BSC)  Barriers to discharge:  Decreased caregiver support    Assessment:     Reji Guerra is a 65 y.o. male with a medical diagnosis of Cellulitis of left lower extremity without foot.  He presents with deconditioning. Performance deficits affecting function are weakness, impaired endurance, impaired self care skills, impaired balance, gait instability, decreased coordination, decreased upper extremity function, decreased lower extremity function, pain, impaired skin, edema.     Rehab Prognosis:  Good; patient would benefit from acute skilled OT services to address these deficits and reach maximum level of function.       Plan:     Patient to be seen 5 x/week to address the above listed problems via self-care/home management, therapeutic activities, therapeutic exercises  · Plan of Care Expires: 08/30/20  · Plan of Care Reviewed with: patient    Subjective     Pain/Comfort:  · Pain Rating 1: 7/10  · Location - Side 1: Left  · Location - Orientation 1: lower  · Location 1: leg  · Pain Addressed 1: Distraction, Cessation of Activity, Reposition, Nurse notified  · Pain Rating Post-Intervention 1: 9/10    Objective:     Communicated with: nsg prior to session.  Patient found HOB elevated with telemetry, peripheral IV upon OT entry to room.    General Precautions: Standard, fall   Orthopedic Precautions:N/A   Braces: N/A     Occupational Performance:     Bed Mobility:    · Patient completed Rolling/Turning to Right with supervision  · Patient completed Scooting/Bridging with supervision  · Patient completed Supine to Sit with supervision  · Patient completed Sit to Supine with supervision     Functional Mobility/Transfers:  · Patient completed Sit <>  "Stand Transfer with minimum assistance  with  rolling walker   · Patient completed Toilet Transfer Step Transfer technique with contact guard assistance with  rolling walker  Functional Mobility: Pt with fair dynamic seated and standing balance. Slowed gait but pt reports that he paces himself to reduce possibility of fall     Activities of Daily Living:  · Grooming: supervision to wash hands in stance at sink  · Upper Body Dressing: minimum assistance to don gown as robe seated EOB  · Lower Body Dressing: maximal assistance to don/doff B socks supine in bed  · Toileting: supervision for pericare, no clothing management required at this time      [facility] 6 Click ADL: 20    Treatment & Education:  Pt educated on role of OT and POC.   Pt performing skills as listed above.    Pt initially becoming very agitated that high hat had been placed into toilet and OT explained several times that pt may utilize high hat in standing or seated so that nsg can measure volume output; pt stated that he did not want to have BM in high hat and OT educated pt that he could urinate in stance then sit or sit futher back on commode. Pt then refusing to use toilet at all and stated, "Just tell me what to do! Just tell me what to do!" Pt agreeable to toileting with high hat removed and stated, "The nurse last night just did an unmeasured output." Nsg notified of unmeasured output. Pt with improvement in overall BLE edema this session, continues with limited frustration tolerance but overall fairly redirectable.    Patient left HOB elevated with all lines intact, call button in reach, bed alarm on and nsg notifiedEducation:      GOALS:   Multidisciplinary Problems     Occupational Therapy Goals        Problem: Occupational Therapy Goal    Goal Priority Disciplines Outcome Interventions   Occupational Therapy Goal     OT, PT/OT Ongoing, Progressing    Description: Goals to be met by: 08/30/2020      Patient will increase functional independence " with ADLs by performing:    UE Dressing with Modified [place].  LE Dressing with Modified [place].  Grooming while standing with Modified [place].  Toileting from toilet or bedside commode with Modified [place] for hygiene and clothing management.   Toilet transfer to toilet or bedside commode with Modified [place].  Increased functional strength to WFL for self care skills.  Upper extremity exercise program x10 reps per handout, with independence.                      Time Tracking:     OT Date of Treatment: 07/31/20  OT Start Time: 0948  OT Stop Time: 1035  OT Total Time (min): 47 min    Billable Minutes:Self Care/Home Management 47    Queenie Barton OT  7/31/2020

## 2020-07-31 NOTE — PLAN OF CARE
Case discussed with MD alvarez for probable d/c to home Sat 8/1     pt info sent to [facility] --- accepted per Della-- she is aware that pt is due to d/c Saturday 8/1       Future Appointments   Date Time Provider Department Center   8/6/2020  8:40 AM Uriel Brenner PA-C [facility] spoke with pt -- he doesn't have his roommate's phone # -- pt plans to drive himself home at  discharge  -- pt drove himself to hospital .     Pt informed that the home health nurse will call the pt before coming to his house.    When asked why he washes up in the sink  (per wound care nurse's notes) pt  states he has a walk in shower and he has not wanted to get his affected legs wet; also his legs are swollen and he can't bend them.  Pt states he has a chair that he can use in shower when he is able to bend his legs. There are no problems with the shower such as availability of water.         07/31/20 1404   Discharge Reassessment   Assessment Type Discharge Planning Reassessment

## 2020-07-31 NOTE — PROGRESS NOTES
[facility] Hospital Medicine  Progress Note    Patient Name: Reji Guerra  MRN: [id number]  Patient Class: OP- Observation   Admission Date: 7/30/2020  Length of Stay: 0 days  Attending Physician: Josr Kelly III, MD  Primary Care Provider: Smitha Hernandez MD        Subjective:     Principal Problem:Cellulitis of left lower extremity without foot        HPI:  Patient is a 65 y.o. male with PMHx of CAD s/p 3 vessel stenting, HTN, DM, chronic venous insufficiency who presented with complaints of LLE swelling, pain. Patient reports for the previous 2 weeks he has noted increased swelling to his bilateral lower extremities but especially his left lower extremity.  Patient was previously seen by PCP who started him on 20 mg Lasix p.o. daily but patient did not take these daily, he was hoarding them and had not taken them for at least 2 weeks prior to presenting to the emergency department.  Patient, over the same time., noticed increased redness overlying the left lower extremity as well.  Patient had a chronic venous stasis wound to this limb and the area of erythema grew to encompass the wound and grew over the majority of the shin.  This area of erythema and swelling then was noted to begin to be weeping fluid.  This prompted the patient to present to the emergency department.  Of note, the patient has a history of cellulitis to the left lower extremity, reportedly last episode was about 4 years ago that required hospitalization and 1 episode in the intervening period that did not require hospitalization.    Upon arrival to the ED, patient's vital signs were stable and patient afebrile.  CBC did not show leukocytosis, and CMP was largely within normal limits except for slight increase in BUN/creatinine, up from baseline 30/1.0.  Patient started on vancomycin and ceftriaxone in the ED for likely cellulitis.      Overview/Hospital Course:  No notes on file    Interval History: NAEO.  Transition to PO abx today. No fevers, cp, sob.    Review of Systems   Constitutional: Negative for appetite change, chills, fatigue, fever and unexpected weight change.   Respiratory: Negative for cough, chest tightness, shortness of breath and wheezing.    Cardiovascular: Negative for chest pain and palpitations.   Gastrointestinal: Negative for abdominal distention, abdominal pain, blood in stool, constipation, diarrhea, nausea and vomiting.   Genitourinary: Negative for dysuria, flank pain, frequency, hematuria and urgency.   Musculoskeletal: Positive for myalgias. Negative for arthralgias and back pain.   Skin: Positive for color change, rash and wound.   Neurological: Negative for dizziness, light-headedness, numbness and headaches.   Psychiatric/Behavioral: Negative for behavioral problems, confusion, decreased concentration and sleep disturbance. The patient is nervous/anxious.      Objective:     Vital Signs (Most Recent):  Temp: 97.3 °F (36.3 °C) (07/31/20 1134)  Pulse: 75 (07/31/20 1205)  Resp: 17 (07/31/20 1205)  BP: (!) 145/65 (07/31/20 1134)  SpO2: (!) 94 % (07/31/20 1205) Vital Signs (24h Range):  Temp:  [97.3 °F (36.3 °C)-98.5 °F (36.9 °C)] 97.3 °F (36.3 °C)  Pulse:  [56-81] 75  Resp:  [17-26] 17  SpO2:  [94 %-95 %] 94 %  BP: (126-166)/(62-87) 145/65     Weight: 103.9 kg (229 lb 0.9 oz)  Body mass index is 41.9 kg/m².    Intake/Output Summary (Last 24 hours) at 7/31/2020 1303  Last data filed at 7/31/2020 0600  Gross per 24 hour   Intake 1795.83 ml   Output 700 ml   Net 1095.83 ml      Physical Exam  Constitutional:       General: He is not in acute distress.     Appearance: He is well-developed. He is obese. He is not diaphoretic.   HENT:      Head: Normocephalic and atraumatic.   Eyes:      Conjunctiva/sclera: Conjunctivae normal.      Pupils: Pupils are equal, round, and reactive to light.   Neck:      Musculoskeletal: Normal range of motion and neck supple.      Vascular: No JVD.    Cardiovascular:      Rate and Rhythm: Normal rate and regular rhythm.      Pulses: Normal pulses.      Heart sounds: Normal heart sounds. No murmur.   Pulmonary:      Effort: Pulmonary effort is normal. No respiratory distress.      Breath sounds: Normal breath sounds. No wheezing.   Abdominal:      General: Bowel sounds are normal. There is no distension.      Palpations: Abdomen is soft. There is no mass.      Tenderness: There is no abdominal tenderness.   Musculoskeletal:         General: Swelling (slight LLE) present.   Skin:     Comments: Significant area of erythema overlying distal aspect of LLE (improving) weeping pus from open wounds (erythematous areas marked); much smaller area of erythema on anterior distal RLE; evidence of stasis dermatitis over both distal lower extremities   Neurological:      General: No focal deficit present.      Mental Status: He is alert and oriented to person, place, and time. Mental status is at baseline.   Psychiatric:         Mood and Affect: Mood normal.         Behavior: Behavior normal.         Significant Labs:   CBC:   Recent Labs   Lab 07/29/20  2256 07/30/20  0711 07/31/20  0539   WBC 9.32 10.52 8.85   HGB 14.0 13.9* 12.4*   HCT 40.5 42.4 37.1*    231 233     CMP:   Recent Labs   Lab 07/29/20  2256 07/30/20  0711 07/30/20  1401 07/31/20  0539    135* 137 138   K 5.0 4.3 4.3 4.1    103 99 99   CO2 24 25 30* 31*   * 110 171* 140*   BUN 25* 26* 29* 28*   CREATININE 1.4 1.4 1.6* 1.4   CALCIUM 9.0 8.6* 8.7 8.6*   PROT 6.7 5.9*  --  5.9*   ALBUMIN 3.3* 3.0*  --  2.8*   BILITOT 0.5 0.4  --  0.4   ALKPHOS 72 62  --  57   AST 28 21  --  20   ALT 23 19  --  18   ANIONGAP 9 7* 8 8   EGFRNONAA 52* 52* 44* 52*       Significant Imaging: I have reviewed all pertinent imaging results/findings within the past 24 hours.      Assessment/Plan:      * Cellulitis of left lower extremity without foot  Did not fail outpatient abx, presented to ED with no prior  tx  Improved w IV abx  Aerobic culture growing beta hemolytic strep, anaerobic pending, bcx NGTD  Transition to PO augmentin   Will watch another day for continued improvement w PO abx  Wound care on board  Will need outpatient follow up w wound care and PCP          Chronic venous insufficiency  Patient reportedly with a significant hx of chronic venous insufficiency to the bilateral lower extremities. Patient has been previously evaluated by interventional cardiology.    - Lasix 20mg PO BID   - Continue to monitor   - Likely needs local wound care for chronic wounds/statsis dermatitis changes      Hypertension associated with diabetes  /86 on presentation.   - Continue home lisinopril 10mg daily   - Continue home Coreg 6.25mg BID   - Continue home hctz      Anxiety  Patient with reported anxiety and seemingly quite anxious throughout interview and exam. Previously on Lexapro but could not tolerate GI side effects. Requesting something else for anxiety now.   - Hydroxyzine PRN    - Consider starting another SSRI pending conversation with patient about likely short nature of GI side-effects    Coronary artery disease involving native coronary artery of native heart without angina pectoris  Patient with a history of CAD with 3 vessel stenting about 20 years ago. Denies any recent chest pain or shortness of breath.   - Continue home ASA 81mg daily    Type 2 diabetes mellitus with hyperglycemia, without long-term current use of insulin  Patient on 500mg Metformin BID at home. Reported fasting BG about 120.   - Low dose sliding scale   - diabetic diet      VTE Risk Mitigation (From admission, onward)         Ordered     enoxaparin injection 40 mg  Every 24 hours      07/30/20 0401     IP VTE HIGH RISK PATIENT  Once      07/30/20 0401     Place sequential compression device  Until discontinued      07/30/20 0401                      Jamal Arenas MD  Department of Hospital Medicine   [facility]

## 2020-08-01 LAB
ALBUMIN SERPL BCP-MCNC: 2.9 G/DL (ref 3.5–5.2)
ALP SERPL-CCNC: 54 U/L (ref 55–135)
ALT SERPL W/O P-5'-P-CCNC: 18 U/L (ref 10–44)
ANION GAP SERPL CALC-SCNC: 7 MMOL/L (ref 8–16)
AST SERPL-CCNC: 19 U/L (ref 10–40)
BASOPHILS # BLD AUTO: 0.02 K/UL (ref 0–0.2)
BASOPHILS NFR BLD: 0.3 % (ref 0–1.9)
BILIRUB SERPL-MCNC: 0.3 MG/DL (ref 0.1–1)
BUN SERPL-MCNC: 19 MG/DL (ref 8–23)
CALCIUM SERPL-MCNC: 8.7 MG/DL (ref 8.7–10.5)
CHLORIDE SERPL-SCNC: 103 MMOL/L (ref 95–110)
CO2 SERPL-SCNC: 28 MMOL/L (ref 23–29)
CREAT SERPL-MCNC: 1.1 MG/DL (ref 0.5–1.4)
DIFFERENTIAL METHOD: ABNORMAL
EOSINOPHIL # BLD AUTO: 0.6 K/UL (ref 0–0.5)
EOSINOPHIL NFR BLD: 7.8 % (ref 0–8)
ERYTHROCYTE [DISTWIDTH] IN BLOOD BY AUTOMATED COUNT: 13.5 % (ref 11.5–14.5)
EST. GFR  (AFRICAN AMERICAN): >60 ML/MIN/1.73 M^2
EST. GFR  (NON AFRICAN AMERICAN): >60 ML/MIN/1.73 M^2
GLUCOSE SERPL-MCNC: 95 MG/DL (ref 70–110)
HCT VFR BLD AUTO: 36.7 % (ref 40–54)
HGB BLD-MCNC: 12.2 G/DL (ref 14–18)
IMM GRANULOCYTES # BLD AUTO: 0.06 K/UL (ref 0–0.04)
IMM GRANULOCYTES NFR BLD AUTO: 0.9 % (ref 0–0.5)
LYMPHOCYTES # BLD AUTO: 1 K/UL (ref 1–4.8)
LYMPHOCYTES NFR BLD: 13.9 % (ref 18–48)
MAGNESIUM SERPL-MCNC: 1.7 MG/DL (ref 1.6–2.6)
MCH RBC QN AUTO: 36 PG (ref 27–31)
MCHC RBC AUTO-ENTMCNC: 33.2 G/DL (ref 32–36)
MCV RBC AUTO: 108 FL (ref 82–98)
MONOCYTES # BLD AUTO: 0.5 K/UL (ref 0.3–1)
MONOCYTES NFR BLD: 6.8 % (ref 4–15)
NEUTROPHILS # BLD AUTO: 5 K/UL (ref 1.8–7.7)
NEUTROPHILS NFR BLD: 70.3 % (ref 38–73)
NRBC BLD-RTO: 0 /100 WBC
PHOSPHATE SERPL-MCNC: 3.3 MG/DL (ref 2.7–4.5)
PLATELET # BLD AUTO: 229 K/UL (ref 150–350)
PMV BLD AUTO: 10.3 FL (ref 9.2–12.9)
POCT GLUCOSE: 106 MG/DL (ref 70–110)
POCT GLUCOSE: 118 MG/DL (ref 70–110)
POCT GLUCOSE: 119 MG/DL (ref 70–110)
POCT GLUCOSE: 97 MG/DL (ref 70–110)
POTASSIUM SERPL-SCNC: 4.3 MMOL/L (ref 3.5–5.1)
PROT SERPL-MCNC: 5.9 G/DL (ref 6–8.4)
RBC # BLD AUTO: 3.39 M/UL (ref 4.6–6.2)
SODIUM SERPL-SCNC: 138 MMOL/L (ref 136–145)
WBC # BLD AUTO: 7.05 K/UL (ref 3.9–12.7)

## 2020-08-01 PROCEDURE — 25000003 PHARM REV CODE 250: Performed by: STUDENT IN AN ORGANIZED HEALTH CARE EDUCATION/TRAINING PROGRAM

## 2020-08-01 PROCEDURE — 94761 N-INVAS EAR/PLS OXIMETRY MLT: CPT

## 2020-08-01 PROCEDURE — G0378 HOSPITAL OBSERVATION PER HR: HCPCS

## 2020-08-01 PROCEDURE — 80053 COMPREHEN METABOLIC PANEL: CPT

## 2020-08-01 PROCEDURE — 83735 ASSAY OF MAGNESIUM: CPT

## 2020-08-01 PROCEDURE — 36415 COLL VENOUS BLD VENIPUNCTURE: CPT

## 2020-08-01 PROCEDURE — 84100 ASSAY OF PHOSPHORUS: CPT

## 2020-08-01 PROCEDURE — 96376 TX/PRO/DX INJ SAME DRUG ADON: CPT

## 2020-08-01 PROCEDURE — 63600175 PHARM REV CODE 636 W HCPCS: Performed by: STUDENT IN AN ORGANIZED HEALTH CARE EDUCATION/TRAINING PROGRAM

## 2020-08-01 PROCEDURE — 85025 COMPLETE CBC W/AUTO DIFF WBC: CPT

## 2020-08-01 PROCEDURE — 96372 THER/PROPH/DIAG INJ SC/IM: CPT

## 2020-08-01 RX ORDER — CEPHALEXIN 500 MG/1
500 CAPSULE ORAL EVERY 6 HOURS
Status: DISCONTINUED | OUTPATIENT
Start: 2020-08-01 | End: 2020-08-02 | Stop reason: HOSPADM

## 2020-08-01 RX ORDER — CEPHALEXIN 500 MG/1
500 CAPSULE ORAL 4 TIMES DAILY
Qty: 28 CAPSULE | Refills: 0 | Status: SHIPPED | OUTPATIENT
Start: 2020-08-01 | End: 2020-08-02 | Stop reason: SDUPTHER

## 2020-08-01 RX ORDER — HYDROCHLOROTHIAZIDE 25 MG/1
25 TABLET ORAL DAILY
Status: DISCONTINUED | OUTPATIENT
Start: 2020-08-02 | End: 2020-08-02 | Stop reason: HOSPADM

## 2020-08-01 RX ORDER — AMOXICILLIN AND CLAVULANATE POTASSIUM 875; 125 MG/1; MG/1
1 TABLET, FILM COATED ORAL EVERY 12 HOURS
Qty: 14 TABLET | Refills: 0 | Status: SHIPPED | OUTPATIENT
Start: 2020-08-01 | End: 2020-08-01 | Stop reason: HOSPADM

## 2020-08-01 RX ORDER — HYDROCODONE BITARTRATE AND ACETAMINOPHEN 7.5; 325 MG/1; MG/1
1 TABLET ORAL EVERY 6 HOURS
Status: DISCONTINUED | OUTPATIENT
Start: 2020-08-01 | End: 2020-08-02 | Stop reason: HOSPADM

## 2020-08-01 RX ORDER — LISINOPRIL 10 MG/1
10 TABLET ORAL DAILY
Status: DISCONTINUED | OUTPATIENT
Start: 2020-08-02 | End: 2020-08-02 | Stop reason: HOSPADM

## 2020-08-01 RX ADMIN — Medication 1 TABLET: at 08:08

## 2020-08-01 RX ADMIN — HYDROCODONE BITARTRATE AND ACETAMINOPHEN 1 TABLET: 7.5; 325 TABLET ORAL at 06:08

## 2020-08-01 RX ADMIN — HYDROCODONE BITARTRATE AND ACETAMINOPHEN 1 TABLET: 5; 325 TABLET ORAL at 02:08

## 2020-08-01 RX ADMIN — CEPHALEXIN 500 MG: 500 CAPSULE ORAL at 01:08

## 2020-08-01 RX ADMIN — ENOXAPARIN SODIUM 40 MG: 40 INJECTION SUBCUTANEOUS at 06:08

## 2020-08-01 RX ADMIN — SERTRALINE HYDROCHLORIDE 50 MG: 50 TABLET ORAL at 08:08

## 2020-08-01 RX ADMIN — CARVEDILOL 6.25 MG: 6.25 TABLET, FILM COATED ORAL at 08:08

## 2020-08-01 RX ADMIN — CEPHALEXIN 500 MG: 500 CAPSULE ORAL at 11:08

## 2020-08-01 RX ADMIN — MORPHINE SULFATE 4 MG: 4 INJECTION INTRAVENOUS at 08:08

## 2020-08-01 RX ADMIN — HYDROCODONE BITARTRATE AND ACETAMINOPHEN 1 TABLET: 7.5; 325 TABLET ORAL at 11:08

## 2020-08-01 RX ADMIN — AMOXICILLIN AND CLAVULANATE POTASSIUM 1 TABLET: 875; 125 TABLET, FILM COATED ORAL at 08:08

## 2020-08-01 RX ADMIN — CEPHALEXIN 500 MG: 500 CAPSULE ORAL at 06:08

## 2020-08-01 RX ADMIN — ASPIRIN 81 MG: 81 TABLET, COATED ORAL at 08:08

## 2020-08-01 RX ADMIN — HYDROCODONE BITARTRATE AND ACETAMINOPHEN 1 TABLET: 7.5; 325 TABLET ORAL at 01:08

## 2020-08-01 RX ADMIN — CARVEDILOL 6.25 MG: 6.25 TABLET, FILM COATED ORAL at 09:08

## 2020-08-01 NOTE — PLAN OF CARE
Permission attained to enter room via virtual system.  Virtual rounds completed as documented.  Today's lab values, notes, and vital signs up to now have been reviewed.   Pt expecting discharge soon   pt was concerned that initial antibiotic given   augmentin was causing gi side effects   threese diarrhea     medication has been changed to keflex.  Pt hopeful he will tolerate .    Pt stated that oral pain control with current hydrocodone order is tolerable

## 2020-08-01 NOTE — HOSPITAL COURSE
Pt admitted for cellulitis of the left lower extremity, 2/2 venous insufficiency, DMII. Pt initially started on IV abx (vanc/rocephin/zosyn) and wound cx were obtained. Wound cx grew pansensitive group c strep. Pt was transitioned to PO abx (augmentin then keflex d/t nausea/diarrhea associated with augmentin). On 8/2, patient stable for discharge. Pt asymptomatic on day of discharge, vitals stable. Pt has f/u with PCP, wound care. Pt prescribed Keflex x 7d and 5 day course of PO Norco for pain control, with home health for dressing changes.

## 2020-08-01 NOTE — PROGRESS NOTES
[facility] Hospital Medicine  Progress Note    Patient Name: Reji Guerra  MRN: [id number]  Patient Class: OP- Observation   Admission Date: 7/30/2020  Length of Stay: 0 days  Attending Physician: Josr Kelly III, MD  Primary Care Provider: Smitha Hernandez MD        Subjective:     Principal Problem:Cellulitis of left lower extremity without foot        HPI:  Patient is a 65 y.o. male with PMHx of CAD s/p 3 vessel stenting, HTN, DM, chronic venous insufficiency who presented with complaints of LLE swelling, pain. Patient reports for the previous 2 weeks he has noted increased swelling to his bilateral lower extremities but especially his left lower extremity.  Patient was previously seen by PCP who started him on 20 mg Lasix p.o. daily but patient did not take these daily, he was hoarding them and had not taken them for at least 2 weeks prior to presenting to the emergency department.  Patient, over the same time., noticed increased redness overlying the left lower extremity as well.  Patient had a chronic venous stasis wound to this limb and the area of erythema grew to encompass the wound and grew over the majority of the shin.  This area of erythema and swelling then was noted to begin to be weeping fluid.  This prompted the patient to present to the emergency department.  Of note, the patient has a history of cellulitis to the left lower extremity, reportedly last episode was about 4 years ago that required hospitalization and 1 episode in the intervening period that did not require hospitalization.    Upon arrival to the ED, patient's vital signs were stable and patient afebrile.  CBC did not show leukocytosis, and CMP was largely within normal limits except for slight increase in BUN/creatinine, up from baseline 30/1.0.  Patient started on vancomycin and ceftriaxone in the ED for likely cellulitis.      Overview/Hospital Course:  No notes on file    Interval History: Pt examined  bedside. NAEON. Pt having frequent loose stools. Pt states his left leg pain/erythema/swelling is improving. Pt states he is ok to go home. ROS negative.     Review of Systems   Constitutional: Negative for chills, diaphoresis, fatigue and fever.   HENT: Negative for congestion, rhinorrhea, sore throat and trouble swallowing.    Eyes: Negative for itching and visual disturbance.   Respiratory: Negative for cough, chest tightness, shortness of breath and wheezing.    Cardiovascular: Negative for chest pain and palpitations.   Gastrointestinal: Positive for diarrhea. Negative for abdominal pain, constipation, nausea and vomiting.   Endocrine: Negative for polyphagia and polyuria.   Genitourinary: Negative for dysuria and flank pain.   Musculoskeletal: Negative for back pain, joint swelling and neck stiffness.   Skin: Negative for color change and rash.   Neurological: Negative for dizziness, weakness, light-headedness and headaches.   Psychiatric/Behavioral: Negative for confusion. The patient is not nervous/anxious.      Objective:     Vital Signs (Most Recent):  Temp: 97.5 °F (36.4 °C) (08/01/20 0352)  Pulse: 67 (08/01/20 0756)  Resp: 19 (08/01/20 0352)  BP: (!) 162/75 (08/01/20 0352)  SpO2: (!) 93 % (08/01/20 0711) Vital Signs (24h Range):  Temp:  [97.2 °F (36.2 °C)-97.8 °F (36.6 °C)] 97.5 °F (36.4 °C)  Pulse:  [61-77] 67  Resp:  [17-20] 19  SpO2:  [93 %-95 %] 93 %  BP: (134-162)/() 162/75     Weight: 103.7 kg (228 lb 9.9 oz)  Body mass index is 41.81 kg/m².    Intake/Output Summary (Last 24 hours) at 8/1/2020 0815  Last data filed at 8/1/2020 0224  Gross per 24 hour   Intake 749.17 ml   Output 650 ml   Net 99.17 ml      Physical Exam  Constitutional:       General: He is not in acute distress.     Appearance: He is well-developed. He is obese. He is not diaphoretic.   HENT:      Head: Normocephalic and atraumatic.   Eyes:      Conjunctiva/sclera: Conjunctivae normal.      Pupils: Pupils are equal, round, and  reactive to light.   Neck:      Musculoskeletal: Normal range of motion and neck supple.      Vascular: No JVD.   Cardiovascular:      Rate and Rhythm: Normal rate and regular rhythm.      Pulses: Normal pulses.      Heart sounds: Normal heart sounds. No murmur.   Pulmonary:      Effort: Pulmonary effort is normal. No respiratory distress.      Breath sounds: Normal breath sounds. No wheezing.   Abdominal:      General: Bowel sounds are normal. There is no distension.      Palpations: Abdomen is soft. There is no mass.      Tenderness: There is no abdominal tenderness.   Musculoskeletal:         General: Swelling (slight LLE) present.   Skin:     Comments: Significant area of erythema overlying distal aspect of LLE (improving) weeping pus from open wounds (erythematous areas marked); much smaller area of erythema on anterior distal RLE; evidence of stasis dermatitis over both distal lower extremities   Neurological:      General: No focal deficit present.      Mental Status: He is alert and oriented to person, place, and time. Mental status is at baseline.   Psychiatric:         Mood and Affect: Mood normal.         Behavior: Behavior normal.       Recent Labs   Lab 07/29/20 2256 07/30/20 0711 07/31/20  0539 08/01/20  0422   WBC 9.32 10.52 8.85 7.05   HGB 14.0 13.9* 12.4* 12.2*   HCT 40.5 42.4 37.1* 36.7*   * 110* 109* 108*   RBC 3.81* 3.87* 3.41* 3.39*   MCH 36.7* 35.9* 36.4* 36.0*   MCHC 34.6 32.8 33.4 33.2   RDW 13.7 13.9 13.6 13.5    231 233 229   MPV 11.4 10.2 10.4 10.3   GRAN 78.2*  7.3 75.2*  7.9* 74.6*  6.6 70.3  5.0   LYMPH 10.0*  0.9* 10.5*  1.1 12.2*  1.1 13.9*  1.0   MONO 5.5  0.5 7.0  0.7 5.4  0.5 6.8  0.5   EOSINOPHIL 5.6 6.5 7.1 7.8   BASOPHIL 0.3 0.2 0.2 0.3       Recent Labs   Lab 07/29/20 2256 07/30/20  0711 07/30/20  1401 07/31/20  0539 08/01/20  0422    135* 137 138 138   K 5.0 4.3 4.3 4.1 4.3    103 99 99 103   CO2 24 25 30* 31* 28   BUN 25* 26* 29* 28*  19   CREATININE 1.4 1.4 1.6* 1.4 1.1   * 110 171* 140* 95   CALCIUM 9.0 8.6* 8.7 8.6* 8.7   PROT 6.7 5.9*  --  5.9* 5.9*   ALBUMIN 3.3* 3.0*  --  2.8* 2.9*   ALKPHOS 72 62  --  57 54*   BILITOT 0.5 0.4  --  0.4 0.3   ALT 23 19  --  18 18   AST 28 21  --  20 19   ESTGFRAFRICA 60 60 51* 60 >60   EGFRNONAA 52* 52* 44* 52* >60   ANIONGAP 9 7* 8 8 7*   MG  --  1.6  --  1.6 1.7   PHOS  --  2.9  --  3.5 3.3       No results for input(s): COLORU, APPEARANCEUA, PHUR, SPECGRAV, PROTEINUA, GLUCUA, KETONESU, BILIRUBINUA, OCCULTUA, UROBILINOGEN, NITRITE, LEUKOCYTESUR, RBCUA, WBCUA, BACTERIA, SQUAMEPITHEL, HYALINECASTS, GRANULARCAST, MICROCMT in the last 168 hours.    Invalid input(s): SPECIMENU, AMORPHOUSU    No results for input(s): PH, PCO2, PO2, HCO3, POCSATURATED, BE in the last 168 hours.    No results for input(s): TROPONINI, CPK, CPKMB in the last 168 hours.    No results for input(s): PT, INR, APTT in the last 168 hours.    Lab Results   Component Value Date    HGBA1C 6.0 (H) 07/30/2020       Recent Labs   Lab 07/30/20  0711   TSH 0.334*   FREET4 0.82       Microbiology Results (last 7 days)     Procedure Component Value Units Date/Time    Blood Culture #1 **CANNOT BE ORDERED STAT** [[id number]] Collected: 07/29/20 2309    Order Status: Completed Specimen: Blood from Peripheral, Antecubital, Left Updated: 07/31/20 1012     Blood Culture, Routine No Growth to date      No Growth to date    Blood Culture #2 **CANNOT BE ORDERED STAT** [[id number]] Collected: 07/30/20 0152    Order Status: Completed Specimen: Blood from Peripheral, Antecubital, Right Updated: 07/31/20 1012     Blood Culture, Routine No Growth to date      No Growth to date    Culture, Anaerobe [[id number]] Collected: 07/30/20 0210    Order Status: Completed Specimen: Wound from Leg, Left Updated: 07/31/20 0905     Anaerobic Culture Culture in progress    Aerobic culture [[id number]]  (Abnormal) Collected: 07/30/20 0210    Order Status: Completed Specimen:  Wound from Leg, Left Updated: 07/31/20 0757     Aerobic Bacterial Culture STREPTOCOCCUS GROUP C  Many  Beta-hemolytic streptococci are routinely susceptible to   penicillins,cephalosporins and carbapenems.  Susceptibility testing not routinely performed  No other significant isolate      Gram stain [[id number]] Collected: 07/30/20 0210    Order Status: Completed Specimen: Wound from Leg, Left Updated: 07/30/20 1438     Gram Stain Result No WBC's      Rare Gram positive cocci          X-ray Chest Ap Portable    Result Date: 7/30/2020  EXAMINATION: XR CHEST AP PORTABLE CLINICAL HISTORY: cellulitis; TECHNIQUE: Single frontal view of the chest was performed. COMPARISON: Non 12/23/2017 e FINDINGS: Heart size normal.  The lungs are clear.  No pleural effusion.  Old rib fractures noted on the left side.     See above Electronically signed by: Jayesh Graham MD Date:    07/30/2020 Time:    09:30    Us Lower Extremity Veins Bilateral    Result Date: 7/30/2020  EXAMINATION: US LOWER EXTREMITY VEINS BILATERAL CLINICAL HISTORY: Other specified soft tissue disorders TECHNIQUE: Duplex and color flow Doppler and dynamic compression was performed of the bilateral lower extremity veins was performed. COMPARISON: 09/16/2018 FINDINGS: Right thigh veins: The common femoral, femoral, popliteal, upper greater saphenous, and deep femoral veins are patent and free of thrombus. The veins are normally compressible and have normal phasic flow and augmentation response. Right calf veins: The visualized calf veins are patent. Left thigh veins: The common femoral, femoral, popliteal, upper greater saphenous, and deep femoral veins are patent and free of thrombus. The veins are normally compressible and have normal phasic flow and augmentation response. Left calf veins: The visualized calf veins are patent. Miscellaneous: There is a hypoechoic structure without associated vascularity identified within the right popliteal fossa suggestive of a  popliteal cyst.  This measures 2.8 x 2.9 x 1.5 cm.     No evidence of deep venous thrombosis in either lower extremity. Right popliteal fossa cyst. Electronically signed by: Jaxon Hyman MD Date:    07/30/2020 Time:    03:05        Assessment/Plan:      * Cellulitis of left lower extremity without foot  Did not fail outpatient abx, presented to ED with no prior tx  Improved w IV abx  Aerobic culture growing beta hemolytic strep, anaerobic pending, bcx NGTD  Transition to PO augmentin   Wound care on board  Will need outpatient follow up w wound care and PCP          Chronic venous insufficiency  Patient reportedly with a significant hx of chronic venous insufficiency to the bilateral lower extremities. Patient has been previously evaluated by interventional cardiology.    - Lasix 20mg PO BID   - Continue to monitor   - Likely needs local wound care for chronic wounds/statsis dermatitis changes      Hypertension associated with diabetes  /86 on presentation.   - Continue home lisinopril 10mg daily   - Continue home Coreg 6.25mg BID   - Continue home hctz      Anxiety  Patient with reported anxiety and seemingly quite anxious throughout interview and exam. Previously on Lexapro but could not tolerate GI side effects. Requesting something else for anxiety now.   - Hydroxyzine PRN    - Consider starting another SSRI pending conversation with patient about likely short nature of GI side-effects    Coronary artery disease involving native coronary artery of native heart without angina pectoris  Patient with a history of CAD with 3 vessel stenting about 20 years ago. Denies any recent chest pain or shortness of breath.   - Continue home ASA 81mg daily    Type 2 diabetes mellitus with hyperglycemia, without long-term current use of insulin  Patient on 500mg Metformin BID at home. Reported fasting BG about 120.   - Low dose sliding scale   - diabetic diet      VTE Risk Mitigation (From admission, onward)          Ordered     enoxaparin injection 40 mg  Every 24 hours      07/30/20 0401     IP VTE HIGH RISK PATIENT  Once      07/30/20 0401     Place sequential compression device  Until discontinued      07/30/20 0401              Dispo: discharge today on augmentin po. Pt needs outpatient f/u with PCP.        David Joyce MD  Department of Hospital Medicine   [facility]

## 2020-08-01 NOTE — ASSESSMENT & PLAN NOTE
Did not fail outpatient abx, presented to ED with no prior tx  Improved w IV abx  Aerobic culture growing beta hemolytic strep, anaerobic pending, bcx NGTD  Transition to PO augmentin   Wound care on board  Will need outpatient follow up w wound care and PCP

## 2020-08-01 NOTE — SUBJECTIVE & OBJECTIVE
Interval History: Pt examined bedside. NAEON. Pt having frequent loose stools. Pt states his left leg pain/erythema/swelling is improving. Pt states he is ok to go home. ROS negative.     Review of Systems   Constitutional: Negative for chills, diaphoresis, fatigue and fever.   HENT: Negative for congestion, rhinorrhea, sore throat and trouble swallowing.    Eyes: Negative for itching and visual disturbance.   Respiratory: Negative for cough, chest tightness, shortness of breath and wheezing.    Cardiovascular: Negative for chest pain and palpitations.   Gastrointestinal: Positive for diarrhea. Negative for abdominal pain, constipation, nausea and vomiting.   Endocrine: Negative for polyphagia and polyuria.   Genitourinary: Negative for dysuria and flank pain.   Musculoskeletal: Negative for back pain, joint swelling and neck stiffness.   Skin: Negative for color change and rash.   Neurological: Negative for dizziness, weakness, light-headedness and headaches.   Psychiatric/Behavioral: Negative for confusion. The patient is not nervous/anxious.      Objective:     Vital Signs (Most Recent):  Temp: 97.5 °F (36.4 °C) (08/01/20 0352)  Pulse: 67 (08/01/20 0756)  Resp: 19 (08/01/20 0352)  BP: (!) 162/75 (08/01/20 0352)  SpO2: (!) 93 % (08/01/20 0711) Vital Signs (24h Range):  Temp:  [97.2 °F (36.2 °C)-97.8 °F (36.6 °C)] 97.5 °F (36.4 °C)  Pulse:  [61-77] 67  Resp:  [17-20] 19  SpO2:  [93 %-95 %] 93 %  BP: (134-162)/() 162/75     Weight: 103.7 kg (228 lb 9.9 oz)  Body mass index is 41.81 kg/m².    Intake/Output Summary (Last 24 hours) at 8/1/2020 0815  Last data filed at 8/1/2020 0224  Gross per 24 hour   Intake 749.17 ml   Output 650 ml   Net 99.17 ml      Physical Exam  Constitutional:       General: He is not in acute distress.     Appearance: He is well-developed. He is obese. He is not diaphoretic.   HENT:      Head: Normocephalic and atraumatic.   Eyes:      Conjunctiva/sclera: Conjunctivae normal.       Pupils: Pupils are equal, round, and reactive to light.   Neck:      Musculoskeletal: Normal range of motion and neck supple.      Vascular: No JVD.   Cardiovascular:      Rate and Rhythm: Normal rate and regular rhythm.      Pulses: Normal pulses.      Heart sounds: Normal heart sounds. No murmur.   Pulmonary:      Effort: Pulmonary effort is normal. No respiratory distress.      Breath sounds: Normal breath sounds. No wheezing.   Abdominal:      General: Bowel sounds are normal. There is no distension.      Palpations: Abdomen is soft. There is no mass.      Tenderness: There is no abdominal tenderness.   Musculoskeletal:         General: Swelling (slight LLE) present.   Skin:     Comments: Significant area of erythema overlying distal aspect of LLE (improving) weeping pus from open wounds (erythematous areas marked); much smaller area of erythema on anterior distal RLE; evidence of stasis dermatitis over both distal lower extremities   Neurological:      General: No focal deficit present.      Mental Status: He is alert and oriented to person, place, and time. Mental status is at baseline.   Psychiatric:         Mood and Affect: Mood normal.         Behavior: Behavior normal.       Recent Labs   Lab 07/29/20  2256 07/30/20  0711 07/31/20  0539 08/01/20  0422   WBC 9.32 10.52 8.85 7.05   HGB 14.0 13.9* 12.4* 12.2*   HCT 40.5 42.4 37.1* 36.7*   * 110* 109* 108*   RBC 3.81* 3.87* 3.41* 3.39*   MCH 36.7* 35.9* 36.4* 36.0*   MCHC 34.6 32.8 33.4 33.2   RDW 13.7 13.9 13.6 13.5    231 233 229   MPV 11.4 10.2 10.4 10.3   GRAN 78.2*  7.3 75.2*  7.9* 74.6*  6.6 70.3  5.0   LYMPH 10.0*  0.9* 10.5*  1.1 12.2*  1.1 13.9*  1.0   MONO 5.5  0.5 7.0  0.7 5.4  0.5 6.8  0.5   EOSINOPHIL 5.6 6.5 7.1 7.8   BASOPHIL 0.3 0.2 0.2 0.3       Recent Labs   Lab 07/29/20  2256 07/30/20  0711 07/30/20  1401 07/31/20  0539 08/01/20  0422    135* 137 138 138   K 5.0 4.3 4.3 4.1 4.3    103 99 99 103   CO2 24  25 30* 31* 28   BUN 25* 26* 29* 28* 19   CREATININE 1.4 1.4 1.6* 1.4 1.1   * 110 171* 140* 95   CALCIUM 9.0 8.6* 8.7 8.6* 8.7   PROT 6.7 5.9*  --  5.9* 5.9*   ALBUMIN 3.3* 3.0*  --  2.8* 2.9*   ALKPHOS 72 62  --  57 54*   BILITOT 0.5 0.4  --  0.4 0.3   ALT 23 19  --  18 18   AST 28 21  --  20 19   ESTGFRAFRICA 60 60 51* 60 >60   EGFRNONAA 52* 52* 44* 52* >60   ANIONGAP 9 7* 8 8 7*   MG  --  1.6  --  1.6 1.7   PHOS  --  2.9  --  3.5 3.3       No results for input(s): COLORU, APPEARANCEUA, PHUR, SPECGRAV, PROTEINUA, GLUCUA, KETONESU, BILIRUBINUA, OCCULTUA, UROBILINOGEN, NITRITE, LEUKOCYTESUR, RBCUA, WBCUA, BACTERIA, SQUAMEPITHEL, HYALINECASTS, GRANULARCAST, MICROCMT in the last 168 hours.    Invalid input(s): SPECIMENU, AMORPHOUSU    No results for input(s): PH, PCO2, PO2, HCO3, POCSATURATED, BE in the last 168 hours.    No results for input(s): TROPONINI, CPK, CPKMB in the last 168 hours.    No results for input(s): PT, INR, APTT in the last 168 hours.    Lab Results   Component Value Date    HGBA1C 6.0 (H) 07/30/2020       Recent Labs   Lab 07/30/20  0711   TSH 0.334*   FREET4 0.82       Microbiology Results (last 7 days)     Procedure Component Value Units Date/Time    Blood Culture #1 **CANNOT BE ORDERED STAT** [[id number]] Collected: 07/29/20 2309    Order Status: Completed Specimen: Blood from Peripheral, Antecubital, Left Updated: 07/31/20 1012     Blood Culture, Routine No Growth to date      No Growth to date    Blood Culture #2 **CANNOT BE ORDERED STAT** [[id number]] Collected: 07/30/20 0152    Order Status: Completed Specimen: Blood from Peripheral, Antecubital, Right Updated: 07/31/20 1012     Blood Culture, Routine No Growth to date      No Growth to date    Culture, Anaerobe [[id number]] Collected: 07/30/20 0210    Order Status: Completed Specimen: Wound from Leg, Left Updated: 07/31/20 0905     Anaerobic Culture Culture in progress    Aerobic culture [[id number]]  (Abnormal) Collected: 07/30/20 0210     Order Status: Completed Specimen: Wound from Leg, Left Updated: 07/31/20 0757     Aerobic Bacterial Culture STREPTOCOCCUS GROUP C  Many  Beta-hemolytic streptococci are routinely susceptible to   penicillins,cephalosporins and carbapenems.  Susceptibility testing not routinely performed  No other significant isolate      Gram stain [[id number]] Collected: 07/30/20 0210    Order Status: Completed Specimen: Wound from Leg, Left Updated: 07/30/20 1438     Gram Stain Result No WBC's      Rare Gram positive cocci          X-ray Chest Ap Portable    Result Date: 7/30/2020  EXAMINATION: XR CHEST AP PORTABLE CLINICAL HISTORY: cellulitis; TECHNIQUE: Single frontal view of the chest was performed. COMPARISON: Non 12/23/2017 e FINDINGS: Heart size normal.  The lungs are clear.  No pleural effusion.  Old rib fractures noted on the left side.     See above Electronically signed by: Jayesh Graham MD Date:    07/30/2020 Time:    09:30    Us Lower Extremity Veins Bilateral    Result Date: 7/30/2020  EXAMINATION: US LOWER EXTREMITY VEINS BILATERAL CLINICAL HISTORY: Other specified soft tissue disorders TECHNIQUE: Duplex and color flow Doppler and dynamic compression was performed of the bilateral lower extremity veins was performed. COMPARISON: 09/16/2018 FINDINGS: Right thigh veins: The common femoral, femoral, popliteal, upper greater saphenous, and deep femoral veins are patent and free of thrombus. The veins are normally compressible and have normal phasic flow and augmentation response. Right calf veins: The visualized calf veins are patent. Left thigh veins: The common femoral, femoral, popliteal, upper greater saphenous, and deep femoral veins are patent and free of thrombus. The veins are normally compressible and have normal phasic flow and augmentation response. Left calf veins: The visualized calf veins are patent. Miscellaneous: There is a hypoechoic structure without associated vascularity identified within the right  popliteal fossa suggestive of a popliteal cyst.  This measures 2.8 x 2.9 x 1.5 cm.     No evidence of deep venous thrombosis in either lower extremity. Right popliteal fossa cyst. Electronically signed by: Jaxon Hyman MD Date:    07/30/2020 Time:    03:05

## 2020-08-01 NOTE — PLAN OF CARE
AAOx4. Left lower leg pain controlled by PRN pain meds. Tolerating diabetic diet. BLE dressings remain CDI. Up to toilet with walker and 1 person assist. Blood glucose and cardiac monitoring continued. Bed locked in lowest position, bed alarm set and call bell within reach. Will continue to monitor.

## 2020-08-02 VITALS
OXYGEN SATURATION: 92 % | HEIGHT: 62 IN | RESPIRATION RATE: 20 BRPM | WEIGHT: 229.94 LBS | SYSTOLIC BLOOD PRESSURE: 172 MMHG | HEART RATE: 62 BPM | DIASTOLIC BLOOD PRESSURE: 81 MMHG | BODY MASS INDEX: 42.31 KG/M2 | TEMPERATURE: 98 F

## 2020-08-02 LAB
ALBUMIN SERPL BCP-MCNC: 3.3 G/DL (ref 3.5–5.2)
ALP SERPL-CCNC: 64 U/L (ref 55–135)
ALT SERPL W/O P-5'-P-CCNC: 24 U/L (ref 10–44)
ANION GAP SERPL CALC-SCNC: 6 MMOL/L (ref 8–16)
AST SERPL-CCNC: 26 U/L (ref 10–40)
BASOPHILS # BLD AUTO: 0.02 K/UL (ref 0–0.2)
BASOPHILS NFR BLD: 0.2 % (ref 0–1.9)
BILIRUB SERPL-MCNC: 0.3 MG/DL (ref 0.1–1)
BUN SERPL-MCNC: 14 MG/DL (ref 8–23)
CALCIUM SERPL-MCNC: 9.4 MG/DL (ref 8.7–10.5)
CHLORIDE SERPL-SCNC: 100 MMOL/L (ref 95–110)
CO2 SERPL-SCNC: 28 MMOL/L (ref 23–29)
CREAT SERPL-MCNC: 1 MG/DL (ref 0.5–1.4)
DIFFERENTIAL METHOD: ABNORMAL
EOSINOPHIL # BLD AUTO: 0.5 K/UL (ref 0–0.5)
EOSINOPHIL NFR BLD: 5.7 % (ref 0–8)
ERYTHROCYTE [DISTWIDTH] IN BLOOD BY AUTOMATED COUNT: 13.4 % (ref 11.5–14.5)
EST. GFR  (AFRICAN AMERICAN): >60 ML/MIN/1.73 M^2
EST. GFR  (NON AFRICAN AMERICAN): >60 ML/MIN/1.73 M^2
GLUCOSE SERPL-MCNC: 109 MG/DL (ref 70–110)
HCT VFR BLD AUTO: 37.9 % (ref 40–54)
HGB BLD-MCNC: 12.6 G/DL (ref 14–18)
IMM GRANULOCYTES # BLD AUTO: 0.04 K/UL (ref 0–0.04)
IMM GRANULOCYTES NFR BLD AUTO: 0.5 % (ref 0–0.5)
LYMPHOCYTES # BLD AUTO: 1.1 K/UL (ref 1–4.8)
LYMPHOCYTES NFR BLD: 13 % (ref 18–48)
MAGNESIUM SERPL-MCNC: 1.7 MG/DL (ref 1.6–2.6)
MCH RBC QN AUTO: 36.2 PG (ref 27–31)
MCHC RBC AUTO-ENTMCNC: 33.2 G/DL (ref 32–36)
MCV RBC AUTO: 109 FL (ref 82–98)
MONOCYTES # BLD AUTO: 0.7 K/UL (ref 0.3–1)
MONOCYTES NFR BLD: 8 % (ref 4–15)
NEUTROPHILS # BLD AUTO: 6.2 K/UL (ref 1.8–7.7)
NEUTROPHILS NFR BLD: 72.6 % (ref 38–73)
NRBC BLD-RTO: 0 /100 WBC
PHOSPHATE SERPL-MCNC: 2.7 MG/DL (ref 2.7–4.5)
PLATELET # BLD AUTO: 248 K/UL (ref 150–350)
PMV BLD AUTO: 10.4 FL (ref 9.2–12.9)
POCT GLUCOSE: 109 MG/DL (ref 70–110)
POCT GLUCOSE: 129 MG/DL (ref 70–110)
POTASSIUM SERPL-SCNC: 4.4 MMOL/L (ref 3.5–5.1)
PROT SERPL-MCNC: 6.6 G/DL (ref 6–8.4)
RBC # BLD AUTO: 3.48 M/UL (ref 4.6–6.2)
SODIUM SERPL-SCNC: 134 MMOL/L (ref 136–145)
WBC # BLD AUTO: 8.48 K/UL (ref 3.9–12.7)

## 2020-08-02 PROCEDURE — 84100 ASSAY OF PHOSPHORUS: CPT

## 2020-08-02 PROCEDURE — 97110 THERAPEUTIC EXERCISES: CPT | Mod: CQ

## 2020-08-02 PROCEDURE — 93010 ELECTROCARDIOGRAM REPORT: CPT | Mod: ,,, | Performed by: INTERNAL MEDICINE

## 2020-08-02 PROCEDURE — 94761 N-INVAS EAR/PLS OXIMETRY MLT: CPT

## 2020-08-02 PROCEDURE — 25000003 PHARM REV CODE 250: Performed by: STUDENT IN AN ORGANIZED HEALTH CARE EDUCATION/TRAINING PROGRAM

## 2020-08-02 PROCEDURE — 96372 THER/PROPH/DIAG INJ SC/IM: CPT

## 2020-08-02 PROCEDURE — 93010 EKG 12-LEAD: ICD-10-PCS | Mod: ,,, | Performed by: INTERNAL MEDICINE

## 2020-08-02 PROCEDURE — 83735 ASSAY OF MAGNESIUM: CPT

## 2020-08-02 PROCEDURE — 85025 COMPLETE CBC W/AUTO DIFF WBC: CPT

## 2020-08-02 PROCEDURE — 36415 COLL VENOUS BLD VENIPUNCTURE: CPT

## 2020-08-02 PROCEDURE — 97116 GAIT TRAINING THERAPY: CPT | Mod: CQ

## 2020-08-02 PROCEDURE — G0378 HOSPITAL OBSERVATION PER HR: HCPCS

## 2020-08-02 PROCEDURE — 80053 COMPREHEN METABOLIC PANEL: CPT

## 2020-08-02 PROCEDURE — 93005 ELECTROCARDIOGRAM TRACING: CPT

## 2020-08-02 RX ORDER — CEPHALEXIN 500 MG/1
500 CAPSULE ORAL 4 TIMES DAILY
Qty: 28 CAPSULE | Refills: 0 | Status: SHIPPED | OUTPATIENT
Start: 2020-08-02 | End: 2020-08-09

## 2020-08-02 RX ORDER — HYDROCODONE BITARTRATE AND ACETAMINOPHEN 7.5; 325 MG/1; MG/1
1 TABLET ORAL EVERY 6 HOURS
Qty: 20 TABLET | Refills: 0 | OUTPATIENT
Start: 2020-08-02 | End: 2020-09-08

## 2020-08-02 RX ADMIN — HYDROCODONE BITARTRATE AND ACETAMINOPHEN 1 TABLET: 7.5; 325 TABLET ORAL at 06:08

## 2020-08-02 RX ADMIN — ACETAMINOPHEN 650 MG: 325 TABLET ORAL at 08:08

## 2020-08-02 RX ADMIN — CEPHALEXIN 500 MG: 500 CAPSULE ORAL at 06:08

## 2020-08-02 RX ADMIN — ASPIRIN 81 MG: 81 TABLET, COATED ORAL at 08:08

## 2020-08-02 RX ADMIN — CEPHALEXIN 500 MG: 500 CAPSULE ORAL at 11:08

## 2020-08-02 RX ADMIN — HYDROCHLOROTHIAZIDE 25 MG: 25 TABLET ORAL at 08:08

## 2020-08-02 RX ADMIN — CARVEDILOL 6.25 MG: 6.25 TABLET, FILM COATED ORAL at 08:08

## 2020-08-02 RX ADMIN — Medication 1 TABLET: at 08:08

## 2020-08-02 RX ADMIN — SERTRALINE HYDROCHLORIDE 50 MG: 50 TABLET ORAL at 08:08

## 2020-08-02 RX ADMIN — LISINOPRIL 10 MG: 10 TABLET ORAL at 08:08

## 2020-08-02 RX ADMIN — HYDROCODONE BITARTRATE AND ACETAMINOPHEN 1 TABLET: 7.5; 325 TABLET ORAL at 11:08

## 2020-08-02 NOTE — DISCHARGE SUMMARY
[facility] Hospital Medicine  Discharge Summary      Patient Name: Reji Guerra  MRN: [id number]  Admission Date: 7/30/2020  Hospital Length of Stay: 0 days  Discharge Date and Time:  08/02/2020 9:15 AM  Attending Physician: Josr Kelly III, MD   Discharging Provider: Juana Aguiar MD  Primary Care Provider: Smitha Hernandez MD      HPI:   Patient is a 65 y.o. male with PMHx of CAD s/p 3 vessel stenting, HTN, DM, chronic venous insufficiency who presented with complaints of LLE swelling, pain. Patient reports for the previous 2 weeks he has noted increased swelling to his bilateral lower extremities but especially his left lower extremity.  Patient was previously seen by PCP who started him on 20 mg Lasix p.o. daily but patient did not take these daily, he was hoarding them and had not taken them for at least 2 weeks prior to presenting to the emergency department.  Patient, over the same time., noticed increased redness overlying the left lower extremity as well.  Patient had a chronic venous stasis wound to this limb and the area of erythema grew to encompass the wound and grew over the majority of the shin.  This area of erythema and swelling then was noted to begin to be weeping fluid.  This prompted the patient to present to the emergency department.  Of note, the patient has a history of cellulitis to the left lower extremity, reportedly last episode was about 4 years ago that required hospitalization and 1 episode in the intervening period that did not require hospitalization.    Upon arrival to the ED, patient's vital signs were stable and patient afebrile.  CBC did not show leukocytosis, and CMP was largely within normal limits except for slight increase in BUN/creatinine, up from baseline 30/1.0.  Patient started on vancomycin and ceftriaxone in the ED for likely cellulitis.      * No surgery found *      Hospital Course:   Pt admitted for cellulitis of the left lower  extremity, 2/2 venous insufficiency, DMII. Pt initially started on IV abx (vanc/rocephin/zosyn) and wound cx were obtained. Wound cx grew pansensitive group c strep. Pt was transitioned to PO abx (augmentin then keflex d/t nausea/diarrhea associated with augmentin). On 8/2, patient stable for discharge. Pt asymptomatic on day of discharge, vitals stable. Pt has f/u with PCP, wound care. Pt prescribed Keflex x 7d and 5 day course of PO Norco for pain control, with home health for dressing changes.         Consults:     No new Assessment & Plan notes have been filed under this hospital service since the last note was generated.  Service: Hospital Medicine    Final Active Diagnoses:    Diagnosis Date Noted POA    PRINCIPAL PROBLEM:  Cellulitis of left lower extremity without foot [L03.116] 12/13/2017 Yes    Leg swelling [M79.89] 07/30/2020 Yes    Chronic venous insufficiency [I87.2] 04/12/2018 Yes    Hypertension associated with diabetes [E11.59, I10] 01/16/2018 Yes    Type 2 diabetes mellitus with hyperglycemia, without long-term current use of insulin [E11.65] 05/20/2015 Yes    Coronary artery disease involving native coronary artery of native heart without angina pectoris [I25.10] 05/20/2015 Yes    Anxiety [F41.9] 05/20/2015 Yes      Problems Resolved During this Admission:       Discharged Condition: stable    Disposition: Home or Self Care    Follow Up:  Follow-up Information     [facility] On 8/5/2020.    Specialty: Family Medicine  Why: 8:40 am     Contact information:  [address], [address]  [facility] [address] [phone]  Additional information:  At this time [facility] [place] will only use these entries [facility], [facility], and Emergency Department due to COVID-19 precautions.            [facility].    Specialty: Home Health Services  Why: Home Health nurse will contact patient to arrange a visit.  Contact information:  [address]  [facility]  [address]  [place] [address]  [phone]             Please follow up.    Why: An ambulatory referral was placed with the Wound Clinic. Their offices will contact patient to arrange an appointment.           Smitha Hernandez MD In 1 week.    Specialty: Family Medicine  Why: For wound re-check  Contact information:  [address]  [address]  [place] [address]  [phone]                 Patient Instructions:      Ambulatory referral/consult to Home Health   Standing Status: Future   Referral Priority: Routine Referral Type: Home Health   Referral Reason: Specialty Services Required   Requested Specialty: Home Health Services   Number of Visits Requested: 1     Ambulatory referral/consult to Wound Clinic   Standing Status: Future   Referral Priority: Routine Referral Type: Consultation   Referral Reason: Specialty Services Required   Requested Specialty: Wound Care   Number of Visits Requested: 1     Diet diabetic     Diet Cardiac     Notify your health care provider if you experience any of the following:  temperature >100.4     Notify your health care provider if you experience any of the following:  persistent nausea and vomiting or diarrhea     Notify your health care provider if you experience any of the following:  severe uncontrolled pain     Notify your health care provider if you experience any of the following:  redness, tenderness, or signs of infection (pain, swelling, redness, odor or green/yellow discharge around incision site)     Notify your health care provider if you experience any of the following:  increased confusion or weakness     Notify your health care provider if you experience any of the following:  persistent dizziness, light-headedness, or visual disturbances     Notify your health care provider if you experience any of the following:  worsening rash     Notify your health care provider if you experience any of the following:  severe persistent headache     Notify your  health care provider if you experience any of the following:  difficulty breathing or increased cough     Leave dressing on - Keep it clean, dry, and intact until clinic visit     Activity as tolerated       Significant Diagnostic Studies: Labs:   CMP   Recent Labs   Lab 08/01/20  0422 08/02/20  0808    134*   K 4.3 4.4    100   CO2 28 28   GLU 95 109   BUN 19 14   CREATININE 1.1 1.0   CALCIUM 8.7 9.4   PROT 5.9* 6.6   ALBUMIN 2.9* 3.3*   BILITOT 0.3 0.3   ALKPHOS 54* 64   AST 19 26   ALT 18 24   ANIONGAP 7* 6*   ESTGFRAFRICA >60 >60   EGFRNONAA >60 >60   , CBC   Recent Labs   Lab 08/01/20  0422 08/02/20  0808   WBC 7.05 8.48   HGB 12.2* 12.6*   HCT 36.7* 37.9*    248   , A1C:   Recent Labs   Lab 05/14/20  1545 07/30/20  0711   HGBA1C 6.4* 6.0*    and All labs within the past 24 hours have been reviewed    Pending Diagnostic Studies:     None         Medications:  Reconciled Home Medications:      Medication List      START taking these medications    cephALEXin 500 MG capsule  Commonly known as: KEFLEX  Take 1 capsule (500 mg total) by mouth 4 (four) times daily. for 7 days     HYDROcodone-acetaminophen 7.5-325 mg per tablet  Commonly known as: NORCO  Take 1 tablet by mouth every 6 (six) hours.     iron-vit c-b12-folic acid Tab  Commonly known as: I-Car-C Plus  Take 1 tablet by mouth once daily.        CONTINUE taking these medications    aspirin 81 MG EC tablet  Commonly known as: ECOTRIN  Take 81 mg by mouth once daily.     carvediloL 6.25 MG tablet  Commonly known as: COREG     furosemide 20 MG tablet  Commonly known as: LASIX  Take 1 tablet (20 mg total) by mouth once daily. Take in the morning     hydroCHLOROthiazide 25 MG tablet  Commonly known as: HYDRODIURIL  Take 1 tablet (25 mg total) by mouth once daily.     lisinopriL 10 MG tablet  Take 1 tablet (10 mg total) by mouth once daily.     metFORMIN 500 MG tablet  Commonly known as: GLUCOPHAGE  Take 1 tablet (500 mg total) by mouth 2 (two)  times daily with meals.     sertraline 50 MG tablet  Commonly known as: ZOLOFT  Take 1 tablet (50 mg total) by mouth once daily.     simvastatin 40 MG tablet  Commonly known as: ZOCOR  Take 1 tablet (40 mg total) by mouth every evening.            Indwelling Lines/Drains at time of discharge:   Lines/Drains/Airways     None                 Time spent on the discharge of patient: 66 minutes  Patient was seen and examined on the date of discharge and determined to be suitable for discharge.         Juana Aguiar MD   [employer] [facility] PGY2  Department of [facility] Medicine  [facility]

## 2020-08-02 NOTE — PLAN OF CARE
Dressing changes daily to BLE. Oral antibiotics given as ordered. Medicated for pain as scheduled. Bed in low position, call light in reach. On telemetry SR. Remains free from falls bed alarm in reach.

## 2020-08-02 NOTE — PLAN OF CARE
Pt alert and oriented x4. Affect is anxious. Pain managed by scheduled norco, Dressings changed to lower extremities per wound care orders. Up to bathroom w/ walker. Pt had 2 episodes of diarrhea today. Tolerating a diabetic diet w/ no n/v. Safety maintained, call light w/in reach.

## 2020-08-02 NOTE — SUBJECTIVE & OBJECTIVE
Interval History: NAEON. Patient will discharge this afternoon with     Review of Systems   Constitutional: Negative for chills, diaphoresis, fatigue and fever.   HENT: Negative for congestion, rhinorrhea, sore throat and trouble swallowing.    Eyes: Negative for itching and visual disturbance.   Respiratory: Negative for cough, chest tightness, shortness of breath and wheezing.    Cardiovascular: Negative for chest pain and palpitations.   Gastrointestinal: Positive for diarrhea. Negative for abdominal pain, constipation, nausea and vomiting.   Endocrine: Negative for polyphagia and polyuria.   Genitourinary: Negative for dysuria and flank pain.   Musculoskeletal: Negative for back pain, joint swelling and neck stiffness.   Skin: Negative for color change and rash.   Neurological: Negative for dizziness, weakness, light-headedness and headaches.   Psychiatric/Behavioral: Negative for confusion. The patient is not nervous/anxious.      Objective:     Vital Signs (Most Recent):  Temp: 98.4 °F (36.9 °C) (08/02/20 0750)  Pulse: 70 (08/02/20 0816)  Resp: 20 (08/02/20 0750)  BP: (!) 137/103 (08/02/20 0750)  SpO2: (!) 94 % (08/02/20 0703) Vital Signs (24h Range):  Temp:  [97.6 °F (36.4 °C)-98.4 °F (36.9 °C)] 98.4 °F (36.9 °C)  Pulse:  [57-71] 70  Resp:  [15-20] 20  SpO2:  [93 %-97 %] 94 %  BP: (137-195)/() 137/103     Weight: 104.3 kg (229 lb 15 oz)  Body mass index is 42.06 kg/m².    Intake/Output Summary (Last 24 hours) at 8/2/2020 0910  Last data filed at 8/2/2020 0600  Gross per 24 hour   Intake 650 ml   Output 600 ml   Net 50 ml      Physical Exam  Constitutional:       General: He is not in acute distress.     Appearance: He is well-developed. He is obese. He is not diaphoretic.   HENT:      Head: Normocephalic and atraumatic.   Eyes:      Conjunctiva/sclera: Conjunctivae normal.      Pupils: Pupils are equal, round, and reactive to light.   Neck:      Musculoskeletal: Normal range of motion and neck supple.       Vascular: No JVD.   Cardiovascular:      Rate and Rhythm: Normal rate and regular rhythm.      Pulses: Normal pulses.      Heart sounds: Normal heart sounds. No murmur.   Pulmonary:      Effort: Pulmonary effort is normal. No respiratory distress.      Breath sounds: Normal breath sounds. No wheezing.   Abdominal:      General: Bowel sounds are normal. There is no distension.      Palpations: Abdomen is soft. There is no mass.      Tenderness: There is no abdominal tenderness.   Musculoskeletal:         General: Swelling (slight LLE) present.   Skin:     Comments: Significant area of erythema overlying distal aspect of LLE (improving) weeping pus from open wounds (erythematous areas marked); much smaller area of erythema on anterior distal RLE; evidence of stasis dermatitis over both distal lower extremities   Neurological:      General: No focal deficit present.      Mental Status: He is alert and oriented to person, place, and time. Mental status is at baseline.   Psychiatric:         Mood and Affect: Mood normal.         Behavior: Behavior normal.         Significant Labs:   A1C:   Recent Labs   Lab 05/14/20  1545 07/30/20  0711   HGBA1C 6.4* 6.0*     CBC:   Recent Labs   Lab 08/01/20  0422 08/02/20  0808   WBC 7.05 8.48   HGB 12.2* 12.6*   HCT 36.7* 37.9*    248     CMP:   Recent Labs   Lab 08/01/20  0422 08/02/20  0808    134*   K 4.3 4.4    100   CO2 28 28   GLU 95 109   BUN 19 14   CREATININE 1.1 1.0   CALCIUM 8.7 9.4   PROT 5.9* 6.6   ALBUMIN 2.9* 3.3*   BILITOT 0.3 0.3   ALKPHOS 54* 64   AST 19 26   ALT 18 24   ANIONGAP 7* 6*   EGFRNONAA >60 >60     All pertinent labs within the past 24 hours have been reviewed.    Significant Imaging: I have reviewed and interpreted all pertinent imaging results/findings within the past 24 hours.

## 2020-08-02 NOTE — PLAN OF CARE
Patient had 10 beat run of Vtach at approximately 11am, will obtain 12 lead EKG, if WNL, will follow up outpatient.     [place] 12:14 PM: EKG demonstrates PACs, but no Vtach, patient asymptomatic and stable for dc.     Juana Aguiar MD  [facility] PGY2

## 2020-08-02 NOTE — ASSESSMENT & PLAN NOTE
Patient reportedly with a significant hx of chronic venous insufficiency to the bilateral lower extremities. Patient has been previously evaluated by interventional cardiology.    - Lasix 20mg PO BID

## 2020-08-02 NOTE — PT/OT/SLP PROGRESS
"Physical Therapy Treatment    Patient Name:  Reji Guerra   MRN:  [id number]    Recommendations:     Discharge Recommendations:  (HH PT/OT vs SNF)   Discharge Equipment Recommendations: bedside commode(TTB)   Barriers to discharge: Decreased caregiver support    Assessment:     Reji Guerra is a 65 y.o. male admitted with a medical diagnosis of Cellulitis of left lower extremity without foot.  He presents with the following impairments/functional limitations:  weakness, impaired endurance, impaired self care skills, impaired functional mobilty, gait instability, impaired balance, decreased coordination, decreased upper extremity function, decreased lower extremity function, decreased safety awareness, pain, decreased ROM, impaired coordination, impaired skin, edema. Pt performed ambulation training ~75 ft with RW and close CGA. Declined to ambulate any further. However, did agree to seated therapeutic exercises. Would benefit from continued PT services to increase pt's independent functional mobility.    Rehab Prognosis: Good; patient would benefit from acute skilled PT services to address these deficits and reach maximum level of function.    Recent Surgery: * No surgery found *      Plan:     During this hospitalization, patient to be seen 6 x/week to address the identified rehab impairments via gait training, therapeutic activities, therapeutic exercises, neuromuscular re-education and progress toward the following goals:    · Plan of Care Expires:  08/30/20    Subjective     Chief Complaint: None Expressed  Patient/Family Comments/goals: "I don't know how far I can walk. My legs hurt and they will start oozing if I walk too much".  Pain/Comfort:  · Pain Rating 1: (Did not rate)  · Location - Side 1: Left  · Location - Orientation 1: lower  · Location 1: leg  · Pain Addressed 1: Reposition, Distraction, Cessation of Activity  · Pain Rating Post-Intervention 1: (Did not rate)      Objective:     Communicated " with debo prior to session.  Patient found supine with peripheral IV, telemetry upon PT entry to room.     General Precautions: Standard, fall   Orthopedic Precautions:N/A   Braces: N/A     Functional Mobility:  · Bed Mobility:     · Rolling Right: minimum assistance  · Scooting: stand by assistance and  To EOB  · Supine to Sit: minimum assistance and  assistance to advance trunk  · Transfers:     · Sit to Stand:  stand by assistance and contact guard assistance with rolling walker  · Gait:  ~75 ft with RW and close CGA      AM-PAC 6 CLICK MOBILITY          Therapeutic Activities and Exercises:   Pt required some encouragement to begin treatment session. Performed ambulation training ~75 ft with RW and close CGA. Pt demonstrates a slow emiliano and increased trunk flexion. Seated therapeutic exercises BLE's 1 x 10 reps consisting of LAQ's, hip add/abd, and heel/toe raises. Pt became very anxious a few times during session and incessantly talks requiring redirection to task. Requires extra time to complete all PTA requests.    Patient left up in chair with all lines intact, call button in reach, chair alarm on and  nurse notified..    GOALS:   Multidisciplinary Problems     Physical Therapy Goals        Problem: Physical Therapy Goal    Goal Priority Disciplines Outcome Goal Variances Interventions   Physical Therapy Goal     PT, PT/OT Ongoing, Progressing     Description: Goals to be met by: 2020     Patient will increase functional independence with mobility by performin. Supine to sit with Modified [place]  2. Sit to supine with Modified [place]  3. Rolling with Modified [place].  4. Sit to stand transfer with Modified [place] using Rolling Walker  5. Bed to chair transfer with Modified [place] using Rolling Walker  6. Gait  x 75 feet with Modified [place] using Rolling Walker.   7. Lower extremity exercise program x10 reps with independence                     Time  Tracking:     PT Received On: 08/02/20  PT Start Time: 1116     PT Stop Time: 1148  PT Total Time (min): 32 min     Billable Minutes: Gait Training  17 and Therapeutic Exercise 15    Treatment Type: Treatment  PT/PTA: PTA     PTA Visit Number: 2     Delaney Marie, PTA  08/02/2020

## 2020-08-02 NOTE — ASSESSMENT & PLAN NOTE
Patient with reported anxiety and seemingly quite anxious throughout interview and exam. Previously on Lexapro but could not tolerate GI side effects. Requesting something else for anxiety now.   - Hydroxyzine PRN    - Consider starting another SSRI pending conversation with patient about likely short nature of GI side-effects  -Follow up with PCP outpatient for anxiety management

## 2020-08-02 NOTE — PLAN OF CARE
HH orders sent and accepted via [employer] to [facility]. Their HH nurse will contact pt to arrange a visit for Monday 8/3/20    No DME ordered    Future Appointments   Date Time Provider Department Center   8/6/2020  8:40 AM Uriel Brenner PA-C [facility]       Pt's nurse will go over medications/signs and symptoms prior to discharge    Pt's vehicle is in parking lot, pt drove self to hospital. Per Dr David Joyce via secure chat on Saturday 8/1/20, it is ok for pt to drive self home       08/02/20 0922   Final Note   Assessment Type Final Discharge Note   Anticipated Discharge Disposition Home-Health  ([facility])   What phone number can be called within the next 1-3 days to see how you are doing after discharge? [phone]   Hospital Follow Up  Appt(s) scheduled? Yes   Right Care Referral Info   Post Acute Recommendation Other   Referral Type [facility]   Facility Name [facility]     Keerthi Tai, RN Transitional Navigator  [phone]

## 2020-08-02 NOTE — ASSESSMENT & PLAN NOTE
Wound care on board  Will need outpatient follow up w wound care and PCP  -Patient will be discharged with Keflex

## 2020-08-02 NOTE — PLAN OF CARE
Problem: Physical Therapy Goal  Goal: Physical Therapy Goal  Description: Goals to be met by: 2020     Patient will increase functional independence with mobility by performin. Supine to sit with Modified [place]  2. Sit to supine with Modified [place]  3. Rolling with Modified [place].  4. Sit to stand transfer with Modified [place] using Rolling Walker  5. Bed to chair transfer with Modified [place] using Rolling Walker  6. Gait  x 75 feet with Modified [place] using Rolling Walker.   7. Lower extremity exercise program x10 reps with independence    Outcome: Ongoing, Progressing   Pt continues to work and progress toward all goals. Able to perform ambulation training ~75 ft with RW and close CGA.

## 2020-08-02 NOTE — ASSESSMENT & PLAN NOTE
Patient with a history of CAD with 3 vessel stenting about 20 years ago. Denies any recent chest pain or shortness of breath.   - Continue home ASA 81mg daily  -Will follow up with PCP outpatient

## 2020-08-02 NOTE — PLAN OF CARE
Problem: Adult Inpatient Plan of Care  Goal: Plan of Care Review  Outcome: Ongoing, Progressing     VIRTUAL NURSE:  Cued into patient's room.  Permission received per patient to turn camera to view patient.  Introduced as VN for night shift that will be working with floor nurse and nursing assistant.  Educated patient on VN's role in patient care and  VIP model.  Plan of care reviewed with patient.  Education per flowsheet.   Informed patient that staff will round on them every 2 hours but to use call light for any other needs they may have; informed of fall risk and fall precautions.  Patient verbalized understanding.  Call light within reach; bed siderails up x3.  Opportunity given for questions and questions answered.  Patient denies complaints or any needs at this time.  C/o left leg pain 3/10 but refused offered pain medication.  Will cont to monitor and intervene as needed.    Labs, notes, orders, and careplan reviewed.

## 2020-08-02 NOTE — ASSESSMENT & PLAN NOTE
Patient on 500mg Metformin BID at home. Reported fasting BG about 120.   - Low dose sliding scale   - diabetic diet  -Patient will follow up with PCP outpatient for further DM management, HbA1C of 6

## 2020-08-02 NOTE — NURSING
Received call from Telemetry Monitor Tech that patient has a 10-beats run of V-Tach.  Checked on patient and patient is resting in bed with no apparent distress.  Dr. Aguiar notified.  Will continue to monitor.

## 2020-08-02 NOTE — PROGRESS NOTES
[facility] Hospital Medicine  Progress Note    Patient Name: Reji Guerra  MRN: [id number]  Patient Class: OP- Observation   Admission Date: 7/30/2020  Length of Stay: 0 days  Attending Physician: Josr Kelly III, MD  Primary Care Provider: Smitha Hernandez MD        Subjective:     Principal Problem:Cellulitis of left lower extremity without foot        HPI:  Patient is a 65 y.o. male with PMHx of CAD s/p 3 vessel stenting, HTN, DM, chronic venous insufficiency who presented with complaints of LLE swelling, pain. Patient reports for the previous 2 weeks he has noted increased swelling to his bilateral lower extremities but especially his left lower extremity.  Patient was previously seen by PCP who started him on 20 mg Lasix p.o. daily but patient did not take these daily, he was hoarding them and had not taken them for at least 2 weeks prior to presenting to the emergency department.  Patient, over the same time., noticed increased redness overlying the left lower extremity as well.  Patient had a chronic venous stasis wound to this limb and the area of erythema grew to encompass the wound and grew over the majority of the shin.  This area of erythema and swelling then was noted to begin to be weeping fluid.  This prompted the patient to present to the emergency department.  Of note, the patient has a history of cellulitis to the left lower extremity, reportedly last episode was about 4 years ago that required hospitalization and 1 episode in the intervening period that did not require hospitalization.    Upon arrival to the ED, patient's vital signs were stable and patient afebrile.  CBC did not show leukocytosis, and CMP was largely within normal limits except for slight increase in BUN/creatinine, up from baseline 30/1.0.  Patient started on vancomycin and ceftriaxone in the ED for likely cellulitis.      Overview/Hospital Course:  Pt admitted for cellulitis of the left lower  extremity, 2/2 venous insufficiency, DMII. Pt initially started on IV abx (vanc/rocephin/zosyn) and wound cx were obtained. Wound cx grew pansensitive group c strep. Pt was transitioned to PO abx (augmentin then keflex d/t nausea/diarrhea associated with augmentin). On 8/2, patient stable for discharge. Pt asymptomatic on day of discharge, vitals stable. Pt has f/u with PCP, wound care. Pt prescribed Keflex x 7d and 5 day course of PO Norco for pain control, with home health for dressing changes.        Interval History: NAEON. Patient will discharge this afternoon with     Review of Systems   Constitutional: Negative for chills, diaphoresis, fatigue and fever.   HENT: Negative for congestion, rhinorrhea, sore throat and trouble swallowing.    Eyes: Negative for itching and visual disturbance.   Respiratory: Negative for cough, chest tightness, shortness of breath and wheezing.    Cardiovascular: Negative for chest pain and palpitations.   Gastrointestinal: Positive for diarrhea. Negative for abdominal pain, constipation, nausea and vomiting.   Endocrine: Negative for polyphagia and polyuria.   Genitourinary: Negative for dysuria and flank pain.   Musculoskeletal: Negative for back pain, joint swelling and neck stiffness.   Skin: Negative for color change and rash.   Neurological: Negative for dizziness, weakness, light-headedness and headaches.   Psychiatric/Behavioral: Negative for confusion. The patient is not nervous/anxious.      Objective:     Vital Signs (Most Recent):  Temp: 98.4 °F (36.9 °C) (08/02/20 0750)  Pulse: 70 (08/02/20 0816)  Resp: 20 (08/02/20 0750)  BP: (!) 137/103 (08/02/20 0750)  SpO2: (!) 94 % (08/02/20 0703) Vital Signs (24h Range):  Temp:  [97.6 °F (36.4 °C)-98.4 °F (36.9 °C)] 98.4 °F (36.9 °C)  Pulse:  [57-71] 70  Resp:  [15-20] 20  SpO2:  [93 %-97 %] 94 %  BP: (137-195)/() 137/103     Weight: 104.3 kg (229 lb 15 oz)  Body mass index is 42.06 kg/m².    Intake/Output Summary (Last 24  hours) at 8/2/2020 0910  Last data filed at 8/2/2020 0600  Gross per 24 hour   Intake 650 ml   Output 600 ml   Net 50 ml      Physical Exam  Constitutional:       General: He is not in acute distress.     Appearance: He is well-developed. He is obese. He is not diaphoretic.   HENT:      Head: Normocephalic and atraumatic.   Eyes:      Conjunctiva/sclera: Conjunctivae normal.      Pupils: Pupils are equal, round, and reactive to light.   Neck:      Musculoskeletal: Normal range of motion and neck supple.      Vascular: No JVD.   Cardiovascular:      Rate and Rhythm: Normal rate and regular rhythm.      Pulses: Normal pulses.      Heart sounds: Normal heart sounds. No murmur.   Pulmonary:      Effort: Pulmonary effort is normal. No respiratory distress.      Breath sounds: Normal breath sounds. No wheezing.   Abdominal:      General: Bowel sounds are normal. There is no distension.      Palpations: Abdomen is soft. There is no mass.      Tenderness: There is no abdominal tenderness.   Musculoskeletal:         General: Swelling (slight LLE) present.   Skin:     Comments: Significant area of erythema overlying distal aspect of LLE (improving) weeping pus from open wounds (erythematous areas marked); much smaller area of erythema on anterior distal RLE; evidence of stasis dermatitis over both distal lower extremities   Neurological:      General: No focal deficit present.      Mental Status: He is alert and oriented to person, place, and time. Mental status is at baseline.   Psychiatric:         Mood and Affect: Mood normal.         Behavior: Behavior normal.         Significant Labs:   A1C:   Recent Labs   Lab 05/14/20  1545 07/30/20  0711   HGBA1C 6.4* 6.0*     CBC:   Recent Labs   Lab 08/01/20  0422 08/02/20  0808   WBC 7.05 8.48   HGB 12.2* 12.6*   HCT 36.7* 37.9*    248     CMP:   Recent Labs   Lab 08/01/20  0422 08/02/20  0808    134*   K 4.3 4.4    100   CO2 28 28   GLU 95 109   BUN 19 14    CREATININE 1.1 1.0   CALCIUM 8.7 9.4   PROT 5.9* 6.6   ALBUMIN 2.9* 3.3*   BILITOT 0.3 0.3   ALKPHOS 54* 64   AST 19 26   ALT 18 24   ANIONGAP 7* 6*   EGFRNONAA >60 >60     All pertinent labs within the past 24 hours have been reviewed.    Significant Imaging: I have reviewed and interpreted all pertinent imaging results/findings within the past 24 hours.      Assessment/Plan:      * Cellulitis of left lower extremity without foot  Wound care on board  Will need outpatient follow up w wound care and PCP  -Patient will be discharged with Keflex          Leg swelling        Chronic venous insufficiency  Patient reportedly with a significant hx of chronic venous insufficiency to the bilateral lower extremities. Patient has been previously evaluated by interventional cardiology.    - Lasix 20mg PO BID    Hypertension associated with diabetes  /86 on presentation.   - Continue home lisinopril 10mg daily   - Continue home Coreg 6.25mg BID   - Continue home hctz      Anxiety  Patient with reported anxiety and seemingly quite anxious throughout interview and exam. Previously on Lexapro but could not tolerate GI side effects. Requesting something else for anxiety now.   - Hydroxyzine PRN    - Consider starting another SSRI pending conversation with patient about likely short nature of GI side-effects  -Follow up with PCP outpatient for anxiety management    Coronary artery disease involving native coronary artery of native heart without angina pectoris  Patient with a history of CAD with 3 vessel stenting about 20 years ago. Denies any recent chest pain or shortness of breath.   - Continue home ASA 81mg daily  -Will follow up with PCP outpatient    Type 2 diabetes mellitus with hyperglycemia, without long-term current use of insulin  Patient on 500mg Metformin BID at home. Reported fasting BG about 120.   - Low dose sliding scale   - diabetic diet  -Patient will follow up with PCP outpatient for further DM  management, HbA1C of 6      VTE Risk Mitigation (From admission, onward)         Ordered     enoxaparin injection 40 mg  Every 24 hours      07/30/20 0401     IP VTE HIGH RISK PATIENT  Once      07/30/20 0401     Place sequential compression device  Until discontinued      07/30/20 0401                      Juana Aguiar MD   [employer] [facility] PGY2  Department of Hospital Medicine   [facility]

## 2020-08-03 LAB
BACTERIA SPEC AEROBE CULT: ABNORMAL
BACTERIA SPEC AEROBE CULT: ABNORMAL
BACTERIA SPEC ANAEROBE CULT: NORMAL

## 2020-08-04 LAB
BACTERIA BLD CULT: NORMAL
BACTERIA BLD CULT: NORMAL

## 2020-08-06 ENCOUNTER — OFFICE VISIT (OUTPATIENT)
Dept: FAMILY MEDICINE | Facility: HOSPITAL | Age: 65
End: 2020-08-06
Attending: FAMILY MEDICINE
Payer: COMMERCIAL

## 2020-08-06 VITALS
BODY MASS INDEX: 39.3 KG/M2 | HEIGHT: 63 IN | DIASTOLIC BLOOD PRESSURE: 88 MMHG | SYSTOLIC BLOOD PRESSURE: 138 MMHG | HEART RATE: 64 BPM | WEIGHT: 221.81 LBS

## 2020-08-06 DIAGNOSIS — I10 ESSENTIAL HYPERTENSION: ICD-10-CM

## 2020-08-06 DIAGNOSIS — L03.116 CELLULITIS OF LEFT LOWER EXTREMITY: Primary | ICD-10-CM

## 2020-08-06 DIAGNOSIS — I87.2 CHRONIC VENOUS INSUFFICIENCY: ICD-10-CM

## 2020-08-06 PROCEDURE — 99214 OFFICE O/P EST MOD 30 MIN: CPT | Performed by: PHYSICIAN ASSISTANT

## 2020-08-06 NOTE — PROGRESS NOTES
FAMILY MEDICINE  New Visit Progress Note   Recent Hospital Discharge     PRESENTING HISTORY     Chief Complaint/Reason for Admission:  Follow up Hospital Discharge   Chief Complaint   Patient presents with    Hospital Follow Up     PCP: Smitha Hernandez MD    History of Present Illness:  Mr. Reji Guerra is a 65 y.o. male who was recently admitted to the hospital.    Admission Date: 7/30/2020  Discharge Date and Time:  08/02/2020  ___________________________________________________________________  HPI:   65 y.o. male with PMHx of CAD s/p 3 vessel stenting, HTN, DM, chronic venous insufficiency here today for hospital follow up after being admitted for LLE cellulitis. Presented to ED with LLE swelling and pain. In the ED vital signs were stable and afebrile. CBC without leukocytosis, and CMP with slight increase in BUN/creatinine, up from baseline 30/1.0.  Started on vancomycin and ceftriaxone in the ED. Wound cx grew pansensitive group c strep. Transitioned to PO Keflex after unable to tolerate Augmentin. D/c'd with home health for wound care and 7 day PO course of Keflex.    Today the patient is unaccompanied during the visit. Overall he is doing well and taking his Abx as prescribed, but he is still having some GI upset with diarrhea. He does report that the diarrhea is less with Keflex than the Augmentin. He does not have wound care coming to his house, but he has a wound care appointment here in [place] with Dr. Sandoval tomorrow at 1pm. He also reports that he was instructed to keep his leg wrapped until his wound care follow up. He was contacted by a wound care nurse to schedule home visits, but patient deferred until after his clinic follow up visit. Today he reports that his leg is significantly improved with less swelling and less redness. He has been having trouble getting in and out of the tub, but has been trying to keep them clean with just a wash cloth. Denies any fever, chills, but is having  some leg pain that has been managed with his pain medication.     Review of Systems  General ROS: negative for chills, fever or weight loss  Psychological ROS: negative for hallucination, depression or suicidal ideation  Ophthalmic ROS: negative for blurry vision, photophobia or eye pain  ENT ROS: negative for epistaxis, sore throat or rhinorrhea  Respiratory ROS: no cough, shortness of breath, or wheezing  Cardiovascular ROS: no chest pain or dyspnea on exertion  Gastrointestinal ROS: +diarrhea, denies N/V  Genito-Urinary ROS: no dysuria, trouble voiding, or hematuria  Musculoskeletal ROS: +gait disturbance, leg swelling L>R  Neurological ROS: no syncope or seizures; no ataxia  Dermatological ROS: +leg wounds    PAST HISTORY:     Past Medical History:   Diagnosis Date    Diabetes mellitus, type 2     Heart disease     High cholesterol     Hypertension        Past Surgical History:   Procedure Laterality Date    ANGIOPLASTY      HERNIA REPAIR       No family history on file.    Social History     Socioeconomic History    Marital status: Single     Spouse name: Not on file    Number of children: Not on file    Years of education: Not on file    Highest education level: Not on file   Occupational History    Not on file   Social Needs    Financial resource strain: Not on file    Food insecurity     Worry: Not on file     Inability: Not on file    Transportation needs     Medical: Not on file     Non-medical: Not on file   Tobacco Use    Smoking status: Current Every Day Smoker     Packs/day: 1.00     Years: 55.00     Pack years: 55.00     Types: Cigarettes     Start date: 1965    Smokeless tobacco: Never Used    Tobacco comment: Pt enrolled in the [employer]. Ambulatory referral to Smoking Cessation program    Substance and Sexual Activity    Alcohol use: Yes     Alcohol/week: 24.0 standard drinks     Types: 12 Cans of beer, 12 Standard drinks or equivalent per week     Frequency: 2-3 times a week     Drug use: No    Sexual activity: Not on file   Lifestyle    Physical activity     Days per week: Not on file     Minutes per session: Not on file    Stress: Not on file   Relationships    Social connections     Talks on phone: Not on file     Gets together: Not on file     Attends Buddhist service: Not on file     Active member of club or organization: Not on file     Attends meetings of clubs or organizations: Not on file     Relationship status: Not on file   Other Topics Concern    Not on file   Social History Narrative    Not on file       MEDICATIONS & ALLERGIES:     Current Outpatient Medications on File Prior to Visit   Medication Sig Dispense Refill    aspirin (ECOTRIN) 81 MG EC tablet Take 81 mg by mouth once daily.      carvediloL (COREG) 6.25 MG tablet       cephALEXin (KEFLEX) 500 MG capsule Take 1 capsule (500 mg total) by mouth 4 (four) times daily. for 7 days 28 capsule 0    furosemide (LASIX) 20 MG tablet Take 1 tablet (20 mg total) by mouth once daily. Take in the morning 30 tablet 0    hydroCHLOROthiazide (HYDRODIURIL) 25 MG tablet Take 1 tablet (25 mg total) by mouth once daily. 90 tablet 3    HYDROcodone-acetaminophen (NORCO) 7.5-325 mg per tablet Take 1 tablet by mouth every 6 (six) hours. 20 tablet 0    iron-vit c-vit b12-folic acid (IRON-C PLUS) tablet Take 1 tablet by mouth once daily. 30 tablet 11    lisinopril 10 MG tablet Take 1 tablet (10 mg total) by mouth once daily. 90 tablet 3    metFORMIN (GLUCOPHAGE) 500 MG tablet Take 1 tablet (500 mg total) by mouth 2 (two) times daily with meals. 180 tablet 3    sertraline (ZOLOFT) 50 MG tablet Take 1 tablet (50 mg total) by mouth once daily. 90 tablet 0    simvastatin (ZOCOR) 40 MG tablet Take 1 tablet (40 mg total) by mouth every evening. 90 tablet 3     No current facility-administered medications on file prior to visit.         Review of patient's allergies indicates:   Allergen Reactions    Iodine and iodide containing  "products     Shellfish containing products        OBJECTIVE:     Vital Signs:  Vitals:    08/06/20 0853   BP: 138/88   Pulse: 64     Wt Readings from Last 1 Encounters:   08/06/20 0853 100.6 kg (221 lb 12.5 oz)     Body mass index is 39.29 kg/m².        Physical Exam:  /88 (BP Location: Left arm, Patient Position: Sitting, BP Method: Medium (Manual))   Pulse 64   Ht 5' 3" (1.6 m)   Wt 100.6 kg (221 lb 12.5 oz)   BMI 39.29 kg/m²   General appearance: Alert, cooperative, no distress  Constitutional: Oriented to person, place, and time. Appears well-developed and well-nourished.  HEENT: Normocephalic, atraumatic, neck symmetrical, no nasal discharge   Eyes: EOM's intact  Lungs: Speaking in full sentences, no respiratory distress  Heart: Regular rate  Abdomen: Soft, non-tender; bowel sounds normoactive  Extremities: +wounds to bilateral LEs L>R, some weeping of serosanginous fluid to left, +improved swelling L>R   Integument: +As above, also scaling to R LE with healing wounds  Neurologic: Alert and oriented X 3, normal strength, normal coordination and gait  Psychiatric: no pressured speech; normal affect; no evidence of impaired cognition     Laboratory  Lab Results   Component Value Date    WBC 8.48 08/02/2020    HGB 12.6 (L) 08/02/2020    HCT 37.9 (L) 08/02/2020     (H) 08/02/2020     08/02/2020     BMP  Lab Results   Component Value Date     (L) 08/02/2020    K 4.4 08/02/2020     08/02/2020    CO2 28 08/02/2020    BUN 14 08/02/2020    CREATININE 1.0 08/02/2020    CALCIUM 9.4 08/02/2020    ANIONGAP 6 (L) 08/02/2020    ESTGFRAFRICA >60 08/02/2020    EGFRNONAA >60 08/02/2020     Lab Results   Component Value Date    ALT 24 08/02/2020    AST 26 08/02/2020    ALKPHOS 64 08/02/2020    BILITOT 0.3 08/02/2020     Lab Results   Component Value Date    INR 0.9 05/08/2018    INR 1.0 12/12/2017     Lab Results   Component Value Date    HGBA1C 6.0 (H) 07/30/2020     No results for input(s): " POCTGLUCOSE in the last 72 hours.    Diagnostic Results:  LE US  FINDINGS:  Right thigh veins: The common femoral, femoral, popliteal, upper greater saphenous, and deep femoral veins are patent and free of thrombus. The veins are normally compressible and have normal phasic flow and augmentation response.     Right calf veins: The visualized calf veins are patent.     Left thigh veins: The common femoral, femoral, popliteal, upper greater saphenous, and deep femoral veins are patent and free of thrombus. The veins are normally compressible and have normal phasic flow and augmentation response.     Left calf veins: The visualized calf veins are patent.     Miscellaneous: There is a hypoechoic structure without associated vascularity identified within the right popliteal fossa suggestive of a popliteal cyst.  This measures 2.8 x 2.9 x 1.5 cm.     Impression:     No evidence of deep venous thrombosis in either lower extremity.     Right popliteal fossa cyst.      ASSESSMENT & PLAN:     Cellulitis of left lower extremity  Chronic venous insufficiency   -Today Mr. Guerra reports that he is doing well and his leg is improving. He will continue with his current abx for the prescribed amount and follow up in the wound care clinic tomorrow. He will also reach out to home health for home wound care visits.     Essential hypertension   -BP well controlled on current medication and advised to continue.     30 minutes were spent face to face with this patient. More than 50 percent of that time was spent counseling and coordinating wound care, as well as discussing his hospital course and his current follow up plan.     Instructions for the patient:    Follow up in one week for wound check and to establish care.     Scheduled Follow-up :  Future Appointments   Date Time Provider Department Center   8/7/2020  1:00 PM Goyo Sandoval MD [facility]   8/14/2020 10:40 AM Jomar Nelson DO [facility]       Post Visit Medication List:     Medication List          Accurate as of August 6, 2020 10:52 AM. If you have any questions, ask your nurse or doctor.            CONTINUE taking these medications    aspirin 81 MG EC tablet  Commonly known as: ECOTRIN     carvediloL 6.25 MG tablet  Commonly known as: COREG     cephALEXin 500 MG capsule  Commonly known as: KEFLEX  Take 1 capsule (500 mg total) by mouth 4 (four) times daily. for 7 days     furosemide 20 MG tablet  Commonly known as: LASIX  Take 1 tablet (20 mg total) by mouth once daily. Take in the morning     hydroCHLOROthiazide 25 MG tablet  Commonly known as: HYDRODIURIL  Take 1 tablet (25 mg total) by mouth once daily.     HYDROcodone-acetaminophen 7.5-325 mg per tablet  Commonly known as: NORCO  Take 1 tablet by mouth every 6 (six) hours.     iron-vit c-b12-folic acid Tab  Commonly known as: I-Car-C Plus  Take 1 tablet by mouth once daily.     lisinopriL 10 MG tablet  Take 1 tablet (10 mg total) by mouth once daily.     metFORMIN 500 MG tablet  Commonly known as: GLUCOPHAGE  Take 1 tablet (500 mg total) by mouth 2 (two) times daily with meals.     sertraline 50 MG tablet  Commonly known as: ZOLOFT  Take 1 tablet (50 mg total) by mouth once daily.     simvastatin 40 MG tablet  Commonly known as: ZOCOR  Take 1 tablet (40 mg total) by mouth every evening.            Signing Provider:  Uriel Brenner PA-C

## 2020-08-07 ENCOUNTER — HOSPITAL ENCOUNTER (OUTPATIENT)
Dept: WOUND CARE | Facility: HOSPITAL | Age: 65
Discharge: HOME | End: 2020-08-07
Attending: EMERGENCY MEDICINE
Payer: COMMERCIAL

## 2020-08-07 VITALS
TEMPERATURE: 98 F | WEIGHT: 221 LBS | SYSTOLIC BLOOD PRESSURE: 129 MMHG | BODY MASS INDEX: 39.16 KG/M2 | HEIGHT: 63 IN | HEART RATE: 91 BPM | DIASTOLIC BLOOD PRESSURE: 73 MMHG

## 2020-08-07 DIAGNOSIS — L03.116 LEFT LEG CELLULITIS: ICD-10-CM

## 2020-08-07 PROCEDURE — 99203 OFFICE O/P NEW LOW 30 MIN: CPT | Mod: 25

## 2020-08-07 PROCEDURE — 29581 APPL MULTLAYER CMPRN SYS LEG: CPT | Mod: 50

## 2020-08-07 NOTE — PROGRESS NOTES
Subjective:       Patient ID: Reji Guerra is a 65 y.o. male.    Chief Complaint: Wound Care    08/07/2020- new pt to clinic for Dr. Sandoval. Pt with hx of dm, smoker, htn, hyperlipidemia, cad with 3 stent placement 18 years ago, chronic venous insufficiency. Pt was recently hospitalized for left lower leg cellulitis and was discharged 08/02/2020. Pt was sent home with aquacel ag to wounds while awaiting wound care appt. Pt is on keflex. ABIs performed with a result of 1.19 to all extremities. Pt [facility] Wound care as follows: betamethasone/ miconazole (lotrisone at home) to BLE, wrapped with coflex with calamine. Abd scabs to be covered with aqaucel ag foam borders. Dressing changes to be done mondays and Thursdays. Pt to f/u with Dr. Sandoval 08/17/2020.   Hx as above; no c/o fever noted.  Review of Systems    Objective:    WNWWD alert, appropriately responsive WM INAD.  HEENT: No abnormalaties noted.  Neck: supple w/o JVD or bruits.  Wound as noted.  Physical Exam    Assessment:       1. Left leg cellulitis           Wound 08/07/20 1300 Venous Ulcer Left lower Leg #1 (Active)   08/07/20 1300    Pre-existing: Yes   Primary Wound Type: Venous ulcer   Side: Left   Orientation: lower   Location: Leg   Wound Number (optional): #1   Ankle-Brachial Index:    Pulses:    Removal Indication and Assessment:    Wound Outcome:    (Retired) Wound Type:    (Retired) Wound Length (cm):    (Retired) Wound Width (cm):    (Retired) Depth (cm):    Wound Description (Comments):    Removal Indications:    Wound Image   08/07/20 1300   Dressing Appearance Intact 08/07/20 1300   Drainage Amount None 08/07/20 1300   Appearance Red 08/07/20 1300   Tissue loss description Partial thickness 08/07/20 1300   Red (%), Wound Tissue Color 100 % 08/07/20 1300   Periwound Area Dry;Redness;Edematous 08/07/20 1300   Wound Edges Irregular 08/07/20 1300   Wound Length (cm) 8.5 cm 08/07/20 1300   Wound Width  (cm) 9.5 cm 08/07/20 1300   Wound Depth (cm) 0.1 cm 08/07/20 1300   Wound Volume (cm^3) 8.07 cm^3 08/07/20 1300   Wound Surface Area (cm^2) 80.75 cm^2 08/07/20 1300   Care Cleansed with:;Sterile normal saline 08/07/20 1300   Dressing Compression wrap 08/07/20 1300   Periwound Care Topical treatment applied 08/07/20 1300   Compression Two layer compression 08/07/20 1300   Dressing Change Due 08/10/20 08/07/20 1300            Wound 08/07/20 1300 Venous Ulcer Right lower Leg #2 (Active)   08/07/20 1300    Pre-existing: Yes   Primary Wound Type: Venous ulcer   Side: Right   Orientation: lower   Location: Leg   Wound Number (optional): #2   Ankle-Brachial Index:    Pulses:    Removal Indication and Assessment:    Wound Outcome:    (Retired) Wound Type:    (Retired) Wound Length (cm):    (Retired) Wound Width (cm):    (Retired) Depth (cm):    Wound Description (Comments):    Removal Indications:    Wound Image   08/07/20 1300   Dressing Appearance Open to air 08/07/20 1300   Drainage Amount None 08/07/20 1300   Appearance Red 08/07/20 1300   Tissue loss description Partial thickness 08/07/20 1300   Red (%), Wound Tissue Color 100 % 08/07/20 1300   Periwound Area Dry;Edematous;Redness 08/07/20 1300   Wound Edges Irregular 08/07/20 1300   Wound Length (cm) 3 cm 08/07/20 1300   Wound Width (cm) 2 cm 08/07/20 1300   Wound Depth (cm) 0.1 cm 08/07/20 1300   Wound Volume (cm^3) 0.6 cm^3 08/07/20 1300   Wound Surface Area (cm^2) 6 cm^2 08/07/20 1300   Care Cleansed with:;Sterile normal saline 08/07/20 1300   Dressing Compression wrap 08/07/20 1300   Periwound Care Topical treatment applied 08/07/20 1300   Compression Two layer compression 08/07/20 1300   Dressing Change Due 08/10/20 08/07/20 1300       Bilateral Lower Leg Wounds  Cleanse wound with: normal saline  Lidocaine: prn  Silver nitrate: prn  Primary dressing: betamethasone and miconazole in clinic, lotrisone at home  Secondary dressing: coflex with calamine, toes to  knee  Edema control: coflex with calamine, toes to knee  Frequency: Mondays and Thursdays   Follow-up: with Dr. Sandoval 08/17/2020  Home Health: [facility] to [facility] for [facility]. Fax # [phone]. Perform dressing changes as ordered Mondays (when not in clinic) and Thursdays. Next clinic visit 08/17/2020.  Other Orders: Place aquacel ag foam borders to abdominal scabs. Rx for lotrisone sent with patient.       Plan:                Follow up in about 10 days (around 8/17/2020).

## 2020-08-17 ENCOUNTER — HOSPITAL ENCOUNTER (OUTPATIENT)
Dept: WOUND CARE | Facility: HOSPITAL | Age: 65
Discharge: HOME | End: 2020-08-17
Attending: EMERGENCY MEDICINE
Payer: COMMERCIAL

## 2020-08-17 VITALS
TEMPERATURE: 98 F | HEIGHT: 63 IN | SYSTOLIC BLOOD PRESSURE: 147 MMHG | HEART RATE: 68 BPM | BODY MASS INDEX: 39.16 KG/M2 | DIASTOLIC BLOOD PRESSURE: 67 MMHG | WEIGHT: 221 LBS

## 2020-08-17 DIAGNOSIS — L03.116 LEFT LEG CELLULITIS: Primary | ICD-10-CM

## 2020-08-17 PROCEDURE — 29581 APPL MULTLAYER CMPRN SYS LEG: CPT | Mod: LT

## 2020-08-17 NOTE — PROGRESS NOTES
Subjective:       Patient ID: Reji Guerra is a 65 y.o. male.    Chief Complaint: Venous Stasis (bilateral lower legs)    08/07/2020- new pt to clinic for Dr. Sandoval. Pt with hx of dm, smoker, htn, hyperlipidemia, cad with 3 stent placement 18 years ago, chronic venous insufficiency. Pt was recently hospitalized for left lower leg cellulitis and was discharged 08/02/2020. Pt was sent home with aquacel ag to wounds while awaiting wound care appt. Pt is on keflex. ABIs performed with a result of 1.19 to all extremities. Pt [facility]. Wound care as follows: betamethasone/ miconazole (lotrisone at home) to BLE, wrapped with coflex with calamine. Abd scabs to be covered with aqaucel ag foam borders. Dressing changes to be done mondays and Thursdays. Pt to f/u with Dr. Sandoval 08/17/2020.   8/17/20: F/U with Dr. Sandoval. No open areas to BLE. Completed Keflex. Tubigrip F toes to knee RLE. Continue Co-flex with calamine to LLE toes to knee. Next visit 8/28/20.  No c/o noted.  Review of Systems    Objective:    Wounds appear closed w/o Gerald's sign or s/s of infection.  Physical Exam    Assessment:       1. Left leg cellulitis           Wound 08/07/20 1300 Venous Ulcer Left lower Leg #1 (Active)   08/07/20 1300    Pre-existing: Yes   Primary Wound Type: Venous ulcer   Side: Left   Orientation: lower   Location: Leg   Wound Number (optional): #1   Ankle-Brachial Index:    Pulses:    Removal Indication and Assessment:    Wound Outcome:    (Retired) Wound Type:    (Retired) Wound Length (cm):    (Retired) Wound Width (cm):    (Retired) Depth (cm):    Wound Description (Comments):    Removal Indications:    Wound Image   08/17/20 1000   Dressing Appearance Intact;Dry 08/17/20 1000   Drainage Amount None 08/17/20 1000   Appearance Red;Closed/resurfaced 08/17/20 1000   Tissue loss description Not applicable 08/17/20 1000   Red (%), Wound Tissue Color 100 % 08/17/20 1000   Periwound  Area Dry;Hemosiderin Staining;Edematous 08/17/20 1000   Wound Edges Rolled/closed 08/17/20 1000   Wound Length (cm) 0 cm 08/17/20 1000   Wound Width (cm) 0 cm 08/17/20 1000   Wound Depth (cm) 0 cm 08/17/20 1000   Wound Volume (cm^3) 0 cm^3 08/17/20 1000   Wound Surface Area (cm^2) 0 cm^2 08/17/20 1000   Care Cleansed with:;Soap and water 08/17/20 1000   Dressing Compression wrap 08/17/20 1000   Periwound Care Topical treatment applied 08/17/20 1000   Compression Two layer compression 08/17/20 1000   Dressing Change Due 08/20/20 08/17/20 1000            Wound 08/07/20 1300 Venous Ulcer Right lower Leg #2 (Active)   08/07/20 1300    Pre-existing: Yes   Primary Wound Type: Venous ulcer   Side: Right   Orientation: lower   Location: Leg   Wound Number (optional): #2   Ankle-Brachial Index:    Pulses:    Removal Indication and Assessment:    Wound Outcome:    (Retired) Wound Type:    (Retired) Wound Length (cm):    (Retired) Wound Width (cm):    (Retired) Depth (cm):    Wound Description (Comments):    Removal Indications:    Wound Image   08/17/20 1000   Dressing Appearance Intact;Dry 08/17/20 1000   Drainage Amount None 08/17/20 1000   Appearance Red 08/17/20 1000   Tissue loss description Not applicable 08/17/20 1000   Red (%), Wound Tissue Color 100 % 08/17/20 1000   Periwound Area Intact;Dry;Hemosiderin Staining 08/17/20 1000   Wound Edges Rolled/closed 08/17/20 1000   Wound Length (cm) 0 cm 08/17/20 1000   Wound Width (cm) 0 cm 08/17/20 1000   Wound Depth (cm) 0 cm 08/17/20 1000   Wound Volume (cm^3) 0 cm^3 08/17/20 1000   Wound Surface Area (cm^2) 0 cm^2 08/17/20 1000   Care Cleansed with:;Soap and water 08/17/20 1000   Dressing Tubular bandage 08/17/20 1000   Periwound Care Topical treatment applied 08/17/20 1000   Compression Tubular elasticized bandage 08/17/20 1000   Dressing Change Due 08/20/20 08/17/20 1000       Left  Lower Leg:  Cleanse wound with: normal saline  Lidocaine: prn  Silver nitrate:  prn  Primary dressing: betamethasone and miconazole in clinic, lotrisone at home  Secondary dressing: coflex with calamine, toes to knee  Edema control: coflex with calamine toes to knee LLE, Tubigrip F RLE toes to knee.  Frequency: Mondays and Thursdays   Follow-up: with Dr. Sandoval 08/28/2020  Home Health: [facility] for [facility]. Fax # [phone]. Perform dressing changes as ordered Mondays (when not in clinic) and Thursdays. Next clinic visit 08/28/2020.  Other Orders:  Tubigrip F RLE toes to knee. Rx for lotrisone sent with patient.       Plan:                Follow up in about 11 days (around 8/28/2020).

## 2020-08-24 ENCOUNTER — EXTERNAL HOME HEALTH (OUTPATIENT)
Dept: HOME HEALTH SERVICES | Facility: HOSPITAL | Age: 65
End: 2020-08-24

## 2020-08-26 ENCOUNTER — DOCUMENT SCAN (OUTPATIENT)
Dept: HOME HEALTH SERVICES | Facility: HOSPITAL | Age: 65
End: 2020-08-26

## 2020-08-28 ENCOUNTER — HOSPITAL ENCOUNTER (OUTPATIENT)
Dept: WOUND CARE | Facility: HOSPITAL | Age: 65
Discharge: HOME | End: 2020-08-28
Attending: EMERGENCY MEDICINE
Payer: COMMERCIAL

## 2020-08-28 VITALS
HEIGHT: 63 IN | WEIGHT: 221 LBS | TEMPERATURE: 97 F | HEART RATE: 85 BPM | SYSTOLIC BLOOD PRESSURE: 159 MMHG | DIASTOLIC BLOOD PRESSURE: 75 MMHG | BODY MASS INDEX: 39.16 KG/M2

## 2020-08-28 DIAGNOSIS — L03.116 LEFT LEG CELLULITIS: Primary | ICD-10-CM

## 2020-08-28 PROCEDURE — 29581 APPL MULTLAYER CMPRN SYS LEG: CPT | Mod: LT

## 2020-08-28 NOTE — PROGRESS NOTES
Subjective:       Patient ID: Reji Guerra is a 65 y.o. male.    Chief Complaint: Wound Care    08/07/2020- new pt to clinic for Dr. Sandoval. Pt with hx of dm, smoker, htn, hyperlipidemia, cad with 3 stent placement 18 years ago, chronic venous insufficiency. Pt was recently hospitalized for left lower leg cellulitis and was discharged 08/02/2020. Pt was sent home with aquacel ag to wounds while awaiting wound care appt. Pt is on keflex. ABIs performed with a result of 1.19 to all extremities. Pt to receive [facility] via [facility]. Wound care as follows: betamethasone/ miconazole (lotrisone at home) to BLE, wrapped with coflex with calamine. Abd scabs to be covered with aqaucel ag foam borders. Dressing changes to be done mondays and Thursdays. Pt to f/u with Dr. Sandoval 08/17/2020.   8/17/20: F/U with Dr. Sandoval. No open areas to BLE. Completed Keflex. Tubigrip F toes to knee RLE. Continue Co-flex with calamine to LLE toes to knee. Next visit 8/28/20.  08/28/2020- f/u with Dr. Sandoval. Cont POC. Pt states that he is looking for note that was previously written re: light duty and to avoid walking and standing for long periods of time. Dr. Sandoval states that he will rewrite a note next visit if not found. F/u with Dr. Sandoval in 1 week 09/04/2020.  No c/o noted.  Review of Systems    Objective:    Only a few ? Open areas remain; no Gerald's sign or s/s of infection.  Physical Exam    Assessment:       1. Left leg cellulitis           Wound 08/07/20 1300 Venous Ulcer Left lower Leg #1 (Active)   08/07/20 1300    Pre-existing: Yes   Primary Wound Type: Venous ulcer   Side: Left   Orientation: lower   Location: Leg   Wound Number (optional): #1   Ankle-Brachial Index:    Pulses:    Removal Indication and Assessment:    Wound Outcome:    (Retired) Wound Type:    (Retired) Wound Length (cm):    (Retired) Wound Width (cm):    (Retired) Depth (cm):    Wound Description (Comments):    Removal  Indications:    Wound Image   08/28/20 1500   Dressing Appearance Dry;Intact 08/28/20 1500   Drainage Amount None 08/28/20 1500   Appearance Red 08/28/20 1500   Tissue loss description Not applicable 08/28/20 1500   Red (%), Wound Tissue Color 100 % 08/28/20 1500   Periwound Area Dry;Hemosiderin Staining 08/28/20 1500   Wound Edges Rolled/closed 08/28/20 1500   Wound Length (cm) 0 cm 08/28/20 1500   Wound Width (cm) 0 cm 08/28/20 1500   Wound Depth (cm) 0 cm 08/28/20 1500   Wound Volume (cm^3) 0 cm^3 08/28/20 1500   Wound Surface Area (cm^2) 0 cm^2 08/28/20 1500   Care Cleansed with:;Sterile normal saline 08/28/20 1500   Dressing Compression wrap 08/28/20 1500   Periwound Care Topical treatment applied 08/28/20 1500   Compression Two layer compression 08/28/20 1500   Dressing Change Due 08/31/20 08/28/20 1500            Wound 08/07/20 1300 Venous Ulcer Right lower Leg #2 (Active)   08/07/20 1300    Pre-existing: Yes   Primary Wound Type: Venous ulcer   Side: Right   Orientation: lower   Location: Leg   Wound Number (optional): #2   Ankle-Brachial Index:    Pulses:    Removal Indication and Assessment:    Wound Outcome:    (Retired) Wound Type:    (Retired) Wound Length (cm):    (Retired) Wound Width (cm):    (Retired) Depth (cm):    Wound Description (Comments):    Removal Indications:    Wound Image   08/28/20 1500   Dressing Appearance Intact 08/28/20 1500   Drainage Amount None 08/28/20 1500   Appearance Red 08/28/20 1500   Tissue loss description Not applicable 08/28/20 1500   Red (%), Wound Tissue Color 100 % 08/28/20 1500   Periwound Area Intact;Hemosiderin Staining 08/28/20 1500   Wound Edges Rolled/closed 08/28/20 1500   Wound Length (cm) 0 cm 08/28/20 1500   Wound Width (cm) 0 cm 08/28/20 1500   Wound Depth (cm) 0 cm 08/28/20 1500   Wound Volume (cm^3) 0 cm^3 08/28/20 1500   Wound Surface Area (cm^2) 0 cm^2 08/28/20 1500   Care Cleansed with:;Sterile normal saline 08/28/20 1500   Dressing Tubular bandage  08/28/20 1500   Periwound Care Topical treatment applied 08/28/20 1500   Compression Tubular elasticized bandage 08/28/20 1500   Dressing Change Due 08/31/20 08/28/20 1500       Left Lower Leg:   Cleanse wound with: normal saline   Lidocaine: prn   Silver nitrate: prn   Primary dressing: betamethasone and miconazole in clinic, lotrisone at home   Secondary dressing: coflex with calamine LLE toes to knee   Edema control: coflex with calamine LLE toes to knee, RLE Tubigrip F toes to knee   Frequency: Mondays and Thursdays   Follow-up: with Dr. Sandoval 09/04/2020   Home Health: Admit pt to [facility] for home wound care. Fax # [phone]. Perform dressing changes as ordered Mondays  and Thursdays. Next clinic visit 09/04/2020.   Other Orders: Tubigrip F RLE toes to knee.      Plan:                 Follow up in about 1 week (around 9/4/2020).

## 2020-09-04 ENCOUNTER — HOSPITAL ENCOUNTER (OUTPATIENT)
Dept: WOUND CARE | Facility: HOSPITAL | Age: 65
Discharge: HOME | End: 2020-09-04
Attending: EMERGENCY MEDICINE
Payer: COMMERCIAL

## 2020-09-04 VITALS
SYSTOLIC BLOOD PRESSURE: 109 MMHG | DIASTOLIC BLOOD PRESSURE: 53 MMHG | HEART RATE: 86 BPM | BODY MASS INDEX: 39.16 KG/M2 | TEMPERATURE: 98 F | WEIGHT: 221 LBS | HEIGHT: 63 IN

## 2020-09-04 DIAGNOSIS — L03.116 CELLULITIS OF LEFT LOWER EXTREMITY: Primary | ICD-10-CM

## 2020-09-04 PROCEDURE — 99213 OFFICE O/P EST LOW 20 MIN: CPT

## 2020-09-04 NOTE — PROGRESS NOTES
Subjective:       Patient ID: Reji Guerra is a 65 y.o. male.    Chief Complaint: Wound Care    08/07/2020- new pt to clinic for Dr. Sandoval. Pt with hx of dm, smoker, htn, hyperlipidemia, cad with 3 stent placement 18 years ago, chronic venous insufficiency. Pt was recently hospitalized for left lower leg cellulitis and was discharged 08/02/2020. Pt was sent home with aquacel ag to wounds while awaiting wound care appt. Pt is on keflex. ABIs performed with a result of 1.19 to all extremities. Pt to receive home [facility] via [facility]. Wound care as follows: betamethasone/ miconazole (lotrisone at home) to BLE, wrapped with coflex with calamine. Abd scabs to be covered with aqaucel ag foam borders. Dressing changes to be done mondays and Thursdays. Pt to f/u with Dr. Sandoval 08/17/2020.   8/17/20: F/U with Dr. Sandoval. No open areas to BLE. Completed Keflex. Tubigrip F toes to knee RLE. Continue Co-flex with calamine to LLE toes to knee. Next visit 8/28/20.  08/28/2020- f/u with Dr. Sandoval. Cont POC. Pt states that he is looking for note that was previously written re: light duty and to avoid walking and standing for long periods of time. Dr. Sandoval states that he will rewrite a note next visit if not found. F/u with Dr. Sandoval in 1 week 09/04/2020.  09/04/2020- f/u with Dr. Sandoval. Wound healed. Tubi  F x 2 placed to BLE. Pt instructed to call wound care clinic if wound reopens.     Review of Systems    Objective:      Physical Exam    Assessment:       1. Cellulitis of left lower extremity           Wound 08/07/20 1300 Venous Ulcer Left lower Leg #1 (Active)   08/07/20 1300    Pre-existing: Yes   Primary Wound Type: Venous ulcer   Side: Left   Orientation: lower   Location: Leg   Wound Number (optional): #1   Ankle-Brachial Index:    Pulses:    Removal Indication and Assessment:    Wound Outcome:    (Retired) Wound Type:    (Retired) Wound Length (cm):    (Retired) Wound Width (cm):     (Retired) Depth (cm):    Wound Description (Comments):    Removal Indications:    Wound Image   09/04/20 1400   Dressing Appearance Intact 09/04/20 1400   Drainage Amount None 09/04/20 1400   Appearance Pink 09/04/20 1400   Tissue loss description Not applicable 09/04/20 1400   Red (%), Wound Tissue Color 100 % 09/04/20 1400   Periwound Area Intact 09/04/20 1400   Wound Edges Rolled/closed 09/04/20 1400   Wound Length (cm) 0 cm 09/04/20 1400   Wound Width (cm) 0 cm 09/04/20 1400   Wound Depth (cm) 0 cm 09/04/20 1400   Wound Volume (cm^3) 0 cm^3 09/04/20 1400   Wound Surface Area (cm^2) 0 cm^2 09/04/20 1400   Care Cleansed with:;Sterile normal saline 09/04/20 1400   Compression Tubular elasticized bandage 09/04/20 1400            Wound 08/07/20 1300 Venous Ulcer Right lower Leg #2 (Active)   08/07/20 1300    Pre-existing: Yes   Primary Wound Type: Venous ulcer   Side: Right   Orientation: lower   Location: Leg   Wound Number (optional): #2   Ankle-Brachial Index:    Pulses:    Removal Indication and Assessment:    Wound Outcome:    (Retired) Wound Type:    (Retired) Wound Length (cm):    (Retired) Wound Width (cm):    (Retired) Depth (cm):    Wound Description (Comments):    Removal Indications:    Wound Image   09/04/20 1400   Dressing Appearance Intact 09/04/20 1400   Drainage Amount None 09/04/20 1400   Appearance Pink 09/04/20 1400   Tissue loss description Not applicable 09/04/20 1400   Red (%), Wound Tissue Color 100 % 09/04/20 1400   Periwound Area Intact 09/04/20 1400   Wound Edges Rolled/closed 09/04/20 1400   Wound Length (cm) 0 cm 09/04/20 1400   Wound Width (cm) 0 cm 09/04/20 1400   Wound Depth (cm) 0 cm 09/04/20 1400   Wound Volume (cm^3) 0 cm^3 09/04/20 1400   Wound Surface Area (cm^2) 0 cm^2 09/04/20 1400   Care Cleansed with:;Sterile normal saline 09/04/20 1400   Compression Tubular elasticized bandage 09/04/20 1400       Healed Wound Instructions:Your wound is healed, it is extremely fragile at  this stage; protect it from friction, wash it gently and pat dry.  If the wound should re-open, please call [phone] for furthur instructions.      Plan:                No follow-ups on file.

## 2020-09-08 ENCOUNTER — TELEPHONE (OUTPATIENT)
Dept: ORTHOPEDICS | Facility: CLINIC | Age: 65
End: 2020-09-08

## 2020-09-08 ENCOUNTER — HOSPITAL ENCOUNTER (EMERGENCY)
Facility: HOSPITAL | Age: 65
Discharge: HOME | End: 2020-09-08
Attending: EMERGENCY MEDICINE
Payer: COMMERCIAL

## 2020-09-08 VITALS
HEART RATE: 83 BPM | DIASTOLIC BLOOD PRESSURE: 83 MMHG | WEIGHT: 220 LBS | TEMPERATURE: 98 F | SYSTOLIC BLOOD PRESSURE: 179 MMHG | RESPIRATION RATE: 20 BRPM | HEIGHT: 64 IN | OXYGEN SATURATION: 98 % | BODY MASS INDEX: 37.56 KG/M2

## 2020-09-08 DIAGNOSIS — S60.221A CONTUSION OF RIGHT HAND, INITIAL ENCOUNTER: ICD-10-CM

## 2020-09-08 DIAGNOSIS — M25.531 ACUTE PAIN OF RIGHT WRIST: ICD-10-CM

## 2020-09-08 DIAGNOSIS — M79.641 RIGHT HAND PAIN: Primary | ICD-10-CM

## 2020-09-08 DIAGNOSIS — S63.501A SPRAIN OF RIGHT WRIST, INITIAL ENCOUNTER: ICD-10-CM

## 2020-09-08 PROCEDURE — 25000003 PHARM REV CODE 250: Performed by: EMERGENCY MEDICINE

## 2020-09-08 PROCEDURE — 99283 EMERGENCY DEPT VISIT LOW MDM: CPT | Mod: 25

## 2020-09-08 RX ORDER — MORPHINE SULFATE 4 MG/ML
4 INJECTION, SOLUTION INTRAMUSCULAR; INTRAVENOUS
Status: DISCONTINUED | OUTPATIENT
Start: 2020-09-08 | End: 2020-09-08 | Stop reason: HOSPADM

## 2020-09-08 RX ORDER — HYDROCODONE BITARTRATE AND ACETAMINOPHEN 10; 325 MG/1; MG/1
1 TABLET ORAL
Status: COMPLETED | OUTPATIENT
Start: 2020-09-08 | End: 2020-09-08

## 2020-09-08 RX ORDER — HYDROCODONE BITARTRATE AND ACETAMINOPHEN 10; 325 MG/1; MG/1
1 TABLET ORAL EVERY 4 HOURS PRN
Qty: 18 TABLET | Refills: 0 | Status: SHIPPED | OUTPATIENT
Start: 2020-09-08 | End: 2021-06-09

## 2020-09-08 RX ADMIN — HYDROCODONE BITARTRATE AND ACETAMINOPHEN 1 TABLET: 10; 325 TABLET ORAL at 07:09

## 2020-09-08 NOTE — ED PROVIDER NOTES
Encounter Date: 9/8/2020    COVID Statement  The [place] [facility] and Jayesh Angela, Governor of the State [facility], have declared a State of Public Health Emergency due to the spread of a novel coronavirus and disease (COVID-19).  There is no currently accepted treatment except conservative measures and respiratory support if appropriate.  This has lead to significant resource capacity and potential delays in care.       SCRIBE #1 NOTE: I, Mumtaz Morales, am scribing for, and in the presence of, Melba Romero MD.       History     Chief Complaint   Patient presents with    Fall     Patient present to the ED with reports of having fallen yesterday with right arm extended. States having pain to the right wrist and hand that worsened last night. Swelling noted to the right lateral wrist area.     Hand Pain    Wrist Pain     66 y/o M presents to the ED due to right wrist pain following a fall yesterday afternoon. Patient reports injuring right wrist and hand after losing his balance and falling on outstretched hands into grass. States that he tripped over some cypress roots after getting out of his car.  Patient states he woke up this morning with worsening pain and swelling to the area. He tried taking a baby Aspirin for his symptoms without much improvement. He denies any associated shoulder or elbow pain.  Pain is worse with movement and improved with immobilization.  Denies any preceding light-headedness, weakness, or dizziness yesterday. No head trauma, LOC, or other symptoms reported at this time. Denies head trauma.    The history is provided by the patient.     Review of patient's allergies indicates:   Allergen Reactions    Iodine and iodide containing products     Shellfish containing products      Past Medical History:   Diagnosis Date    Diabetes mellitus, type 2     Heart disease     High cholesterol     Hypertension      Past Surgical History:   Procedure Laterality  Date    ANGIOPLASTY      HERNIA REPAIR       History reviewed. No pertinent family history.  Social History     Tobacco Use    Smoking status: Current Every Day Smoker     Packs/day: 1.00     Years: 55.00     Pack years: 55.00     Types: Cigarettes     Start date: 1965    Smokeless tobacco: Never Used    Tobacco comment: Pt enrolled in the [employer]. Ambulatory referral to Smoking Cessation program    Substance Use Topics    Alcohol use: Yes     Alcohol/week: 24.0 standard drinks     Types: 12 Cans of beer, 12 Standard drinks or equivalent per week     Frequency: 2-3 times a week    Drug use: No     Review of Systems   Constitutional: Negative for fever.   HENT: Negative for congestion and sore throat.    Respiratory: Negative for shortness of breath.    Cardiovascular: Negative for chest pain.   Gastrointestinal: Negative for nausea.   Genitourinary: Negative for dysuria.   Musculoskeletal: Positive for arthralgias and joint swelling. Negative for back pain.   Skin: Negative for rash.   Neurological: Negative for dizziness, syncope, weakness and light-headedness.   Hematological: Does not bruise/bleed easily.   All other systems reviewed and are negative.      Physical Exam     Initial Vitals [09/08/20 0648]   BP Pulse Resp Temp SpO2   (!) 179/83 83 20 98.1 °F (36.7 °C) 98 %      MAP       --         Physical Exam    Nursing note and vitals reviewed.  Constitutional: He appears well-developed and well-nourished. He is not diaphoretic. No distress.   HENT:   Head: Normocephalic and atraumatic.   Right Ear: External ear normal.   Left Ear: External ear normal.   Eyes: EOM are normal. Pupils are equal, round, and reactive to light.   Neck: Normal range of motion. Neck supple.   Cardiovascular: Normal rate, regular rhythm, normal heart sounds and intact distal pulses.   Pulmonary/Chest: Breath sounds normal. No respiratory distress. He has no wheezes.   Abdominal: Soft. He exhibits no distension. There is no  abdominal tenderness.   Musculoskeletal: Tenderness and edema present.      Comments: Diffuse swelling and tenderness to the right wrist and hand. No snuffbox TTP  Able to range all digits on right hand.  2+ radial pulse right hand  NVI on exam   Neurological: He is alert and oriented to person, place, and time. No cranial nerve deficit or sensory deficit. GCS score is 15. GCS eye subscore is 4. GCS verbal subscore is 5. GCS motor subscore is 6.   Skin: Skin is warm and dry. Capillary refill takes less than 2 seconds.   Psychiatric: He has a normal mood and affect.         ED Course   Procedures  Labs Reviewed - No data to display       Imaging Results          X-Ray Hand 3 View Right (Final result)  Result time 09/08/20 07:21:20    Final result by Joseph Casper MD (09/08/20 07:21:20)                 Impression:      No definite acute displaced fracture identified within limitations of diffuse osseous demineralization.      Electronically signed by: Joseph Casper  Date:    09/08/2020  Time:    07:21             Narrative:    EXAMINATION:  XR HAND COMPLETE 3 VIEW RIGHT    CLINICAL HISTORY:  right hand pain;    TECHNIQUE:  PA, lateral, and oblique views of the right hand were performed.    COMPARISON:  Same day wrist radiograph    FINDINGS:  Osseous demineralization limits evaluation for acute displaced fracture.  With these stations, no definite acute fracture is seen.  Degenerative findings are again seen in the intercarpal joints.  Well corticated osseous fragments about the ulnar styloid and lateral aspect of the scaphoid are nonspecific but may represent sequela of prior trauma.  Soft tissue swelling is suggested about the wrist joint.                               X-Ray Wrist Complete Right (Final result)  Result time 09/08/20 07:18:03    Final result by Joseph Casper MD (09/08/20 07:18:03)                 Impression:      No definite acute displaced fracture within limitations imposed by osseous  demineralization.      Electronically signed by: Joseph Casper  Date:    09/08/2020  Time:    07:18             Narrative:    EXAMINATION:  XR WRIST COMPLETE 3 VIEWS RIGHT    CLINICAL HISTORY:  Pain in right wrist    TECHNIQUE:  PA, lateral, and oblique views of the right wrist were performed.    COMPARISON:  None    FINDINGS:  Diffuse osseous demineralization limits evaluation for nondisplaced fracture.  No definite acute fracture seen.  Well corticated ossific densities adjacent to the ulnar styloid as well as lateral to the scaphoid may represent sequela of prior trauma.  Additionally, there is some degree of loss of anticipated radial volar tilt, non-specific.  Degenerative findings are noted in the intercarpal joints.  Minor soft tissue swelling about the wrist joint..                                 Medical Decision Making:   Initial Assessment:   64 y/o M presents to the ED complaining of right hand and wrist pain after falling yesterday afternoon.     Past medical records queried and reviewed. The patient was seen and examined. The history and physical exam was obtained. The nursing notes and vital signs were reviewed.     Secondary to symptoms and exam findings we ordered:    Imaging: XR hand, XR wrist    Differential Diagnosis:   Fracture, dislocation, contusion, sprain  Clinical Tests:   Radiological Study: Reviewed and Ordered  ED Management:    On re-evaluation, the patient's status has improved.  After complete ED evaluation, clinical impression is most consistent with right wrist sprain.  XRs negative.    Placed right wrist in thumb spica   Dr Dinero follow-up within 2-3 days was recommended.    After taking into careful account the patient's history, physical exam findings, as well as empirical and objective data obtained throughout ED workup, I feel no emergent medical condition has been identified. No further evaluation or admission was felt to be required, and the patient is stable for discharge  from the ED. The patient and any additional family present were updated with test results, overall clinical impression, and recommended further plan of care, including discharge instructions as provided and outpatient follow-up for continued evaluation and management as needed. All questions were answered. The patient expressed understanding and agreed with current plan for discharge and follow-up plan of care. Strict ED return precautions were provided, including return/worsening of current symptoms, new symptoms, or any other concerns.                     ED Course as of Sep 08 0811   Tue Sep 08, 2020   0730 BP(!): 179/83 [LD]   0730 Temp: 98.1 °F (36.7 °C) [LD]   0730 Pulse: 83 [LD]   0730 Resp: 20 [LD]   0730 SpO2: 98 % [LD]      ED Course User Index  [LD] Melba Romero MD                Clinical Impression:       ICD-10-CM ICD-9-CM   1. Right hand pain  M79.641 729.5   2. Acute pain of right wrist  M25.531 719.43   3. Contusion of right hand, initial encounter  S60.221A 923.20   4. Sprain of right wrist, initial encounter  S63.501A 842.00           Disposition:   Disposition: Discharged  Condition: Stable     ED Disposition Condition    Discharge Stable        ED Prescriptions     Medication Sig Dispense Start Date End Date Auth. Provider    HYDROcodone-acetaminophen (NORCO)  mg per tablet Take 1 tablet by mouth every 4 (four) hours as needed for Pain. 18 tablet 9/8/2020  Melba Romero MD        Follow-up Information     Follow up With Specialties Details Why Contact Info    Reji Dinero Jr., MD Hand Surgery, Orthopedic Surgery Call today to arrange outpatient follow up with Dr Dinero for further management. [address]  [place] [address]  [phone]                          I, Melba Romero,  personally performed the services described in this documentation. All medical record entries made by the scribe were at my direction and in my presence.  I have reviewed the chart and  agree that the record reflects my personal performance and is accurate and complete. Melba Romero M.D. 8:11 AM09/08/2020               Melba Romero MD  09/08/20 0811

## 2020-09-08 NOTE — TELEPHONE ENCOUNTER
----- Message from Carrie Arcos sent at 9/8/2020  1:39 PM CDT -----  Type:  Patient Returning Call    Who Called: Patient  Who Left Message for Patient: Missy  Does the patient know what this is regarding?: returned call  Would the patient rather a call back or a response via [facility]? Call back  Best Call Back Number: [phone]  Additional Information: n/a

## 2020-09-08 NOTE — ED TRIAGE NOTES
Patient present to the ED with reports of having fallen yesterday with right arm extended. States having pain to the right wrist and hand that worsened last night. Swelling noted to the right lateral wrist area.     Review of patient's allergies indicates:   Allergen Reactions    Iodine and iodide containing products     Shellfish containing products         Patient has verified the spelling of their name and  on armband.   APPEARANCE: Patient is alert, calm, oriented x 4, and does not appear distressed.  SKIN: Skin is normal for race, warm, and dry. Normal skin turgor and mucous membranes moist.  RESPIRATORY:Normal rate and effort. Respirations are equal and unlabored.    MUSCLE: Limited ROM to the right hand. Swelling and tenderness to the right lateral wrist area. Pain worsens with movement.   PERIPHERAL VASCULAR: peripheral pulses present. Normal cap refill. No edema. Warm to touch.  NEURO: 5/5 strength major flexors/extensors bilaterally. Sensory intact to light touch bilaterally. No neurological abnormalities.

## 2020-09-08 NOTE — TELEPHONE ENCOUNTER
----- Message from Jessica Dang sent at 9/8/2020  8:14 AM CDT -----  Contact: NELLIE DOUGLASS [[id number]]  Name of Caller: Nellie    Reason for Visit/Symptoms: hospital f/u  Best Contact Number or Confirm if Mychart Preferred: [phone]  Preferred Date/Time of Appointment: asap please   Interested in Virtual Visit (yes/no) no  Additional Information: none

## 2020-11-19 ENCOUNTER — OFFICE VISIT (OUTPATIENT)
Dept: FAMILY MEDICINE | Facility: HOSPITAL | Age: 65
End: 2020-11-19
Attending: FAMILY MEDICINE
Payer: COMMERCIAL

## 2020-11-19 VITALS
HEIGHT: 64 IN | SYSTOLIC BLOOD PRESSURE: 142 MMHG | WEIGHT: 225.06 LBS | DIASTOLIC BLOOD PRESSURE: 89 MMHG | BODY MASS INDEX: 38.42 KG/M2

## 2020-11-19 DIAGNOSIS — E11.29 MICROALBUMINURIA DUE TO TYPE 2 DIABETES MELLITUS: ICD-10-CM

## 2020-11-19 DIAGNOSIS — D53.9 MACROCYTIC ANEMIA: ICD-10-CM

## 2020-11-19 DIAGNOSIS — I73.9 PAD (PERIPHERAL ARTERY DISEASE): ICD-10-CM

## 2020-11-19 DIAGNOSIS — E78.5 HYPERLIPIDEMIA, UNSPECIFIED HYPERLIPIDEMIA TYPE: ICD-10-CM

## 2020-11-19 DIAGNOSIS — R80.9 MICROALBUMINURIA DUE TO TYPE 2 DIABETES MELLITUS: ICD-10-CM

## 2020-11-19 DIAGNOSIS — I10 ESSENTIAL HYPERTENSION: ICD-10-CM

## 2020-11-19 DIAGNOSIS — F41.9 ANXIETY: ICD-10-CM

## 2020-11-19 DIAGNOSIS — E11.65 TYPE 2 DIABETES MELLITUS WITH HYPERGLYCEMIA, WITHOUT LONG-TERM CURRENT USE OF INSULIN: Primary | ICD-10-CM

## 2020-11-19 DIAGNOSIS — Z23 NEED FOR PROPHYLACTIC VACCINATION AND INOCULATION AGAINST INFLUENZA: ICD-10-CM

## 2020-11-19 DIAGNOSIS — E07.9 THYROID DYSFUNCTION: ICD-10-CM

## 2020-11-19 PROCEDURE — 99213 OFFICE O/P EST LOW 20 MIN: CPT | Mod: 25 | Performed by: PHYSICIAN ASSISTANT

## 2020-11-19 PROCEDURE — G0008 ADMIN INFLUENZA VIRUS VAC: HCPCS

## 2020-11-19 PROCEDURE — 90694 VACC AIIV4 NO PRSRV 0.5ML IM: CPT

## 2020-11-19 RX ORDER — SERTRALINE HYDROCHLORIDE 50 MG/1
50 TABLET, FILM COATED ORAL DAILY
Qty: 90 TABLET | Refills: 0 | Status: SHIPPED | OUTPATIENT
Start: 2020-11-19 | End: 2021-03-18 | Stop reason: ALTCHOICE

## 2020-11-19 RX ORDER — LISINOPRIL 10 MG/1
10 TABLET ORAL DAILY
Qty: 90 TABLET | Refills: 3 | Status: SHIPPED | OUTPATIENT
Start: 2020-11-19 | End: 2021-03-18 | Stop reason: SDUPTHER

## 2020-11-19 RX ORDER — SERTRALINE HYDROCHLORIDE 50 MG/1
50 TABLET, FILM COATED ORAL DAILY
Qty: 90 TABLET | Refills: 0 | Status: CANCELLED | OUTPATIENT
Start: 2020-11-19 | End: 2021-11-19

## 2020-11-19 RX ORDER — CARVEDILOL 6.25 MG/1
6.25 TABLET ORAL 2 TIMES DAILY
Qty: 180 TABLET | Refills: 3 | Status: SHIPPED | OUTPATIENT
Start: 2020-11-19 | End: 2021-03-18 | Stop reason: SDUPTHER

## 2020-11-19 RX ORDER — METFORMIN HYDROCHLORIDE 500 MG/1
500 TABLET ORAL 2 TIMES DAILY WITH MEALS
Qty: 180 TABLET | Refills: 3 | Status: SHIPPED | OUTPATIENT
Start: 2020-11-19 | End: 2021-03-18 | Stop reason: SDUPTHER

## 2020-11-19 RX ORDER — FUROSEMIDE 20 MG/1
20 TABLET ORAL DAILY
Qty: 30 TABLET | Refills: 0 | Status: CANCELLED | OUTPATIENT
Start: 2020-11-19 | End: 2021-11-19

## 2020-11-19 RX ORDER — SIMVASTATIN 40 MG/1
40 TABLET, FILM COATED ORAL NIGHTLY
Qty: 90 TABLET | Refills: 3 | Status: SHIPPED | OUTPATIENT
Start: 2020-11-19 | End: 2021-03-18 | Stop reason: SDUPTHER

## 2020-11-19 RX ORDER — HYDROCHLOROTHIAZIDE 25 MG/1
25 TABLET ORAL DAILY
Qty: 90 TABLET | Refills: 3 | Status: SHIPPED | OUTPATIENT
Start: 2020-11-19 | End: 2021-03-18 | Stop reason: SDUPTHER

## 2020-11-19 RX ORDER — ASPIRIN 81 MG/1
81 TABLET ORAL DAILY
Qty: 90 TABLET | Refills: 3 | Status: SHIPPED | OUTPATIENT
Start: 2020-11-19 | End: 2021-03-18 | Stop reason: SDUPTHER

## 2020-11-19 NOTE — PROGRESS NOTES
Two patient identifiers verified.  Allergies reviewed. High Dose Flu  IM administered to Left delotid per MD order.  Patient tolerated injection well; no redness, bleeding, or bruising noted to injection site.  Patient instructed to remain in clinic setting for 15 minutes.  Verbalizes understanding.  Flu consent signed by patient.

## 2020-11-19 NOTE — PROGRESS NOTES
Subjective:       Patient ID: Reji Guerra is a 65 y.o. male.    Chief Complaint: Follow-up and Medication Refill    Patient is a 63 yo male PMH of CAD (3 stents in 2003 w/MI), DMII with last A1C 6.7, HTN, PAD who presents today for flu shot and anxiety f/u.  Patient stopped taking lexapro as it was having many side effects. He was started on Zoloft instead, but is not sure if he actually started taking it or not. He is having increased anxiety, especially due to COVID. He has been isolating and has been unable to see his family in many months. He would like to do his blood work today and he would like the high dose flu shot.      Will need to follow up for lab results and to follow up on Zoloft. Healthcare maintenance will also need to be addressed at follow up, as he has only presented urgently the past few times.     Review of Systems   Constitutional: Positive for appetite change (increased).   HENT: Negative.    Respiratory: Negative.    Cardiovascular: Negative.    Gastrointestinal: Negative.    Genitourinary: Negative.    Musculoskeletal: Negative.    Skin: Negative.    Neurological: Negative.    Hematological: Negative.    Psychiatric/Behavioral: The patient is nervous/anxious.        Objective:      Vitals:    11/19/20 1118   BP: (!) 142/89     Physical Exam  Vitals signs reviewed.   Constitutional:       General: He is not in acute distress.     Appearance: Normal appearance. He is obese. He is not ill-appearing.   Neurological:      Mental Status: He is alert.   Psychiatric:         Mood and Affect: Mood normal.         Behavior: Behavior normal.         Thought Content: Thought content normal.         Assessment:       1. Type 2 diabetes mellitus with hyperglycemia, without long-term current use of insulin    2. Essential hypertension    3. PAD (peripheral artery disease)    4. Microalbuminuria due to type 2 diabetes mellitus    5. Hyperlipidemia, unspecified hyperlipidemia type    6. Macrocytic  anemia    7. Thyroid dysfunction    8. Need for prophylactic vaccination and inoculation against influenza    9. Anxiety        Plan:       Type 2 diabetes mellitus with hyperglycemia, without long-term current use of insulin  -     POCT Glucose, Hand-Held Device  -     metFORMIN (GLUCOPHAGE) 500 MG tablet; Take 1 tablet (500 mg total) by mouth 2 (two) times daily with meals.  Dispense: 180 tablet; Refill: 3  -     Comprehensive Metabolic Panel; Future; Expected date: 11/19/2020  -     Hemoglobin A1C; Future; Expected date: 11/19/2020  -     Microalbumin/creatinine urine ratio; Future; Expected date: 11/19/2020    Essential hypertension  -     carvediloL (COREG) 6.25 MG tablet; Take 1 tablet (6.25 mg total) by mouth 2 (two) times daily.  Dispense: 180 tablet; Refill: 3  -     hydroCHLOROthiazide (HYDRODIURIL) 25 MG tablet; Take 1 tablet (25 mg total) by mouth once daily.  Dispense: 90 tablet; Refill: 3    PAD (peripheral artery disease)  -     aspirin (ECOTRIN) 81 MG EC tablet; Take 1 tablet (81 mg total) by mouth once daily.  Dispense: 90 tablet; Refill: 3    Microalbuminuria due to type 2 diabetes mellitus  -     lisinopriL 10 MG tablet; Take 1 tablet (10 mg total) by mouth once daily.  Dispense: 90 tablet; Refill: 3    Hyperlipidemia, unspecified hyperlipidemia type  -     simvastatin (ZOCOR) 40 MG tablet; Take 1 tablet (40 mg total) by mouth every evening.  Dispense: 90 tablet; Refill: 3  -     Lipid Panel; Future; Expected date: 11/19/2020    Macrocytic anemia  -     CBC Auto Differential; Future; Expected date: 11/19/2020  -     Folate; Future; Expected date: 11/19/2020  -     VITAMIN B12; Future; Expected date: 11/19/2020    Thyroid dysfunction  -     TSH; Future; Expected date: 11/19/2020  -     T4; Future; Expected date: 11/19/2020    Need for prophylactic vaccination and inoculation against influenza  -     Flu Vaccine - Quadrivalent (Adjuvanted) *Preferred* 65+    Anxiety  -     sertraline (ZOLOFT) 50 MG  tablet; Take 1 tablet (50 mg total) by mouth once daily.  Dispense: 90 tablet; Refill: 0      Follow up in 3 weeks (on 12/10/2020).

## 2021-03-18 ENCOUNTER — OFFICE VISIT (OUTPATIENT)
Dept: FAMILY MEDICINE | Facility: HOSPITAL | Age: 66
End: 2021-03-18
Attending: FAMILY MEDICINE
Payer: COMMERCIAL

## 2021-03-18 VITALS
HEIGHT: 64 IN | WEIGHT: 231.25 LBS | BODY MASS INDEX: 39.48 KG/M2 | SYSTOLIC BLOOD PRESSURE: 134 MMHG | HEART RATE: 82 BPM | DIASTOLIC BLOOD PRESSURE: 86 MMHG

## 2021-03-18 DIAGNOSIS — E11.65 TYPE 2 DIABETES MELLITUS WITH HYPERGLYCEMIA, WITHOUT LONG-TERM CURRENT USE OF INSULIN: ICD-10-CM

## 2021-03-18 DIAGNOSIS — E11.29 MICROALBUMINURIA DUE TO TYPE 2 DIABETES MELLITUS: ICD-10-CM

## 2021-03-18 DIAGNOSIS — E78.5 HYPERLIPIDEMIA, UNSPECIFIED HYPERLIPIDEMIA TYPE: ICD-10-CM

## 2021-03-18 DIAGNOSIS — R80.9 MICROALBUMINURIA DUE TO TYPE 2 DIABETES MELLITUS: ICD-10-CM

## 2021-03-18 DIAGNOSIS — I10 ESSENTIAL HYPERTENSION: ICD-10-CM

## 2021-03-18 DIAGNOSIS — I73.9 PAD (PERIPHERAL ARTERY DISEASE): ICD-10-CM

## 2021-03-18 DIAGNOSIS — F41.9 ANXIETY: Primary | ICD-10-CM

## 2021-03-18 PROCEDURE — 99213 OFFICE O/P EST LOW 20 MIN: CPT | Performed by: STUDENT IN AN ORGANIZED HEALTH CARE EDUCATION/TRAINING PROGRAM

## 2021-03-18 RX ORDER — SIMVASTATIN 40 MG/1
40 TABLET, FILM COATED ORAL NIGHTLY
Qty: 90 TABLET | Refills: 3 | Status: SHIPPED | OUTPATIENT
Start: 2021-03-18 | End: 2022-04-25 | Stop reason: SDUPTHER

## 2021-03-18 RX ORDER — METFORMIN HYDROCHLORIDE 500 MG/1
500 TABLET ORAL 2 TIMES DAILY WITH MEALS
Qty: 180 TABLET | Refills: 3 | Status: SHIPPED | OUTPATIENT
Start: 2021-03-18 | End: 2022-05-13 | Stop reason: SDUPTHER

## 2021-03-18 RX ORDER — ASPIRIN 81 MG/1
81 TABLET ORAL DAILY
Qty: 90 TABLET | Refills: 3 | Status: SHIPPED | OUTPATIENT
Start: 2021-03-18

## 2021-03-18 RX ORDER — ESCITALOPRAM OXALATE 20 MG/1
20 TABLET ORAL DAILY
Qty: 90 TABLET | Refills: 3 | Status: SHIPPED | OUTPATIENT
Start: 2021-03-18 | End: 2021-06-30 | Stop reason: HOSPADM

## 2021-03-18 RX ORDER — FUROSEMIDE 20 MG/1
20 TABLET ORAL DAILY
Qty: 90 TABLET | Refills: 3 | Status: SHIPPED | OUTPATIENT
Start: 2021-03-18 | End: 2022-03-28 | Stop reason: SDUPTHER

## 2021-03-18 RX ORDER — CARVEDILOL 6.25 MG/1
6.25 TABLET ORAL 2 TIMES DAILY
Qty: 180 TABLET | Refills: 3 | Status: SHIPPED | OUTPATIENT
Start: 2021-03-18 | End: 2022-05-13 | Stop reason: SDUPTHER

## 2021-03-18 RX ORDER — LISINOPRIL 10 MG/1
10 TABLET ORAL DAILY
Qty: 90 TABLET | Refills: 3 | Status: SHIPPED | OUTPATIENT
Start: 2021-03-18 | End: 2021-09-14

## 2021-03-18 RX ORDER — HYDROCHLOROTHIAZIDE 25 MG/1
25 TABLET ORAL DAILY
Qty: 90 TABLET | Refills: 3 | Status: ON HOLD | OUTPATIENT
Start: 2021-03-18 | End: 2021-07-08 | Stop reason: HOSPADM

## 2021-03-20 ENCOUNTER — IMMUNIZATION (OUTPATIENT)
Dept: PRIMARY CARE CLINIC | Facility: CLINIC | Age: 66
End: 2021-03-20
Payer: COMMERCIAL

## 2021-03-20 DIAGNOSIS — Z23 NEED FOR VACCINATION: Primary | ICD-10-CM

## 2021-03-20 PROCEDURE — 0001A PR IMMUNIZ ADMIN, SARS-COV-2 COVID-19 VACC, 30MCG/0.3ML, 1ST DOSE: ICD-10-PCS | Mod: CV19,S$GLB,, | Performed by: INTERNAL MEDICINE

## 2021-03-20 PROCEDURE — 91300 PR SARS-COV- 2 COVID-19 VACCINE, NO PRSV, 30MCG/0.3ML, IM: ICD-10-PCS | Mod: S$GLB,,, | Performed by: INTERNAL MEDICINE

## 2021-03-20 PROCEDURE — 91300 PR SARS-COV- 2 COVID-19 VACCINE, NO PRSV, 30MCG/0.3ML, IM: CPT | Mod: S$GLB,,, | Performed by: INTERNAL MEDICINE

## 2021-03-20 PROCEDURE — 0001A PR IMMUNIZ ADMIN, SARS-COV-2 COVID-19 VACC, 30MCG/0.3ML, 1ST DOSE: CPT | Mod: CV19,S$GLB,, | Performed by: INTERNAL MEDICINE

## 2021-03-20 RX ADMIN — Medication 0.3 ML: at 02:03

## 2021-04-11 ENCOUNTER — IMMUNIZATION (OUTPATIENT)
Dept: PRIMARY CARE CLINIC | Facility: CLINIC | Age: 66
End: 2021-04-11
Payer: COMMERCIAL

## 2021-04-11 DIAGNOSIS — Z23 NEED FOR VACCINATION: Primary | ICD-10-CM

## 2021-04-11 PROCEDURE — 91300 PR SARS-COV- 2 COVID-19 VACCINE, NO PRSV, 30MCG/0.3ML, IM: CPT | Mod: S$GLB,,, | Performed by: INTERNAL MEDICINE

## 2021-04-11 PROCEDURE — 0002A PR IMMUNIZ ADMIN, SARS-COV-2 COVID-19 VACC, 30MCG/0.3ML, 2ND DOSE: ICD-10-PCS | Mod: CV19,S$GLB,, | Performed by: INTERNAL MEDICINE

## 2021-04-11 PROCEDURE — 0002A PR IMMUNIZ ADMIN, SARS-COV-2 COVID-19 VACC, 30MCG/0.3ML, 2ND DOSE: CPT | Mod: CV19,S$GLB,, | Performed by: INTERNAL MEDICINE

## 2021-04-11 PROCEDURE — 91300 PR SARS-COV- 2 COVID-19 VACCINE, NO PRSV, 30MCG/0.3ML, IM: ICD-10-PCS | Mod: S$GLB,,, | Performed by: INTERNAL MEDICINE

## 2021-04-11 RX ADMIN — Medication 0.3 ML: at 03:04

## 2021-06-09 ENCOUNTER — HOSPITAL ENCOUNTER (OUTPATIENT)
Facility: HOSPITAL | Age: 66
Discharge: PSYCHIATRIC OR SUBSTANCE USE FACILITY | End: 2021-06-16
Attending: EMERGENCY MEDICINE | Admitting: FAMILY MEDICINE
Payer: COMMERCIAL

## 2021-06-09 DIAGNOSIS — S00.03XA HEMATOMA OF FRONTAL SCALP, INITIAL ENCOUNTER: ICD-10-CM

## 2021-06-09 DIAGNOSIS — M25.561 RIGHT KNEE PAIN: ICD-10-CM
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
## 2021-06-09 DIAGNOSIS — Z91.89 AT RISK FOR LONG QT SYNDROME: ICD-10-CM

## 2021-06-09 DIAGNOSIS — E87.1 HYPONATREMIA: ICD-10-CM

## 2021-06-09 DIAGNOSIS — M25.521 RIGHT ELBOW PAIN: ICD-10-CM

## 2021-06-09 DIAGNOSIS — R29.91 ABNORMAL FINDING OF LOWER EXTREMITY: ICD-10-CM

## 2021-06-09 PROBLEM — L03.116 CELLULITIS OF LEFT LOWER EXTREMITY: Status: RESOLVED | Noted: 2017-12-13 | Resolved: 2021-06-09
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
## 2021-06-09 PROBLEM — S06.0X0A CONCUSSION WITHOUT LOSS OF CONSCIOUSNESS: Status: ACTIVE | Noted: 2021-06-09

## 2021-06-09 PROBLEM — S05.11XA: Status: ACTIVE | Noted: 2021-06-09

## 2021-06-09 LAB
ALBUMIN SERPL BCP-MCNC: 3.6 G/DL (ref 3.5–5.2)
ALP SERPL-CCNC: 74 U/L (ref 55–135)
ALT SERPL W/O P-5'-P-CCNC: 40 U/L (ref 10–44)
ANION GAP SERPL CALC-SCNC: 12 MMOL/L (ref 8–16)
AST SERPL-CCNC: 51 U/L (ref 10–40)
BASOPHILS # BLD AUTO: 0.03 K/UL (ref 0–0.2)
BASOPHILS NFR BLD: 0.3 % (ref 0–1.9)
BILIRUB SERPL-MCNC: 0.8 MG/DL (ref 0.1–1)
BUN SERPL-MCNC: 12 MG/DL (ref 8–23)
CALCIUM SERPL-MCNC: 8.7 MG/DL (ref 8.7–10.5)
CHLORIDE SERPL-SCNC: 83 MMOL/L (ref 95–110)
CK SERPL-CCNC: 60 U/L (ref 20–200)
CO2 SERPL-SCNC: 27 MMOL/L (ref 23–29)
CREAT SERPL-MCNC: 0.9 MG/DL (ref 0.5–1.4)
DIFFERENTIAL METHOD: ABNORMAL
EOSINOPHIL # BLD AUTO: 0 K/UL (ref 0–0.5)
EOSINOPHIL NFR BLD: 0.3 % (ref 0–8)
ERYTHROCYTE [DISTWIDTH] IN BLOOD BY AUTOMATED COUNT: 12.5 % (ref 11.5–14.5)
EST. GFR  (AFRICAN AMERICAN): >60 ML/MIN/1.73 M^2
EST. GFR  (NON AFRICAN AMERICAN): >60 ML/MIN/1.73 M^2
GLUCOSE SERPL-MCNC: 119 MG/DL (ref 70–110)
HCT VFR BLD AUTO: 36.4 % (ref 40–54)
HGB BLD-MCNC: 13.3 G/DL (ref 14–18)
IMM GRANULOCYTES # BLD AUTO: 0.09 K/UL (ref 0–0.04)
IMM GRANULOCYTES NFR BLD AUTO: 0.8 % (ref 0–0.5)
LYMPHOCYTES # BLD AUTO: 0.9 K/UL (ref 1–4.8)
LYMPHOCYTES NFR BLD: 7.5 % (ref 18–48)
MCH RBC QN AUTO: 35.7 PG (ref 27–31)
MCHC RBC AUTO-ENTMCNC: 36.5 G/DL (ref 32–36)
MCV RBC AUTO: 98 FL (ref 82–98)
MONOCYTES # BLD AUTO: 0.8 K/UL (ref 0.3–1)
MONOCYTES NFR BLD: 7 % (ref 4–15)
NEUTROPHILS # BLD AUTO: 9.8 K/UL (ref 1.8–7.7)
NEUTROPHILS NFR BLD: 84.1 % (ref 38–73)
NRBC BLD-RTO: 0 /100 WBC
PLATELET # BLD AUTO: 219 K/UL (ref 150–450)
PMV BLD AUTO: 10.4 FL (ref 9.2–12.9)
POCT GLUCOSE: 139 MG/DL (ref 70–110)
POTASSIUM SERPL-SCNC: 4.6 MMOL/L (ref 3.5–5.1)
PROT SERPL-MCNC: 6.7 G/DL (ref 6–8.4)
RBC # BLD AUTO: 3.73 M/UL (ref 4.6–6.2)
SODIUM SERPL-SCNC: 122 MMOL/L (ref 136–145)
WBC # BLD AUTO: 11.65 K/UL (ref 3.9–12.7)

## 2021-06-09 PROCEDURE — 63600175 PHARM REV CODE 636 W HCPCS: Performed by: EMERGENCY MEDICINE

## 2021-06-09 PROCEDURE — 82550 ASSAY OF CK (CPK): CPT | Performed by: EMERGENCY MEDICINE

## 2021-06-09 PROCEDURE — 99291 CRITICAL CARE FIRST HOUR: CPT | Mod: 25

## 2021-06-09 PROCEDURE — 96374 THER/PROPH/DIAG INJ IV PUSH: CPT

## 2021-06-09 PROCEDURE — 93010 ELECTROCARDIOGRAM REPORT: CPT | Mod: ,,, | Performed by: INTERNAL MEDICINE

## 2021-06-09 PROCEDURE — 93005 ELECTROCARDIOGRAM TRACING: CPT

## 2021-06-09 PROCEDURE — 93010 EKG 12-LEAD: ICD-10-PCS | Mod: ,,, | Performed by: INTERNAL MEDICINE

## 2021-06-09 PROCEDURE — 80053 COMPREHEN METABOLIC PANEL: CPT | Performed by: EMERGENCY MEDICINE

## 2021-06-09 PROCEDURE — G0378 HOSPITAL OBSERVATION PER HR: HCPCS

## 2021-06-09 PROCEDURE — 25000003 PHARM REV CODE 250: Performed by: EMERGENCY MEDICINE

## 2021-06-09 PROCEDURE — 85025 COMPLETE CBC W/AUTO DIFF WBC: CPT | Performed by: EMERGENCY MEDICINE

## 2021-06-09 PROCEDURE — 96361 HYDRATE IV INFUSION ADD-ON: CPT

## 2021-06-09 PROCEDURE — 82962 GLUCOSE BLOOD TEST: CPT

## 2021-06-09 PROCEDURE — 96375 TX/PRO/DX INJ NEW DRUG ADDON: CPT

## 2021-06-09 RX ORDER — FUROSEMIDE 20 MG/1
20 TABLET ORAL DAILY
Status: DISCONTINUED | OUTPATIENT
Start: 2021-06-10 | End: 2021-06-09

## 2021-06-09 RX ORDER — SODIUM CHLORIDE, SODIUM LACTATE, POTASSIUM CHLORIDE, CALCIUM CHLORIDE 600; 310; 30; 20 MG/100ML; MG/100ML; MG/100ML; MG/100ML
INJECTION, SOLUTION INTRAVENOUS CONTINUOUS
Status: ACTIVE | OUTPATIENT
Start: 2021-06-10 | End: 2021-06-12

## 2021-06-09 RX ORDER — MORPHINE SULFATE 4 MG/ML
4 INJECTION, SOLUTION INTRAMUSCULAR; INTRAVENOUS
Status: COMPLETED | OUTPATIENT
Start: 2021-06-09 | End: 2021-06-09

## 2021-06-09 RX ORDER — ACETAMINOPHEN 325 MG/1
650 TABLET ORAL EVERY 4 HOURS PRN
Status: DISCONTINUED | OUTPATIENT
Start: 2021-06-09 | End: 2021-06-13

## 2021-06-09 RX ORDER — SODIUM CHLORIDE 9 MG/ML
INJECTION, SOLUTION INTRAVENOUS
Status: COMPLETED | OUTPATIENT
Start: 2021-06-09 | End: 2021-06-09

## 2021-06-09 RX ORDER — SODIUM CHLORIDE 0.9 % (FLUSH) 0.9 %
5 SYRINGE (ML) INJECTION
Status: DISCONTINUED | OUTPATIENT
Start: 2021-06-09 | End: 2021-06-16 | Stop reason: HOSPADM

## 2021-06-09 RX ORDER — ASPIRIN 81 MG/1
81 TABLET ORAL DAILY
Status: DISCONTINUED | OUTPATIENT
Start: 2021-06-10 | End: 2021-06-10

## 2021-06-09 RX ORDER — ONDANSETRON 2 MG/ML
4 INJECTION INTRAMUSCULAR; INTRAVENOUS
Status: COMPLETED | OUTPATIENT
Start: 2021-06-09 | End: 2021-06-09

## 2021-06-09 RX ORDER — HYDROXYZINE PAMOATE 25 MG/1
25 CAPSULE ORAL EVERY 8 HOURS PRN
Status: DISCONTINUED | OUTPATIENT
Start: 2021-06-10 | End: 2021-06-16 | Stop reason: HOSPADM

## 2021-06-09 RX ORDER — LISINOPRIL 10 MG/1
10 TABLET ORAL DAILY
Status: DISCONTINUED | OUTPATIENT
Start: 2021-06-10 | End: 2021-06-13

## 2021-06-09 RX ORDER — ONDANSETRON 8 MG/1
8 TABLET, ORALLY DISINTEGRATING ORAL EVERY 6 HOURS PRN
Status: DISCONTINUED | OUTPATIENT
Start: 2021-06-09 | End: 2021-06-16 | Stop reason: HOSPADM

## 2021-06-09 RX ORDER — HYDROCHLOROTHIAZIDE 25 MG/1
25 TABLET ORAL DAILY
Status: DISCONTINUED | OUTPATIENT
Start: 2021-06-10 | End: 2021-06-09

## 2021-06-09 RX ORDER — GLUCAGON 1 MG
1 KIT INJECTION
Status: DISCONTINUED | OUTPATIENT
Start: 2021-06-09 | End: 2021-06-16 | Stop reason: HOSPADM

## 2021-06-09 RX ORDER — IBUPROFEN 200 MG
24 TABLET ORAL
Status: DISCONTINUED | OUTPATIENT
Start: 2021-06-09 | End: 2021-06-16 | Stop reason: HOSPADM

## 2021-06-09 RX ORDER — AMOXICILLIN 250 MG
1 CAPSULE ORAL 2 TIMES DAILY PRN
Status: DISCONTINUED | OUTPATIENT
Start: 2021-06-09 | End: 2021-06-13

## 2021-06-09 RX ORDER — CARVEDILOL 6.25 MG/1
6.25 TABLET ORAL 2 TIMES DAILY
Status: DISCONTINUED | OUTPATIENT
Start: 2021-06-10 | End: 2021-06-16 | Stop reason: HOSPADM

## 2021-06-09 RX ORDER — ATORVASTATIN CALCIUM 40 MG/1
40 TABLET, FILM COATED ORAL NIGHTLY
Status: DISCONTINUED | OUTPATIENT
Start: 2021-06-10 | End: 2021-06-16 | Stop reason: HOSPADM

## 2021-06-09 RX ORDER — IBUPROFEN 200 MG
16 TABLET ORAL
Status: DISCONTINUED | OUTPATIENT
Start: 2021-06-09 | End: 2021-06-16 | Stop reason: HOSPADM

## 2021-06-09 RX ORDER — TALC
9 POWDER (GRAM) TOPICAL NIGHTLY PRN
Status: DISCONTINUED | OUTPATIENT
Start: 2021-06-09 | End: 2021-06-16 | Stop reason: HOSPADM

## 2021-06-09 RX ORDER — INSULIN ASPART 100 [IU]/ML
0-5 INJECTION, SOLUTION INTRAVENOUS; SUBCUTANEOUS
Status: DISCONTINUED | OUTPATIENT
Start: 2021-06-09 | End: 2021-06-16 | Stop reason: HOSPADM

## 2021-06-09 RX ORDER — HYDRALAZINE HYDROCHLORIDE 20 MG/ML
10 INJECTION INTRAMUSCULAR; INTRAVENOUS EVERY 6 HOURS PRN
Status: DISCONTINUED | OUTPATIENT
Start: 2021-06-10 | End: 2021-06-16 | Stop reason: HOSPADM

## 2021-06-09 RX ORDER — ENOXAPARIN SODIUM 100 MG/ML
40 INJECTION SUBCUTANEOUS EVERY 24 HOURS
Status: DISCONTINUED | OUTPATIENT
Start: 2021-06-10 | End: 2021-06-10

## 2021-06-09 RX ADMIN — SODIUM CHLORIDE: 0.9 INJECTION, SOLUTION INTRAVENOUS at 08:06

## 2021-06-09 RX ADMIN — MORPHINE SULFATE 4 MG: 4 INJECTION INTRAVENOUS at 08:06

## 2021-06-09 RX ADMIN — ONDANSETRON 4 MG: 2 INJECTION INTRAMUSCULAR; INTRAVENOUS at 08:06

## 2021-06-10 ENCOUNTER — CLINICAL SUPPORT (OUTPATIENT)
Dept: SMOKING CESSATION | Facility: CLINIC | Age: 66
End: 2021-06-10
Payer: COMMERCIAL

## 2021-06-10 DIAGNOSIS — F17.210 CIGARETTE SMOKER: Primary | ICD-10-CM

## 2021-06-10 PROBLEM — I25.10 PRESENCE OF STENT IN CORONARY ARTERY IN PATIENT WITH CORONARY ARTERY DISEASE: Status: ACTIVE | Noted: 2018-02-26

## 2021-06-10 LAB
ALBUMIN SERPL BCP-MCNC: 3.3 G/DL (ref 3.5–5.2)
ALP SERPL-CCNC: 68 U/L (ref 55–135)
ALT SERPL W/O P-5'-P-CCNC: 31 U/L (ref 10–44)
AMPHET+METHAMPHET UR QL: NEGATIVE
ANION GAP SERPL CALC-SCNC: 10 MMOL/L (ref 8–16)
ANION GAP SERPL CALC-SCNC: 10 MMOL/L (ref 8–16)
ANION GAP SERPL CALC-SCNC: 12 MMOL/L (ref 8–16)
ANION GAP SERPL CALC-SCNC: 13 MMOL/L (ref 8–16)
ANION GAP SERPL CALC-SCNC: 8 MMOL/L (ref 8–16)
AST SERPL-CCNC: 35 U/L (ref 10–40)
BACTERIA #/AREA URNS HPF: ABNORMAL /HPF
BARBITURATES UR QL SCN>200 NG/ML: NEGATIVE
BASOPHILS # BLD AUTO: 0.02 K/UL (ref 0–0.2)
BASOPHILS NFR BLD: 0.2 % (ref 0–1.9)
BENZODIAZ UR QL SCN>200 NG/ML: NEGATIVE
BILIRUB SERPL-MCNC: 0.8 MG/DL (ref 0.1–1)
BILIRUB UR QL STRIP: NEGATIVE
BUN SERPL-MCNC: 11 MG/DL (ref 8–23)
BUN SERPL-MCNC: 12 MG/DL (ref 8–23)
BZE UR QL SCN: NEGATIVE
CALCIUM SERPL-MCNC: 8.2 MG/DL (ref 8.7–10.5)
CALCIUM SERPL-MCNC: 8.2 MG/DL (ref 8.7–10.5)
CALCIUM SERPL-MCNC: 8.4 MG/DL (ref 8.7–10.5)
CALCIUM SERPL-MCNC: 8.5 MG/DL (ref 8.7–10.5)
CALCIUM SERPL-MCNC: 8.8 MG/DL (ref 8.7–10.5)
CANNABINOIDS UR QL SCN: NEGATIVE
CHLORIDE SERPL-SCNC: 82 MMOL/L (ref 95–110)
CHLORIDE SERPL-SCNC: 84 MMOL/L (ref 95–110)
CHLORIDE SERPL-SCNC: 85 MMOL/L (ref 95–110)
CHLORIDE SERPL-SCNC: 86 MMOL/L (ref 95–110)
CHLORIDE SERPL-SCNC: 86 MMOL/L (ref 95–110)
CLARITY UR: CLEAR
CO2 SERPL-SCNC: 26 MMOL/L (ref 23–29)
CO2 SERPL-SCNC: 30 MMOL/L (ref 23–29)
CO2 SERPL-SCNC: 31 MMOL/L (ref 23–29)
COLOR UR: YELLOW
CREAT SERPL-MCNC: 0.8 MG/DL (ref 0.5–1.4)
CREAT SERPL-MCNC: 0.8 MG/DL (ref 0.5–1.4)
CREAT SERPL-MCNC: 0.9 MG/DL (ref 0.5–1.4)
CREAT UR-MCNC: 46.5 MG/DL (ref 23–375)
CREAT UR-MCNC: 46.5 MG/DL (ref 23–375)
DIFFERENTIAL METHOD: ABNORMAL
EOSINOPHIL # BLD AUTO: 0.1 K/UL (ref 0–0.5)
EOSINOPHIL NFR BLD: 1 % (ref 0–8)
ERYTHROCYTE [DISTWIDTH] IN BLOOD BY AUTOMATED COUNT: 12.8 % (ref 11.5–14.5)
EST. GFR  (AFRICAN AMERICAN): >60 ML/MIN/1.73 M^2
EST. GFR  (NON AFRICAN AMERICAN): >60 ML/MIN/1.73 M^2
ESTIMATED AVG GLUCOSE: 123 MG/DL (ref 68–131)
GLUCOSE SERPL-MCNC: 110 MG/DL (ref 70–110)
GLUCOSE SERPL-MCNC: 138 MG/DL (ref 70–110)
GLUCOSE SERPL-MCNC: 139 MG/DL (ref 70–110)
GLUCOSE SERPL-MCNC: 92 MG/DL (ref 70–110)
GLUCOSE SERPL-MCNC: 95 MG/DL (ref 70–110)
GLUCOSE UR QL STRIP: NEGATIVE
HBA1C MFR BLD: 5.9 % (ref 4–5.6)
HCT VFR BLD AUTO: 37.1 % (ref 40–54)
HCV AB SERPL QL IA: NEGATIVE
HGB BLD-MCNC: 13.2 G/DL (ref 14–18)
HGB UR QL STRIP: NEGATIVE
IMM GRANULOCYTES # BLD AUTO: 0.06 K/UL (ref 0–0.04)
IMM GRANULOCYTES NFR BLD AUTO: 0.7 % (ref 0–0.5)
INR PPP: 1 (ref 0.8–1.2)
KETONES UR QL STRIP: NEGATIVE
LEUKOCYTE ESTERASE UR QL STRIP: ABNORMAL
LYMPHOCYTES # BLD AUTO: 1.1 K/UL (ref 1–4.8)
LYMPHOCYTES NFR BLD: 12.7 % (ref 18–48)
MAGNESIUM SERPL-MCNC: 1.2 MG/DL (ref 1.6–2.6)
MAGNESIUM SERPL-MCNC: 1.4 MG/DL (ref 1.6–2.6)
MCH RBC QN AUTO: 35.9 PG (ref 27–31)
MCHC RBC AUTO-ENTMCNC: 35.6 G/DL (ref 32–36)
MCV RBC AUTO: 101 FL (ref 82–98)
METHADONE UR QL SCN>300 NG/ML: NEGATIVE
MICROSCOPIC COMMENT: ABNORMAL
MONOCYTES # BLD AUTO: 0.6 K/UL (ref 0.3–1)
MONOCYTES NFR BLD: 7.5 % (ref 4–15)
NEUTROPHILS # BLD AUTO: 6.7 K/UL (ref 1.8–7.7)
NEUTROPHILS NFR BLD: 77.9 % (ref 38–73)
NITRITE UR QL STRIP: NEGATIVE
NRBC BLD-RTO: 0 /100 WBC
OPIATES UR QL SCN: NORMAL
OSMOLALITY SERPL: 264 MOSM/KG (ref 280–300)
OSMOLALITY UR: 249 MOSM/KG (ref 50–1200)
PCP UR QL SCN>25 NG/ML: NEGATIVE
PH UR STRIP: 7 [PH] (ref 5–8)
PHOSPHATE SERPL-MCNC: 2.8 MG/DL (ref 2.7–4.5)
PHOSPHATE SERPL-MCNC: 3.2 MG/DL (ref 2.7–4.5)
PLATELET # BLD AUTO: 200 K/UL (ref 150–450)
PMV BLD AUTO: 10.9 FL (ref 9.2–12.9)
POCT GLUCOSE: 101 MG/DL (ref 70–110)
POCT GLUCOSE: 125 MG/DL (ref 70–110)
POCT GLUCOSE: 150 MG/DL (ref 70–110)
POTASSIUM SERPL-SCNC: 3.6 MMOL/L (ref 3.5–5.1)
POTASSIUM SERPL-SCNC: 3.6 MMOL/L (ref 3.5–5.1)
POTASSIUM SERPL-SCNC: 3.7 MMOL/L (ref 3.5–5.1)
POTASSIUM SERPL-SCNC: 3.7 MMOL/L (ref 3.5–5.1)
POTASSIUM SERPL-SCNC: 3.8 MMOL/L (ref 3.5–5.1)
PROT SERPL-MCNC: 5.9 G/DL (ref 6–8.4)
PROT UR QL STRIP: NEGATIVE
PROTHROMBIN TIME: 10.8 SEC (ref 9–12.5)
RBC # BLD AUTO: 3.68 M/UL (ref 4.6–6.2)
RBC #/AREA URNS HPF: 0 /HPF (ref 0–4)
SODIUM SERPL-SCNC: 125 MMOL/L (ref 136–145)
SODIUM UR-SCNC: 40 MMOL/L (ref 20–250)
SODIUM UR-SCNC: 48 MMOL/L (ref 20–250)
SP GR UR STRIP: 1.01 (ref 1–1.03)
SQUAMOUS #/AREA URNS HPF: 0 /HPF
TOXICOLOGY INFORMATION: NORMAL
TSH SERPL DL<=0.005 MIU/L-ACNC: 2.91 UIU/ML (ref 0.4–4)
URN SPEC COLLECT METH UR: ABNORMAL
UROBILINOGEN UR STRIP-ACNC: NEGATIVE EU/DL
UUN UR-MCNC: 234 MG/DL (ref 140–1050)
WBC # BLD AUTO: 8.59 K/UL (ref 3.9–12.7)
WBC #/AREA URNS HPF: 31 /HPF (ref 0–5)

## 2021-06-10 PROCEDURE — 84443 ASSAY THYROID STIM HORMONE: CPT | Performed by: STUDENT IN AN ORGANIZED HEALTH CARE EDUCATION/TRAINING PROGRAM

## 2021-06-10 PROCEDURE — 86803 HEPATITIS C AB TEST: CPT | Performed by: STUDENT IN AN ORGANIZED HEALTH CARE EDUCATION/TRAINING PROGRAM

## 2021-06-10 PROCEDURE — 63600175 PHARM REV CODE 636 W HCPCS: Performed by: STUDENT IN AN ORGANIZED HEALTH CARE EDUCATION/TRAINING PROGRAM

## 2021-06-10 PROCEDURE — 96376 TX/PRO/DX INJ SAME DRUG ADON: CPT

## 2021-06-10 PROCEDURE — 84300 ASSAY OF URINE SODIUM: CPT | Mod: 91 | Performed by: FAMILY MEDICINE

## 2021-06-10 PROCEDURE — 25000003 PHARM REV CODE 250: Performed by: STUDENT IN AN ORGANIZED HEALTH CARE EDUCATION/TRAINING PROGRAM

## 2021-06-10 PROCEDURE — 80048 BASIC METABOLIC PNL TOTAL CA: CPT | Mod: 91 | Performed by: STUDENT IN AN ORGANIZED HEALTH CARE EDUCATION/TRAINING PROGRAM

## 2021-06-10 PROCEDURE — 80048 BASIC METABOLIC PNL TOTAL CA: CPT | Performed by: STUDENT IN AN ORGANIZED HEALTH CARE EDUCATION/TRAINING PROGRAM

## 2021-06-10 PROCEDURE — 84100 ASSAY OF PHOSPHORUS: CPT | Performed by: STUDENT IN AN ORGANIZED HEALTH CARE EDUCATION/TRAINING PROGRAM

## 2021-06-10 PROCEDURE — 83036 HEMOGLOBIN GLYCOSYLATED A1C: CPT | Performed by: STUDENT IN AN ORGANIZED HEALTH CARE EDUCATION/TRAINING PROGRAM

## 2021-06-10 PROCEDURE — 99407 PR TOBACCO USE CESSATION INTENSIVE >10 MINUTES: ICD-10-PCS | Mod: S$GLB,,,

## 2021-06-10 PROCEDURE — 99407 BEHAV CHNG SMOKING > 10 MIN: CPT | Mod: S$GLB,,,

## 2021-06-10 PROCEDURE — 84100 ASSAY OF PHOSPHORUS: CPT | Mod: 91 | Performed by: STUDENT IN AN ORGANIZED HEALTH CARE EDUCATION/TRAINING PROGRAM

## 2021-06-10 PROCEDURE — 83935 ASSAY OF URINE OSMOLALITY: CPT | Performed by: STUDENT IN AN ORGANIZED HEALTH CARE EDUCATION/TRAINING PROGRAM

## 2021-06-10 PROCEDURE — 87077 CULTURE AEROBIC IDENTIFY: CPT | Performed by: STUDENT IN AN ORGANIZED HEALTH CARE EDUCATION/TRAINING PROGRAM

## 2021-06-10 PROCEDURE — 80307 DRUG TEST PRSMV CHEM ANLYZR: CPT | Performed by: STUDENT IN AN ORGANIZED HEALTH CARE EDUCATION/TRAINING PROGRAM

## 2021-06-10 PROCEDURE — 86580 TB INTRADERMAL TEST: CPT | Performed by: STUDENT IN AN ORGANIZED HEALTH CARE EDUCATION/TRAINING PROGRAM

## 2021-06-10 PROCEDURE — 99900035 HC TECH TIME PER 15 MIN (STAT)

## 2021-06-10 PROCEDURE — 87086 URINE CULTURE/COLONY COUNT: CPT | Performed by: STUDENT IN AN ORGANIZED HEALTH CARE EDUCATION/TRAINING PROGRAM

## 2021-06-10 PROCEDURE — 36415 COLL VENOUS BLD VENIPUNCTURE: CPT | Performed by: STUDENT IN AN ORGANIZED HEALTH CARE EDUCATION/TRAINING PROGRAM

## 2021-06-10 PROCEDURE — 83735 ASSAY OF MAGNESIUM: CPT | Mod: 91 | Performed by: STUDENT IN AN ORGANIZED HEALTH CARE EDUCATION/TRAINING PROGRAM

## 2021-06-10 PROCEDURE — 30200315 PPD INTRADERMAL TEST REV CODE 302: Performed by: STUDENT IN AN ORGANIZED HEALTH CARE EDUCATION/TRAINING PROGRAM

## 2021-06-10 PROCEDURE — 85025 COMPLETE CBC W/AUTO DIFF WBC: CPT | Performed by: STUDENT IN AN ORGANIZED HEALTH CARE EDUCATION/TRAINING PROGRAM

## 2021-06-10 PROCEDURE — 96361 HYDRATE IV INFUSION ADD-ON: CPT

## 2021-06-10 PROCEDURE — 97530 THERAPEUTIC ACTIVITIES: CPT

## 2021-06-10 PROCEDURE — 94761 N-INVAS EAR/PLS OXIMETRY MLT: CPT

## 2021-06-10 PROCEDURE — 84540 ASSAY OF URINE/UREA-N: CPT | Performed by: STUDENT IN AN ORGANIZED HEALTH CARE EDUCATION/TRAINING PROGRAM

## 2021-06-10 PROCEDURE — 83930 ASSAY OF BLOOD OSMOLALITY: CPT | Performed by: STUDENT IN AN ORGANIZED HEALTH CARE EDUCATION/TRAINING PROGRAM

## 2021-06-10 PROCEDURE — 81000 URINALYSIS NONAUTO W/SCOPE: CPT | Mod: 59 | Performed by: STUDENT IN AN ORGANIZED HEALTH CARE EDUCATION/TRAINING PROGRAM

## 2021-06-10 PROCEDURE — 97161 PT EVAL LOW COMPLEX 20 MIN: CPT

## 2021-06-10 PROCEDURE — 84300 ASSAY OF URINE SODIUM: CPT | Performed by: STUDENT IN AN ORGANIZED HEALTH CARE EDUCATION/TRAINING PROGRAM

## 2021-06-10 PROCEDURE — 87088 URINE BACTERIA CULTURE: CPT | Performed by: STUDENT IN AN ORGANIZED HEALTH CARE EDUCATION/TRAINING PROGRAM

## 2021-06-10 PROCEDURE — G0378 HOSPITAL OBSERVATION PER HR: HCPCS

## 2021-06-10 PROCEDURE — 85610 PROTHROMBIN TIME: CPT | Performed by: STUDENT IN AN ORGANIZED HEALTH CARE EDUCATION/TRAINING PROGRAM

## 2021-06-10 PROCEDURE — 80053 COMPREHEN METABOLIC PANEL: CPT | Performed by: STUDENT IN AN ORGANIZED HEALTH CARE EDUCATION/TRAINING PROGRAM

## 2021-06-10 PROCEDURE — 27000221 HC OXYGEN, UP TO 24 HOURS

## 2021-06-10 PROCEDURE — 87186 SC STD MICRODIL/AGAR DIL: CPT | Performed by: STUDENT IN AN ORGANIZED HEALTH CARE EDUCATION/TRAINING PROGRAM

## 2021-06-10 PROCEDURE — 83735 ASSAY OF MAGNESIUM: CPT | Performed by: STUDENT IN AN ORGANIZED HEALTH CARE EDUCATION/TRAINING PROGRAM

## 2021-06-10 RX ORDER — OXYCODONE HYDROCHLORIDE 5 MG/1
5 TABLET ORAL EVERY 6 HOURS PRN
Status: DISCONTINUED | OUTPATIENT
Start: 2021-06-10 | End: 2021-06-13

## 2021-06-10 RX ORDER — MORPHINE SULFATE 4 MG/ML
4 INJECTION, SOLUTION INTRAMUSCULAR; INTRAVENOUS EVERY 6 HOURS PRN
Status: DISCONTINUED | OUTPATIENT
Start: 2021-06-10 | End: 2021-06-13

## 2021-06-10 RX ADMIN — CARVEDILOL 6.25 MG: 6.25 TABLET, FILM COATED ORAL at 08:06

## 2021-06-10 RX ADMIN — TUBERCULIN PURIFIED PROTEIN DERIVATIVE 5 UNITS: 5 INJECTION, SOLUTION INTRADERMAL at 12:06

## 2021-06-10 RX ADMIN — ATORVASTATIN CALCIUM 40 MG: 40 TABLET, FILM COATED ORAL at 09:06

## 2021-06-10 RX ADMIN — OXYCODONE 5 MG: 5 TABLET ORAL at 07:06

## 2021-06-10 RX ADMIN — ACETAMINOPHEN 650 MG: 325 TABLET ORAL at 01:06

## 2021-06-10 RX ADMIN — SODIUM CHLORIDE, SODIUM LACTATE, POTASSIUM CHLORIDE, AND CALCIUM CHLORIDE: .6; .31; .03; .02 INJECTION, SOLUTION INTRAVENOUS at 01:06

## 2021-06-10 RX ADMIN — MORPHINE SULFATE 4 MG: 4 INJECTION INTRAVENOUS at 08:06

## 2021-06-10 RX ADMIN — HYDROXYZINE PAMOATE 25 MG: 25 CAPSULE ORAL at 08:06

## 2021-06-10 RX ADMIN — OXYCODONE 5 MG: 5 TABLET ORAL at 11:06

## 2021-06-10 RX ADMIN — OXYCODONE 5 MG: 5 TABLET ORAL at 03:06

## 2021-06-10 RX ADMIN — Medication 1 TABLET: at 08:06

## 2021-06-10 RX ADMIN — MORPHINE SULFATE 4 MG: 4 INJECTION INTRAVENOUS at 02:06

## 2021-06-10 RX ADMIN — LISINOPRIL 10 MG: 10 TABLET ORAL at 08:06

## 2021-06-10 RX ADMIN — SODIUM CHLORIDE, SODIUM LACTATE, POTASSIUM CHLORIDE, AND CALCIUM CHLORIDE: .6; .31; .03; .02 INJECTION, SOLUTION INTRAVENOUS at 02:06

## 2021-06-11 LAB
ALBUMIN SERPL BCP-MCNC: 3.2 G/DL (ref 3.5–5.2)
ALP SERPL-CCNC: 63 U/L (ref 55–135)
ALT SERPL W/O P-5'-P-CCNC: 30 U/L (ref 10–44)
AMMONIA PLAS-SCNC: 30 UMOL/L (ref 10–50)
ANION GAP SERPL CALC-SCNC: 10 MMOL/L (ref 8–16)
ANION GAP SERPL CALC-SCNC: 11 MMOL/L (ref 8–16)
ANION GAP SERPL CALC-SCNC: 11 MMOL/L (ref 8–16)
ANION GAP SERPL CALC-SCNC: 9 MMOL/L (ref 8–16)
ANION GAP SERPL CALC-SCNC: 9 MMOL/L (ref 8–16)
AORTIC ROOT ANNULUS: 3.11 CM
ASCENDING AORTA: 2.98 CM
AST SERPL-CCNC: 43 U/L (ref 10–40)
AV INDEX (PROSTH): 0.89
AV MEAN GRADIENT: 3 MMHG
AV PEAK GRADIENT: 5 MMHG
AV VALVE AREA: 3.37 CM2
AV VELOCITY RATIO: 0.83
BASOPHILS # BLD AUTO: 0.04 K/UL (ref 0–0.2)
BASOPHILS NFR BLD: 0.4 % (ref 0–1.9)
BILIRUB SERPL-MCNC: 0.7 MG/DL (ref 0.1–1)
BSA FOR ECHO PROCEDURE: 2.15 M2
BUN SERPL-MCNC: 11 MG/DL (ref 8–23)
BUN SERPL-MCNC: 14 MG/DL (ref 8–23)
BUN SERPL-MCNC: 15 MG/DL (ref 8–23)
CALCIUM SERPL-MCNC: 8.6 MG/DL (ref 8.7–10.5)
CALCIUM SERPL-MCNC: 8.6 MG/DL (ref 8.7–10.5)
CALCIUM SERPL-MCNC: 8.7 MG/DL (ref 8.7–10.5)
CALCIUM SERPL-MCNC: 8.7 MG/DL (ref 8.7–10.5)
CALCIUM SERPL-MCNC: 8.9 MG/DL (ref 8.7–10.5)
CHLORIDE SERPL-SCNC: 87 MMOL/L (ref 95–110)
CHLORIDE SERPL-SCNC: 89 MMOL/L (ref 95–110)
CHLORIDE SERPL-SCNC: 89 MMOL/L (ref 95–110)
CHLORIDE SERPL-SCNC: 91 MMOL/L (ref 95–110)
CHLORIDE SERPL-SCNC: 92 MMOL/L (ref 95–110)
CO2 SERPL-SCNC: 27 MMOL/L (ref 23–29)
CO2 SERPL-SCNC: 27 MMOL/L (ref 23–29)
CO2 SERPL-SCNC: 29 MMOL/L (ref 23–29)
CO2 SERPL-SCNC: 29 MMOL/L (ref 23–29)
CO2 SERPL-SCNC: 32 MMOL/L (ref 23–29)
CREAT SERPL-MCNC: 0.8 MG/DL (ref 0.5–1.4)
CREAT SERPL-MCNC: 0.8 MG/DL (ref 0.5–1.4)
CREAT SERPL-MCNC: 0.9 MG/DL (ref 0.5–1.4)
CREAT SERPL-MCNC: 0.9 MG/DL (ref 0.5–1.4)
CREAT SERPL-MCNC: 1 MG/DL (ref 0.5–1.4)
CV ECHO LV RWT: 0.38 CM
DIFFERENTIAL METHOD: ABNORMAL
DOP CALC AO PEAK VEL: 1.14 M/S
DOP CALC AO VTI: 22.42 CM
DOP CALC LVOT AREA: 3.8 CM2
DOP CALC LVOT DIAMETER: 2.2 CM
DOP CALC LVOT PEAK VEL: 0.95 M/S
DOP CALC LVOT STROKE VOLUME: 75.65 CM3
DOP CALCLVOT PEAK VEL VTI: 19.91 CM
E WAVE DECELERATION TIME: 219.64 MSEC
E/A RATIO: 0.65
E/E' RATIO: 10.92 M/S
ECHO LV POSTERIOR WALL: 0.91 CM (ref 0.6–1.1)
EJECTION FRACTION: 55 %
EOSINOPHIL # BLD AUTO: 0.2 K/UL (ref 0–0.5)
EOSINOPHIL NFR BLD: 1.6 % (ref 0–8)
ERYTHROCYTE [DISTWIDTH] IN BLOOD BY AUTOMATED COUNT: 12.6 % (ref 11.5–14.5)
EST. GFR  (AFRICAN AMERICAN): >60 ML/MIN/1.73 M^2
EST. GFR  (NON AFRICAN AMERICAN): >60 ML/MIN/1.73 M^2
FRACTIONAL SHORTENING: 28 % (ref 28–44)
GLUCOSE SERPL-MCNC: 107 MG/DL (ref 70–110)
GLUCOSE SERPL-MCNC: 108 MG/DL (ref 70–110)
GLUCOSE SERPL-MCNC: 112 MG/DL (ref 70–110)
GLUCOSE SERPL-MCNC: 139 MG/DL (ref 70–110)
GLUCOSE SERPL-MCNC: 97 MG/DL (ref 70–110)
HCT VFR BLD AUTO: 37.3 % (ref 40–54)
HGB BLD-MCNC: 13 G/DL (ref 14–18)
IMM GRANULOCYTES # BLD AUTO: 0.07 K/UL (ref 0–0.04)
IMM GRANULOCYTES NFR BLD AUTO: 0.7 % (ref 0–0.5)
INTERVENTRICULAR SEPTUM: 1.24 CM (ref 0.6–1.1)
IVRT: 88.49 MSEC
LA MAJOR: 4.58 CM
LA WIDTH: 2.91 CM
LEFT ATRIUM SIZE: 3.38 CM
LEFT ATRIUM VOLUME INDEX MOD: 17.3 ML/M2
LEFT ATRIUM VOLUME MOD: 35.57 CM3
LEFT INTERNAL DIMENSION IN SYSTOLE: 3.49 CM (ref 2.1–4)
LEFT VENTRICLE DIASTOLIC VOLUME INDEX: 52.64 ML/M2
LEFT VENTRICLE DIASTOLIC VOLUME: 108.43 ML
LEFT VENTRICLE MASS INDEX: 92 G/M2
LEFT VENTRICLE SYSTOLIC VOLUME INDEX: 24.5 ML/M2
LEFT VENTRICLE SYSTOLIC VOLUME: 50.45 ML
LEFT VENTRICULAR INTERNAL DIMENSION IN DIASTOLE: 4.82 CM (ref 3.5–6)
LEFT VENTRICULAR MASS: 189.16 G
LV LATERAL E/E' RATIO: 8.88 M/S
LV SEPTAL E/E' RATIO: 14.2 M/S
LYMPHOCYTES # BLD AUTO: 1 K/UL (ref 1–4.8)
LYMPHOCYTES NFR BLD: 9.7 % (ref 18–48)
MAGNESIUM SERPL-MCNC: 1.4 MG/DL (ref 1.6–2.6)
MCH RBC QN AUTO: 35.9 PG (ref 27–31)
MCHC RBC AUTO-ENTMCNC: 34.9 G/DL (ref 32–36)
MCV RBC AUTO: 103 FL (ref 82–98)
MONOCYTES # BLD AUTO: 0.7 K/UL (ref 0.3–1)
MONOCYTES NFR BLD: 6.7 % (ref 4–15)
MV PEAK A VEL: 1.09 M/S
MV PEAK E VEL: 0.71 M/S
MV PEAK GRADIENT: 5 MMHG
MV STENOSIS PRESSURE HALF TIME: 63.69 MS
MV VALVE AREA P 1/2 METHOD: 3.45 CM2
NEUTROPHILS # BLD AUTO: 8.7 K/UL (ref 1.8–7.7)
NEUTROPHILS NFR BLD: 80.9 % (ref 38–73)
NRBC BLD-RTO: 0 /100 WBC
PHOSPHATE SERPL-MCNC: 3.2 MG/DL (ref 2.7–4.5)
PLATELET # BLD AUTO: 199 K/UL (ref 150–450)
PMV BLD AUTO: 10.6 FL (ref 9.2–12.9)
POCT GLUCOSE: 107 MG/DL (ref 70–110)
POCT GLUCOSE: 152 MG/DL (ref 70–110)
POTASSIUM SERPL-SCNC: 3.7 MMOL/L (ref 3.5–5.1)
POTASSIUM SERPL-SCNC: 4.1 MMOL/L (ref 3.5–5.1)
POTASSIUM SERPL-SCNC: 4.1 MMOL/L (ref 3.5–5.1)
POTASSIUM SERPL-SCNC: 4.7 MMOL/L (ref 3.5–5.1)
POTASSIUM SERPL-SCNC: 4.7 MMOL/L (ref 3.5–5.1)
PROT SERPL-MCNC: 6.3 G/DL (ref 6–8.4)
PV PEAK VELOCITY: 0.83 CM/S
RA MAJOR: 4.76 CM
RA PRESSURE: 3 MMHG
RA WIDTH: 3.76 CM
RBC # BLD AUTO: 3.62 M/UL (ref 4.6–6.2)
RV TISSUE DOPPLER FREE WALL SYSTOLIC VELOCITY 1 (APICAL 4 CHAMBER VIEW): 11.45 CM/S
SODIUM SERPL-SCNC: 127 MMOL/L (ref 136–145)
SODIUM SERPL-SCNC: 127 MMOL/L (ref 136–145)
SODIUM SERPL-SCNC: 128 MMOL/L (ref 136–145)
SODIUM SERPL-SCNC: 128 MMOL/L (ref 136–145)
SODIUM SERPL-SCNC: 132 MMOL/L (ref 136–145)
STJ: 2.75 CM
TDI LATERAL: 0.08 M/S
TDI SEPTAL: 0.05 M/S
TDI: 0.07 M/S
WBC # BLD AUTO: 10.71 K/UL (ref 3.9–12.7)

## 2021-06-11 PROCEDURE — 97116 GAIT TRAINING THERAPY: CPT | Mod: CQ

## 2021-06-11 PROCEDURE — 82607 VITAMIN B-12: CPT | Performed by: STUDENT IN AN ORGANIZED HEALTH CARE EDUCATION/TRAINING PROGRAM

## 2021-06-11 PROCEDURE — G0378 HOSPITAL OBSERVATION PER HR: HCPCS

## 2021-06-11 PROCEDURE — 85025 COMPLETE CBC W/AUTO DIFF WBC: CPT | Performed by: STUDENT IN AN ORGANIZED HEALTH CARE EDUCATION/TRAINING PROGRAM

## 2021-06-11 PROCEDURE — 97165 OT EVAL LOW COMPLEX 30 MIN: CPT

## 2021-06-11 PROCEDURE — 27000221 HC OXYGEN, UP TO 24 HOURS

## 2021-06-11 PROCEDURE — 97535 SELF CARE MNGMENT TRAINING: CPT

## 2021-06-11 PROCEDURE — 80048 BASIC METABOLIC PNL TOTAL CA: CPT | Performed by: STUDENT IN AN ORGANIZED HEALTH CARE EDUCATION/TRAINING PROGRAM

## 2021-06-11 PROCEDURE — 94761 N-INVAS EAR/PLS OXIMETRY MLT: CPT

## 2021-06-11 PROCEDURE — 86592 SYPHILIS TEST NON-TREP QUAL: CPT | Performed by: STUDENT IN AN ORGANIZED HEALTH CARE EDUCATION/TRAINING PROGRAM

## 2021-06-11 PROCEDURE — 86703 HIV-1/HIV-2 1 RESULT ANTBDY: CPT | Performed by: STUDENT IN AN ORGANIZED HEALTH CARE EDUCATION/TRAINING PROGRAM

## 2021-06-11 PROCEDURE — 99205 PR OFFICE/OUTPT VISIT, NEW, LEVL V, 60-74 MIN: ICD-10-PCS | Mod: ,,, | Performed by: PSYCHIATRY & NEUROLOGY

## 2021-06-11 PROCEDURE — 25000003 PHARM REV CODE 250: Performed by: STUDENT IN AN ORGANIZED HEALTH CARE EDUCATION/TRAINING PROGRAM

## 2021-06-11 PROCEDURE — 83735 ASSAY OF MAGNESIUM: CPT | Performed by: STUDENT IN AN ORGANIZED HEALTH CARE EDUCATION/TRAINING PROGRAM

## 2021-06-11 PROCEDURE — 82140 ASSAY OF AMMONIA: CPT | Performed by: STUDENT IN AN ORGANIZED HEALTH CARE EDUCATION/TRAINING PROGRAM

## 2021-06-11 PROCEDURE — 84100 ASSAY OF PHOSPHORUS: CPT | Performed by: STUDENT IN AN ORGANIZED HEALTH CARE EDUCATION/TRAINING PROGRAM

## 2021-06-11 PROCEDURE — 36415 COLL VENOUS BLD VENIPUNCTURE: CPT | Performed by: STUDENT IN AN ORGANIZED HEALTH CARE EDUCATION/TRAINING PROGRAM

## 2021-06-11 PROCEDURE — 84425 ASSAY OF VITAMIN B-1: CPT | Performed by: STUDENT IN AN ORGANIZED HEALTH CARE EDUCATION/TRAINING PROGRAM

## 2021-06-11 PROCEDURE — 90833 PSYTX W PT W E/M 30 MIN: CPT | Mod: ,,, | Performed by: PSYCHIATRY & NEUROLOGY

## 2021-06-11 PROCEDURE — 99900035 HC TECH TIME PER 15 MIN (STAT)

## 2021-06-11 PROCEDURE — 97530 THERAPEUTIC ACTIVITIES: CPT | Mod: CQ

## 2021-06-11 PROCEDURE — 25500020 PHARM REV CODE 255: Performed by: INTERNAL MEDICINE

## 2021-06-11 PROCEDURE — 99205 OFFICE O/P NEW HI 60 MIN: CPT | Mod: ,,, | Performed by: PSYCHIATRY & NEUROLOGY

## 2021-06-11 PROCEDURE — 80053 COMPREHEN METABOLIC PANEL: CPT | Performed by: STUDENT IN AN ORGANIZED HEALTH CARE EDUCATION/TRAINING PROGRAM

## 2021-06-11 PROCEDURE — 90833 PR PSYCHOTHERAPY W/PATIENT W/E&M, 30 MIN (ADD ON): ICD-10-PCS | Mod: ,,, | Performed by: PSYCHIATRY & NEUROLOGY

## 2021-06-11 PROCEDURE — 63600175 PHARM REV CODE 636 W HCPCS: Performed by: STUDENT IN AN ORGANIZED HEALTH CARE EDUCATION/TRAINING PROGRAM

## 2021-06-11 PROCEDURE — 25000003 PHARM REV CODE 250: Performed by: PSYCHIATRY & NEUROLOGY

## 2021-06-11 PROCEDURE — 80048 BASIC METABOLIC PNL TOTAL CA: CPT | Mod: 91 | Performed by: STUDENT IN AN ORGANIZED HEALTH CARE EDUCATION/TRAINING PROGRAM

## 2021-06-11 PROCEDURE — 96376 TX/PRO/DX INJ SAME DRUG ADON: CPT | Mod: 59

## 2021-06-11 PROCEDURE — 94760 N-INVAS EAR/PLS OXIMETRY 1: CPT

## 2021-06-11 PROCEDURE — 82746 ASSAY OF FOLIC ACID SERUM: CPT | Performed by: STUDENT IN AN ORGANIZED HEALTH CARE EDUCATION/TRAINING PROGRAM

## 2021-06-11 PROCEDURE — 96361 HYDRATE IV INFUSION ADD-ON: CPT

## 2021-06-11 RX ORDER — OLANZAPINE 2.5 MG/1
5 TABLET ORAL 2 TIMES DAILY
Status: DISCONTINUED | OUTPATIENT
Start: 2021-06-11 | End: 2021-06-14

## 2021-06-11 RX ADMIN — OXYCODONE 5 MG: 5 TABLET ORAL at 06:06

## 2021-06-11 RX ADMIN — HUMAN ALBUMIN MICROSPHERES AND PERFLUTREN 0.11 MG: 10; .22 INJECTION, SOLUTION INTRAVENOUS at 12:06

## 2021-06-11 RX ADMIN — CARVEDILOL 6.25 MG: 6.25 TABLET, FILM COATED ORAL at 09:06

## 2021-06-11 RX ADMIN — Medication 1 TABLET: at 09:06

## 2021-06-11 RX ADMIN — MORPHINE SULFATE 4 MG: 4 INJECTION INTRAVENOUS at 01:06

## 2021-06-11 RX ADMIN — SODIUM CHLORIDE, SODIUM LACTATE, POTASSIUM CHLORIDE, AND CALCIUM CHLORIDE: .6; .31; .03; .02 INJECTION, SOLUTION INTRAVENOUS at 02:06

## 2021-06-11 RX ADMIN — ATORVASTATIN CALCIUM 40 MG: 40 TABLET, FILM COATED ORAL at 09:06

## 2021-06-11 RX ADMIN — OLANZAPINE 5 MG: 2.5 TABLET, FILM COATED ORAL at 03:06

## 2021-06-11 RX ADMIN — OLANZAPINE 5 MG: 2.5 TABLET, FILM COATED ORAL at 09:06

## 2021-06-11 RX ADMIN — SODIUM CHLORIDE, SODIUM LACTATE, POTASSIUM CHLORIDE, AND CALCIUM CHLORIDE: .6; .31; .03; .02 INJECTION, SOLUTION INTRAVENOUS at 01:06

## 2021-06-12 LAB
ALBUMIN SERPL BCP-MCNC: 3.4 G/DL (ref 3.5–5.2)
ALP SERPL-CCNC: 66 U/L (ref 55–135)
ALT SERPL W/O P-5'-P-CCNC: 32 U/L (ref 10–44)
ANION GAP SERPL CALC-SCNC: 13 MMOL/L (ref 8–16)
ANION GAP SERPL CALC-SCNC: 9 MMOL/L (ref 8–16)
AST SERPL-CCNC: 37 U/L (ref 10–40)
BASOPHILS # BLD AUTO: 0.03 K/UL (ref 0–0.2)
BASOPHILS NFR BLD: 0.4 % (ref 0–1.9)
BILIRUB SERPL-MCNC: 0.6 MG/DL (ref 0.1–1)
BUN SERPL-MCNC: 12 MG/DL (ref 8–23)
BUN SERPL-MCNC: 14 MG/DL (ref 8–23)
CALCIUM SERPL-MCNC: 8.6 MG/DL (ref 8.7–10.5)
CALCIUM SERPL-MCNC: 8.9 MG/DL (ref 8.7–10.5)
CHLORIDE SERPL-SCNC: 95 MMOL/L (ref 95–110)
CHLORIDE SERPL-SCNC: 96 MMOL/L (ref 95–110)
CO2 SERPL-SCNC: 26 MMOL/L (ref 23–29)
CO2 SERPL-SCNC: 30 MMOL/L (ref 23–29)
CREAT SERPL-MCNC: 0.8 MG/DL (ref 0.5–1.4)
CREAT SERPL-MCNC: 0.8 MG/DL (ref 0.5–1.4)
DIFFERENTIAL METHOD: ABNORMAL
EOSINOPHIL # BLD AUTO: 0.1 K/UL (ref 0–0.5)
EOSINOPHIL NFR BLD: 1.7 % (ref 0–8)
ERYTHROCYTE [DISTWIDTH] IN BLOOD BY AUTOMATED COUNT: 13.2 % (ref 11.5–14.5)
EST. GFR  (AFRICAN AMERICAN): >60 ML/MIN/1.73 M^2
EST. GFR  (AFRICAN AMERICAN): >60 ML/MIN/1.73 M^2
EST. GFR  (NON AFRICAN AMERICAN): >60 ML/MIN/1.73 M^2
EST. GFR  (NON AFRICAN AMERICAN): >60 ML/MIN/1.73 M^2
FOLATE SERPL-MCNC: 13.5 NG/ML (ref 4–24)
GLUCOSE SERPL-MCNC: 107 MG/DL (ref 70–110)
GLUCOSE SERPL-MCNC: 107 MG/DL (ref 70–110)
HCT VFR BLD AUTO: 39.4 % (ref 40–54)
HGB BLD-MCNC: 13.5 G/DL (ref 14–18)
IMM GRANULOCYTES # BLD AUTO: 0.07 K/UL (ref 0–0.04)
IMM GRANULOCYTES NFR BLD AUTO: 0.9 % (ref 0–0.5)
LYMPHOCYTES # BLD AUTO: 0.9 K/UL (ref 1–4.8)
LYMPHOCYTES NFR BLD: 11.2 % (ref 18–48)
MAGNESIUM SERPL-MCNC: 1.5 MG/DL (ref 1.6–2.6)
MCH RBC QN AUTO: 36 PG (ref 27–31)
MCHC RBC AUTO-ENTMCNC: 34.3 G/DL (ref 32–36)
MCV RBC AUTO: 105 FL (ref 82–98)
MONOCYTES # BLD AUTO: 0.6 K/UL (ref 0.3–1)
MONOCYTES NFR BLD: 7.7 % (ref 4–15)
NEUTROPHILS # BLD AUTO: 6.3 K/UL (ref 1.8–7.7)
NEUTROPHILS NFR BLD: 78.1 % (ref 38–73)
NRBC BLD-RTO: 0 /100 WBC
PHOSPHATE SERPL-MCNC: 3.1 MG/DL (ref 2.7–4.5)
PLATELET # BLD AUTO: 184 K/UL (ref 150–450)
PMV BLD AUTO: 10.3 FL (ref 9.2–12.9)
POCT GLUCOSE: 102 MG/DL (ref 70–110)
POCT GLUCOSE: 115 MG/DL (ref 70–110)
POCT GLUCOSE: 125 MG/DL (ref 70–110)
POCT GLUCOSE: 141 MG/DL (ref 70–110)
POCT GLUCOSE: 150 MG/DL (ref 70–110)
POCT GLUCOSE: 95 MG/DL (ref 70–110)
POTASSIUM SERPL-SCNC: 3.6 MMOL/L (ref 3.5–5.1)
POTASSIUM SERPL-SCNC: 3.7 MMOL/L (ref 3.5–5.1)
PROT SERPL-MCNC: 6.3 G/DL (ref 6–8.4)
RBC # BLD AUTO: 3.75 M/UL (ref 4.6–6.2)
RPR SER QL: NORMAL
SODIUM SERPL-SCNC: 134 MMOL/L (ref 136–145)
SODIUM SERPL-SCNC: 135 MMOL/L (ref 136–145)
TB INDURATION 48 - 72 HR READ: 0 MM
VIT B12 SERPL-MCNC: 421 PG/ML (ref 210–950)
WBC # BLD AUTO: 8.05 K/UL (ref 3.9–12.7)

## 2021-06-12 PROCEDURE — 27000221 HC OXYGEN, UP TO 24 HOURS

## 2021-06-12 PROCEDURE — 25000003 PHARM REV CODE 250: Performed by: STUDENT IN AN ORGANIZED HEALTH CARE EDUCATION/TRAINING PROGRAM

## 2021-06-12 PROCEDURE — 83735 ASSAY OF MAGNESIUM: CPT | Performed by: STUDENT IN AN ORGANIZED HEALTH CARE EDUCATION/TRAINING PROGRAM

## 2021-06-12 PROCEDURE — 97110 THERAPEUTIC EXERCISES: CPT | Mod: CQ

## 2021-06-12 PROCEDURE — 25000003 PHARM REV CODE 250: Performed by: PSYCHIATRY & NEUROLOGY

## 2021-06-12 PROCEDURE — G0378 HOSPITAL OBSERVATION PER HR: HCPCS

## 2021-06-12 PROCEDURE — 97116 GAIT TRAINING THERAPY: CPT | Mod: CQ

## 2021-06-12 PROCEDURE — 93005 ELECTROCARDIOGRAM TRACING: CPT

## 2021-06-12 PROCEDURE — 36415 COLL VENOUS BLD VENIPUNCTURE: CPT | Performed by: STUDENT IN AN ORGANIZED HEALTH CARE EDUCATION/TRAINING PROGRAM

## 2021-06-12 PROCEDURE — 93010 EKG 12-LEAD: ICD-10-PCS | Mod: ,,, | Performed by: INTERNAL MEDICINE

## 2021-06-12 PROCEDURE — 80048 BASIC METABOLIC PNL TOTAL CA: CPT | Performed by: STUDENT IN AN ORGANIZED HEALTH CARE EDUCATION/TRAINING PROGRAM

## 2021-06-12 PROCEDURE — 93010 ELECTROCARDIOGRAM REPORT: CPT | Mod: ,,, | Performed by: INTERNAL MEDICINE

## 2021-06-12 PROCEDURE — 85025 COMPLETE CBC W/AUTO DIFF WBC: CPT | Performed by: STUDENT IN AN ORGANIZED HEALTH CARE EDUCATION/TRAINING PROGRAM

## 2021-06-12 PROCEDURE — 94761 N-INVAS EAR/PLS OXIMETRY MLT: CPT

## 2021-06-12 PROCEDURE — 96361 HYDRATE IV INFUSION ADD-ON: CPT

## 2021-06-12 PROCEDURE — 63600175 PHARM REV CODE 636 W HCPCS: Performed by: STUDENT IN AN ORGANIZED HEALTH CARE EDUCATION/TRAINING PROGRAM

## 2021-06-12 PROCEDURE — 96375 TX/PRO/DX INJ NEW DRUG ADDON: CPT

## 2021-06-12 PROCEDURE — 96376 TX/PRO/DX INJ SAME DRUG ADON: CPT

## 2021-06-12 PROCEDURE — 84100 ASSAY OF PHOSPHORUS: CPT | Performed by: STUDENT IN AN ORGANIZED HEALTH CARE EDUCATION/TRAINING PROGRAM

## 2021-06-12 PROCEDURE — 97530 THERAPEUTIC ACTIVITIES: CPT | Mod: CQ

## 2021-06-12 PROCEDURE — 80053 COMPREHEN METABOLIC PANEL: CPT | Performed by: STUDENT IN AN ORGANIZED HEALTH CARE EDUCATION/TRAINING PROGRAM

## 2021-06-12 RX ORDER — MAGNESIUM SULFATE 1 G/100ML
1 INJECTION INTRAVENOUS ONCE
Status: COMPLETED | OUTPATIENT
Start: 2021-06-12 | End: 2021-06-12

## 2021-06-12 RX ADMIN — CARVEDILOL 6.25 MG: 6.25 TABLET, FILM COATED ORAL at 09:06

## 2021-06-12 RX ADMIN — Medication 1 TABLET: at 09:06

## 2021-06-12 RX ADMIN — OLANZAPINE 5 MG: 2.5 TABLET, FILM COATED ORAL at 09:06

## 2021-06-12 RX ADMIN — MORPHINE SULFATE 4 MG: 4 INJECTION INTRAVENOUS at 12:06

## 2021-06-12 RX ADMIN — LISINOPRIL 10 MG: 10 TABLET ORAL at 09:06

## 2021-06-12 RX ADMIN — OXYCODONE 5 MG: 5 TABLET ORAL at 02:06

## 2021-06-12 RX ADMIN — ATORVASTATIN CALCIUM 40 MG: 40 TABLET, FILM COATED ORAL at 09:06

## 2021-06-12 RX ADMIN — MAGNESIUM SULFATE 1 G: 1 INJECTION INTRAVENOUS at 09:06

## 2021-06-13 LAB
ALBUMIN SERPL BCP-MCNC: 3.2 G/DL (ref 3.5–5.2)
ALP SERPL-CCNC: 68 U/L (ref 55–135)
ALT SERPL W/O P-5'-P-CCNC: 36 U/L (ref 10–44)
ANION GAP SERPL CALC-SCNC: 10 MMOL/L (ref 8–16)
AST SERPL-CCNC: 40 U/L (ref 10–40)
BACTERIA UR CULT: ABNORMAL
BASOPHILS # BLD AUTO: 0.03 K/UL (ref 0–0.2)
BASOPHILS NFR BLD: 0.4 % (ref 0–1.9)
BILIRUB SERPL-MCNC: 0.5 MG/DL (ref 0.1–1)
BUN SERPL-MCNC: 14 MG/DL (ref 8–23)
CALCIUM SERPL-MCNC: 8.8 MG/DL (ref 8.7–10.5)
CHLORIDE SERPL-SCNC: 100 MMOL/L (ref 95–110)
CO2 SERPL-SCNC: 30 MMOL/L (ref 23–29)
CREAT SERPL-MCNC: 0.8 MG/DL (ref 0.5–1.4)
DIFFERENTIAL METHOD: ABNORMAL
EOSINOPHIL # BLD AUTO: 0.2 K/UL (ref 0–0.5)
EOSINOPHIL NFR BLD: 2.6 % (ref 0–8)
ERYTHROCYTE [DISTWIDTH] IN BLOOD BY AUTOMATED COUNT: 13.4 % (ref 11.5–14.5)
EST. GFR  (AFRICAN AMERICAN): >60 ML/MIN/1.73 M^2
EST. GFR  (NON AFRICAN AMERICAN): >60 ML/MIN/1.73 M^2
GLUCOSE SERPL-MCNC: 109 MG/DL (ref 70–110)
HCT VFR BLD AUTO: 37.1 % (ref 40–54)
HGB BLD-MCNC: 12.4 G/DL (ref 14–18)
IMM GRANULOCYTES # BLD AUTO: 0.04 K/UL (ref 0–0.04)
IMM GRANULOCYTES NFR BLD AUTO: 0.5 % (ref 0–0.5)
LYMPHOCYTES # BLD AUTO: 0.8 K/UL (ref 1–4.8)
LYMPHOCYTES NFR BLD: 9.9 % (ref 18–48)
MAGNESIUM SERPL-MCNC: 1.6 MG/DL (ref 1.6–2.6)
MCH RBC QN AUTO: 35.9 PG (ref 27–31)
MCHC RBC AUTO-ENTMCNC: 33.4 G/DL (ref 32–36)
MCV RBC AUTO: 108 FL (ref 82–98)
MONOCYTES # BLD AUTO: 0.7 K/UL (ref 0.3–1)
MONOCYTES NFR BLD: 8.4 % (ref 4–15)
NEUTROPHILS # BLD AUTO: 6.4 K/UL (ref 1.8–7.7)
NEUTROPHILS NFR BLD: 78.2 % (ref 38–73)
NRBC BLD-RTO: 0 /100 WBC
PHOSPHATE SERPL-MCNC: 3.3 MG/DL (ref 2.7–4.5)
PLATELET # BLD AUTO: 179 K/UL (ref 150–450)
PMV BLD AUTO: 10.4 FL (ref 9.2–12.9)
POCT GLUCOSE: 136 MG/DL (ref 70–110)
POCT GLUCOSE: 145 MG/DL (ref 70–110)
POCT GLUCOSE: 159 MG/DL (ref 70–110)
POCT GLUCOSE: 181 MG/DL (ref 70–110)
POTASSIUM SERPL-SCNC: 4.1 MMOL/L (ref 3.5–5.1)
PROT SERPL-MCNC: 5.7 G/DL (ref 6–8.4)
RBC # BLD AUTO: 3.45 M/UL (ref 4.6–6.2)
SODIUM SERPL-SCNC: 140 MMOL/L (ref 136–145)
WBC # BLD AUTO: 8.12 K/UL (ref 3.9–12.7)

## 2021-06-13 PROCEDURE — 83735 ASSAY OF MAGNESIUM: CPT | Performed by: STUDENT IN AN ORGANIZED HEALTH CARE EDUCATION/TRAINING PROGRAM

## 2021-06-13 PROCEDURE — 85025 COMPLETE CBC W/AUTO DIFF WBC: CPT | Performed by: STUDENT IN AN ORGANIZED HEALTH CARE EDUCATION/TRAINING PROGRAM

## 2021-06-13 PROCEDURE — 99900035 HC TECH TIME PER 15 MIN (STAT)

## 2021-06-13 PROCEDURE — 80053 COMPREHEN METABOLIC PANEL: CPT | Performed by: STUDENT IN AN ORGANIZED HEALTH CARE EDUCATION/TRAINING PROGRAM

## 2021-06-13 PROCEDURE — G0378 HOSPITAL OBSERVATION PER HR: HCPCS

## 2021-06-13 PROCEDURE — 96375 TX/PRO/DX INJ NEW DRUG ADDON: CPT

## 2021-06-13 PROCEDURE — 25000003 PHARM REV CODE 250: Performed by: STUDENT IN AN ORGANIZED HEALTH CARE EDUCATION/TRAINING PROGRAM

## 2021-06-13 PROCEDURE — 63600175 PHARM REV CODE 636 W HCPCS: Performed by: STUDENT IN AN ORGANIZED HEALTH CARE EDUCATION/TRAINING PROGRAM

## 2021-06-13 PROCEDURE — 94761 N-INVAS EAR/PLS OXIMETRY MLT: CPT

## 2021-06-13 PROCEDURE — 27000221 HC OXYGEN, UP TO 24 HOURS

## 2021-06-13 PROCEDURE — 36415 COLL VENOUS BLD VENIPUNCTURE: CPT | Performed by: STUDENT IN AN ORGANIZED HEALTH CARE EDUCATION/TRAINING PROGRAM

## 2021-06-13 PROCEDURE — 25000003 PHARM REV CODE 250: Performed by: PSYCHIATRY & NEUROLOGY

## 2021-06-13 PROCEDURE — 84100 ASSAY OF PHOSPHORUS: CPT | Performed by: STUDENT IN AN ORGANIZED HEALTH CARE EDUCATION/TRAINING PROGRAM

## 2021-06-13 RX ORDER — LISINOPRIL 10 MG/1
10 TABLET ORAL ONCE
Status: COMPLETED | OUTPATIENT
Start: 2021-06-13 | End: 2021-06-13

## 2021-06-13 RX ORDER — NITROFURANTOIN 25; 75 MG/1; MG/1
100 CAPSULE ORAL EVERY 12 HOURS
Status: DISCONTINUED | OUTPATIENT
Start: 2021-06-13 | End: 2021-06-13

## 2021-06-13 RX ORDER — AMOXICILLIN 250 MG
1 CAPSULE ORAL 2 TIMES DAILY
Status: DISCONTINUED | OUTPATIENT
Start: 2021-06-13 | End: 2021-06-16 | Stop reason: HOSPADM

## 2021-06-13 RX ORDER — LISINOPRIL 20 MG/1
20 TABLET ORAL DAILY
Status: DISCONTINUED | OUTPATIENT
Start: 2021-06-14 | End: 2021-06-16 | Stop reason: HOSPADM

## 2021-06-13 RX ORDER — ACETAMINOPHEN 325 MG/1
650 TABLET ORAL EVERY 4 HOURS PRN
Status: DISCONTINUED | OUTPATIENT
Start: 2021-06-13 | End: 2021-06-16 | Stop reason: HOSPADM

## 2021-06-13 RX ORDER — LISINOPRIL 10 MG/1
10 TABLET ORAL DAILY
Status: DISCONTINUED | OUTPATIENT
Start: 2021-06-13 | End: 2021-06-13

## 2021-06-13 RX ADMIN — OLANZAPINE 5 MG: 2.5 TABLET, FILM COATED ORAL at 08:06

## 2021-06-13 RX ADMIN — LISINOPRIL 10 MG: 10 TABLET ORAL at 08:06

## 2021-06-13 RX ADMIN — Medication 1 TABLET: at 08:06

## 2021-06-13 RX ADMIN — OXYCODONE 5 MG: 5 TABLET ORAL at 08:06

## 2021-06-13 RX ADMIN — OLANZAPINE 5 MG: 2.5 TABLET, FILM COATED ORAL at 09:06

## 2021-06-13 RX ADMIN — CARVEDILOL 6.25 MG: 6.25 TABLET, FILM COATED ORAL at 09:06

## 2021-06-13 RX ADMIN — CARVEDILOL 6.25 MG: 6.25 TABLET, FILM COATED ORAL at 08:06

## 2021-06-13 RX ADMIN — Medication 1 TABLET: at 09:06

## 2021-06-13 RX ADMIN — CEFTRIAXONE 1 G: 1 INJECTION, SOLUTION INTRAVENOUS at 10:06

## 2021-06-13 RX ADMIN — NITROFURANTOIN (MONOHYDRATE/MACROCRYSTALS) 100 MG: 75; 25 CAPSULE ORAL at 08:06

## 2021-06-13 RX ADMIN — ATORVASTATIN CALCIUM 40 MG: 40 TABLET, FILM COATED ORAL at 09:06

## 2021-06-13 RX ADMIN — LISINOPRIL 10 MG: 10 TABLET ORAL at 12:06

## 2021-06-14 ENCOUNTER — CLINICAL SUPPORT (OUTPATIENT)
Dept: SMOKING CESSATION | Facility: CLINIC | Age: 66
End: 2021-06-14
Payer: COMMERCIAL

## 2021-06-14 DIAGNOSIS — F17.210 CIGARETTE SMOKER: Primary | ICD-10-CM

## 2021-06-14 LAB
ALBUMIN SERPL BCP-MCNC: 3.4 G/DL (ref 3.5–5.2)
ALP SERPL-CCNC: 71 U/L (ref 55–135)
ALT SERPL W/O P-5'-P-CCNC: 38 U/L (ref 10–44)
ANION GAP SERPL CALC-SCNC: 6 MMOL/L (ref 8–16)
AST SERPL-CCNC: 37 U/L (ref 10–40)
BASOPHILS # BLD AUTO: 0.03 K/UL (ref 0–0.2)
BASOPHILS NFR BLD: 0.4 % (ref 0–1.9)
BILIRUB SERPL-MCNC: 0.6 MG/DL (ref 0.1–1)
BUN SERPL-MCNC: 15 MG/DL (ref 8–23)
CALCIUM SERPL-MCNC: 9.4 MG/DL (ref 8.7–10.5)
CHLORIDE SERPL-SCNC: 102 MMOL/L (ref 95–110)
CO2 SERPL-SCNC: 31 MMOL/L (ref 23–29)
CREAT SERPL-MCNC: 1 MG/DL (ref 0.5–1.4)
DIFFERENTIAL METHOD: ABNORMAL
EOSINOPHIL # BLD AUTO: 0.2 K/UL (ref 0–0.5)
EOSINOPHIL NFR BLD: 2.7 % (ref 0–8)
ERYTHROCYTE [DISTWIDTH] IN BLOOD BY AUTOMATED COUNT: 13.6 % (ref 11.5–14.5)
EST. GFR  (AFRICAN AMERICAN): >60 ML/MIN/1.73 M^2
EST. GFR  (NON AFRICAN AMERICAN): >60 ML/MIN/1.73 M^2
GLUCOSE SERPL-MCNC: 107 MG/DL (ref 70–110)
HCT VFR BLD AUTO: 39.7 % (ref 40–54)
HGB BLD-MCNC: 12.9 G/DL (ref 14–18)
HIV 1+2 AB+HIV1 P24 AG SERPL QL IA: NEGATIVE
IMM GRANULOCYTES # BLD AUTO: 0.06 K/UL (ref 0–0.04)
IMM GRANULOCYTES NFR BLD AUTO: 0.7 % (ref 0–0.5)
LYMPHOCYTES # BLD AUTO: 1 K/UL (ref 1–4.8)
LYMPHOCYTES NFR BLD: 12 % (ref 18–48)
MAGNESIUM SERPL-MCNC: 1.6 MG/DL (ref 1.6–2.6)
MCH RBC QN AUTO: 35 PG (ref 27–31)
MCHC RBC AUTO-ENTMCNC: 32.5 G/DL (ref 32–36)
MCV RBC AUTO: 108 FL (ref 82–98)
MONOCYTES # BLD AUTO: 0.7 K/UL (ref 0.3–1)
MONOCYTES NFR BLD: 8.3 % (ref 4–15)
NEUTROPHILS # BLD AUTO: 6.4 K/UL (ref 1.8–7.7)
NEUTROPHILS NFR BLD: 75.9 % (ref 38–73)
NRBC BLD-RTO: 0 /100 WBC
PHOSPHATE SERPL-MCNC: 3.2 MG/DL (ref 2.7–4.5)
PLATELET # BLD AUTO: 182 K/UL (ref 150–450)
PMV BLD AUTO: 10.4 FL (ref 9.2–12.9)
POCT GLUCOSE: 128 MG/DL (ref 70–110)
POCT GLUCOSE: 163 MG/DL (ref 70–110)
POCT GLUCOSE: 167 MG/DL (ref 70–110)
POTASSIUM SERPL-SCNC: 4.8 MMOL/L (ref 3.5–5.1)
PROT SERPL-MCNC: 6.4 G/DL (ref 6–8.4)
RBC # BLD AUTO: 3.69 M/UL (ref 4.6–6.2)
SODIUM SERPL-SCNC: 139 MMOL/L (ref 136–145)
WBC # BLD AUTO: 8.4 K/UL (ref 3.9–12.7)

## 2021-06-14 PROCEDURE — 99233 PR SUBSEQUENT HOSPITAL CARE,LEVL III: ICD-10-PCS | Mod: ,,, | Performed by: PSYCHIATRY & NEUROLOGY

## 2021-06-14 PROCEDURE — 96375 TX/PRO/DX INJ NEW DRUG ADDON: CPT

## 2021-06-14 PROCEDURE — 84100 ASSAY OF PHOSPHORUS: CPT | Performed by: STUDENT IN AN ORGANIZED HEALTH CARE EDUCATION/TRAINING PROGRAM

## 2021-06-14 PROCEDURE — 25000003 PHARM REV CODE 250: Performed by: STUDENT IN AN ORGANIZED HEALTH CARE EDUCATION/TRAINING PROGRAM

## 2021-06-14 PROCEDURE — 83735 ASSAY OF MAGNESIUM: CPT | Performed by: STUDENT IN AN ORGANIZED HEALTH CARE EDUCATION/TRAINING PROGRAM

## 2021-06-14 PROCEDURE — 85025 COMPLETE CBC W/AUTO DIFF WBC: CPT | Performed by: STUDENT IN AN ORGANIZED HEALTH CARE EDUCATION/TRAINING PROGRAM

## 2021-06-14 PROCEDURE — 97110 THERAPEUTIC EXERCISES: CPT | Mod: CQ

## 2021-06-14 PROCEDURE — 25000003 PHARM REV CODE 250: Performed by: PSYCHIATRY & NEUROLOGY

## 2021-06-14 PROCEDURE — 27000221 HC OXYGEN, UP TO 24 HOURS

## 2021-06-14 PROCEDURE — 99407 PR TOBACCO USE CESSATION INTENSIVE >10 MINUTES: ICD-10-PCS | Mod: S$GLB,,,

## 2021-06-14 PROCEDURE — 97530 THERAPEUTIC ACTIVITIES: CPT | Mod: CO

## 2021-06-14 PROCEDURE — 94761 N-INVAS EAR/PLS OXIMETRY MLT: CPT

## 2021-06-14 PROCEDURE — 99407 BEHAV CHNG SMOKING > 10 MIN: CPT | Mod: S$GLB,,,

## 2021-06-14 PROCEDURE — G0378 HOSPITAL OBSERVATION PER HR: HCPCS

## 2021-06-14 PROCEDURE — 90833 PSYTX W PT W E/M 30 MIN: CPT | Mod: ,,, | Performed by: PSYCHIATRY & NEUROLOGY

## 2021-06-14 PROCEDURE — 97530 THERAPEUTIC ACTIVITIES: CPT | Mod: CQ

## 2021-06-14 PROCEDURE — 90833 PR PSYCHOTHERAPY W/PATIENT W/E&M, 30 MIN (ADD ON): ICD-10-PCS | Mod: ,,, | Performed by: PSYCHIATRY & NEUROLOGY

## 2021-06-14 PROCEDURE — 36415 COLL VENOUS BLD VENIPUNCTURE: CPT | Performed by: STUDENT IN AN ORGANIZED HEALTH CARE EDUCATION/TRAINING PROGRAM

## 2021-06-14 PROCEDURE — 63600175 PHARM REV CODE 636 W HCPCS: Performed by: STUDENT IN AN ORGANIZED HEALTH CARE EDUCATION/TRAINING PROGRAM

## 2021-06-14 PROCEDURE — 80053 COMPREHEN METABOLIC PANEL: CPT | Performed by: STUDENT IN AN ORGANIZED HEALTH CARE EDUCATION/TRAINING PROGRAM

## 2021-06-14 PROCEDURE — 99233 SBSQ HOSP IP/OBS HIGH 50: CPT | Mod: ,,, | Performed by: PSYCHIATRY & NEUROLOGY

## 2021-06-14 RX ORDER — FUROSEMIDE 10 MG/ML
40 INJECTION INTRAMUSCULAR; INTRAVENOUS ONCE
Status: COMPLETED | OUTPATIENT
Start: 2021-06-14 | End: 2021-06-14

## 2021-06-14 RX ORDER — OLANZAPINE 2.5 MG/1
7.5 TABLET ORAL NIGHTLY
Status: DISCONTINUED | OUTPATIENT
Start: 2021-06-14 | End: 2021-06-16 | Stop reason: HOSPADM

## 2021-06-14 RX ORDER — OLANZAPINE 2.5 MG/1
5 TABLET ORAL DAILY
Status: DISCONTINUED | OUTPATIENT
Start: 2021-06-15 | End: 2021-06-16 | Stop reason: HOSPADM

## 2021-06-14 RX ADMIN — OLANZAPINE 7.5 MG: 2.5 TABLET, FILM COATED ORAL at 09:06

## 2021-06-14 RX ADMIN — CARVEDILOL 6.25 MG: 6.25 TABLET, FILM COATED ORAL at 08:06

## 2021-06-14 RX ADMIN — ACETAMINOPHEN 650 MG: 325 TABLET ORAL at 06:06

## 2021-06-14 RX ADMIN — Medication 1 TABLET: at 09:06

## 2021-06-14 RX ADMIN — CARVEDILOL 6.25 MG: 6.25 TABLET, FILM COATED ORAL at 09:06

## 2021-06-14 RX ADMIN — Medication 1 TABLET: at 08:06

## 2021-06-14 RX ADMIN — OLANZAPINE 5 MG: 2.5 TABLET, FILM COATED ORAL at 08:06

## 2021-06-14 RX ADMIN — ATORVASTATIN CALCIUM 40 MG: 40 TABLET, FILM COATED ORAL at 09:06

## 2021-06-14 RX ADMIN — LISINOPRIL 20 MG: 20 TABLET ORAL at 08:06

## 2021-06-14 RX ADMIN — FUROSEMIDE 40 MG: 10 INJECTION, SOLUTION INTRAVENOUS at 05:06

## 2021-06-14 RX ADMIN — HYDROXYZINE PAMOATE 25 MG: 25 CAPSULE ORAL at 08:06

## 2021-06-15 VITALS
HEART RATE: 84 BPM | TEMPERATURE: 97 F | WEIGHT: 229.25 LBS | HEIGHT: 64 IN | OXYGEN SATURATION: 95 % | SYSTOLIC BLOOD PRESSURE: 143 MMHG | BODY MASS INDEX: 39.14 KG/M2 | DIASTOLIC BLOOD PRESSURE: 84 MMHG | RESPIRATION RATE: 20 BRPM

## 2021-06-15 PROBLEM — R09.02 HYPOXIA: Status: ACTIVE | Noted: 2021-06-15

## 2021-06-15 LAB
ALBUMIN SERPL BCP-MCNC: 3.4 G/DL (ref 3.5–5.2)
ALP SERPL-CCNC: 74 U/L (ref 55–135)
ALT SERPL W/O P-5'-P-CCNC: 38 U/L (ref 10–44)
ANION GAP SERPL CALC-SCNC: 11 MMOL/L (ref 8–16)
AST SERPL-CCNC: 34 U/L (ref 10–40)
BASOPHILS # BLD AUTO: 0.03 K/UL (ref 0–0.2)
BASOPHILS NFR BLD: 0.4 % (ref 0–1.9)
BILIRUB SERPL-MCNC: 0.6 MG/DL (ref 0.1–1)
BUN SERPL-MCNC: 17 MG/DL (ref 8–23)
CALCIUM SERPL-MCNC: 9.4 MG/DL (ref 8.7–10.5)
CHLORIDE SERPL-SCNC: 102 MMOL/L (ref 95–110)
CO2 SERPL-SCNC: 27 MMOL/L (ref 23–29)
CREAT SERPL-MCNC: 1 MG/DL (ref 0.5–1.4)
DIFFERENTIAL METHOD: ABNORMAL
EOSINOPHIL # BLD AUTO: 0.2 K/UL (ref 0–0.5)
EOSINOPHIL NFR BLD: 2.7 % (ref 0–8)
ERYTHROCYTE [DISTWIDTH] IN BLOOD BY AUTOMATED COUNT: 13.4 % (ref 11.5–14.5)
EST. GFR  (AFRICAN AMERICAN): >60 ML/MIN/1.73 M^2
EST. GFR  (NON AFRICAN AMERICAN): >60 ML/MIN/1.73 M^2
GLUCOSE SERPL-MCNC: 113 MG/DL (ref 70–110)
HCT VFR BLD AUTO: 41.3 % (ref 40–54)
HGB BLD-MCNC: 13.6 G/DL (ref 14–18)
IMM GRANULOCYTES # BLD AUTO: 0.05 K/UL (ref 0–0.04)
IMM GRANULOCYTES NFR BLD AUTO: 0.6 % (ref 0–0.5)
LYMPHOCYTES # BLD AUTO: 1.1 K/UL (ref 1–4.8)
LYMPHOCYTES NFR BLD: 13.3 % (ref 18–48)
MAGNESIUM SERPL-MCNC: 1.5 MG/DL (ref 1.6–2.6)
MCH RBC QN AUTO: 35.7 PG (ref 27–31)
MCHC RBC AUTO-ENTMCNC: 32.9 G/DL (ref 32–36)
MCV RBC AUTO: 108 FL (ref 82–98)
MONOCYTES # BLD AUTO: 0.7 K/UL (ref 0.3–1)
MONOCYTES NFR BLD: 8.6 % (ref 4–15)
NEUTROPHILS # BLD AUTO: 6 K/UL (ref 1.8–7.7)
NEUTROPHILS NFR BLD: 74.4 % (ref 38–73)
NRBC BLD-RTO: 0 /100 WBC
PHOSPHATE SERPL-MCNC: 3.8 MG/DL (ref 2.7–4.5)
PLATELET # BLD AUTO: 195 K/UL (ref 150–450)
PMV BLD AUTO: 10.1 FL (ref 9.2–12.9)
POCT GLUCOSE: 118 MG/DL (ref 70–110)
POCT GLUCOSE: 125 MG/DL (ref 70–110)
POCT GLUCOSE: 131 MG/DL (ref 70–110)
POCT GLUCOSE: 141 MG/DL (ref 70–110)
POTASSIUM SERPL-SCNC: 4.4 MMOL/L (ref 3.5–5.1)
PROT SERPL-MCNC: 6.4 G/DL (ref 6–8.4)
RBC # BLD AUTO: 3.81 M/UL (ref 4.6–6.2)
SODIUM SERPL-SCNC: 140 MMOL/L (ref 136–145)
WBC # BLD AUTO: 8.11 K/UL (ref 3.9–12.7)

## 2021-06-15 PROCEDURE — 99214 OFFICE O/P EST MOD 30 MIN: CPT | Mod: ,,, | Performed by: PSYCHIATRY & NEUROLOGY

## 2021-06-15 PROCEDURE — 90833 PSYTX W PT W E/M 30 MIN: CPT | Mod: ,,, | Performed by: PSYCHIATRY & NEUROLOGY

## 2021-06-15 PROCEDURE — 94761 N-INVAS EAR/PLS OXIMETRY MLT: CPT

## 2021-06-15 PROCEDURE — 25000003 PHARM REV CODE 250: Performed by: STUDENT IN AN ORGANIZED HEALTH CARE EDUCATION/TRAINING PROGRAM

## 2021-06-15 PROCEDURE — 97530 THERAPEUTIC ACTIVITIES: CPT | Mod: CO

## 2021-06-15 PROCEDURE — 85025 COMPLETE CBC W/AUTO DIFF WBC: CPT | Performed by: STUDENT IN AN ORGANIZED HEALTH CARE EDUCATION/TRAINING PROGRAM

## 2021-06-15 PROCEDURE — 83735 ASSAY OF MAGNESIUM: CPT | Performed by: STUDENT IN AN ORGANIZED HEALTH CARE EDUCATION/TRAINING PROGRAM

## 2021-06-15 PROCEDURE — 25000003 PHARM REV CODE 250: Performed by: PSYCHIATRY & NEUROLOGY

## 2021-06-15 PROCEDURE — 36415 COLL VENOUS BLD VENIPUNCTURE: CPT | Performed by: STUDENT IN AN ORGANIZED HEALTH CARE EDUCATION/TRAINING PROGRAM

## 2021-06-15 PROCEDURE — 97116 GAIT TRAINING THERAPY: CPT | Mod: CQ

## 2021-06-15 PROCEDURE — G0378 HOSPITAL OBSERVATION PER HR: HCPCS

## 2021-06-15 PROCEDURE — 84100 ASSAY OF PHOSPHORUS: CPT | Performed by: STUDENT IN AN ORGANIZED HEALTH CARE EDUCATION/TRAINING PROGRAM

## 2021-06-15 PROCEDURE — 90833 PR PSYCHOTHERAPY W/PATIENT W/E&M, 30 MIN (ADD ON): ICD-10-PCS | Mod: ,,, | Performed by: PSYCHIATRY & NEUROLOGY

## 2021-06-15 PROCEDURE — 80053 COMPREHEN METABOLIC PANEL: CPT | Performed by: STUDENT IN AN ORGANIZED HEALTH CARE EDUCATION/TRAINING PROGRAM

## 2021-06-15 PROCEDURE — 97530 THERAPEUTIC ACTIVITIES: CPT | Mod: CQ

## 2021-06-15 PROCEDURE — 99214 PR OFFICE/OUTPT VISIT, EST, LEVL IV, 30-39 MIN: ICD-10-PCS | Mod: ,,, | Performed by: PSYCHIATRY & NEUROLOGY

## 2021-06-15 RX ORDER — OLANZAPINE 7.5 MG/1
7.5 TABLET ORAL NIGHTLY
Qty: 30 TABLET | Refills: 11 | Status: ON HOLD | OUTPATIENT
Start: 2021-06-15 | End: 2021-07-08 | Stop reason: SDUPTHER

## 2021-06-15 RX ORDER — OLANZAPINE 5 MG/1
5 TABLET ORAL DAILY
Qty: 30 TABLET | Refills: 11 | Status: ON HOLD | OUTPATIENT
Start: 2021-06-16 | End: 2021-07-08 | Stop reason: HOSPADM

## 2021-06-15 RX ADMIN — Medication 1 TABLET: at 08:06

## 2021-06-15 RX ADMIN — Medication 1 TABLET: at 09:06

## 2021-06-15 RX ADMIN — ACETAMINOPHEN 650 MG: 325 TABLET ORAL at 05:06

## 2021-06-15 RX ADMIN — LISINOPRIL 20 MG: 20 TABLET ORAL at 09:06

## 2021-06-15 RX ADMIN — OLANZAPINE 5 MG: 2.5 TABLET, FILM COATED ORAL at 09:06

## 2021-06-15 RX ADMIN — HYDROXYZINE PAMOATE 25 MG: 25 CAPSULE ORAL at 05:06

## 2021-06-15 RX ADMIN — OLANZAPINE 7.5 MG: 2.5 TABLET, FILM COATED ORAL at 08:06

## 2021-06-15 RX ADMIN — CARVEDILOL 6.25 MG: 6.25 TABLET, FILM COATED ORAL at 08:06

## 2021-06-15 RX ADMIN — CARVEDILOL 6.25 MG: 6.25 TABLET, FILM COATED ORAL at 09:06

## 2021-06-15 RX ADMIN — ACETAMINOPHEN 650 MG: 325 TABLET ORAL at 11:06

## 2021-06-15 RX ADMIN — ATORVASTATIN CALCIUM 40 MG: 40 TABLET, FILM COATED ORAL at 08:06

## 2021-06-16 LAB — VIT B1 BLD-MCNC: 61 UG/L (ref 38–122)

## 2021-06-16 PROCEDURE — G0378 HOSPITAL OBSERVATION PER HR: HCPCS

## 2021-06-29 ENCOUNTER — HOSPITAL ENCOUNTER (INPATIENT)
Facility: HOSPITAL | Age: 66
LOS: 8 days | Discharge: NURSING, REHAB OR LONG-TERM CARE | DRG: 640 | End: 2021-07-08
Attending: EMERGENCY MEDICINE | Admitting: SPECIALIST
Payer: COMMERCIAL

## 2021-06-29 DIAGNOSIS — R00.1 BRADYCARDIA: ICD-10-CM

## 2021-06-29 DIAGNOSIS — G47.30 SLEEP APNEA, UNSPECIFIED TYPE: ICD-10-CM

## 2021-06-29 DIAGNOSIS — I21.4 NSTEMI (NON-ST ELEVATED MYOCARDIAL INFARCTION): ICD-10-CM

## 2021-06-29 DIAGNOSIS — R79.89 TROPONIN LEVEL ELEVATED: ICD-10-CM

## 2021-06-29 DIAGNOSIS — E87.1 HYPONATREMIA: Primary | ICD-10-CM

## 2021-06-29 DIAGNOSIS — R53.1 WEAKNESS: ICD-10-CM

## 2021-06-29 LAB
ALBUMIN SERPL BCP-MCNC: 3.4 G/DL (ref 3.5–5.2)
ALP SERPL-CCNC: 87 U/L (ref 55–135)
ALT SERPL W/O P-5'-P-CCNC: 30 U/L (ref 10–44)
ANION GAP SERPL CALC-SCNC: 11 MMOL/L (ref 8–16)
AST SERPL-CCNC: 36 U/L (ref 10–40)
BASOPHILS # BLD AUTO: 0.02 K/UL (ref 0–0.2)
BASOPHILS NFR BLD: 0.3 % (ref 0–1.9)
BILIRUB SERPL-MCNC: 0.5 MG/DL (ref 0.1–1)
BNP SERPL-MCNC: 52 PG/ML (ref 0–99)
BUN SERPL-MCNC: 14 MG/DL (ref 8–23)
CALCIUM SERPL-MCNC: 8.5 MG/DL (ref 8.7–10.5)
CHLORIDE SERPL-SCNC: 77 MMOL/L (ref 95–110)
CO2 SERPL-SCNC: 27 MMOL/L (ref 23–29)
CREAT SERPL-MCNC: 0.9 MG/DL (ref 0.5–1.4)
CRP SERPL-MCNC: 30.3 MG/L (ref 0–8.2)
DIFFERENTIAL METHOD: ABNORMAL
EOSINOPHIL # BLD AUTO: 0.2 K/UL (ref 0–0.5)
EOSINOPHIL NFR BLD: 3.7 % (ref 0–8)
ERYTHROCYTE [DISTWIDTH] IN BLOOD BY AUTOMATED COUNT: 12.4 % (ref 11.5–14.5)
EST. GFR  (AFRICAN AMERICAN): >60 ML/MIN/1.73 M^2
EST. GFR  (NON AFRICAN AMERICAN): >60 ML/MIN/1.73 M^2
GLUCOSE SERPL-MCNC: 120 MG/DL (ref 70–110)
HCT VFR BLD AUTO: 33.1 % (ref 40–54)
HGB BLD-MCNC: 12.1 G/DL (ref 14–18)
IMM GRANULOCYTES # BLD AUTO: 0.05 K/UL (ref 0–0.04)
IMM GRANULOCYTES NFR BLD AUTO: 0.8 % (ref 0–0.5)
LACTATE SERPL-SCNC: 1.3 MMOL/L (ref 0.5–2.2)
LIPASE SERPL-CCNC: 26 U/L (ref 4–60)
LYMPHOCYTES # BLD AUTO: 1.1 K/UL (ref 1–4.8)
LYMPHOCYTES NFR BLD: 17.2 % (ref 18–48)
MAGNESIUM SERPL-MCNC: 1.3 MG/DL (ref 1.6–2.6)
MCH RBC QN AUTO: 35.4 PG (ref 27–31)
MCHC RBC AUTO-ENTMCNC: 36.6 G/DL (ref 32–36)
MCV RBC AUTO: 97 FL (ref 82–98)
MONOCYTES # BLD AUTO: 0.7 K/UL (ref 0.3–1)
MONOCYTES NFR BLD: 11.3 % (ref 4–15)
NEUTROPHILS # BLD AUTO: 4.3 K/UL (ref 1.8–7.7)
NEUTROPHILS NFR BLD: 66.7 % (ref 38–73)
NRBC BLD-RTO: 0 /100 WBC
PHOSPHATE SERPL-MCNC: 2.6 MG/DL (ref 2.7–4.5)
PLATELET # BLD AUTO: 260 K/UL (ref 150–450)
PMV BLD AUTO: 11.5 FL (ref 9.2–12.9)
POTASSIUM SERPL-SCNC: 4.4 MMOL/L (ref 3.5–5.1)
PROCALCITONIN SERPL IA-MCNC: 0.08 NG/ML
PROT SERPL-MCNC: 6 G/DL (ref 6–8.4)
RBC # BLD AUTO: 3.42 M/UL (ref 4.6–6.2)
SODIUM SERPL-SCNC: 115 MMOL/L (ref 136–145)
TROPONIN I SERPL DL<=0.01 NG/ML-MCNC: 0.23 NG/ML (ref 0–0.03)
TSH SERPL DL<=0.005 MIU/L-ACNC: 2.72 UIU/ML (ref 0.4–4)
WBC # BLD AUTO: 6.46 K/UL (ref 3.9–12.7)

## 2021-06-29 PROCEDURE — 83690 ASSAY OF LIPASE: CPT | Performed by: EMERGENCY MEDICINE

## 2021-06-29 PROCEDURE — 85025 COMPLETE CBC W/AUTO DIFF WBC: CPT | Performed by: EMERGENCY MEDICINE

## 2021-06-29 PROCEDURE — 84443 ASSAY THYROID STIM HORMONE: CPT | Performed by: EMERGENCY MEDICINE

## 2021-06-29 PROCEDURE — 86140 C-REACTIVE PROTEIN: CPT | Performed by: EMERGENCY MEDICINE

## 2021-06-29 PROCEDURE — 80053 COMPREHEN METABOLIC PANEL: CPT | Performed by: EMERGENCY MEDICINE

## 2021-06-29 PROCEDURE — 25000003 PHARM REV CODE 250: Performed by: EMERGENCY MEDICINE

## 2021-06-29 PROCEDURE — 86900 BLOOD TYPING SEROLOGIC ABO: CPT | Performed by: EMERGENCY MEDICINE

## 2021-06-29 PROCEDURE — 96360 HYDRATION IV INFUSION INIT: CPT

## 2021-06-29 PROCEDURE — 83735 ASSAY OF MAGNESIUM: CPT | Performed by: EMERGENCY MEDICINE

## 2021-06-29 PROCEDURE — 99291 CRITICAL CARE FIRST HOUR: CPT | Mod: 25

## 2021-06-29 PROCEDURE — 84484 ASSAY OF TROPONIN QUANT: CPT | Performed by: EMERGENCY MEDICINE

## 2021-06-29 PROCEDURE — 84145 PROCALCITONIN (PCT): CPT | Performed by: EMERGENCY MEDICINE

## 2021-06-29 PROCEDURE — 93010 EKG 12-LEAD: ICD-10-PCS | Mod: ,,, | Performed by: INTERNAL MEDICINE

## 2021-06-29 PROCEDURE — 93010 ELECTROCARDIOGRAM REPORT: CPT | Mod: ,,, | Performed by: INTERNAL MEDICINE

## 2021-06-29 PROCEDURE — 83605 ASSAY OF LACTIC ACID: CPT | Performed by: EMERGENCY MEDICINE

## 2021-06-29 PROCEDURE — 83880 ASSAY OF NATRIURETIC PEPTIDE: CPT | Performed by: EMERGENCY MEDICINE

## 2021-06-29 PROCEDURE — 84100 ASSAY OF PHOSPHORUS: CPT | Performed by: EMERGENCY MEDICINE

## 2021-06-29 PROCEDURE — 93005 ELECTROCARDIOGRAM TRACING: CPT

## 2021-06-29 RX ADMIN — SODIUM CHLORIDE 1000 ML: 0.9 INJECTION, SOLUTION INTRAVENOUS at 11:06

## 2021-06-30 ENCOUNTER — CLINICAL SUPPORT (OUTPATIENT)
Dept: SMOKING CESSATION | Facility: CLINIC | Age: 66
End: 2021-06-30
Payer: COMMERCIAL

## 2021-06-30 DIAGNOSIS — F17.210 CIGARETTE SMOKER: Primary | ICD-10-CM

## 2021-06-30 PROBLEM — Z72.0 TOBACCO ABUSE: Status: ACTIVE | Noted: 2021-06-30

## 2021-06-30 LAB
ABO + RH BLD: NORMAL
ALBUMIN SERPL BCP-MCNC: 3.1 G/DL (ref 3.5–5.2)
ALP SERPL-CCNC: 74 U/L (ref 55–135)
ALT SERPL W/O P-5'-P-CCNC: 25 U/L (ref 10–44)
ANION GAP SERPL CALC-SCNC: 10 MMOL/L (ref 8–16)
ANION GAP SERPL CALC-SCNC: 10 MMOL/L (ref 8–16)
ANION GAP SERPL CALC-SCNC: 6 MMOL/L (ref 8–16)
ANION GAP SERPL CALC-SCNC: 7 MMOL/L (ref 8–16)
ANION GAP SERPL CALC-SCNC: 7 MMOL/L (ref 8–16)
ANION GAP SERPL CALC-SCNC: 8 MMOL/L (ref 8–16)
ANION GAP SERPL CALC-SCNC: 9 MMOL/L (ref 8–16)
AST SERPL-CCNC: 30 U/L (ref 10–40)
BASOPHILS # BLD AUTO: 0.02 K/UL (ref 0–0.2)
BASOPHILS NFR BLD: 0.4 % (ref 0–1.9)
BILIRUB SERPL-MCNC: 0.4 MG/DL (ref 0.1–1)
BILIRUB UR QL STRIP: NEGATIVE
BLD GP AB SCN CELLS X3 SERPL QL: NORMAL
BUN SERPL-MCNC: 14 MG/DL (ref 8–23)
BUN SERPL-MCNC: 15 MG/DL (ref 8–23)
BUN SERPL-MCNC: 16 MG/DL (ref 8–23)
BUN SERPL-MCNC: 17 MG/DL (ref 8–23)
CALCIUM SERPL-MCNC: 7.9 MG/DL (ref 8.7–10.5)
CALCIUM SERPL-MCNC: 7.9 MG/DL (ref 8.7–10.5)
CALCIUM SERPL-MCNC: 8.1 MG/DL (ref 8.7–10.5)
CALCIUM SERPL-MCNC: 8.2 MG/DL (ref 8.7–10.5)
CALCIUM SERPL-MCNC: 8.3 MG/DL (ref 8.7–10.5)
CHLORIDE SERPL-SCNC: 80 MMOL/L (ref 95–110)
CHLORIDE SERPL-SCNC: 81 MMOL/L (ref 95–110)
CHLORIDE SERPL-SCNC: 83 MMOL/L (ref 95–110)
CHLORIDE SERPL-SCNC: 86 MMOL/L (ref 95–110)
CLARITY UR: CLEAR
CO2 SERPL-SCNC: 29 MMOL/L (ref 23–29)
CO2 SERPL-SCNC: 29 MMOL/L (ref 23–29)
CO2 SERPL-SCNC: 30 MMOL/L (ref 23–29)
CO2 SERPL-SCNC: 31 MMOL/L (ref 23–29)
CO2 SERPL-SCNC: 32 MMOL/L (ref 23–29)
CO2 SERPL-SCNC: 33 MMOL/L (ref 23–29)
CO2 SERPL-SCNC: 33 MMOL/L (ref 23–29)
COLOR UR: COLORLESS
CREAT SERPL-MCNC: 0.9 MG/DL (ref 0.5–1.4)
CREAT SERPL-MCNC: 1 MG/DL (ref 0.5–1.4)
CREAT UR-MCNC: 25.2 MG/DL (ref 23–375)
DIFFERENTIAL METHOD: ABNORMAL
EOSINOPHIL # BLD AUTO: 0.2 K/UL (ref 0–0.5)
EOSINOPHIL NFR BLD: 3 % (ref 0–8)
ERYTHROCYTE [DISTWIDTH] IN BLOOD BY AUTOMATED COUNT: 12.4 % (ref 11.5–14.5)
EST. GFR  (AFRICAN AMERICAN): >60 ML/MIN/1.73 M^2
EST. GFR  (NON AFRICAN AMERICAN): >60 ML/MIN/1.73 M^2
GLUCOSE SERPL-MCNC: 102 MG/DL (ref 70–110)
GLUCOSE SERPL-MCNC: 106 MG/DL (ref 70–110)
GLUCOSE SERPL-MCNC: 118 MG/DL (ref 70–110)
GLUCOSE SERPL-MCNC: 150 MG/DL (ref 70–110)
GLUCOSE SERPL-MCNC: 150 MG/DL (ref 70–110)
GLUCOSE SERPL-MCNC: 99 MG/DL (ref 70–110)
GLUCOSE SERPL-MCNC: 99 MG/DL (ref 70–110)
GLUCOSE UR QL STRIP: NEGATIVE
HCT VFR BLD AUTO: 31 % (ref 40–54)
HGB BLD-MCNC: 11.3 G/DL (ref 14–18)
HGB UR QL STRIP: NEGATIVE
IMM GRANULOCYTES # BLD AUTO: 0.03 K/UL (ref 0–0.04)
IMM GRANULOCYTES NFR BLD AUTO: 0.5 % (ref 0–0.5)
KETONES UR QL STRIP: NEGATIVE
LEUKOCYTE ESTERASE UR QL STRIP: NEGATIVE
LYMPHOCYTES # BLD AUTO: 1.2 K/UL (ref 1–4.8)
LYMPHOCYTES NFR BLD: 20.1 % (ref 18–48)
MAGNESIUM SERPL-MCNC: 1.8 MG/DL (ref 1.6–2.6)
MCH RBC QN AUTO: 35.9 PG (ref 27–31)
MCHC RBC AUTO-ENTMCNC: 36.5 G/DL (ref 32–36)
MCV RBC AUTO: 98 FL (ref 82–98)
MONOCYTES # BLD AUTO: 0.7 K/UL (ref 0.3–1)
MONOCYTES NFR BLD: 12.6 % (ref 4–15)
NEUTROPHILS # BLD AUTO: 3.6 K/UL (ref 1.8–7.7)
NEUTROPHILS NFR BLD: 63.4 % (ref 38–73)
NITRITE UR QL STRIP: NEGATIVE
NRBC BLD-RTO: 0 /100 WBC
OSMOLALITY SERPL: 259 MOSM/KG (ref 280–300)
OSMOLALITY UR: 142 MOSM/KG (ref 50–1200)
PH UR STRIP: 8 [PH] (ref 5–8)
PHOSPHATE SERPL-MCNC: 3.2 MG/DL (ref 2.7–4.5)
PLATELET # BLD AUTO: 224 K/UL (ref 150–450)
PMV BLD AUTO: 11.9 FL (ref 9.2–12.9)
POCT GLUCOSE: 108 MG/DL (ref 70–110)
POCT GLUCOSE: 135 MG/DL (ref 70–110)
POCT GLUCOSE: 147 MG/DL (ref 70–110)
POTASSIUM SERPL-SCNC: 3.9 MMOL/L (ref 3.5–5.1)
POTASSIUM SERPL-SCNC: 4.2 MMOL/L (ref 3.5–5.1)
POTASSIUM SERPL-SCNC: 4.2 MMOL/L (ref 3.5–5.1)
POTASSIUM SERPL-SCNC: 4.3 MMOL/L (ref 3.5–5.1)
POTASSIUM SERPL-SCNC: 4.4 MMOL/L (ref 3.5–5.1)
PROT SERPL-MCNC: 5.1 G/DL (ref 6–8.4)
PROT UR QL STRIP: NEGATIVE
RBC # BLD AUTO: 3.15 M/UL (ref 4.6–6.2)
SODIUM SERPL-SCNC: 119 MMOL/L (ref 136–145)
SODIUM SERPL-SCNC: 120 MMOL/L (ref 136–145)
SODIUM SERPL-SCNC: 121 MMOL/L (ref 136–145)
SODIUM SERPL-SCNC: 122 MMOL/L (ref 136–145)
SODIUM SERPL-SCNC: 125 MMOL/L (ref 136–145)
SODIUM UR-SCNC: 23 MMOL/L (ref 20–250)
SP GR UR STRIP: <1.005 (ref 1–1.03)
TROPONIN I SERPL DL<=0.01 NG/ML-MCNC: 0.12 NG/ML (ref 0–0.03)
TROPONIN I SERPL DL<=0.01 NG/ML-MCNC: 0.17 NG/ML (ref 0–0.03)
TROPONIN I SERPL DL<=0.01 NG/ML-MCNC: 0.28 NG/ML (ref 0–0.03)
URN SPEC COLLECT METH UR: ABNORMAL
UROBILINOGEN UR STRIP-ACNC: NEGATIVE EU/DL
UUN UR-MCNC: 98 MG/DL (ref 140–1050)
WBC # BLD AUTO: 5.71 K/UL (ref 3.9–12.7)

## 2021-06-30 PROCEDURE — 93010 ELECTROCARDIOGRAM REPORT: CPT | Mod: ,,, | Performed by: INTERNAL MEDICINE

## 2021-06-30 PROCEDURE — 25000003 PHARM REV CODE 250: Performed by: SPECIALIST

## 2021-06-30 PROCEDURE — 25000003 PHARM REV CODE 250: Performed by: STUDENT IN AN ORGANIZED HEALTH CARE EDUCATION/TRAINING PROGRAM

## 2021-06-30 PROCEDURE — 93010 EKG 12-LEAD: ICD-10-PCS | Mod: ,,, | Performed by: INTERNAL MEDICINE

## 2021-06-30 PROCEDURE — 84100 ASSAY OF PHOSPHORUS: CPT | Performed by: STUDENT IN AN ORGANIZED HEALTH CARE EDUCATION/TRAINING PROGRAM

## 2021-06-30 PROCEDURE — 36569 INSJ PICC 5 YR+ W/O IMAGING: CPT

## 2021-06-30 PROCEDURE — 80053 COMPREHEN METABOLIC PANEL: CPT | Performed by: STUDENT IN AN ORGANIZED HEALTH CARE EDUCATION/TRAINING PROGRAM

## 2021-06-30 PROCEDURE — 99900035 HC TECH TIME PER 15 MIN (STAT)

## 2021-06-30 PROCEDURE — 27000221 HC OXYGEN, UP TO 24 HOURS

## 2021-06-30 PROCEDURE — 99291 PR CRITICAL CARE, E/M 30-74 MINUTES: ICD-10-PCS | Mod: ,,, | Performed by: INTERNAL MEDICINE

## 2021-06-30 PROCEDURE — A4216 STERILE WATER/SALINE, 10 ML: HCPCS | Performed by: SPECIALIST

## 2021-06-30 PROCEDURE — C1751 CATH, INF, PER/CENT/MIDLINE: HCPCS

## 2021-06-30 PROCEDURE — 80048 BASIC METABOLIC PNL TOTAL CA: CPT | Performed by: STUDENT IN AN ORGANIZED HEALTH CARE EDUCATION/TRAINING PROGRAM

## 2021-06-30 PROCEDURE — 84484 ASSAY OF TROPONIN QUANT: CPT | Performed by: STUDENT IN AN ORGANIZED HEALTH CARE EDUCATION/TRAINING PROGRAM

## 2021-06-30 PROCEDURE — 99407 BEHAV CHNG SMOKING > 10 MIN: CPT | Mod: S$GLB,,,

## 2021-06-30 PROCEDURE — 83735 ASSAY OF MAGNESIUM: CPT | Performed by: STUDENT IN AN ORGANIZED HEALTH CARE EDUCATION/TRAINING PROGRAM

## 2021-06-30 PROCEDURE — 85025 COMPLETE CBC W/AUTO DIFF WBC: CPT | Performed by: STUDENT IN AN ORGANIZED HEALTH CARE EDUCATION/TRAINING PROGRAM

## 2021-06-30 PROCEDURE — 25000242 PHARM REV CODE 250 ALT 637 W/ HCPCS: Performed by: STUDENT IN AN ORGANIZED HEALTH CARE EDUCATION/TRAINING PROGRAM

## 2021-06-30 PROCEDURE — 84540 ASSAY OF URINE/UREA-N: CPT | Performed by: STUDENT IN AN ORGANIZED HEALTH CARE EDUCATION/TRAINING PROGRAM

## 2021-06-30 PROCEDURE — 99291 CRITICAL CARE FIRST HOUR: CPT | Mod: ,,, | Performed by: INTERNAL MEDICINE

## 2021-06-30 PROCEDURE — 82570 ASSAY OF URINE CREATININE: CPT | Performed by: STUDENT IN AN ORGANIZED HEALTH CARE EDUCATION/TRAINING PROGRAM

## 2021-06-30 PROCEDURE — 93010 EKG 12-LEAD: ICD-10-PCS | Mod: 76,,, | Performed by: INTERNAL MEDICINE

## 2021-06-30 PROCEDURE — 94640 AIRWAY INHALATION TREATMENT: CPT

## 2021-06-30 PROCEDURE — 94660 CPAP INITIATION&MGMT: CPT

## 2021-06-30 PROCEDURE — 63600175 PHARM REV CODE 636 W HCPCS: Performed by: STUDENT IN AN ORGANIZED HEALTH CARE EDUCATION/TRAINING PROGRAM

## 2021-06-30 PROCEDURE — 27000190 HC CPAP FULL FACE MASK W/VALVE

## 2021-06-30 PROCEDURE — 83930 ASSAY OF BLOOD OSMOLALITY: CPT | Performed by: STUDENT IN AN ORGANIZED HEALTH CARE EDUCATION/TRAINING PROGRAM

## 2021-06-30 PROCEDURE — 83935 ASSAY OF URINE OSMOLALITY: CPT | Performed by: STUDENT IN AN ORGANIZED HEALTH CARE EDUCATION/TRAINING PROGRAM

## 2021-06-30 PROCEDURE — 81003 URINALYSIS AUTO W/O SCOPE: CPT | Performed by: STUDENT IN AN ORGANIZED HEALTH CARE EDUCATION/TRAINING PROGRAM

## 2021-06-30 PROCEDURE — 36556 INSERT NON-TUNNEL CV CATH: CPT

## 2021-06-30 PROCEDURE — 93005 ELECTROCARDIOGRAM TRACING: CPT

## 2021-06-30 PROCEDURE — 99407 PR TOBACCO USE CESSATION INTENSIVE >10 MINUTES: ICD-10-PCS | Mod: S$GLB,,,

## 2021-06-30 PROCEDURE — 11000001 HC ACUTE MED/SURG PRIVATE ROOM

## 2021-06-30 PROCEDURE — 84300 ASSAY OF URINE SODIUM: CPT | Performed by: STUDENT IN AN ORGANIZED HEALTH CARE EDUCATION/TRAINING PROGRAM

## 2021-06-30 PROCEDURE — 94761 N-INVAS EAR/PLS OXIMETRY MLT: CPT

## 2021-06-30 RX ORDER — SODIUM CHLORIDE 9 MG/ML
INJECTION, SOLUTION INTRAVENOUS CONTINUOUS
Status: DISCONTINUED | OUTPATIENT
Start: 2021-06-30 | End: 2021-07-08 | Stop reason: HOSPADM

## 2021-06-30 RX ORDER — SODIUM CHLORIDE, SODIUM LACTATE, POTASSIUM CHLORIDE, CALCIUM CHLORIDE 600; 310; 30; 20 MG/100ML; MG/100ML; MG/100ML; MG/100ML
INJECTION, SOLUTION INTRAVENOUS CONTINUOUS
Status: DISCONTINUED | OUTPATIENT
Start: 2021-06-30 | End: 2021-06-30

## 2021-06-30 RX ORDER — IPRATROPIUM BROMIDE AND ALBUTEROL SULFATE 2.5; .5 MG/3ML; MG/3ML
3 SOLUTION RESPIRATORY (INHALATION) EVERY 4 HOURS PRN
Status: DISCONTINUED | OUTPATIENT
Start: 2021-06-30 | End: 2021-07-08 | Stop reason: HOSPADM

## 2021-06-30 RX ORDER — HYDRALAZINE HYDROCHLORIDE 20 MG/ML
10 INJECTION INTRAMUSCULAR; INTRAVENOUS EVERY 6 HOURS PRN
Status: DISCONTINUED | OUTPATIENT
Start: 2021-06-30 | End: 2021-07-08 | Stop reason: HOSPADM

## 2021-06-30 RX ORDER — MAGNESIUM SULFATE HEPTAHYDRATE 40 MG/ML
2 INJECTION, SOLUTION INTRAVENOUS ONCE
Status: COMPLETED | OUTPATIENT
Start: 2021-06-30 | End: 2021-06-30

## 2021-06-30 RX ORDER — KETOROLAC TROMETHAMINE 30 MG/ML
15 INJECTION, SOLUTION INTRAMUSCULAR; INTRAVENOUS ONCE
Status: COMPLETED | OUTPATIENT
Start: 2021-06-30 | End: 2021-06-30

## 2021-06-30 RX ORDER — OLANZAPINE 2.5 MG/1
5 TABLET ORAL DAILY
Status: DISCONTINUED | OUTPATIENT
Start: 2021-06-30 | End: 2021-06-30

## 2021-06-30 RX ORDER — ONDANSETRON 8 MG/1
8 TABLET, ORALLY DISINTEGRATING ORAL EVERY 6 HOURS PRN
Status: DISCONTINUED | OUTPATIENT
Start: 2021-06-30 | End: 2021-07-08 | Stop reason: HOSPADM

## 2021-06-30 RX ORDER — SODIUM CHLORIDE 0.9 % (FLUSH) 0.9 %
10 SYRINGE (ML) INJECTION
Status: DISCONTINUED | OUTPATIENT
Start: 2021-06-30 | End: 2021-07-08 | Stop reason: HOSPADM

## 2021-06-30 RX ORDER — LISINOPRIL 20 MG/1
20 TABLET ORAL DAILY
Status: DISCONTINUED | OUTPATIENT
Start: 2021-06-30 | End: 2021-06-30

## 2021-06-30 RX ORDER — ACETAMINOPHEN 325 MG/1
650 TABLET ORAL EVERY 4 HOURS PRN
Status: DISCONTINUED | OUTPATIENT
Start: 2021-06-30 | End: 2021-06-30

## 2021-06-30 RX ORDER — ASPIRIN 81 MG/1
81 TABLET ORAL DAILY
Status: DISCONTINUED | OUTPATIENT
Start: 2021-06-30 | End: 2021-07-08 | Stop reason: HOSPADM

## 2021-06-30 RX ORDER — INSULIN ASPART 100 [IU]/ML
0-5 INJECTION, SOLUTION INTRAVENOUS; SUBCUTANEOUS
Status: DISCONTINUED | OUTPATIENT
Start: 2021-06-30 | End: 2021-07-08 | Stop reason: HOSPADM

## 2021-06-30 RX ORDER — IBUPROFEN 200 MG
24 TABLET ORAL
Status: DISCONTINUED | OUTPATIENT
Start: 2021-06-30 | End: 2021-07-08 | Stop reason: HOSPADM

## 2021-06-30 RX ORDER — CLOTRIMAZOLE AND BETAMETHASONE DIPROPIONATE 10; .64 MG/G; MG/G
CREAM TOPICAL 2 TIMES DAILY
Status: DISCONTINUED | OUTPATIENT
Start: 2021-06-30 | End: 2021-07-08 | Stop reason: HOSPADM

## 2021-06-30 RX ORDER — GLUCAGON 1 MG
1 KIT INJECTION
Status: DISCONTINUED | OUTPATIENT
Start: 2021-06-30 | End: 2021-07-08 | Stop reason: HOSPADM

## 2021-06-30 RX ORDER — SODIUM CHLORIDE 0.9 % (FLUSH) 0.9 %
10 SYRINGE (ML) INJECTION EVERY 6 HOURS
Status: DISCONTINUED | OUTPATIENT
Start: 2021-06-30 | End: 2021-07-08 | Stop reason: HOSPADM

## 2021-06-30 RX ORDER — ACETAMINOPHEN 325 MG/1
650 TABLET ORAL 3 TIMES DAILY
Status: DISCONTINUED | OUTPATIENT
Start: 2021-06-30 | End: 2021-07-08 | Stop reason: HOSPADM

## 2021-06-30 RX ORDER — SODIUM CHLORIDE, SODIUM LACTATE, POTASSIUM CHLORIDE, CALCIUM CHLORIDE 600; 310; 30; 20 MG/100ML; MG/100ML; MG/100ML; MG/100ML
INJECTION, SOLUTION INTRAVENOUS CONTINUOUS
Status: ACTIVE | OUTPATIENT
Start: 2021-06-30 | End: 2021-06-30

## 2021-06-30 RX ORDER — ATORVASTATIN CALCIUM 40 MG/1
40 TABLET, FILM COATED ORAL NIGHTLY
Status: DISCONTINUED | OUTPATIENT
Start: 2021-06-30 | End: 2021-07-08 | Stop reason: HOSPADM

## 2021-06-30 RX ORDER — CARVEDILOL 3.12 MG/1
6.25 TABLET ORAL 2 TIMES DAILY
Status: DISCONTINUED | OUTPATIENT
Start: 2021-06-30 | End: 2021-06-30

## 2021-06-30 RX ORDER — SODIUM CHLORIDE 0.9 % (FLUSH) 0.9 %
5 SYRINGE (ML) INJECTION
Status: DISCONTINUED | OUTPATIENT
Start: 2021-06-30 | End: 2021-07-08 | Stop reason: HOSPADM

## 2021-06-30 RX ORDER — SIMETHICONE 125 MG
125 TABLET,CHEWABLE ORAL EVERY 6 HOURS PRN
Status: DISCONTINUED | OUTPATIENT
Start: 2021-06-30 | End: 2021-07-08 | Stop reason: HOSPADM

## 2021-06-30 RX ORDER — MUPIROCIN 20 MG/G
OINTMENT TOPICAL 2 TIMES DAILY
Status: COMPLETED | OUTPATIENT
Start: 2021-06-30 | End: 2021-07-04

## 2021-06-30 RX ORDER — HYDROXYZINE PAMOATE 25 MG/1
25 CAPSULE ORAL EVERY 8 HOURS PRN
Status: DISCONTINUED | OUTPATIENT
Start: 2021-06-30 | End: 2021-07-08 | Stop reason: HOSPADM

## 2021-06-30 RX ORDER — IBUPROFEN 200 MG
16 TABLET ORAL
Status: DISCONTINUED | OUTPATIENT
Start: 2021-06-30 | End: 2021-07-08 | Stop reason: HOSPADM

## 2021-06-30 RX ORDER — TALC
9 POWDER (GRAM) TOPICAL NIGHTLY PRN
Status: DISCONTINUED | OUTPATIENT
Start: 2021-06-30 | End: 2021-07-08 | Stop reason: HOSPADM

## 2021-06-30 RX ORDER — AMOXICILLIN 250 MG
1 CAPSULE ORAL 2 TIMES DAILY
Status: DISCONTINUED | OUTPATIENT
Start: 2021-06-30 | End: 2021-07-08 | Stop reason: HOSPADM

## 2021-06-30 RX ORDER — IBUPROFEN 200 MG
1 TABLET ORAL DAILY PRN
Status: DISCONTINUED | OUTPATIENT
Start: 2021-06-30 | End: 2021-07-05

## 2021-06-30 RX ORDER — OLANZAPINE 2.5 MG/1
7.5 TABLET ORAL NIGHTLY
Status: DISCONTINUED | OUTPATIENT
Start: 2021-06-30 | End: 2021-06-30

## 2021-06-30 RX ORDER — OLANZAPINE 2.5 MG/1
5 TABLET ORAL NIGHTLY
Status: DISCONTINUED | OUTPATIENT
Start: 2021-06-30 | End: 2021-07-08 | Stop reason: HOSPADM

## 2021-06-30 RX ORDER — ENOXAPARIN SODIUM 100 MG/ML
40 INJECTION SUBCUTANEOUS EVERY 24 HOURS
Status: DISCONTINUED | OUTPATIENT
Start: 2021-06-30 | End: 2021-07-01

## 2021-06-30 RX ORDER — LIDOCAINE 50 MG/G
1 PATCH TOPICAL
Status: DISCONTINUED | OUTPATIENT
Start: 2021-06-30 | End: 2021-07-08 | Stop reason: HOSPADM

## 2021-06-30 RX ORDER — HEPARIN SODIUM 5000 [USP'U]/ML
5000 INJECTION, SOLUTION INTRAVENOUS; SUBCUTANEOUS EVERY 8 HOURS
Status: DISCONTINUED | OUTPATIENT
Start: 2021-06-30 | End: 2021-06-30

## 2021-06-30 RX ADMIN — ATORVASTATIN CALCIUM 40 MG: 40 TABLET, FILM COATED ORAL at 01:06

## 2021-06-30 RX ADMIN — CLOTRIMAZOLE AND BETAMETHASONE DIPROPIONATE: 10; .5 CREAM TOPICAL at 10:06

## 2021-06-30 RX ADMIN — CARVEDILOL 6.25 MG: 3.12 TABLET, FILM COATED ORAL at 01:06

## 2021-06-30 RX ADMIN — ONDANSETRON 8 MG: 8 TABLET, ORALLY DISINTEGRATING ORAL at 01:06

## 2021-06-30 RX ADMIN — Medication 1 TABLET: at 09:06

## 2021-06-30 RX ADMIN — IPRATROPIUM BROMIDE AND ALBUTEROL SULFATE 3 ML: .5; 2.5 SOLUTION RESPIRATORY (INHALATION) at 03:06

## 2021-06-30 RX ADMIN — CLOTRIMAZOLE AND BETAMETHASONE DIPROPIONATE: 10; .5 CREAM TOPICAL at 09:06

## 2021-06-30 RX ADMIN — ACETAMINOPHEN 650 MG: 325 TABLET ORAL at 12:06

## 2021-06-30 RX ADMIN — HEPARIN SODIUM 5000 UNITS: 5000 INJECTION, SOLUTION INTRAVENOUS; SUBCUTANEOUS at 01:06

## 2021-06-30 RX ADMIN — ACETAMINOPHEN 650 MG: 325 TABLET ORAL at 02:06

## 2021-06-30 RX ADMIN — OLANZAPINE 7.5 MG: 2.5 TABLET, FILM COATED ORAL at 02:06

## 2021-06-30 RX ADMIN — OLANZAPINE 5 MG: 2.5 TABLET, FILM COATED ORAL at 10:06

## 2021-06-30 RX ADMIN — MAGNESIUM SULFATE 2 G: 2 INJECTION INTRAVENOUS at 03:06

## 2021-06-30 RX ADMIN — SIMETHICONE 125 MG: 125 TABLET, CHEWABLE ORAL at 02:06

## 2021-06-30 RX ADMIN — CLOTRIMAZOLE AND BETAMETHASONE DIPROPIONATE: 10; .5 CREAM TOPICAL at 02:06

## 2021-06-30 RX ADMIN — ACETAMINOPHEN 650 MG: 325 TABLET ORAL at 10:06

## 2021-06-30 RX ADMIN — Medication 1 TABLET: at 10:06

## 2021-06-30 RX ADMIN — KETOROLAC TROMETHAMINE 15 MG: 30 INJECTION, SOLUTION INTRAMUSCULAR; INTRAVENOUS at 01:06

## 2021-06-30 RX ADMIN — ATORVASTATIN CALCIUM 40 MG: 40 TABLET, FILM COATED ORAL at 09:06

## 2021-06-30 RX ADMIN — HEPARIN SODIUM 5000 UNITS: 5000 INJECTION, SOLUTION INTRAVENOUS; SUBCUTANEOUS at 05:06

## 2021-06-30 RX ADMIN — LIDOCAINE 1 PATCH: 50 PATCH CUTANEOUS at 12:06

## 2021-06-30 RX ADMIN — SODIUM CHLORIDE, SODIUM LACTATE, POTASSIUM CHLORIDE, AND CALCIUM CHLORIDE: .6; .31; .03; .02 INJECTION, SOLUTION INTRAVENOUS at 06:06

## 2021-06-30 RX ADMIN — ASPIRIN 81 MG: 81 TABLET, COATED ORAL at 10:06

## 2021-06-30 RX ADMIN — Medication 10 ML: at 01:06

## 2021-06-30 RX ADMIN — SODIUM CHLORIDE, SODIUM LACTATE, POTASSIUM CHLORIDE, AND CALCIUM CHLORIDE: .6; .31; .03; .02 INJECTION, SOLUTION INTRAVENOUS at 02:06

## 2021-06-30 RX ADMIN — SODIUM CHLORIDE, SODIUM LACTATE, POTASSIUM CHLORIDE, AND CALCIUM CHLORIDE: .6; .31; .03; .02 INJECTION, SOLUTION INTRAVENOUS at 01:06

## 2021-06-30 RX ADMIN — Medication 10 ML: at 11:06

## 2021-06-30 RX ADMIN — ACETAMINOPHEN 650 MG: 325 TABLET ORAL at 09:06

## 2021-06-30 RX ADMIN — ENOXAPARIN SODIUM 40 MG: 40 INJECTION SUBCUTANEOUS at 04:06

## 2021-06-30 RX ADMIN — VITAMIN C (ASCORBIC ACID), CARBONYL IRON, FOLIC ACID, VITAMIN B-12 (CYANOCOBALAMIN) 1 TABLET: 250; 25; 1; 100 TABLET ORAL at 10:06

## 2021-06-30 RX ADMIN — MUPIROCIN: 20 OINTMENT TOPICAL at 09:06

## 2021-06-30 RX ADMIN — MUPIROCIN: 20 OINTMENT TOPICAL at 10:06

## 2021-07-01 LAB
ALBUMIN SERPL BCP-MCNC: 3.3 G/DL (ref 3.5–5.2)
ALP SERPL-CCNC: 77 U/L (ref 55–135)
ALT SERPL W/O P-5'-P-CCNC: 27 U/L (ref 10–44)
ANION GAP SERPL CALC-SCNC: 6 MMOL/L (ref 8–16)
ANION GAP SERPL CALC-SCNC: 7 MMOL/L (ref 8–16)
ANION GAP SERPL CALC-SCNC: 8 MMOL/L (ref 8–16)
AST SERPL-CCNC: 28 U/L (ref 10–40)
BASOPHILS # BLD AUTO: 0.02 K/UL (ref 0–0.2)
BASOPHILS NFR BLD: 0.4 % (ref 0–1.9)
BILIRUB SERPL-MCNC: 0.3 MG/DL (ref 0.1–1)
BUN SERPL-MCNC: 13 MG/DL (ref 8–23)
BUN SERPL-MCNC: 14 MG/DL (ref 8–23)
BUN SERPL-MCNC: 15 MG/DL (ref 8–23)
CALCIUM SERPL-MCNC: 7.6 MG/DL (ref 8.7–10.5)
CALCIUM SERPL-MCNC: 8.3 MG/DL (ref 8.7–10.5)
CALCIUM SERPL-MCNC: 8.4 MG/DL (ref 8.7–10.5)
CALCIUM SERPL-MCNC: 8.4 MG/DL (ref 8.7–10.5)
CALCIUM SERPL-MCNC: 8.5 MG/DL (ref 8.7–10.5)
CALCIUM SERPL-MCNC: 8.7 MG/DL (ref 8.7–10.5)
CHLORIDE SERPL-SCNC: 86 MMOL/L (ref 95–110)
CHLORIDE SERPL-SCNC: 88 MMOL/L (ref 95–110)
CHLORIDE SERPL-SCNC: 88 MMOL/L (ref 95–110)
CHLORIDE SERPL-SCNC: 89 MMOL/L (ref 95–110)
CHLORIDE SERPL-SCNC: 89 MMOL/L (ref 95–110)
CHLORIDE SERPL-SCNC: 93 MMOL/L (ref 95–110)
CO2 SERPL-SCNC: 28 MMOL/L (ref 23–29)
CO2 SERPL-SCNC: 31 MMOL/L (ref 23–29)
CO2 SERPL-SCNC: 32 MMOL/L (ref 23–29)
CO2 SERPL-SCNC: 33 MMOL/L (ref 23–29)
CREAT SERPL-MCNC: 0.8 MG/DL (ref 0.5–1.4)
CREAT SERPL-MCNC: 0.9 MG/DL (ref 0.5–1.4)
DIFFERENTIAL METHOD: ABNORMAL
EOSINOPHIL # BLD AUTO: 0.2 K/UL (ref 0–0.5)
EOSINOPHIL NFR BLD: 3 % (ref 0–8)
ERYTHROCYTE [DISTWIDTH] IN BLOOD BY AUTOMATED COUNT: 12.5 % (ref 11.5–14.5)
EST. GFR  (AFRICAN AMERICAN): >60 ML/MIN/1.73 M^2
EST. GFR  (NON AFRICAN AMERICAN): >60 ML/MIN/1.73 M^2
GLUCOSE SERPL-MCNC: 101 MG/DL (ref 70–110)
GLUCOSE SERPL-MCNC: 131 MG/DL (ref 70–110)
GLUCOSE SERPL-MCNC: 180 MG/DL (ref 70–110)
GLUCOSE SERPL-MCNC: 93 MG/DL (ref 70–110)
GLUCOSE SERPL-MCNC: 99 MG/DL (ref 70–110)
GLUCOSE SERPL-MCNC: 99 MG/DL (ref 70–110)
HCT VFR BLD AUTO: 33.2 % (ref 40–54)
HGB BLD-MCNC: 11.5 G/DL (ref 14–18)
IMM GRANULOCYTES # BLD AUTO: 0.03 K/UL (ref 0–0.04)
IMM GRANULOCYTES NFR BLD AUTO: 0.6 % (ref 0–0.5)
LYMPHOCYTES # BLD AUTO: 1.1 K/UL (ref 1–4.8)
LYMPHOCYTES NFR BLD: 20 % (ref 18–48)
MAGNESIUM SERPL-MCNC: 1.7 MG/DL (ref 1.6–2.6)
MCH RBC QN AUTO: 35.3 PG (ref 27–31)
MCHC RBC AUTO-ENTMCNC: 34.6 G/DL (ref 32–36)
MCV RBC AUTO: 102 FL (ref 82–98)
MONOCYTES # BLD AUTO: 0.5 K/UL (ref 0.3–1)
MONOCYTES NFR BLD: 9.1 % (ref 4–15)
NEUTROPHILS # BLD AUTO: 3.6 K/UL (ref 1.8–7.7)
NEUTROPHILS NFR BLD: 66.9 % (ref 38–73)
NRBC BLD-RTO: 0 /100 WBC
PHOSPHATE SERPL-MCNC: 3.2 MG/DL (ref 2.7–4.5)
PLATELET # BLD AUTO: 225 K/UL (ref 150–450)
PMV BLD AUTO: 11.2 FL (ref 9.2–12.9)
POCT GLUCOSE: 109 MG/DL (ref 70–110)
POCT GLUCOSE: 157 MG/DL (ref 70–110)
POCT GLUCOSE: 200 MG/DL (ref 70–110)
POCT GLUCOSE: 92 MG/DL (ref 70–110)
POTASSIUM SERPL-SCNC: 4.2 MMOL/L (ref 3.5–5.1)
POTASSIUM SERPL-SCNC: 4.2 MMOL/L (ref 3.5–5.1)
POTASSIUM SERPL-SCNC: 4.3 MMOL/L (ref 3.5–5.1)
POTASSIUM SERPL-SCNC: 4.4 MMOL/L (ref 3.5–5.1)
POTASSIUM SERPL-SCNC: 4.5 MMOL/L (ref 3.5–5.1)
POTASSIUM SERPL-SCNC: 4.6 MMOL/L (ref 3.5–5.1)
PROT SERPL-MCNC: 5.6 G/DL (ref 6–8.4)
RBC # BLD AUTO: 3.26 M/UL (ref 4.6–6.2)
SARS-COV-2 RDRP RESP QL NAA+PROBE: NEGATIVE
SODIUM SERPL-SCNC: 125 MMOL/L (ref 136–145)
SODIUM SERPL-SCNC: 128 MMOL/L (ref 136–145)
SODIUM SERPL-SCNC: 129 MMOL/L (ref 136–145)
WBC # BLD AUTO: 5.39 K/UL (ref 3.9–12.7)

## 2021-07-01 PROCEDURE — 25000003 PHARM REV CODE 250: Performed by: STUDENT IN AN ORGANIZED HEALTH CARE EDUCATION/TRAINING PROGRAM

## 2021-07-01 PROCEDURE — 94660 CPAP INITIATION&MGMT: CPT

## 2021-07-01 PROCEDURE — A4216 STERILE WATER/SALINE, 10 ML: HCPCS | Performed by: SPECIALIST

## 2021-07-01 PROCEDURE — 80053 COMPREHEN METABOLIC PANEL: CPT | Performed by: STUDENT IN AN ORGANIZED HEALTH CARE EDUCATION/TRAINING PROGRAM

## 2021-07-01 PROCEDURE — 84100 ASSAY OF PHOSPHORUS: CPT | Performed by: STUDENT IN AN ORGANIZED HEALTH CARE EDUCATION/TRAINING PROGRAM

## 2021-07-01 PROCEDURE — 99900035 HC TECH TIME PER 15 MIN (STAT)

## 2021-07-01 PROCEDURE — 63600175 PHARM REV CODE 636 W HCPCS: Performed by: STUDENT IN AN ORGANIZED HEALTH CARE EDUCATION/TRAINING PROGRAM

## 2021-07-01 PROCEDURE — 83735 ASSAY OF MAGNESIUM: CPT | Performed by: STUDENT IN AN ORGANIZED HEALTH CARE EDUCATION/TRAINING PROGRAM

## 2021-07-01 PROCEDURE — U0002 COVID-19 LAB TEST NON-CDC: HCPCS | Performed by: STUDENT IN AN ORGANIZED HEALTH CARE EDUCATION/TRAINING PROGRAM

## 2021-07-01 PROCEDURE — 11000001 HC ACUTE MED/SURG PRIVATE ROOM

## 2021-07-01 PROCEDURE — 97165 OT EVAL LOW COMPLEX 30 MIN: CPT

## 2021-07-01 PROCEDURE — 25000003 PHARM REV CODE 250: Performed by: SPECIALIST

## 2021-07-01 PROCEDURE — 97530 THERAPEUTIC ACTIVITIES: CPT

## 2021-07-01 PROCEDURE — 63600175 PHARM REV CODE 636 W HCPCS: Performed by: SPECIALIST

## 2021-07-01 PROCEDURE — 97161 PT EVAL LOW COMPLEX 20 MIN: CPT

## 2021-07-01 PROCEDURE — 30200315 PPD INTRADERMAL TEST REV CODE 302: Performed by: STUDENT IN AN ORGANIZED HEALTH CARE EDUCATION/TRAINING PROGRAM

## 2021-07-01 PROCEDURE — 27000221 HC OXYGEN, UP TO 24 HOURS

## 2021-07-01 PROCEDURE — 97116 GAIT TRAINING THERAPY: CPT

## 2021-07-01 PROCEDURE — 86580 TB INTRADERMAL TEST: CPT | Performed by: STUDENT IN AN ORGANIZED HEALTH CARE EDUCATION/TRAINING PROGRAM

## 2021-07-01 PROCEDURE — 85025 COMPLETE CBC W/AUTO DIFF WBC: CPT | Performed by: STUDENT IN AN ORGANIZED HEALTH CARE EDUCATION/TRAINING PROGRAM

## 2021-07-01 PROCEDURE — 80048 BASIC METABOLIC PNL TOTAL CA: CPT | Performed by: STUDENT IN AN ORGANIZED HEALTH CARE EDUCATION/TRAINING PROGRAM

## 2021-07-01 PROCEDURE — 80048 BASIC METABOLIC PNL TOTAL CA: CPT | Mod: 91 | Performed by: STUDENT IN AN ORGANIZED HEALTH CARE EDUCATION/TRAINING PROGRAM

## 2021-07-01 PROCEDURE — 94761 N-INVAS EAR/PLS OXIMETRY MLT: CPT

## 2021-07-01 RX ORDER — ENOXAPARIN SODIUM 100 MG/ML
40 INJECTION SUBCUTANEOUS EVERY 12 HOURS
Status: DISCONTINUED | OUTPATIENT
Start: 2021-07-01 | End: 2021-07-08 | Stop reason: HOSPADM

## 2021-07-01 RX ORDER — CARVEDILOL 6.25 MG/1
6.25 TABLET ORAL 2 TIMES DAILY WITH MEALS
Status: DISCONTINUED | OUTPATIENT
Start: 2021-07-01 | End: 2021-07-08 | Stop reason: HOSPADM

## 2021-07-01 RX ORDER — SODIUM CHLORIDE, SODIUM LACTATE, POTASSIUM CHLORIDE, CALCIUM CHLORIDE 600; 310; 30; 20 MG/100ML; MG/100ML; MG/100ML; MG/100ML
INJECTION, SOLUTION INTRAVENOUS CONTINUOUS
Status: DISCONTINUED | OUTPATIENT
Start: 2021-07-01 | End: 2021-07-01

## 2021-07-01 RX ORDER — OLANZAPINE 2.5 MG/1
2.5 TABLET ORAL DAILY
Status: DISCONTINUED | OUTPATIENT
Start: 2021-07-01 | End: 2021-07-05

## 2021-07-01 RX ORDER — SODIUM CHLORIDE 9 MG/ML
INJECTION, SOLUTION INTRAVENOUS CONTINUOUS
Status: DISCONTINUED | OUTPATIENT
Start: 2021-07-01 | End: 2021-07-02

## 2021-07-01 RX ORDER — SODIUM CHLORIDE 9 MG/ML
INJECTION, SOLUTION INTRAVENOUS CONTINUOUS
Status: DISCONTINUED | OUTPATIENT
Start: 2021-07-01 | End: 2021-07-01

## 2021-07-01 RX ADMIN — Medication 10 ML: at 12:07

## 2021-07-01 RX ADMIN — CARVEDILOL 6.25 MG: 6.25 TABLET, FILM COATED ORAL at 09:07

## 2021-07-01 RX ADMIN — ATORVASTATIN CALCIUM 40 MG: 40 TABLET, FILM COATED ORAL at 09:07

## 2021-07-01 RX ADMIN — TUBERCULIN PURIFIED PROTEIN DERIVATIVE 5 UNITS: 5 INJECTION, SOLUTION INTRADERMAL at 11:07

## 2021-07-01 RX ADMIN — CLOTRIMAZOLE AND BETAMETHASONE DIPROPIONATE: 10; .5 CREAM TOPICAL at 09:07

## 2021-07-01 RX ADMIN — SODIUM CHLORIDE: 0.9 INJECTION, SOLUTION INTRAVENOUS at 07:07

## 2021-07-01 RX ADMIN — MUPIROCIN: 20 OINTMENT TOPICAL at 09:07

## 2021-07-01 RX ADMIN — Medication 10 ML: at 02:07

## 2021-07-01 RX ADMIN — ENOXAPARIN SODIUM 40 MG: 40 INJECTION SUBCUTANEOUS at 09:07

## 2021-07-01 RX ADMIN — VITAMIN C (ASCORBIC ACID), CARBONYL IRON, FOLIC ACID, VITAMIN B-12 (CYANOCOBALAMIN) 1 TABLET: 250; 25; 1; 100 TABLET ORAL at 08:07

## 2021-07-01 RX ADMIN — SIMETHICONE 125 MG: 125 TABLET, CHEWABLE ORAL at 08:07

## 2021-07-01 RX ADMIN — CLOTRIMAZOLE AND BETAMETHASONE DIPROPIONATE: 10; .5 CREAM TOPICAL at 08:07

## 2021-07-01 RX ADMIN — Medication 1 TABLET: at 08:07

## 2021-07-01 RX ADMIN — CARVEDILOL 6.25 MG: 6.25 TABLET, FILM COATED ORAL at 05:07

## 2021-07-01 RX ADMIN — HYDROXYZINE PAMOATE 25 MG: 25 CAPSULE ORAL at 08:07

## 2021-07-01 RX ADMIN — SODIUM CHLORIDE: 0.9 INJECTION, SOLUTION INTRAVENOUS at 05:07

## 2021-07-01 RX ADMIN — Medication 1 TABLET: at 09:07

## 2021-07-01 RX ADMIN — SODIUM CHLORIDE, SODIUM LACTATE, POTASSIUM CHLORIDE, AND CALCIUM CHLORIDE: .6; .31; .03; .02 INJECTION, SOLUTION INTRAVENOUS at 02:07

## 2021-07-01 RX ADMIN — ASPIRIN 81 MG: 81 TABLET, COATED ORAL at 08:07

## 2021-07-01 RX ADMIN — ACETAMINOPHEN 650 MG: 325 TABLET ORAL at 08:07

## 2021-07-01 RX ADMIN — ACETAMINOPHEN 650 MG: 325 TABLET ORAL at 02:07

## 2021-07-01 RX ADMIN — OLANZAPINE 5 MG: 2.5 TABLET, FILM COATED ORAL at 09:07

## 2021-07-01 RX ADMIN — Medication 10 ML: at 05:07

## 2021-07-01 RX ADMIN — OLANZAPINE 2.5 MG: 2.5 TABLET, FILM COATED ORAL at 09:07

## 2021-07-01 RX ADMIN — Medication 10 ML: at 06:07

## 2021-07-01 RX ADMIN — MUPIROCIN: 20 OINTMENT TOPICAL at 08:07

## 2021-07-01 RX ADMIN — LIDOCAINE 1 PATCH: 50 PATCH CUTANEOUS at 12:07

## 2021-07-02 LAB
ALBUMIN SERPL BCP-MCNC: 3.4 G/DL (ref 3.5–5.2)
ALP SERPL-CCNC: 80 U/L (ref 55–135)
ALT SERPL W/O P-5'-P-CCNC: 22 U/L (ref 10–44)
ANION GAP SERPL CALC-SCNC: 6 MMOL/L (ref 8–16)
ANION GAP SERPL CALC-SCNC: 7 MMOL/L (ref 8–16)
ANION GAP SERPL CALC-SCNC: 7 MMOL/L (ref 8–16)
ANION GAP SERPL CALC-SCNC: 8 MMOL/L (ref 8–16)
ANION GAP SERPL CALC-SCNC: 8 MMOL/L (ref 8–16)
ANION GAP SERPL CALC-SCNC: 9 MMOL/L (ref 8–16)
AST SERPL-CCNC: 26 U/L (ref 10–40)
BASOPHILS # BLD AUTO: 0.03 K/UL (ref 0–0.2)
BASOPHILS NFR BLD: 0.4 % (ref 0–1.9)
BILIRUB SERPL-MCNC: 0.4 MG/DL (ref 0.1–1)
BUN SERPL-MCNC: 10 MG/DL (ref 8–23)
BUN SERPL-MCNC: 11 MG/DL (ref 8–23)
BUN SERPL-MCNC: 11 MG/DL (ref 8–23)
BUN SERPL-MCNC: 12 MG/DL (ref 8–23)
BUN SERPL-MCNC: 12 MG/DL (ref 8–23)
BUN SERPL-MCNC: 13 MG/DL (ref 8–23)
CALCIUM SERPL-MCNC: 8.4 MG/DL (ref 8.7–10.5)
CALCIUM SERPL-MCNC: 8.5 MG/DL (ref 8.7–10.5)
CALCIUM SERPL-MCNC: 8.6 MG/DL (ref 8.7–10.5)
CALCIUM SERPL-MCNC: 8.7 MG/DL (ref 8.7–10.5)
CALCIUM SERPL-MCNC: 8.7 MG/DL (ref 8.7–10.5)
CALCIUM SERPL-MCNC: 8.8 MG/DL (ref 8.7–10.5)
CHLORIDE SERPL-SCNC: 91 MMOL/L (ref 95–110)
CHLORIDE SERPL-SCNC: 92 MMOL/L (ref 95–110)
CHLORIDE SERPL-SCNC: 95 MMOL/L (ref 95–110)
CO2 SERPL-SCNC: 30 MMOL/L (ref 23–29)
CO2 SERPL-SCNC: 31 MMOL/L (ref 23–29)
CO2 SERPL-SCNC: 32 MMOL/L (ref 23–29)
CREAT SERPL-MCNC: 0.8 MG/DL (ref 0.5–1.4)
CREAT SERPL-MCNC: 0.9 MG/DL (ref 0.5–1.4)
CREAT SERPL-MCNC: 1 MG/DL (ref 0.5–1.4)
DIFFERENTIAL METHOD: ABNORMAL
EOSINOPHIL # BLD AUTO: 0.3 K/UL (ref 0–0.5)
EOSINOPHIL NFR BLD: 4.2 % (ref 0–8)
ERYTHROCYTE [DISTWIDTH] IN BLOOD BY AUTOMATED COUNT: 12.8 % (ref 11.5–14.5)
EST. GFR  (AFRICAN AMERICAN): >60 ML/MIN/1.73 M^2
EST. GFR  (NON AFRICAN AMERICAN): >60 ML/MIN/1.73 M^2
GLUCOSE SERPL-MCNC: 114 MG/DL (ref 70–110)
GLUCOSE SERPL-MCNC: 114 MG/DL (ref 70–110)
GLUCOSE SERPL-MCNC: 117 MG/DL (ref 70–110)
GLUCOSE SERPL-MCNC: 128 MG/DL (ref 70–110)
GLUCOSE SERPL-MCNC: 139 MG/DL (ref 70–110)
GLUCOSE SERPL-MCNC: 151 MG/DL (ref 70–110)
HCT VFR BLD AUTO: 34.8 % (ref 40–54)
HGB BLD-MCNC: 11.7 G/DL (ref 14–18)
IMM GRANULOCYTES # BLD AUTO: 0.04 K/UL (ref 0–0.04)
IMM GRANULOCYTES NFR BLD AUTO: 0.6 % (ref 0–0.5)
LYMPHOCYTES # BLD AUTO: 1.2 K/UL (ref 1–4.8)
LYMPHOCYTES NFR BLD: 16.9 % (ref 18–48)
MAGNESIUM SERPL-MCNC: 1.6 MG/DL (ref 1.6–2.6)
MCH RBC QN AUTO: 35.1 PG (ref 27–31)
MCHC RBC AUTO-ENTMCNC: 33.6 G/DL (ref 32–36)
MCV RBC AUTO: 105 FL (ref 82–98)
MONOCYTES # BLD AUTO: 0.7 K/UL (ref 0.3–1)
MONOCYTES NFR BLD: 9.4 % (ref 4–15)
NEUTROPHILS # BLD AUTO: 4.9 K/UL (ref 1.8–7.7)
NEUTROPHILS NFR BLD: 68.5 % (ref 38–73)
NRBC BLD-RTO: 0 /100 WBC
PHOSPHATE SERPL-MCNC: 2.9 MG/DL (ref 2.7–4.5)
PLATELET # BLD AUTO: 240 K/UL (ref 150–450)
PMV BLD AUTO: 11.3 FL (ref 9.2–12.9)
POCT GLUCOSE: 109 MG/DL (ref 70–110)
POCT GLUCOSE: 119 MG/DL (ref 70–110)
POCT GLUCOSE: 119 MG/DL (ref 70–110)
POCT GLUCOSE: 131 MG/DL (ref 70–110)
POTASSIUM SERPL-SCNC: 4.5 MMOL/L (ref 3.5–5.1)
POTASSIUM SERPL-SCNC: 4.5 MMOL/L (ref 3.5–5.1)
POTASSIUM SERPL-SCNC: 4.6 MMOL/L (ref 3.5–5.1)
POTASSIUM SERPL-SCNC: 4.7 MMOL/L (ref 3.5–5.1)
POTASSIUM SERPL-SCNC: 4.7 MMOL/L (ref 3.5–5.1)
POTASSIUM SERPL-SCNC: 4.9 MMOL/L (ref 3.5–5.1)
PROT SERPL-MCNC: 6.1 G/DL (ref 6–8.4)
RBC # BLD AUTO: 3.33 M/UL (ref 4.6–6.2)
SODIUM SERPL-SCNC: 128 MMOL/L (ref 136–145)
SODIUM SERPL-SCNC: 130 MMOL/L (ref 136–145)
SODIUM SERPL-SCNC: 131 MMOL/L (ref 136–145)
SODIUM SERPL-SCNC: 133 MMOL/L (ref 136–145)
WBC # BLD AUTO: 7.21 K/UL (ref 3.9–12.7)

## 2021-07-02 PROCEDURE — 94660 CPAP INITIATION&MGMT: CPT

## 2021-07-02 PROCEDURE — 97530 THERAPEUTIC ACTIVITIES: CPT

## 2021-07-02 PROCEDURE — 11000001 HC ACUTE MED/SURG PRIVATE ROOM

## 2021-07-02 PROCEDURE — 99900035 HC TECH TIME PER 15 MIN (STAT)

## 2021-07-02 PROCEDURE — 94760 N-INVAS EAR/PLS OXIMETRY 1: CPT

## 2021-07-02 PROCEDURE — 80048 BASIC METABOLIC PNL TOTAL CA: CPT | Performed by: STUDENT IN AN ORGANIZED HEALTH CARE EDUCATION/TRAINING PROGRAM

## 2021-07-02 PROCEDURE — 80053 COMPREHEN METABOLIC PANEL: CPT | Performed by: STUDENT IN AN ORGANIZED HEALTH CARE EDUCATION/TRAINING PROGRAM

## 2021-07-02 PROCEDURE — 63600175 PHARM REV CODE 636 W HCPCS: Performed by: STUDENT IN AN ORGANIZED HEALTH CARE EDUCATION/TRAINING PROGRAM

## 2021-07-02 PROCEDURE — A4216 STERILE WATER/SALINE, 10 ML: HCPCS | Performed by: SPECIALIST

## 2021-07-02 PROCEDURE — 25000003 PHARM REV CODE 250: Performed by: STUDENT IN AN ORGANIZED HEALTH CARE EDUCATION/TRAINING PROGRAM

## 2021-07-02 PROCEDURE — 97116 GAIT TRAINING THERAPY: CPT | Mod: CQ

## 2021-07-02 PROCEDURE — 80048 BASIC METABOLIC PNL TOTAL CA: CPT | Mod: 91 | Performed by: SPECIALIST

## 2021-07-02 PROCEDURE — 25000003 PHARM REV CODE 250: Performed by: SPECIALIST

## 2021-07-02 PROCEDURE — 97530 THERAPEUTIC ACTIVITIES: CPT | Mod: CQ

## 2021-07-02 PROCEDURE — 83735 ASSAY OF MAGNESIUM: CPT | Performed by: STUDENT IN AN ORGANIZED HEALTH CARE EDUCATION/TRAINING PROGRAM

## 2021-07-02 PROCEDURE — 63600175 PHARM REV CODE 636 W HCPCS: Performed by: SPECIALIST

## 2021-07-02 PROCEDURE — 84100 ASSAY OF PHOSPHORUS: CPT | Performed by: STUDENT IN AN ORGANIZED HEALTH CARE EDUCATION/TRAINING PROGRAM

## 2021-07-02 PROCEDURE — 85025 COMPLETE CBC W/AUTO DIFF WBC: CPT | Performed by: STUDENT IN AN ORGANIZED HEALTH CARE EDUCATION/TRAINING PROGRAM

## 2021-07-02 PROCEDURE — 80048 BASIC METABOLIC PNL TOTAL CA: CPT | Mod: 91 | Performed by: STUDENT IN AN ORGANIZED HEALTH CARE EDUCATION/TRAINING PROGRAM

## 2021-07-02 PROCEDURE — 27000221 HC OXYGEN, UP TO 24 HOURS

## 2021-07-02 PROCEDURE — S4991 NICOTINE PATCH NONLEGEND: HCPCS | Performed by: STUDENT IN AN ORGANIZED HEALTH CARE EDUCATION/TRAINING PROGRAM

## 2021-07-02 RX ORDER — SODIUM CHLORIDE, SODIUM LACTATE, POTASSIUM CHLORIDE, CALCIUM CHLORIDE 600; 310; 30; 20 MG/100ML; MG/100ML; MG/100ML; MG/100ML
INJECTION, SOLUTION INTRAVENOUS CONTINUOUS
Status: DISCONTINUED | OUTPATIENT
Start: 2021-07-02 | End: 2021-07-02

## 2021-07-02 RX ORDER — SODIUM CHLORIDE, SODIUM LACTATE, POTASSIUM CHLORIDE, CALCIUM CHLORIDE 600; 310; 30; 20 MG/100ML; MG/100ML; MG/100ML; MG/100ML
INJECTION, SOLUTION INTRAVENOUS CONTINUOUS
Status: ACTIVE | OUTPATIENT
Start: 2021-07-02 | End: 2021-07-02

## 2021-07-02 RX ADMIN — HYDROXYZINE PAMOATE 25 MG: 25 CAPSULE ORAL at 12:07

## 2021-07-02 RX ADMIN — MUPIROCIN: 20 OINTMENT TOPICAL at 08:07

## 2021-07-02 RX ADMIN — LIDOCAINE 1 PATCH: 50 PATCH CUTANEOUS at 12:07

## 2021-07-02 RX ADMIN — Medication 1 PATCH: at 02:07

## 2021-07-02 RX ADMIN — SODIUM CHLORIDE, SODIUM LACTATE, POTASSIUM CHLORIDE, AND CALCIUM CHLORIDE: .6; .31; .03; .02 INJECTION, SOLUTION INTRAVENOUS at 05:07

## 2021-07-02 RX ADMIN — Medication 1 TABLET: at 07:07

## 2021-07-02 RX ADMIN — OLANZAPINE 5 MG: 2.5 TABLET, FILM COATED ORAL at 08:07

## 2021-07-02 RX ADMIN — OLANZAPINE 2.5 MG: 2.5 TABLET, FILM COATED ORAL at 07:07

## 2021-07-02 RX ADMIN — ENOXAPARIN SODIUM 40 MG: 40 INJECTION SUBCUTANEOUS at 07:07

## 2021-07-02 RX ADMIN — Medication 10 ML: at 06:07

## 2021-07-02 RX ADMIN — Medication 10 ML: at 07:07

## 2021-07-02 RX ADMIN — SODIUM CHLORIDE, SODIUM LACTATE, POTASSIUM CHLORIDE, AND CALCIUM CHLORIDE: .6; .31; .03; .02 INJECTION, SOLUTION INTRAVENOUS at 01:07

## 2021-07-02 RX ADMIN — HYDROXYZINE PAMOATE 25 MG: 25 CAPSULE ORAL at 05:07

## 2021-07-02 RX ADMIN — MELATONIN 9 MG: 3 TAB ORAL at 08:07

## 2021-07-02 RX ADMIN — Medication 10 ML: at 12:07

## 2021-07-02 RX ADMIN — ENOXAPARIN SODIUM 40 MG: 40 INJECTION SUBCUTANEOUS at 08:07

## 2021-07-02 RX ADMIN — SODIUM CHLORIDE, SODIUM LACTATE, POTASSIUM CHLORIDE, AND CALCIUM CHLORIDE: .6; .31; .03; .02 INJECTION, SOLUTION INTRAVENOUS at 04:07

## 2021-07-02 RX ADMIN — ASPIRIN 81 MG: 81 TABLET, COATED ORAL at 07:07

## 2021-07-02 RX ADMIN — MUPIROCIN: 20 OINTMENT TOPICAL at 10:07

## 2021-07-02 RX ADMIN — VITAMIN C (ASCORBIC ACID), CARBONYL IRON, FOLIC ACID, VITAMIN B-12 (CYANOCOBALAMIN) 1 TABLET: 250; 25; 1; 100 TABLET ORAL at 07:07

## 2021-07-02 RX ADMIN — CLOTRIMAZOLE AND BETAMETHASONE DIPROPIONATE: 10; .5 CREAM TOPICAL at 10:07

## 2021-07-02 RX ADMIN — ACETAMINOPHEN 650 MG: 325 TABLET ORAL at 07:07

## 2021-07-02 RX ADMIN — Medication 1 TABLET: at 08:07

## 2021-07-02 RX ADMIN — Medication 10 ML: at 10:07

## 2021-07-02 RX ADMIN — ATORVASTATIN CALCIUM 40 MG: 40 TABLET, FILM COATED ORAL at 08:07

## 2021-07-02 RX ADMIN — CLOTRIMAZOLE AND BETAMETHASONE DIPROPIONATE: 10; .5 CREAM TOPICAL at 08:07

## 2021-07-02 RX ADMIN — CARVEDILOL 6.25 MG: 6.25 TABLET, FILM COATED ORAL at 07:07

## 2021-07-03 LAB
ALBUMIN SERPL BCP-MCNC: 3.3 G/DL (ref 3.5–5.2)
ALP SERPL-CCNC: 73 U/L (ref 55–135)
ALT SERPL W/O P-5'-P-CCNC: 22 U/L (ref 10–44)
ANION GAP SERPL CALC-SCNC: 5 MMOL/L (ref 8–16)
ANION GAP SERPL CALC-SCNC: 6 MMOL/L (ref 8–16)
ANION GAP SERPL CALC-SCNC: 6 MMOL/L (ref 8–16)
ANION GAP SERPL CALC-SCNC: 8 MMOL/L (ref 8–16)
AST SERPL-CCNC: 28 U/L (ref 10–40)
BASOPHILS # BLD AUTO: 0.03 K/UL (ref 0–0.2)
BASOPHILS NFR BLD: 0.4 % (ref 0–1.9)
BILIRUB SERPL-MCNC: 0.3 MG/DL (ref 0.1–1)
BUN SERPL-MCNC: 10 MG/DL (ref 8–23)
BUN SERPL-MCNC: 11 MG/DL (ref 8–23)
BUN SERPL-MCNC: 9 MG/DL (ref 8–23)
BUN SERPL-MCNC: 9 MG/DL (ref 8–23)
CALCIUM SERPL-MCNC: 8.6 MG/DL (ref 8.7–10.5)
CALCIUM SERPL-MCNC: 9 MG/DL (ref 8.7–10.5)
CALCIUM SERPL-MCNC: 9.1 MG/DL (ref 8.7–10.5)
CALCIUM SERPL-MCNC: 9.1 MG/DL (ref 8.7–10.5)
CHLORIDE SERPL-SCNC: 95 MMOL/L (ref 95–110)
CHLORIDE SERPL-SCNC: 96 MMOL/L (ref 95–110)
CHLORIDE SERPL-SCNC: 97 MMOL/L (ref 95–110)
CHLORIDE SERPL-SCNC: 97 MMOL/L (ref 95–110)
CO2 SERPL-SCNC: 29 MMOL/L (ref 23–29)
CO2 SERPL-SCNC: 31 MMOL/L (ref 23–29)
CO2 SERPL-SCNC: 31 MMOL/L (ref 23–29)
CO2 SERPL-SCNC: 32 MMOL/L (ref 23–29)
CREAT SERPL-MCNC: 0.8 MG/DL (ref 0.5–1.4)
DIFFERENTIAL METHOD: ABNORMAL
EOSINOPHIL # BLD AUTO: 0.3 K/UL (ref 0–0.5)
EOSINOPHIL NFR BLD: 4 % (ref 0–8)
ERYTHROCYTE [DISTWIDTH] IN BLOOD BY AUTOMATED COUNT: 12.9 % (ref 11.5–14.5)
EST. GFR  (AFRICAN AMERICAN): >60 ML/MIN/1.73 M^2
EST. GFR  (NON AFRICAN AMERICAN): >60 ML/MIN/1.73 M^2
GLUCOSE SERPL-MCNC: 120 MG/DL (ref 70–110)
GLUCOSE SERPL-MCNC: 121 MG/DL (ref 70–110)
GLUCOSE SERPL-MCNC: 133 MG/DL (ref 70–110)
GLUCOSE SERPL-MCNC: 140 MG/DL (ref 70–110)
HCT VFR BLD AUTO: 34.2 % (ref 40–54)
HGB BLD-MCNC: 11.5 G/DL (ref 14–18)
IMM GRANULOCYTES # BLD AUTO: 0.06 K/UL (ref 0–0.04)
IMM GRANULOCYTES NFR BLD AUTO: 0.8 % (ref 0–0.5)
LYMPHOCYTES # BLD AUTO: 1 K/UL (ref 1–4.8)
LYMPHOCYTES NFR BLD: 13.1 % (ref 18–48)
MAGNESIUM SERPL-MCNC: 1.4 MG/DL (ref 1.6–2.6)
MCH RBC QN AUTO: 35.5 PG (ref 27–31)
MCHC RBC AUTO-ENTMCNC: 33.6 G/DL (ref 32–36)
MCV RBC AUTO: 106 FL (ref 82–98)
MONOCYTES # BLD AUTO: 0.7 K/UL (ref 0.3–1)
MONOCYTES NFR BLD: 8.7 % (ref 4–15)
NEUTROPHILS # BLD AUTO: 5.6 K/UL (ref 1.8–7.7)
NEUTROPHILS NFR BLD: 73 % (ref 38–73)
NRBC BLD-RTO: 0 /100 WBC
PHOSPHATE SERPL-MCNC: 3 MG/DL (ref 2.7–4.5)
PLATELET # BLD AUTO: 247 K/UL (ref 150–450)
PMV BLD AUTO: 11.2 FL (ref 9.2–12.9)
POCT GLUCOSE: 124 MG/DL (ref 70–110)
POCT GLUCOSE: 129 MG/DL (ref 70–110)
POCT GLUCOSE: 134 MG/DL (ref 70–110)
POCT GLUCOSE: 136 MG/DL (ref 70–110)
POCT GLUCOSE: 95 MG/DL (ref 70–110)
POTASSIUM SERPL-SCNC: 4.4 MMOL/L (ref 3.5–5.1)
POTASSIUM SERPL-SCNC: 4.6 MMOL/L (ref 3.5–5.1)
POTASSIUM SERPL-SCNC: 4.9 MMOL/L (ref 3.5–5.1)
POTASSIUM SERPL-SCNC: 4.9 MMOL/L (ref 3.5–5.1)
PROT SERPL-MCNC: 6 G/DL (ref 6–8.4)
RBC # BLD AUTO: 3.24 M/UL (ref 4.6–6.2)
SODIUM SERPL-SCNC: 132 MMOL/L (ref 136–145)
SODIUM SERPL-SCNC: 133 MMOL/L (ref 136–145)
SODIUM SERPL-SCNC: 134 MMOL/L (ref 136–145)
SODIUM SERPL-SCNC: 134 MMOL/L (ref 136–145)
TB INDURATION 48 - 72 HR READ: 0 MM
WBC # BLD AUTO: 7.72 K/UL (ref 3.9–12.7)

## 2021-07-03 PROCEDURE — 27000221 HC OXYGEN, UP TO 24 HOURS

## 2021-07-03 PROCEDURE — A4216 STERILE WATER/SALINE, 10 ML: HCPCS | Performed by: SPECIALIST

## 2021-07-03 PROCEDURE — 93005 ELECTROCARDIOGRAM TRACING: CPT

## 2021-07-03 PROCEDURE — 85025 COMPLETE CBC W/AUTO DIFF WBC: CPT | Performed by: STUDENT IN AN ORGANIZED HEALTH CARE EDUCATION/TRAINING PROGRAM

## 2021-07-03 PROCEDURE — 93010 EKG 12-LEAD: ICD-10-PCS | Mod: ,,, | Performed by: INTERNAL MEDICINE

## 2021-07-03 PROCEDURE — 80048 BASIC METABOLIC PNL TOTAL CA: CPT | Performed by: SPECIALIST

## 2021-07-03 PROCEDURE — 25000003 PHARM REV CODE 250: Performed by: STUDENT IN AN ORGANIZED HEALTH CARE EDUCATION/TRAINING PROGRAM

## 2021-07-03 PROCEDURE — 99900035 HC TECH TIME PER 15 MIN (STAT)

## 2021-07-03 PROCEDURE — 94660 CPAP INITIATION&MGMT: CPT

## 2021-07-03 PROCEDURE — 93010 ELECTROCARDIOGRAM REPORT: CPT | Mod: ,,, | Performed by: INTERNAL MEDICINE

## 2021-07-03 PROCEDURE — 80053 COMPREHEN METABOLIC PANEL: CPT | Performed by: STUDENT IN AN ORGANIZED HEALTH CARE EDUCATION/TRAINING PROGRAM

## 2021-07-03 PROCEDURE — 25000003 PHARM REV CODE 250: Performed by: SPECIALIST

## 2021-07-03 PROCEDURE — 63600175 PHARM REV CODE 636 W HCPCS: Performed by: SPECIALIST

## 2021-07-03 PROCEDURE — 80048 BASIC METABOLIC PNL TOTAL CA: CPT | Mod: 91 | Performed by: SPECIALIST

## 2021-07-03 PROCEDURE — 11000001 HC ACUTE MED/SURG PRIVATE ROOM

## 2021-07-03 PROCEDURE — 63600175 PHARM REV CODE 636 W HCPCS: Performed by: STUDENT IN AN ORGANIZED HEALTH CARE EDUCATION/TRAINING PROGRAM

## 2021-07-03 PROCEDURE — 84100 ASSAY OF PHOSPHORUS: CPT | Performed by: STUDENT IN AN ORGANIZED HEALTH CARE EDUCATION/TRAINING PROGRAM

## 2021-07-03 PROCEDURE — 83735 ASSAY OF MAGNESIUM: CPT | Performed by: STUDENT IN AN ORGANIZED HEALTH CARE EDUCATION/TRAINING PROGRAM

## 2021-07-03 PROCEDURE — 94760 N-INVAS EAR/PLS OXIMETRY 1: CPT

## 2021-07-03 RX ORDER — MAGNESIUM SULFATE HEPTAHYDRATE 40 MG/ML
2 INJECTION, SOLUTION INTRAVENOUS ONCE
Status: COMPLETED | OUTPATIENT
Start: 2021-07-03 | End: 2021-07-03

## 2021-07-03 RX ADMIN — Medication 1 TABLET: at 09:07

## 2021-07-03 RX ADMIN — CLOTRIMAZOLE AND BETAMETHASONE DIPROPIONATE: 10; .5 CREAM TOPICAL at 09:07

## 2021-07-03 RX ADMIN — OLANZAPINE 2.5 MG: 2.5 TABLET, FILM COATED ORAL at 08:07

## 2021-07-03 RX ADMIN — Medication 10 ML: at 12:07

## 2021-07-03 RX ADMIN — VITAMIN C (ASCORBIC ACID), CARBONYL IRON, FOLIC ACID, VITAMIN B-12 (CYANOCOBALAMIN) 1 TABLET: 250; 25; 1; 100 TABLET ORAL at 08:07

## 2021-07-03 RX ADMIN — CARVEDILOL 6.25 MG: 6.25 TABLET, FILM COATED ORAL at 05:07

## 2021-07-03 RX ADMIN — ACETAMINOPHEN 650 MG: 325 TABLET ORAL at 08:07

## 2021-07-03 RX ADMIN — MUPIROCIN: 20 OINTMENT TOPICAL at 08:07

## 2021-07-03 RX ADMIN — MUPIROCIN: 20 OINTMENT TOPICAL at 09:07

## 2021-07-03 RX ADMIN — ATORVASTATIN CALCIUM 40 MG: 40 TABLET, FILM COATED ORAL at 09:07

## 2021-07-03 RX ADMIN — Medication 1 TABLET: at 08:07

## 2021-07-03 RX ADMIN — LIDOCAINE 1 PATCH: 50 PATCH CUTANEOUS at 12:07

## 2021-07-03 RX ADMIN — OLANZAPINE 5 MG: 2.5 TABLET, FILM COATED ORAL at 09:07

## 2021-07-03 RX ADMIN — MELATONIN 9 MG: 3 TAB ORAL at 09:07

## 2021-07-03 RX ADMIN — MAGNESIUM SULFATE IN WATER 2 G: 40 INJECTION, SOLUTION INTRAVENOUS at 08:07

## 2021-07-03 RX ADMIN — ENOXAPARIN SODIUM 40 MG: 40 INJECTION SUBCUTANEOUS at 09:07

## 2021-07-03 RX ADMIN — Medication 10 ML: at 06:07

## 2021-07-03 RX ADMIN — CLOTRIMAZOLE AND BETAMETHASONE DIPROPIONATE: 10; .5 CREAM TOPICAL at 08:07

## 2021-07-03 RX ADMIN — ACETAMINOPHEN 650 MG: 325 TABLET ORAL at 02:07

## 2021-07-03 RX ADMIN — ASPIRIN 81 MG: 81 TABLET, COATED ORAL at 08:07

## 2021-07-03 RX ADMIN — CARVEDILOL 6.25 MG: 6.25 TABLET, FILM COATED ORAL at 08:07

## 2021-07-03 RX ADMIN — ENOXAPARIN SODIUM 40 MG: 40 INJECTION SUBCUTANEOUS at 08:07

## 2021-07-04 LAB
ALBUMIN SERPL BCP-MCNC: 3.6 G/DL (ref 3.5–5.2)
ALP SERPL-CCNC: 78 U/L (ref 55–135)
ALT SERPL W/O P-5'-P-CCNC: 29 U/L (ref 10–44)
ANION GAP SERPL CALC-SCNC: 5 MMOL/L (ref 8–16)
ANION GAP SERPL CALC-SCNC: 7 MMOL/L (ref 8–16)
ANION GAP SERPL CALC-SCNC: 7 MMOL/L (ref 8–16)
AST SERPL-CCNC: 24 U/L (ref 10–40)
BASOPHILS # BLD AUTO: 0.02 K/UL (ref 0–0.2)
BASOPHILS NFR BLD: 0.3 % (ref 0–1.9)
BILIRUB SERPL-MCNC: 0.2 MG/DL (ref 0.1–1)
BUN SERPL-MCNC: 11 MG/DL (ref 8–23)
CALCIUM SERPL-MCNC: 9.2 MG/DL (ref 8.7–10.5)
CALCIUM SERPL-MCNC: 9.2 MG/DL (ref 8.7–10.5)
CALCIUM SERPL-MCNC: 9.5 MG/DL (ref 8.7–10.5)
CHLORIDE SERPL-SCNC: 96 MMOL/L (ref 95–110)
CHLORIDE SERPL-SCNC: 97 MMOL/L (ref 95–110)
CHLORIDE SERPL-SCNC: 98 MMOL/L (ref 95–110)
CO2 SERPL-SCNC: 31 MMOL/L (ref 23–29)
CO2 SERPL-SCNC: 32 MMOL/L (ref 23–29)
CO2 SERPL-SCNC: 32 MMOL/L (ref 23–29)
CREAT SERPL-MCNC: 0.8 MG/DL (ref 0.5–1.4)
CREAT SERPL-MCNC: 0.8 MG/DL (ref 0.5–1.4)
CREAT SERPL-MCNC: 0.9 MG/DL (ref 0.5–1.4)
DIFFERENTIAL METHOD: ABNORMAL
EOSINOPHIL # BLD AUTO: 0.3 K/UL (ref 0–0.5)
EOSINOPHIL NFR BLD: 4.6 % (ref 0–8)
ERYTHROCYTE [DISTWIDTH] IN BLOOD BY AUTOMATED COUNT: 13.2 % (ref 11.5–14.5)
EST. GFR  (AFRICAN AMERICAN): >60 ML/MIN/1.73 M^2
EST. GFR  (NON AFRICAN AMERICAN): >60 ML/MIN/1.73 M^2
FOLATE SERPL-MCNC: 12.7 NG/ML (ref 4–24)
GLUCOSE SERPL-MCNC: 105 MG/DL (ref 70–110)
GLUCOSE SERPL-MCNC: 115 MG/DL (ref 70–110)
GLUCOSE SERPL-MCNC: 134 MG/DL (ref 70–110)
HCT VFR BLD AUTO: 35.4 % (ref 40–54)
HGB BLD-MCNC: 11.7 G/DL (ref 14–18)
IMM GRANULOCYTES # BLD AUTO: 0.04 K/UL (ref 0–0.04)
IMM GRANULOCYTES NFR BLD AUTO: 0.6 % (ref 0–0.5)
LYMPHOCYTES # BLD AUTO: 1 K/UL (ref 1–4.8)
LYMPHOCYTES NFR BLD: 14.9 % (ref 18–48)
MAGNESIUM SERPL-MCNC: 1.7 MG/DL (ref 1.6–2.6)
MCH RBC QN AUTO: 34.9 PG (ref 27–31)
MCHC RBC AUTO-ENTMCNC: 33.1 G/DL (ref 32–36)
MCV RBC AUTO: 106 FL (ref 82–98)
MONOCYTES # BLD AUTO: 0.8 K/UL (ref 0.3–1)
MONOCYTES NFR BLD: 11.4 % (ref 4–15)
NEUTROPHILS # BLD AUTO: 4.8 K/UL (ref 1.8–7.7)
NEUTROPHILS NFR BLD: 68.2 % (ref 38–73)
NRBC BLD-RTO: 0 /100 WBC
PHOSPHATE SERPL-MCNC: 4.1 MG/DL (ref 2.7–4.5)
PLATELET # BLD AUTO: 245 K/UL (ref 150–450)
PMV BLD AUTO: 11 FL (ref 9.2–12.9)
POCT GLUCOSE: 101 MG/DL (ref 70–110)
POCT GLUCOSE: 105 MG/DL (ref 70–110)
POCT GLUCOSE: 124 MG/DL (ref 70–110)
POCT GLUCOSE: 77 MG/DL (ref 70–110)
POCT GLUCOSE: 98 MG/DL (ref 70–110)
POTASSIUM SERPL-SCNC: 4.8 MMOL/L (ref 3.5–5.1)
POTASSIUM SERPL-SCNC: 4.9 MMOL/L (ref 3.5–5.1)
POTASSIUM SERPL-SCNC: 5 MMOL/L (ref 3.5–5.1)
PROT SERPL-MCNC: 6.3 G/DL (ref 6–8.4)
RBC # BLD AUTO: 3.35 M/UL (ref 4.6–6.2)
SODIUM SERPL-SCNC: 133 MMOL/L (ref 136–145)
SODIUM SERPL-SCNC: 136 MMOL/L (ref 136–145)
SODIUM SERPL-SCNC: 136 MMOL/L (ref 136–145)
VIT B12 SERPL-MCNC: 560 PG/ML (ref 210–950)
WBC # BLD AUTO: 6.99 K/UL (ref 3.9–12.7)

## 2021-07-04 PROCEDURE — 25000003 PHARM REV CODE 250: Performed by: SPECIALIST

## 2021-07-04 PROCEDURE — 83735 ASSAY OF MAGNESIUM: CPT | Performed by: STUDENT IN AN ORGANIZED HEALTH CARE EDUCATION/TRAINING PROGRAM

## 2021-07-04 PROCEDURE — 84100 ASSAY OF PHOSPHORUS: CPT | Performed by: STUDENT IN AN ORGANIZED HEALTH CARE EDUCATION/TRAINING PROGRAM

## 2021-07-04 PROCEDURE — 63600175 PHARM REV CODE 636 W HCPCS: Performed by: SPECIALIST

## 2021-07-04 PROCEDURE — 97530 THERAPEUTIC ACTIVITIES: CPT

## 2021-07-04 PROCEDURE — A4216 STERILE WATER/SALINE, 10 ML: HCPCS | Performed by: SPECIALIST

## 2021-07-04 PROCEDURE — 82746 ASSAY OF FOLIC ACID SERUM: CPT | Performed by: STUDENT IN AN ORGANIZED HEALTH CARE EDUCATION/TRAINING PROGRAM

## 2021-07-04 PROCEDURE — 80048 BASIC METABOLIC PNL TOTAL CA: CPT | Performed by: SPECIALIST

## 2021-07-04 PROCEDURE — 99900035 HC TECH TIME PER 15 MIN (STAT)

## 2021-07-04 PROCEDURE — 80048 BASIC METABOLIC PNL TOTAL CA: CPT | Mod: 91 | Performed by: SPECIALIST

## 2021-07-04 PROCEDURE — 25000003 PHARM REV CODE 250: Performed by: STUDENT IN AN ORGANIZED HEALTH CARE EDUCATION/TRAINING PROGRAM

## 2021-07-04 PROCEDURE — S4991 NICOTINE PATCH NONLEGEND: HCPCS | Performed by: STUDENT IN AN ORGANIZED HEALTH CARE EDUCATION/TRAINING PROGRAM

## 2021-07-04 PROCEDURE — 11000001 HC ACUTE MED/SURG PRIVATE ROOM

## 2021-07-04 PROCEDURE — 82607 VITAMIN B-12: CPT | Performed by: STUDENT IN AN ORGANIZED HEALTH CARE EDUCATION/TRAINING PROGRAM

## 2021-07-04 PROCEDURE — 94660 CPAP INITIATION&MGMT: CPT

## 2021-07-04 PROCEDURE — 85025 COMPLETE CBC W/AUTO DIFF WBC: CPT | Performed by: STUDENT IN AN ORGANIZED HEALTH CARE EDUCATION/TRAINING PROGRAM

## 2021-07-04 PROCEDURE — 80053 COMPREHEN METABOLIC PANEL: CPT | Performed by: STUDENT IN AN ORGANIZED HEALTH CARE EDUCATION/TRAINING PROGRAM

## 2021-07-04 PROCEDURE — 97110 THERAPEUTIC EXERCISES: CPT

## 2021-07-04 PROCEDURE — 97116 GAIT TRAINING THERAPY: CPT

## 2021-07-04 RX ADMIN — ENOXAPARIN SODIUM 40 MG: 40 INJECTION SUBCUTANEOUS at 10:07

## 2021-07-04 RX ADMIN — CARVEDILOL 6.25 MG: 6.25 TABLET, FILM COATED ORAL at 08:07

## 2021-07-04 RX ADMIN — ENOXAPARIN SODIUM 40 MG: 40 INJECTION SUBCUTANEOUS at 08:07

## 2021-07-04 RX ADMIN — LIDOCAINE 1 PATCH: 50 PATCH CUTANEOUS at 12:07

## 2021-07-04 RX ADMIN — OLANZAPINE 5 MG: 2.5 TABLET, FILM COATED ORAL at 10:07

## 2021-07-04 RX ADMIN — HYDROXYZINE PAMOATE 25 MG: 25 CAPSULE ORAL at 10:07

## 2021-07-04 RX ADMIN — MELATONIN 9 MG: 3 TAB ORAL at 10:07

## 2021-07-04 RX ADMIN — Medication 10 ML: at 11:07

## 2021-07-04 RX ADMIN — Medication 10 ML: at 05:07

## 2021-07-04 RX ADMIN — OLANZAPINE 2.5 MG: 2.5 TABLET, FILM COATED ORAL at 08:07

## 2021-07-04 RX ADMIN — ASPIRIN 81 MG: 81 TABLET, COATED ORAL at 08:07

## 2021-07-04 RX ADMIN — Medication 1 TABLET: at 10:07

## 2021-07-04 RX ADMIN — Medication 1 PATCH: at 10:07

## 2021-07-04 RX ADMIN — Medication 10 ML: at 06:07

## 2021-07-04 RX ADMIN — MUPIROCIN: 20 OINTMENT TOPICAL at 08:07

## 2021-07-04 RX ADMIN — ATORVASTATIN CALCIUM 40 MG: 40 TABLET, FILM COATED ORAL at 10:07

## 2021-07-04 RX ADMIN — CLOTRIMAZOLE AND BETAMETHASONE DIPROPIONATE: 10; .5 CREAM TOPICAL at 10:07

## 2021-07-04 RX ADMIN — Medication 10 ML: at 12:07

## 2021-07-04 RX ADMIN — CARVEDILOL 6.25 MG: 6.25 TABLET, FILM COATED ORAL at 04:07

## 2021-07-04 RX ADMIN — MUPIROCIN: 20 OINTMENT TOPICAL at 10:07

## 2021-07-04 RX ADMIN — VITAMIN C (ASCORBIC ACID), CARBONYL IRON, FOLIC ACID, VITAMIN B-12 (CYANOCOBALAMIN) 1 TABLET: 250; 25; 1; 100 TABLET ORAL at 08:07

## 2021-07-04 RX ADMIN — ACETAMINOPHEN 650 MG: 325 TABLET ORAL at 10:07

## 2021-07-04 RX ADMIN — CLOTRIMAZOLE AND BETAMETHASONE DIPROPIONATE: 10; .5 CREAM TOPICAL at 08:07

## 2021-07-04 RX ADMIN — Medication 1 TABLET: at 08:07

## 2021-07-04 RX ADMIN — ACETAMINOPHEN 650 MG: 325 TABLET ORAL at 08:07

## 2021-07-04 RX ADMIN — ACETAMINOPHEN 650 MG: 325 TABLET ORAL at 02:07

## 2021-07-05 LAB
ALBUMIN SERPL BCP-MCNC: 3.5 G/DL (ref 3.5–5.2)
ALP SERPL-CCNC: 65 U/L (ref 55–135)
ALT SERPL W/O P-5'-P-CCNC: 25 U/L (ref 10–44)
ANION GAP SERPL CALC-SCNC: 8 MMOL/L (ref 8–16)
AST SERPL-CCNC: 24 U/L (ref 10–40)
BASOPHILS # BLD AUTO: 0.03 K/UL (ref 0–0.2)
BASOPHILS NFR BLD: 0.4 % (ref 0–1.9)
BILIRUB SERPL-MCNC: 0.4 MG/DL (ref 0.1–1)
BUN SERPL-MCNC: 11 MG/DL (ref 8–23)
CALCIUM SERPL-MCNC: 9.1 MG/DL (ref 8.7–10.5)
CHLORIDE SERPL-SCNC: 98 MMOL/L (ref 95–110)
CO2 SERPL-SCNC: 30 MMOL/L (ref 23–29)
CREAT SERPL-MCNC: 1 MG/DL (ref 0.5–1.4)
DIFFERENTIAL METHOD: ABNORMAL
EOSINOPHIL # BLD AUTO: 0.3 K/UL (ref 0–0.5)
EOSINOPHIL NFR BLD: 3.8 % (ref 0–8)
ERYTHROCYTE [DISTWIDTH] IN BLOOD BY AUTOMATED COUNT: 13.2 % (ref 11.5–14.5)
EST. GFR  (AFRICAN AMERICAN): >60 ML/MIN/1.73 M^2
EST. GFR  (NON AFRICAN AMERICAN): >60 ML/MIN/1.73 M^2
GLUCOSE SERPL-MCNC: 102 MG/DL (ref 70–110)
HCT VFR BLD AUTO: 34.1 % (ref 40–54)
HGB BLD-MCNC: 11.6 G/DL (ref 14–18)
IMM GRANULOCYTES # BLD AUTO: 0.06 K/UL (ref 0–0.04)
IMM GRANULOCYTES NFR BLD AUTO: 0.7 % (ref 0–0.5)
LYMPHOCYTES # BLD AUTO: 1.2 K/UL (ref 1–4.8)
LYMPHOCYTES NFR BLD: 15 % (ref 18–48)
MAGNESIUM SERPL-MCNC: 1.5 MG/DL (ref 1.6–2.6)
MCH RBC QN AUTO: 35.6 PG (ref 27–31)
MCHC RBC AUTO-ENTMCNC: 34 G/DL (ref 32–36)
MCV RBC AUTO: 105 FL (ref 82–98)
MONOCYTES # BLD AUTO: 0.9 K/UL (ref 0.3–1)
MONOCYTES NFR BLD: 11.1 % (ref 4–15)
NEUTROPHILS # BLD AUTO: 5.6 K/UL (ref 1.8–7.7)
NEUTROPHILS NFR BLD: 69 % (ref 38–73)
NRBC BLD-RTO: 0 /100 WBC
PHOSPHATE SERPL-MCNC: 4 MG/DL (ref 2.7–4.5)
PLATELET # BLD AUTO: 240 K/UL (ref 150–450)
PMV BLD AUTO: 11.3 FL (ref 9.2–12.9)
POCT GLUCOSE: 101 MG/DL (ref 70–110)
POCT GLUCOSE: 120 MG/DL (ref 70–110)
POCT GLUCOSE: 133 MG/DL (ref 70–110)
POCT GLUCOSE: 137 MG/DL (ref 70–110)
POTASSIUM SERPL-SCNC: 4.7 MMOL/L (ref 3.5–5.1)
PROT SERPL-MCNC: 6.3 G/DL (ref 6–8.4)
RBC # BLD AUTO: 3.26 M/UL (ref 4.6–6.2)
SODIUM SERPL-SCNC: 136 MMOL/L (ref 136–145)
WBC # BLD AUTO: 8.11 K/UL (ref 3.9–12.7)

## 2021-07-05 PROCEDURE — 85025 COMPLETE CBC W/AUTO DIFF WBC: CPT | Performed by: STUDENT IN AN ORGANIZED HEALTH CARE EDUCATION/TRAINING PROGRAM

## 2021-07-05 PROCEDURE — 99900035 HC TECH TIME PER 15 MIN (STAT)

## 2021-07-05 PROCEDURE — 83735 ASSAY OF MAGNESIUM: CPT | Performed by: STUDENT IN AN ORGANIZED HEALTH CARE EDUCATION/TRAINING PROGRAM

## 2021-07-05 PROCEDURE — 25000003 PHARM REV CODE 250: Performed by: STUDENT IN AN ORGANIZED HEALTH CARE EDUCATION/TRAINING PROGRAM

## 2021-07-05 PROCEDURE — 84100 ASSAY OF PHOSPHORUS: CPT | Performed by: STUDENT IN AN ORGANIZED HEALTH CARE EDUCATION/TRAINING PROGRAM

## 2021-07-05 PROCEDURE — 25000003 PHARM REV CODE 250: Performed by: SPECIALIST

## 2021-07-05 PROCEDURE — 80053 COMPREHEN METABOLIC PANEL: CPT | Performed by: STUDENT IN AN ORGANIZED HEALTH CARE EDUCATION/TRAINING PROGRAM

## 2021-07-05 PROCEDURE — A4216 STERILE WATER/SALINE, 10 ML: HCPCS | Performed by: SPECIALIST

## 2021-07-05 PROCEDURE — 11000001 HC ACUTE MED/SURG PRIVATE ROOM

## 2021-07-05 PROCEDURE — 63600175 PHARM REV CODE 636 W HCPCS: Performed by: SPECIALIST

## 2021-07-05 PROCEDURE — 94761 N-INVAS EAR/PLS OXIMETRY MLT: CPT

## 2021-07-05 PROCEDURE — S4991 NICOTINE PATCH NONLEGEND: HCPCS | Performed by: STUDENT IN AN ORGANIZED HEALTH CARE EDUCATION/TRAINING PROGRAM

## 2021-07-05 PROCEDURE — 27000221 HC OXYGEN, UP TO 24 HOURS

## 2021-07-05 RX ORDER — NICOTINE 7MG/24HR
1 PATCH, TRANSDERMAL 24 HOURS TRANSDERMAL DAILY
Status: DISCONTINUED | OUTPATIENT
Start: 2021-07-05 | End: 2021-07-08 | Stop reason: HOSPADM

## 2021-07-05 RX ORDER — OLANZAPINE 2.5 MG/1
5 TABLET ORAL DAILY
Status: DISCONTINUED | OUTPATIENT
Start: 2021-07-06 | End: 2021-07-08 | Stop reason: HOSPADM

## 2021-07-05 RX ORDER — DOCOSANOL 100 MG/G
1 CREAM TOPICAL 4 TIMES DAILY
Status: DISCONTINUED | OUTPATIENT
Start: 2021-07-05 | End: 2021-07-08 | Stop reason: HOSPADM

## 2021-07-05 RX ADMIN — ACETAMINOPHEN 650 MG: 325 TABLET ORAL at 09:07

## 2021-07-05 RX ADMIN — Medication 10 ML: at 05:07

## 2021-07-05 RX ADMIN — ACETAMINOPHEN 650 MG: 325 TABLET ORAL at 02:07

## 2021-07-05 RX ADMIN — DOCOSANOL 10 % TOPICAL CREAM 1 SQUIRT: at 09:07

## 2021-07-05 RX ADMIN — ENOXAPARIN SODIUM 40 MG: 40 INJECTION SUBCUTANEOUS at 09:07

## 2021-07-05 RX ADMIN — HYDROXYZINE PAMOATE 25 MG: 25 CAPSULE ORAL at 11:07

## 2021-07-05 RX ADMIN — OLANZAPINE 2.5 MG: 2.5 TABLET, FILM COATED ORAL at 09:07

## 2021-07-05 RX ADMIN — OLANZAPINE 5 MG: 2.5 TABLET, FILM COATED ORAL at 09:07

## 2021-07-05 RX ADMIN — NICOTINE 1 PATCH: 7 PATCH TRANSDERMAL at 02:07

## 2021-07-05 RX ADMIN — ATORVASTATIN CALCIUM 40 MG: 40 TABLET, FILM COATED ORAL at 09:07

## 2021-07-05 RX ADMIN — Medication 1 TABLET: at 09:07

## 2021-07-05 RX ADMIN — CLOTRIMAZOLE AND BETAMETHASONE DIPROPIONATE: 10; .5 CREAM TOPICAL at 09:07

## 2021-07-05 RX ADMIN — Medication 10 ML: at 12:07

## 2021-07-05 RX ADMIN — ASPIRIN 81 MG: 81 TABLET, COATED ORAL at 09:07

## 2021-07-05 RX ADMIN — Medication 10 ML: at 06:07

## 2021-07-05 RX ADMIN — CARVEDILOL 6.25 MG: 6.25 TABLET, FILM COATED ORAL at 09:07

## 2021-07-05 RX ADMIN — VITAMIN C (ASCORBIC ACID), CARBONYL IRON, FOLIC ACID, VITAMIN B-12 (CYANOCOBALAMIN) 1 TABLET: 250; 25; 1; 100 TABLET ORAL at 09:07

## 2021-07-05 RX ADMIN — Medication 10 ML: at 11:07

## 2021-07-05 RX ADMIN — DOCOSANOL 10 % TOPICAL CREAM 1 SQUIRT: at 06:07

## 2021-07-05 RX ADMIN — LIDOCAINE 1 PATCH: 50 PATCH CUTANEOUS at 11:07

## 2021-07-05 RX ADMIN — HYDROXYZINE PAMOATE 25 MG: 25 CAPSULE ORAL at 02:07

## 2021-07-05 RX ADMIN — CARVEDILOL 6.25 MG: 6.25 TABLET, FILM COATED ORAL at 05:07

## 2021-07-06 DIAGNOSIS — E78.5 HYPERLIPIDEMIA, UNSPECIFIED HYPERLIPIDEMIA TYPE: ICD-10-CM

## 2021-07-06 LAB
ALBUMIN SERPL BCP-MCNC: 3.5 G/DL (ref 3.5–5.2)
ALP SERPL-CCNC: 68 U/L (ref 55–135)
ALT SERPL W/O P-5'-P-CCNC: 25 U/L (ref 10–44)
ANION GAP SERPL CALC-SCNC: 9 MMOL/L (ref 8–16)
AST SERPL-CCNC: 22 U/L (ref 10–40)
BASOPHILS # BLD AUTO: 0.03 K/UL (ref 0–0.2)
BASOPHILS NFR BLD: 0.4 % (ref 0–1.9)
BILIRUB SERPL-MCNC: 0.2 MG/DL (ref 0.1–1)
BUN SERPL-MCNC: 16 MG/DL (ref 8–23)
CALCIUM SERPL-MCNC: 9.1 MG/DL (ref 8.7–10.5)
CHLORIDE SERPL-SCNC: 100 MMOL/L (ref 95–110)
CO2 SERPL-SCNC: 27 MMOL/L (ref 23–29)
CREAT SERPL-MCNC: 0.9 MG/DL (ref 0.5–1.4)
DIFFERENTIAL METHOD: ABNORMAL
EOSINOPHIL # BLD AUTO: 0.3 K/UL (ref 0–0.5)
EOSINOPHIL NFR BLD: 4.7 % (ref 0–8)
ERYTHROCYTE [DISTWIDTH] IN BLOOD BY AUTOMATED COUNT: 13.2 % (ref 11.5–14.5)
EST. GFR  (AFRICAN AMERICAN): >60 ML/MIN/1.73 M^2
EST. GFR  (NON AFRICAN AMERICAN): >60 ML/MIN/1.73 M^2
GLUCOSE SERPL-MCNC: 111 MG/DL (ref 70–110)
HCT VFR BLD AUTO: 33 % (ref 40–54)
HGB BLD-MCNC: 11.2 G/DL (ref 14–18)
IMM GRANULOCYTES # BLD AUTO: 0.04 K/UL (ref 0–0.04)
IMM GRANULOCYTES NFR BLD AUTO: 0.6 % (ref 0–0.5)
LYMPHOCYTES # BLD AUTO: 1.1 K/UL (ref 1–4.8)
LYMPHOCYTES NFR BLD: 15.4 % (ref 18–48)
MAGNESIUM SERPL-MCNC: 1.5 MG/DL (ref 1.6–2.6)
MCH RBC QN AUTO: 35.3 PG (ref 27–31)
MCHC RBC AUTO-ENTMCNC: 33.9 G/DL (ref 32–36)
MCV RBC AUTO: 104 FL (ref 82–98)
MONOCYTES # BLD AUTO: 0.8 K/UL (ref 0.3–1)
MONOCYTES NFR BLD: 11.2 % (ref 4–15)
NEUTROPHILS # BLD AUTO: 4.8 K/UL (ref 1.8–7.7)
NEUTROPHILS NFR BLD: 67.7 % (ref 38–73)
NRBC BLD-RTO: 0 /100 WBC
PHOSPHATE SERPL-MCNC: 3.6 MG/DL (ref 2.7–4.5)
PLATELET # BLD AUTO: 221 K/UL (ref 150–450)
PMV BLD AUTO: 10.6 FL (ref 9.2–12.9)
POCT GLUCOSE: 114 MG/DL (ref 70–110)
POCT GLUCOSE: 122 MG/DL (ref 70–110)
POCT GLUCOSE: 131 MG/DL (ref 70–110)
POCT GLUCOSE: 142 MG/DL (ref 70–110)
POTASSIUM SERPL-SCNC: 4.3 MMOL/L (ref 3.5–5.1)
PROT SERPL-MCNC: 6 G/DL (ref 6–8.4)
RBC # BLD AUTO: 3.17 M/UL (ref 4.6–6.2)
SODIUM SERPL-SCNC: 136 MMOL/L (ref 136–145)
WBC # BLD AUTO: 7.03 K/UL (ref 3.9–12.7)

## 2021-07-06 PROCEDURE — 85025 COMPLETE CBC W/AUTO DIFF WBC: CPT | Performed by: STUDENT IN AN ORGANIZED HEALTH CARE EDUCATION/TRAINING PROGRAM

## 2021-07-06 PROCEDURE — 97535 SELF CARE MNGMENT TRAINING: CPT | Mod: CO

## 2021-07-06 PROCEDURE — 97530 THERAPEUTIC ACTIVITIES: CPT | Mod: CQ

## 2021-07-06 PROCEDURE — A4216 STERILE WATER/SALINE, 10 ML: HCPCS | Performed by: SPECIALIST

## 2021-07-06 PROCEDURE — 25000003 PHARM REV CODE 250: Performed by: STUDENT IN AN ORGANIZED HEALTH CARE EDUCATION/TRAINING PROGRAM

## 2021-07-06 PROCEDURE — 97116 GAIT TRAINING THERAPY: CPT | Mod: CQ

## 2021-07-06 PROCEDURE — 25000003 PHARM REV CODE 250: Performed by: SPECIALIST

## 2021-07-06 PROCEDURE — 63600175 PHARM REV CODE 636 W HCPCS: Performed by: SPECIALIST

## 2021-07-06 PROCEDURE — 11000001 HC ACUTE MED/SURG PRIVATE ROOM

## 2021-07-06 PROCEDURE — 99900035 HC TECH TIME PER 15 MIN (STAT)

## 2021-07-06 PROCEDURE — 80053 COMPREHEN METABOLIC PANEL: CPT | Performed by: STUDENT IN AN ORGANIZED HEALTH CARE EDUCATION/TRAINING PROGRAM

## 2021-07-06 PROCEDURE — 84100 ASSAY OF PHOSPHORUS: CPT | Performed by: STUDENT IN AN ORGANIZED HEALTH CARE EDUCATION/TRAINING PROGRAM

## 2021-07-06 PROCEDURE — 27000221 HC OXYGEN, UP TO 24 HOURS

## 2021-07-06 PROCEDURE — 83735 ASSAY OF MAGNESIUM: CPT | Performed by: STUDENT IN AN ORGANIZED HEALTH CARE EDUCATION/TRAINING PROGRAM

## 2021-07-06 PROCEDURE — 97530 THERAPEUTIC ACTIVITIES: CPT | Mod: CO

## 2021-07-06 PROCEDURE — S4991 NICOTINE PATCH NONLEGEND: HCPCS | Performed by: STUDENT IN AN ORGANIZED HEALTH CARE EDUCATION/TRAINING PROGRAM

## 2021-07-06 RX ORDER — SIMVASTATIN 40 MG/1
40 TABLET, FILM COATED ORAL NIGHTLY
Qty: 90 TABLET | Refills: 3 | Status: CANCELLED | OUTPATIENT
Start: 2021-07-06 | End: 2022-01-02

## 2021-07-06 RX ADMIN — Medication 1 TABLET: at 09:07

## 2021-07-06 RX ADMIN — ATORVASTATIN CALCIUM 40 MG: 40 TABLET, FILM COATED ORAL at 09:07

## 2021-07-06 RX ADMIN — Medication 10 ML: at 09:07

## 2021-07-06 RX ADMIN — Medication 10 ML: at 12:07

## 2021-07-06 RX ADMIN — HYDROXYZINE PAMOATE 25 MG: 25 CAPSULE ORAL at 03:07

## 2021-07-06 RX ADMIN — HYDROXYZINE PAMOATE 25 MG: 25 CAPSULE ORAL at 11:07

## 2021-07-06 RX ADMIN — CARVEDILOL 6.25 MG: 6.25 TABLET, FILM COATED ORAL at 08:07

## 2021-07-06 RX ADMIN — Medication 10 ML: at 05:07

## 2021-07-06 RX ADMIN — DOCOSANOL 10 % TOPICAL CREAM 1 SQUIRT: at 01:07

## 2021-07-06 RX ADMIN — OLANZAPINE 5 MG: 2.5 TABLET, FILM COATED ORAL at 08:07

## 2021-07-06 RX ADMIN — Medication 10 ML: at 07:07

## 2021-07-06 RX ADMIN — CLOTRIMAZOLE AND BETAMETHASONE DIPROPIONATE: 10; .5 CREAM TOPICAL at 09:07

## 2021-07-06 RX ADMIN — OLANZAPINE 5 MG: 2.5 TABLET, FILM COATED ORAL at 09:07

## 2021-07-06 RX ADMIN — ACETAMINOPHEN 650 MG: 325 TABLET ORAL at 09:07

## 2021-07-06 RX ADMIN — DOCOSANOL 10 % TOPICAL CREAM 1 SQUIRT: at 08:07

## 2021-07-06 RX ADMIN — Medication 1 TABLET: at 08:07

## 2021-07-06 RX ADMIN — ENOXAPARIN SODIUM 40 MG: 40 INJECTION SUBCUTANEOUS at 08:07

## 2021-07-06 RX ADMIN — DOCOSANOL 10 % TOPICAL CREAM 1 SQUIRT: at 07:07

## 2021-07-06 RX ADMIN — NICOTINE 1 PATCH: 7 PATCH TRANSDERMAL at 08:07

## 2021-07-06 RX ADMIN — ACETAMINOPHEN 650 MG: 325 TABLET ORAL at 08:07

## 2021-07-06 RX ADMIN — DOCOSANOL 10 % TOPICAL CREAM 1 SQUIRT: at 09:07

## 2021-07-06 RX ADMIN — CARVEDILOL 6.25 MG: 6.25 TABLET, FILM COATED ORAL at 07:07

## 2021-07-06 RX ADMIN — LIDOCAINE 1 PATCH: 50 PATCH CUTANEOUS at 11:07

## 2021-07-06 RX ADMIN — ACETAMINOPHEN 650 MG: 325 TABLET ORAL at 03:07

## 2021-07-06 RX ADMIN — VITAMIN C (ASCORBIC ACID), CARBONYL IRON, FOLIC ACID, VITAMIN B-12 (CYANOCOBALAMIN) 1 TABLET: 250; 25; 1; 100 TABLET ORAL at 08:07

## 2021-07-06 RX ADMIN — ENOXAPARIN SODIUM 40 MG: 40 INJECTION SUBCUTANEOUS at 09:07

## 2021-07-06 RX ADMIN — ASPIRIN 81 MG: 81 TABLET, COATED ORAL at 08:07

## 2021-07-06 RX ADMIN — CLOTRIMAZOLE AND BETAMETHASONE DIPROPIONATE: 10; .5 CREAM TOPICAL at 08:07

## 2021-07-07 LAB
ALBUMIN SERPL BCP-MCNC: 3.6 G/DL (ref 3.5–5.2)
ALP SERPL-CCNC: 68 U/L (ref 55–135)
ALT SERPL W/O P-5'-P-CCNC: 27 U/L (ref 10–44)
ANION GAP SERPL CALC-SCNC: 5 MMOL/L (ref 8–16)
AST SERPL-CCNC: 24 U/L (ref 10–40)
BASOPHILS # BLD AUTO: 0.03 K/UL (ref 0–0.2)
BASOPHILS NFR BLD: 0.4 % (ref 0–1.9)
BILIRUB SERPL-MCNC: 0.4 MG/DL (ref 0.1–1)
BUN SERPL-MCNC: 14 MG/DL (ref 8–23)
CALCIUM SERPL-MCNC: 9.5 MG/DL (ref 8.7–10.5)
CHLORIDE SERPL-SCNC: 101 MMOL/L (ref 95–110)
CO2 SERPL-SCNC: 31 MMOL/L (ref 23–29)
CREAT SERPL-MCNC: 0.9 MG/DL (ref 0.5–1.4)
DIFFERENTIAL METHOD: ABNORMAL
EOSINOPHIL # BLD AUTO: 0.3 K/UL (ref 0–0.5)
EOSINOPHIL NFR BLD: 3.9 % (ref 0–8)
ERYTHROCYTE [DISTWIDTH] IN BLOOD BY AUTOMATED COUNT: 13.6 % (ref 11.5–14.5)
EST. GFR  (AFRICAN AMERICAN): >60 ML/MIN/1.73 M^2
EST. GFR  (NON AFRICAN AMERICAN): >60 ML/MIN/1.73 M^2
GLUCOSE SERPL-MCNC: 106 MG/DL (ref 70–110)
HCT VFR BLD AUTO: 34.9 % (ref 40–54)
HGB BLD-MCNC: 11.6 G/DL (ref 14–18)
IMM GRANULOCYTES # BLD AUTO: 0.04 K/UL (ref 0–0.04)
IMM GRANULOCYTES NFR BLD AUTO: 0.6 % (ref 0–0.5)
LYMPHOCYTES # BLD AUTO: 1 K/UL (ref 1–4.8)
LYMPHOCYTES NFR BLD: 14.3 % (ref 18–48)
MAGNESIUM SERPL-MCNC: 1.6 MG/DL (ref 1.6–2.6)
MCH RBC QN AUTO: 34.5 PG (ref 27–31)
MCHC RBC AUTO-ENTMCNC: 33.2 G/DL (ref 32–36)
MCV RBC AUTO: 104 FL (ref 82–98)
MONOCYTES # BLD AUTO: 0.9 K/UL (ref 0.3–1)
MONOCYTES NFR BLD: 11.8 % (ref 4–15)
NEUTROPHILS # BLD AUTO: 5 K/UL (ref 1.8–7.7)
NEUTROPHILS NFR BLD: 69 % (ref 38–73)
NRBC BLD-RTO: 0 /100 WBC
PHOSPHATE SERPL-MCNC: 3.8 MG/DL (ref 2.7–4.5)
PLATELET # BLD AUTO: 217 K/UL (ref 150–450)
PMV BLD AUTO: 10.9 FL (ref 9.2–12.9)
POCT GLUCOSE: 116 MG/DL (ref 70–110)
POCT GLUCOSE: 117 MG/DL (ref 70–110)
POCT GLUCOSE: 123 MG/DL (ref 70–110)
POTASSIUM SERPL-SCNC: 4.6 MMOL/L (ref 3.5–5.1)
PROT SERPL-MCNC: 6.6 G/DL (ref 6–8.4)
RBC # BLD AUTO: 3.36 M/UL (ref 4.6–6.2)
SODIUM SERPL-SCNC: 137 MMOL/L (ref 136–145)
WBC # BLD AUTO: 7.26 K/UL (ref 3.9–12.7)

## 2021-07-07 PROCEDURE — 25000003 PHARM REV CODE 250: Performed by: STUDENT IN AN ORGANIZED HEALTH CARE EDUCATION/TRAINING PROGRAM

## 2021-07-07 PROCEDURE — 25000003 PHARM REV CODE 250: Performed by: SPECIALIST

## 2021-07-07 PROCEDURE — 97110 THERAPEUTIC EXERCISES: CPT

## 2021-07-07 PROCEDURE — 84100 ASSAY OF PHOSPHORUS: CPT | Performed by: STUDENT IN AN ORGANIZED HEALTH CARE EDUCATION/TRAINING PROGRAM

## 2021-07-07 PROCEDURE — S4991 NICOTINE PATCH NONLEGEND: HCPCS | Performed by: STUDENT IN AN ORGANIZED HEALTH CARE EDUCATION/TRAINING PROGRAM

## 2021-07-07 PROCEDURE — 11000001 HC ACUTE MED/SURG PRIVATE ROOM

## 2021-07-07 PROCEDURE — 99900035 HC TECH TIME PER 15 MIN (STAT)

## 2021-07-07 PROCEDURE — 85025 COMPLETE CBC W/AUTO DIFF WBC: CPT | Performed by: STUDENT IN AN ORGANIZED HEALTH CARE EDUCATION/TRAINING PROGRAM

## 2021-07-07 PROCEDURE — 63600175 PHARM REV CODE 636 W HCPCS: Performed by: SPECIALIST

## 2021-07-07 PROCEDURE — 97116 GAIT TRAINING THERAPY: CPT

## 2021-07-07 PROCEDURE — A4216 STERILE WATER/SALINE, 10 ML: HCPCS | Performed by: SPECIALIST

## 2021-07-07 PROCEDURE — 97530 THERAPEUTIC ACTIVITIES: CPT | Mod: CO

## 2021-07-07 PROCEDURE — 83735 ASSAY OF MAGNESIUM: CPT | Performed by: STUDENT IN AN ORGANIZED HEALTH CARE EDUCATION/TRAINING PROGRAM

## 2021-07-07 PROCEDURE — 80053 COMPREHEN METABOLIC PANEL: CPT | Performed by: STUDENT IN AN ORGANIZED HEALTH CARE EDUCATION/TRAINING PROGRAM

## 2021-07-07 RX ORDER — LISINOPRIL 5 MG/1
10 TABLET ORAL DAILY
Status: DISCONTINUED | OUTPATIENT
Start: 2021-07-07 | End: 2021-07-08

## 2021-07-07 RX ADMIN — DOCOSANOL 10 % TOPICAL CREAM 1 SQUIRT: at 05:07

## 2021-07-07 RX ADMIN — MELATONIN 9 MG: 3 TAB ORAL at 09:07

## 2021-07-07 RX ADMIN — Medication 1 TABLET: at 09:07

## 2021-07-07 RX ADMIN — HYDROXYZINE PAMOATE 25 MG: 25 CAPSULE ORAL at 09:07

## 2021-07-07 RX ADMIN — CLOTRIMAZOLE AND BETAMETHASONE DIPROPIONATE: 10; .5 CREAM TOPICAL at 09:07

## 2021-07-07 RX ADMIN — DOCOSANOL 10 % TOPICAL CREAM 1 SQUIRT: at 12:07

## 2021-07-07 RX ADMIN — ENOXAPARIN SODIUM 40 MG: 40 INJECTION SUBCUTANEOUS at 09:07

## 2021-07-07 RX ADMIN — NICOTINE 1 PATCH: 7 PATCH TRANSDERMAL at 09:07

## 2021-07-07 RX ADMIN — ACETAMINOPHEN 650 MG: 325 TABLET ORAL at 08:07

## 2021-07-07 RX ADMIN — ASPIRIN 81 MG: 81 TABLET, COATED ORAL at 09:07

## 2021-07-07 RX ADMIN — DOCOSANOL 10 % TOPICAL CREAM 1 SQUIRT: at 09:07

## 2021-07-07 RX ADMIN — Medication 10 ML: at 05:07

## 2021-07-07 RX ADMIN — ATORVASTATIN CALCIUM 40 MG: 40 TABLET, FILM COATED ORAL at 08:07

## 2021-07-07 RX ADMIN — CARVEDILOL 6.25 MG: 6.25 TABLET, FILM COATED ORAL at 05:07

## 2021-07-07 RX ADMIN — ACETAMINOPHEN 650 MG: 325 TABLET ORAL at 03:07

## 2021-07-07 RX ADMIN — OLANZAPINE 5 MG: 2.5 TABLET, FILM COATED ORAL at 09:07

## 2021-07-07 RX ADMIN — HYDROXYZINE PAMOATE 25 MG: 25 CAPSULE ORAL at 06:07

## 2021-07-07 RX ADMIN — LISINOPRIL 10 MG: 5 TABLET ORAL at 12:07

## 2021-07-07 RX ADMIN — OLANZAPINE 5 MG: 2.5 TABLET, FILM COATED ORAL at 08:07

## 2021-07-07 RX ADMIN — ACETAMINOPHEN 650 MG: 325 TABLET ORAL at 09:07

## 2021-07-07 RX ADMIN — Medication 10 ML: at 03:07

## 2021-07-07 RX ADMIN — Medication 1 TABLET: at 08:07

## 2021-07-07 RX ADMIN — VITAMIN C (ASCORBIC ACID), CARBONYL IRON, FOLIC ACID, VITAMIN B-12 (CYANOCOBALAMIN) 1 TABLET: 250; 25; 1; 100 TABLET ORAL at 09:07

## 2021-07-07 RX ADMIN — CARVEDILOL 6.25 MG: 6.25 TABLET, FILM COATED ORAL at 09:07

## 2021-07-08 VITALS
DIASTOLIC BLOOD PRESSURE: 63 MMHG | TEMPERATURE: 98 F | HEART RATE: 76 BPM | SYSTOLIC BLOOD PRESSURE: 126 MMHG | OXYGEN SATURATION: 95 % | WEIGHT: 231.94 LBS | HEIGHT: 61 IN | RESPIRATION RATE: 18 BRPM | BODY MASS INDEX: 43.79 KG/M2

## 2021-07-08 LAB
POCT GLUCOSE: 102 MG/DL (ref 70–110)
POCT GLUCOSE: 126 MG/DL (ref 70–110)

## 2021-07-08 PROCEDURE — 63600175 PHARM REV CODE 636 W HCPCS: Performed by: SPECIALIST

## 2021-07-08 PROCEDURE — 94660 CPAP INITIATION&MGMT: CPT

## 2021-07-08 PROCEDURE — 25000003 PHARM REV CODE 250: Performed by: SPECIALIST

## 2021-07-08 PROCEDURE — 25000003 PHARM REV CODE 250: Performed by: STUDENT IN AN ORGANIZED HEALTH CARE EDUCATION/TRAINING PROGRAM

## 2021-07-08 PROCEDURE — 97530 THERAPEUTIC ACTIVITIES: CPT

## 2021-07-08 PROCEDURE — 99900035 HC TECH TIME PER 15 MIN (STAT)

## 2021-07-08 PROCEDURE — 97535 SELF CARE MNGMENT TRAINING: CPT

## 2021-07-08 PROCEDURE — A4216 STERILE WATER/SALINE, 10 ML: HCPCS | Performed by: SPECIALIST

## 2021-07-08 PROCEDURE — S4991 NICOTINE PATCH NONLEGEND: HCPCS | Performed by: STUDENT IN AN ORGANIZED HEALTH CARE EDUCATION/TRAINING PROGRAM

## 2021-07-08 RX ORDER — OLANZAPINE 5 MG/1
5 TABLET ORAL 2 TIMES DAILY
Qty: 30 TABLET | Refills: 0
Start: 2021-07-08 | End: 2022-03-28 | Stop reason: SDUPTHER

## 2021-07-08 RX ORDER — OLANZAPINE 5 MG/1
5 TABLET ORAL NIGHTLY
Qty: 30 TABLET | Refills: 0
Start: 2021-07-08 | End: 2021-07-08 | Stop reason: SDUPTHER

## 2021-07-08 RX ORDER — LISINOPRIL 20 MG/1
20 TABLET ORAL DAILY
Status: DISCONTINUED | OUTPATIENT
Start: 2021-07-09 | End: 2021-07-08 | Stop reason: HOSPADM

## 2021-07-08 RX ADMIN — Medication 1 TABLET: at 08:07

## 2021-07-08 RX ADMIN — Medication 10 ML: at 12:07

## 2021-07-08 RX ADMIN — DOCOSANOL 10 % TOPICAL CREAM 1 SQUIRT: at 01:07

## 2021-07-08 RX ADMIN — DOCOSANOL 10 % TOPICAL CREAM 1 SQUIRT: at 08:07

## 2021-07-08 RX ADMIN — CLOTRIMAZOLE AND BETAMETHASONE DIPROPIONATE: 10; .5 CREAM TOPICAL at 08:07

## 2021-07-08 RX ADMIN — ASPIRIN 81 MG: 81 TABLET, COATED ORAL at 08:07

## 2021-07-08 RX ADMIN — LIDOCAINE 1 PATCH: 50 PATCH CUTANEOUS at 12:07

## 2021-07-08 RX ADMIN — Medication 10 ML: at 06:07

## 2021-07-08 RX ADMIN — LISINOPRIL 10 MG: 5 TABLET ORAL at 08:07

## 2021-07-08 RX ADMIN — VITAMIN C (ASCORBIC ACID), CARBONYL IRON, FOLIC ACID, VITAMIN B-12 (CYANOCOBALAMIN) 1 TABLET: 250; 25; 1; 100 TABLET ORAL at 08:07

## 2021-07-08 RX ADMIN — OLANZAPINE 5 MG: 2.5 TABLET, FILM COATED ORAL at 08:07

## 2021-07-08 RX ADMIN — NICOTINE 1 PATCH: 7 PATCH TRANSDERMAL at 08:07

## 2021-07-08 RX ADMIN — ACETAMINOPHEN 650 MG: 325 TABLET ORAL at 08:07

## 2021-07-08 RX ADMIN — ENOXAPARIN SODIUM 40 MG: 40 INJECTION SUBCUTANEOUS at 08:07

## 2021-07-08 RX ADMIN — CARVEDILOL 6.25 MG: 6.25 TABLET, FILM COATED ORAL at 08:07

## 2021-07-15 ENCOUNTER — LAB VISIT (OUTPATIENT)
Dept: LAB | Facility: OTHER | Age: 66
End: 2021-07-15
Payer: MEDICARE

## 2021-07-15 DIAGNOSIS — Z20.822 ENCOUNTER FOR LABORATORY TESTING FOR COVID-19 VIRUS: ICD-10-CM

## 2021-07-15 PROCEDURE — U0003 INFECTIOUS AGENT DETECTION BY NUCLEIC ACID (DNA OR RNA); SEVERE ACUTE RESPIRATORY SYNDROME CORONAVIRUS 2 (SARS-COV-2) (CORONAVIRUS DISEASE [COVID-19]), AMPLIFIED PROBE TECHNIQUE, MAKING USE OF HIGH THROUGHPUT TECHNOLOGIES AS DESCRIBED BY CMS-2020-01-R: HCPCS | Performed by: NURSE PRACTITIONER

## 2021-07-16 LAB
SARS-COV-2 RNA RESP QL NAA+PROBE: NOT DETECTED
SARS-COV-2- CYCLE NUMBER: -1

## 2021-07-29 ENCOUNTER — LAB VISIT (OUTPATIENT)
Dept: LAB | Facility: OTHER | Age: 66
End: 2021-07-29
Payer: MEDICARE

## 2021-07-29 DIAGNOSIS — Z20.822 ENCOUNTER FOR LABORATORY TESTING FOR COVID-19 VIRUS: ICD-10-CM

## 2021-07-29 PROCEDURE — U0003 INFECTIOUS AGENT DETECTION BY NUCLEIC ACID (DNA OR RNA); SEVERE ACUTE RESPIRATORY SYNDROME CORONAVIRUS 2 (SARS-COV-2) (CORONAVIRUS DISEASE [COVID-19]), AMPLIFIED PROBE TECHNIQUE, MAKING USE OF HIGH THROUGHPUT TECHNOLOGIES AS DESCRIBED BY CMS-2020-01-R: HCPCS | Performed by: NURSE PRACTITIONER

## 2021-07-30 LAB
SARS-COV-2 RNA RESP QL NAA+PROBE: NOT DETECTED
SARS-COV-2- CYCLE NUMBER: -1

## 2021-08-05 ENCOUNTER — LAB VISIT (OUTPATIENT)
Dept: LAB | Facility: OTHER | Age: 66
End: 2021-08-05
Payer: MEDICARE

## 2021-08-05 DIAGNOSIS — Z20.822 ENCOUNTER FOR LABORATORY TESTING FOR COVID-19 VIRUS: ICD-10-CM

## 2021-08-05 PROCEDURE — U0003 INFECTIOUS AGENT DETECTION BY NUCLEIC ACID (DNA OR RNA); SEVERE ACUTE RESPIRATORY SYNDROME CORONAVIRUS 2 (SARS-COV-2) (CORONAVIRUS DISEASE [COVID-19]), AMPLIFIED PROBE TECHNIQUE, MAKING USE OF HIGH THROUGHPUT TECHNOLOGIES AS DESCRIBED BY CMS-2020-01-R: HCPCS | Performed by: NURSE PRACTITIONER

## 2021-08-07 LAB
SARS-COV-2 RNA RESP QL NAA+PROBE: NORMAL
TEST PERFORMANCE INFO SPEC: NORMAL

## 2021-08-11 ENCOUNTER — TELEPHONE (OUTPATIENT)
Dept: SMOKING CESSATION | Facility: CLINIC | Age: 66
End: 2021-08-11

## 2021-08-12 ENCOUNTER — LAB VISIT (OUTPATIENT)
Dept: LAB | Facility: OTHER | Age: 66
End: 2021-08-12
Payer: MEDICARE

## 2021-08-12 DIAGNOSIS — Z20.822 ENCOUNTER FOR LABORATORY TESTING FOR COVID-19 VIRUS: ICD-10-CM

## 2021-08-12 PROCEDURE — U0003 INFECTIOUS AGENT DETECTION BY NUCLEIC ACID (DNA OR RNA); SEVERE ACUTE RESPIRATORY SYNDROME CORONAVIRUS 2 (SARS-COV-2) (CORONAVIRUS DISEASE [COVID-19]), AMPLIFIED PROBE TECHNIQUE, MAKING USE OF HIGH THROUGHPUT TECHNOLOGIES AS DESCRIBED BY CMS-2020-01-R: HCPCS | Performed by: NURSE PRACTITIONER

## 2021-08-13 LAB
SARS-COV-2 RNA RESP QL NAA+PROBE: NOT DETECTED
SARS-COV-2- CYCLE NUMBER: -1

## 2021-08-17 ENCOUNTER — TELEPHONE (OUTPATIENT)
Dept: SMOKING CESSATION | Facility: CLINIC | Age: 66
End: 2021-08-17

## 2021-08-26 ENCOUNTER — LAB VISIT (OUTPATIENT)
Dept: LAB | Facility: OTHER | Age: 66
End: 2021-08-26
Payer: MEDICAID

## 2021-08-26 DIAGNOSIS — Z20.822 ENCOUNTER FOR LABORATORY TESTING FOR COVID-19 VIRUS: ICD-10-CM

## 2021-08-26 PROCEDURE — U0003 INFECTIOUS AGENT DETECTION BY NUCLEIC ACID (DNA OR RNA); SEVERE ACUTE RESPIRATORY SYNDROME CORONAVIRUS 2 (SARS-COV-2) (CORONAVIRUS DISEASE [COVID-19]), AMPLIFIED PROBE TECHNIQUE, MAKING USE OF HIGH THROUGHPUT TECHNOLOGIES AS DESCRIBED BY CMS-2020-01-R: HCPCS | Performed by: NURSE PRACTITIONER

## 2021-08-29 LAB — SARS-COV-2 RNA RESP QL NAA+PROBE: NOT DETECTED

## 2022-03-20 ENCOUNTER — HOSPITAL ENCOUNTER (EMERGENCY)
Facility: HOSPITAL | Age: 67
Discharge: HOME | End: 2022-03-21
Attending: EMERGENCY MEDICINE
Payer: MEDICARE

## 2022-03-20 DIAGNOSIS — L03.116 CELLULITIS OF LEFT LEG: Primary | ICD-10-CM

## 2022-03-20 DIAGNOSIS — I87.2 VENOUS STASIS DERMATITIS OF BOTH LOWER EXTREMITIES: ICD-10-CM

## 2022-03-20 DIAGNOSIS — M79.605 LEFT LEG PAIN: ICD-10-CM

## 2022-03-20 PROCEDURE — 99283 EMERGENCY DEPT VISIT LOW MDM: CPT

## 2022-03-20 PROCEDURE — 25000003 PHARM REV CODE 250: Performed by: EMERGENCY MEDICINE

## 2022-03-20 RX ORDER — OXYCODONE AND ACETAMINOPHEN 5; 325 MG/1; MG/1
1 TABLET ORAL
Status: COMPLETED | OUTPATIENT
Start: 2022-03-20 | End: 2022-03-20

## 2022-03-20 RX ORDER — CEPHALEXIN 500 MG/1
500 CAPSULE ORAL
Status: COMPLETED | OUTPATIENT
Start: 2022-03-20 | End: 2022-03-20

## 2022-03-20 RX ADMIN — CEPHALEXIN 500 MG: 500 CAPSULE ORAL at 11:03

## 2022-03-20 RX ADMIN — OXYCODONE HYDROCHLORIDE AND ACETAMINOPHEN 1 TABLET: 5; 325 TABLET ORAL at 11:03

## 2022-03-21 VITALS
OXYGEN SATURATION: 98 % | RESPIRATION RATE: 18 BRPM | TEMPERATURE: 99 F | BODY MASS INDEX: 44.37 KG/M2 | HEART RATE: 80 BPM | HEIGHT: 61 IN | WEIGHT: 235 LBS | SYSTOLIC BLOOD PRESSURE: 159 MMHG | DIASTOLIC BLOOD PRESSURE: 83 MMHG

## 2022-03-21 LAB
ALBUMIN SERPL BCP-MCNC: 3.7 G/DL (ref 3.5–5.2)
ALP SERPL-CCNC: 71 U/L (ref 55–135)
ALT SERPL W/O P-5'-P-CCNC: 17 U/L (ref 10–44)
ANION GAP SERPL CALC-SCNC: 10 MMOL/L (ref 8–16)
AST SERPL-CCNC: 18 U/L (ref 10–40)
BILIRUB SERPL-MCNC: 0.5 MG/DL (ref 0.1–1)
BUN SERPL-MCNC: 20 MG/DL (ref 8–23)
CALCIUM SERPL-MCNC: 9.6 MG/DL (ref 8.7–10.5)
CHLORIDE SERPL-SCNC: 96 MMOL/L (ref 95–110)
CO2 SERPL-SCNC: 32 MMOL/L (ref 23–29)
CREAT SERPL-MCNC: 1.1 MG/DL (ref 0.5–1.4)
ERYTHROCYTE [DISTWIDTH] IN BLOOD BY AUTOMATED COUNT: 15.7 % (ref 11.5–14.5)
EST. GFR  (AFRICAN AMERICAN): >60 ML/MIN/1.73 M^2
EST. GFR  (NON AFRICAN AMERICAN): >60 ML/MIN/1.73 M^2
GLUCOSE SERPL-MCNC: 143 MG/DL (ref 70–110)
HCT VFR BLD AUTO: 44.6 % (ref 40–54)
HGB BLD-MCNC: 15 G/DL (ref 14–18)
MCH RBC QN AUTO: 34.6 PG (ref 27–31)
MCHC RBC AUTO-ENTMCNC: 33.6 G/DL (ref 32–36)
MCV RBC AUTO: 103 FL (ref 82–98)
PLATELET # BLD AUTO: 217 K/UL (ref 150–450)
PMV BLD AUTO: 12.3 FL (ref 9.2–12.9)
POTASSIUM SERPL-SCNC: 4 MMOL/L (ref 3.5–5.1)
PROT SERPL-MCNC: 7.2 G/DL (ref 6–8.4)
RBC # BLD AUTO: 4.34 M/UL (ref 4.6–6.2)
SODIUM SERPL-SCNC: 138 MMOL/L (ref 136–145)
WBC # BLD AUTO: 10.72 K/UL (ref 3.9–12.7)

## 2022-03-21 PROCEDURE — 85027 COMPLETE CBC AUTOMATED: CPT | Performed by: EMERGENCY MEDICINE

## 2022-03-21 PROCEDURE — 80053 COMPREHEN METABOLIC PANEL: CPT | Performed by: EMERGENCY MEDICINE

## 2022-03-21 RX ORDER — CEPHALEXIN 500 MG/1
500 CAPSULE ORAL EVERY 6 HOURS
Qty: 28 CAPSULE | Refills: 0 | Status: SHIPPED | OUTPATIENT
Start: 2022-03-21 | End: 2022-03-28

## 2022-03-21 NOTE — ED NOTES
"Pt c/o LLE pain/ swelling s/t "cellulitis." No other complaints. +A/O x 4 w/ ABCs intact, NAD. VSS. ED workup and orders in progress. Pt SR up x 2. Bed in lowest position with wheels locked. Call bell within reach of pt.     Review of patient's allergies indicates:   Allergen Reactions    Iodine and iodide containing products     Lexapro [escitalopram] Other (See Comments)     Hyponatremia    Shellfish containing products         Patient has verified the spelling of their name and  on armband.   APPEARANCE: Patient is alert, calm, oriented x 4, and does not appear distressed.  SKIN: Skin is normal for race, warm, and dry. Normal skin turgor and mucous membranes moist.  CARDIAC: Normal rate and rhythm, no murmur heard.   RESPIRATORY:Normal rate and effort. Breath sounds clear bilaterally throughout chest. Respirations are equal and unlabored.    GASTRO: Bowel sounds normal, abdomen is soft, no tenderness, and no abdominal distention.  MUSCLE: Full ROM. No bony tenderness or soft tissue tenderness. No obvious deformity.  PERIPHERAL VASCULAR: peripheral pulses present. Normal cap refill. +Edema to LLE and redness. Warm to touch.  NEURO: 5/5 strength major flexors/extensors bilaterally. Sensory intact to light touch bilaterally. Guy coma scale: eyes open spontaneously-4, oriented & converses-5, obeys commands-6. No neurological abnormalities.   MENTAL STATUS: awake, alert and aware of environment.  EYE: No overt deficits noted. No drainage. Sclera WNL  ENT: EARS: no obvious drainage. NOSE: no active bleeding. THROAT: no redness or swelling.      "

## 2022-03-21 NOTE — ED PROVIDER NOTES
Encounter Date: 3/20/2022       History     Chief Complaint   Patient presents with    Leg Pain     Pt c/o cellulitis to LLE x 2 weeks. Pt reports hx of cellulitis. LLE is reddened and weeping, skin is flaking and discolored.      Reji Guerra is a 66 y.o. male who  has a past medical history of Diabetes mellitus, type 2, Heart disease, High cholesterol, and Hypertension.    The patient presents to the ED due to L leg pain.   Patient reports symptoms started 2 weeks ago.  He describes pain and redness along the left shin that is similar to prior episodes of cellulitis.  He reports a prior diagnosis of cellulitis about 6 months ago, and was treated with antibiotics, though he does not know the name.  Denies any fever, nausea/vomiting, body aches, chills, or any other systemic symptoms.  He has not been taking anything for pain other than aspirin at home.  He denies any weakness/numbness, recent injury or trauma, or any other complaints or concerns.          Review of patient's allergies indicates:   Allergen Reactions    Iodine and iodide containing products     Lexapro [escitalopram] Other (See Comments)     Hyponatremia    Shellfish containing products      Past Medical History:   Diagnosis Date    Diabetes mellitus, type 2     Heart disease     High cholesterol     Hypertension      Past Surgical History:   Procedure Laterality Date    ANGIOPLASTY      HERNIA REPAIR       No family history on file.  Social History     Tobacco Use    Smoking status: Current Every Day Smoker     Packs/day: 1.00     Years: 55.00     Pack years: 55.00     Types: Cigarettes     Start date: 1965    Smokeless tobacco: Never Used    Tobacco comment: Pt enrolled in the [employer] on 7/20/14 ([employer] Member ID # [id number]). Ambulatory referral to Smoking Cessation program    Substance Use Topics    Alcohol use: Yes     Alcohol/week: 24.0 standard drinks     Types: 12 Cans of beer, 12 Standard drinks or equivalent per week     Drug use: No     Review of Systems   Constitutional: Negative for chills and fever.   HENT: Negative for sore throat.    Respiratory: Negative for shortness of breath.    Cardiovascular: Negative for chest pain.   Gastrointestinal: Negative for constipation, diarrhea, nausea and vomiting.   Genitourinary: Negative for dysuria, frequency and urgency.   Musculoskeletal: Positive for myalgias. Negative for back pain.   Skin: Positive for rash. Negative for wound.   Neurological: Negative for weakness.   Hematological: Does not bruise/bleed easily.   Psychiatric/Behavioral: Negative for agitation, behavioral problems and confusion.       Physical Exam     Initial Vitals [03/20/22 2232]   BP Pulse Resp Temp SpO2   (!) 155/72 79 20 98.7 °F (37.1 °C) 96 %      MAP       --         Physical Exam    Nursing note and vitals reviewed.  Constitutional: He appears well-developed and well-nourished. He is not diaphoretic. No distress.   HENT:   Head: Normocephalic and atraumatic.   Mouth/Throat: Oropharynx is clear and moist.   Eyes: EOM are normal. Pupils are equal, round, and reactive to light.   Neck: No tracheal deviation present.   Cardiovascular: Normal rate, regular rhythm, normal heart sounds and intact distal pulses.   Pulmonary/Chest: Breath sounds normal. No stridor. No respiratory distress.   Abdominal: Abdomen is soft. He exhibits no distension and no mass. There is no abdominal tenderness.   Musculoskeletal:         General: No edema. Normal range of motion.     Neurological: He is alert and oriented to person, place, and time. No cranial nerve deficit or sensory deficit.   Skin: Skin is warm and dry. Capillary refill takes less than 2 seconds. Rash noted. There is erythema.        Chronic venous stasis changes to both legs noted.  Moderate erythema and tenderness involving the left shin.  No bleeding, drainage, or purulence noted.  Distal pulses intact.     Psychiatric: He has a normal mood and affect. His behavior  is normal. Thought content normal.         ED Course   Procedures  Labs Reviewed   CBC WITHOUT DIFFERENTIAL - Abnormal; Notable for the following components:       Result Value    RBC 4.34 (*)      (*)     MCH 34.6 (*)     RDW 15.7 (*)     All other components within normal limits   COMPREHENSIVE METABOLIC PANEL - Abnormal; Notable for the following components:    CO2 32 (*)     Glucose 143 (*)     All other components within normal limits          Imaging Results    None          Medications   cephALEXin capsule 500 mg (500 mg Oral Given 3/20/22 2345)   oxyCODONE-acetaminophen 5-325 mg per tablet 1 tablet (1 tablet Oral Given 3/20/22 2344)     Medical Decision Making:   History:   Old Medical Records: I decided to obtain old medical records.  Old Records Summarized: records from clinic visits.       <> Summary of Records: History of cellulitis in the past, treated with Augmentin, but switched to Keflex due to GI side effects.  Initial Assessment:   67 yo M with L leg pain and redness, consistent with prior episodes of cellulitis.   Will obtain labs, treat pain, give po ABX, and reassess.  Differential Diagnosis:   Differential Diagnosis includes, but is not limited to:  Cellulitis, abscess, necrotizing fasciitis, osteomyelitis, septic joint, MRSA, DVT, drug eruption, allergic/contact dermatitis, EM/SJS, viral exanthem, local trauma/contusion    Clinical Tests:   Lab Tests: Ordered and Reviewed    On re-evaluation, the patient's status has improved.  After complete ED evaluation, clinical impression is most consistent with celluliti.  PCP follow-up within 2-3 days was recommended.    After taking into careful account the patient's history, physical exam findings, as well as empirical and objective data obtained throughout ED workup, I feel no emergent medical condition has been identified. No further evaluation or admission was felt to be required, and the patient is stable for discharge from the ED. The  patient and any additional family present were updated with test results, overall clinical impression, and recommended further plan of care, including discharge instructions as provided and outpatient follow-up for continued evaluation and management as needed. All questions were answered. The patient expressed understanding and agreed with current plan for discharge and follow-up plan of care. Strict ED return precautions were provided, including return/worsening of current symptoms, new symptoms, or any other concerns.               ED Course as of 03/21/22 2138   Mon Mar 21, 2022   0211 Labs unremarkable.  Patient given pain medication and dose of Keflex in ER.  Skin marker used to indicate borders of cellulitis.  Patient instructed to continue antibiotics for the next week.  He was instructed to follow-up with his PCP for re-evaluation.  He was instructed to return to the ER if the cellulitis is spreading despite taking antibiotics for 48 hours, if he develops high fever, worsening pain, or any other concerns. [SS]      ED Course User Index  [SS] Solitario Ortiz MD             Clinical Impression:   Final diagnoses:  [L03.116] Cellulitis of left leg (Primary)  [M79.605] Left leg pain  [I87.2] Venous stasis dermatitis of both lower extremities          ED Disposition Condition    Discharge Stable        ED Prescriptions     Medication Sig Dispense Start Date End Date Auth. Provider    cephALEXin (KEFLEX) 500 MG capsule Take 1 capsule (500 mg total) by mouth every 6 (six) hours. for 7 days 28 capsule 3/21/2022 3/28/2022 Solitario Ortiz MD        Follow-up Information     Follow up With Specialties Details Why Contact Info    Laury Parks MD Family Medicine Schedule an appointment as soon as possible for a visit   [address] [facility]  [address] [place] [id number] [phone]             Solitario Ortiz MD  03/21/22 7913

## 2022-03-28 DIAGNOSIS — F41.9 ANXIETY: ICD-10-CM

## 2022-03-28 DIAGNOSIS — I73.9 PAD (PERIPHERAL ARTERY DISEASE): ICD-10-CM

## 2022-03-29 RX ORDER — ESCITALOPRAM OXALATE 20 MG/1
20 TABLET ORAL DAILY
Qty: 90 TABLET | Refills: 0 | Status: SHIPPED | OUTPATIENT
Start: 2022-03-29 | End: 2022-06-15 | Stop reason: SDUPTHER

## 2022-03-29 RX ORDER — FUROSEMIDE 20 MG/1
20 TABLET ORAL DAILY
Qty: 90 TABLET | Refills: 0 | Status: SHIPPED | OUTPATIENT
Start: 2022-03-29 | End: 2022-06-15 | Stop reason: SDUPTHER

## 2022-03-29 RX ORDER — OLANZAPINE 5 MG/1
5 TABLET ORAL 2 TIMES DAILY
Qty: 30 TABLET | Refills: 0
Start: 2022-03-29 | End: 2022-06-27

## 2022-04-25 DIAGNOSIS — E78.5 HYPERLIPIDEMIA, UNSPECIFIED HYPERLIPIDEMIA TYPE: ICD-10-CM

## 2022-04-25 RX ORDER — HYDROCHLOROTHIAZIDE 25 MG/1
25 TABLET ORAL DAILY
COMMUNITY
Start: 2022-01-17 | End: 2022-04-25 | Stop reason: SDUPTHER

## 2022-04-26 RX ORDER — HYDROCHLOROTHIAZIDE 25 MG/1
25 TABLET ORAL DAILY
Qty: 30 TABLET | Refills: 2 | Status: SHIPPED | OUTPATIENT
Start: 2022-04-26 | End: 2022-07-25

## 2022-04-26 RX ORDER — SIMVASTATIN 40 MG/1
40 TABLET, FILM COATED ORAL NIGHTLY
Qty: 90 TABLET | Refills: 3 | Status: SHIPPED | OUTPATIENT
Start: 2022-04-26 | End: 2022-10-23

## 2022-05-02 ENCOUNTER — TELEPHONE (OUTPATIENT)
Dept: FAMILY MEDICINE | Facility: HOSPITAL | Age: 67
End: 2022-05-02
Payer: MEDICARE

## 2022-05-02 DIAGNOSIS — E11.65 TYPE 2 DIABETES MELLITUS WITH HYPERGLYCEMIA, WITHOUT LONG-TERM CURRENT USE OF INSULIN: ICD-10-CM

## 2022-05-02 RX ORDER — METFORMIN HYDROCHLORIDE 500 MG/1
500 TABLET ORAL 2 TIMES DAILY WITH MEALS
Qty: 180 TABLET | Refills: 3 | Status: CANCELLED | OUTPATIENT
Start: 2022-05-02 | End: 2022-10-29

## 2022-05-02 NOTE — TELEPHONE ENCOUNTER
----- Message from Faviola Darden sent at 4/29/2022  2:59 PM CDT -----  Pt need a refill on his meds   metFORMIN (GLUCOPHAGE) 500 MG tablet  carvediloL (COREG) 6.25 MG tablet

## 2022-05-13 DIAGNOSIS — E11.65 TYPE 2 DIABETES MELLITUS WITH HYPERGLYCEMIA, WITHOUT LONG-TERM CURRENT USE OF INSULIN: ICD-10-CM

## 2022-05-13 DIAGNOSIS — I10 ESSENTIAL HYPERTENSION: ICD-10-CM

## 2022-05-13 RX ORDER — CARVEDILOL 6.25 MG/1
6.25 TABLET ORAL 2 TIMES DAILY
Qty: 180 TABLET | Refills: 3 | Status: SHIPPED | OUTPATIENT
Start: 2022-05-13

## 2022-05-13 RX ORDER — METFORMIN HYDROCHLORIDE 500 MG/1
500 TABLET ORAL 2 TIMES DAILY WITH MEALS
Qty: 60 TABLET | Refills: 11 | Status: SHIPPED | OUTPATIENT
Start: 2022-05-13 | End: 2023-05-13

## 2022-06-15 DIAGNOSIS — F41.9 ANXIETY: ICD-10-CM

## 2022-06-15 DIAGNOSIS — I73.9 PAD (PERIPHERAL ARTERY DISEASE): ICD-10-CM

## 2022-06-17 RX ORDER — ESCITALOPRAM OXALATE 20 MG/1
20 TABLET ORAL DAILY
Qty: 30 TABLET | Refills: 0 | Status: SHIPPED | OUTPATIENT
Start: 2022-06-17 | End: 2022-07-17

## 2022-06-17 RX ORDER — FUROSEMIDE 20 MG/1
20 TABLET ORAL DAILY
Qty: 30 TABLET | Refills: 0 | Status: SHIPPED | OUTPATIENT
Start: 2022-06-17 | End: 2022-07-17

## 2023-02-14 NOTE — PT/OT/SLP PROGRESS
Physical Therapy Treatment    Patient Name:  Reji Guerra   MRN:  [id number]    Recommendations:     Discharge Recommendations:  home with home health   Discharge Equipment Recommendations: bedside commode, bath bench   Barriers to discharge: Decreased caregiver support    Assessment:     Reji Guerra is a 62 y.o. male admitted with a medical diagnosis of Cellulitis of left lower extremity without foot.  He presents with the following impairments/functional limitations:  weakness, gait instability, impaired balance, impaired self care skills, impaired functional mobilty, impaired cognition, decreased safety awareness, decreased lower extremity function, decreased ROM, edema, impaired skin, pain Patient self limiting 2/2 pain .    Rehab Prognosis:  Fair +; patient would benefit from acute skilled PT services to address these deficits and reach maximum level of function.      Recent Surgery: * No surgery found *      Plan:     During this hospitalization, patient to be seen 6 x/week to address the above listed problems via gait training, therapeutic activities, therapeutic exercises  · Plan of Care Expires:  01/19/18   Plan of Care Reviewed with: patient    Subjective     Communicated with primary nurse prior to session.  Patient found supine upon PT entry to room, agreeable to treatment.      Chief Complaint: pain  Patient comments/goals: go home  Pain/Comfort:  · Pain Rating 1: 6/10  · Location - Side 1: Left  · Location 1: leg  · Pain Addressed 1: Distraction, Reposition  · Pain Rating Post-Intervention 1: 5/10    Patients cultural, spiritual, Sabianist conflicts given the current situation:      Objective:     Patient found with: telemetry     General Precautions: Standard, fall   Orthopedic Precautions:N/A   Braces:       Functional Mobility:  · Bed Mobility:     · Scooting: contact guard assistance      AM-PAC 6 CLICK MOBILITY  Turning over in bed (including adjusting bedclothes, sheets and blankets)?:  3  Sitting down on and standing up from a chair with arms (e.g., wheelchair, bedside commode, etc.): 3  Moving from lying on back to sitting on the side of the bed?: 3  Moving to and from a bed to a chair (including a wheelchair)?: 3  Need to walk in hospital room?: 2  Climbing 3-5 steps with a railing?: 2  Total Score: 16       Therapeutic Activities and Exercises:   performed supine exercises in SLR, Hip ABD/ADD.ankle pumps and heel slide. Needed assist with LLE 2/2 pain complaints    Patient left HOB elevated with all lines intact and call button in reach..    GOALS:    Physical Therapy Goals        Problem: Physical Therapy Goal    Goal Priority Disciplines Outcome Goal Variances Interventions   Physical Therapy Goal     PT/OT, PT Ongoing (interventions implemented as appropriate)     Description:  Goals to be met by: 2018     Patient will increase functional independence with mobility by performin. Supine to sit with Modified [place]  2. Sit to stand transfer with Modified [place]  3. Gait  x 150 feet with Modified [place]bilt using Rolling Walker.              Problem: Physical Therapy Goal    Goal Priority Disciplines Outcome Goal Variances Interventions   Physical Therapy Goal     PT/OT, PT                      Time Tracking:     PT Received On: 17  PT Start Time: 0930     PT Stop Time: 0955  PT Total Time (min): 25 min     Billable Minutes: Therapeutic Activity 10 and Therapeutic Exercise 15    Treatment Type: Treatment, 6th Visit  PT/PTA: PT     PTA Visit Number: 0     Mike Brody, PT  2017   Quality 130: Documentation Of Current Medications In The Medical Record: Current Medications Documented Detail Level: Detailed Quality 110: Preventive Care And Screening: Influenza Immunization: Influenza Immunization previously received during influenza season
